# Patient Record
Sex: FEMALE | Race: WHITE | NOT HISPANIC OR LATINO | Employment: OTHER | URBAN - NONMETROPOLITAN AREA
[De-identification: names, ages, dates, MRNs, and addresses within clinical notes are randomized per-mention and may not be internally consistent; named-entity substitution may affect disease eponyms.]

---

## 2017-01-15 ENCOUNTER — TRANSFERRED RECORDS (OUTPATIENT)
Dept: HEALTH INFORMATION MANAGEMENT | Facility: HOSPITAL | Age: 37
End: 2017-01-15

## 2017-01-15 LAB
ALT SERPL-CCNC: 15 IU/L (ref 6–31)
AST SERPL-CCNC: 24 IU/L (ref 10–40)
CREAT SERPL-MCNC: 0.8 MG/DL (ref 0.4–1)
GLUCOSE SERPL-MCNC: 71 MG/DL (ref 70–100)
POTASSIUM SERPL-SCNC: 3 MEQ/L (ref 3.4–5.1)

## 2017-01-18 ENCOUNTER — TRANSFERRED RECORDS (OUTPATIENT)
Dept: HEALTH INFORMATION MANAGEMENT | Facility: HOSPITAL | Age: 37
End: 2017-01-18

## 2017-01-18 LAB
CREAT SERPL-MCNC: 0.81 MG/DL (ref 0.4–1)
GLUCOSE SERPL-MCNC: 82 MG/DL (ref 70–100)
POTASSIUM SERPL-SCNC: 4.8 MEQ/L (ref 3.4–5.1)

## 2017-01-23 ENCOUNTER — TELEPHONE (OUTPATIENT)
Dept: FAMILY MEDICINE | Facility: OTHER | Age: 37
End: 2017-01-23

## 2017-01-23 DIAGNOSIS — K21.9 GASTROESOPHAGEAL REFLUX DISEASE WITHOUT ESOPHAGITIS: Primary | ICD-10-CM

## 2017-01-23 RX ORDER — OMEPRAZOLE 40 MG/1
CAPSULE, DELAYED RELEASE ORAL
Qty: 90 CAPSULE | Refills: 1 | Status: SHIPPED | OUTPATIENT
Start: 2017-01-23 | End: 2017-01-26

## 2017-01-23 NOTE — TELEPHONE ENCOUNTER
Reason for call:  Medication    1. Medication Name? omeprazole (PRILOSEC) 40 MG capsule  2. Is this request for a refill? Yes  3. What Pharmacy do you use? [facility]  4. Have you contacted your pharmacy? No    5. If yes, when?  (Please note that the turn-around-time for prescriptions is 72 business hours; I am sending your request at this time. SEND TO  [facility]  )  Description: Pt states the insurance sent a letter in December stating that they will not cover anymore refills until Provider okay's it.  Pt has a med review appt with Rad Anderson 01/26/2017 because pt is coming in with multiple concerns and HOMER Dailey didn't have any openings this week.  Pt states she is currently out of the medication being requested and is hoping provider will refill it in the meantime.  Nurse, please advise.  Was an appointment offered for this a call? Yes, 01/26/2017 med review and other multiple concerns with Rad Anderson  Preferred method for responding to this messageTelephone Call: [phone]   If we cannot reach you directly, may we leave a detailed response at the number you provided? Yes  Can this message wait until your PCP/Provider returns if not available today? Not applicable, PCP Asim is in today.

## 2017-01-26 ENCOUNTER — OFFICE VISIT (OUTPATIENT)
Dept: FAMILY MEDICINE | Facility: OTHER | Age: 37
End: 2017-01-26
Attending: NURSE PRACTITIONER
Payer: COMMERCIAL

## 2017-01-26 VITALS
HEART RATE: 100 BPM | DIASTOLIC BLOOD PRESSURE: 72 MMHG | BODY MASS INDEX: 25.1 KG/M2 | WEIGHT: 147 LBS | RESPIRATION RATE: 14 BRPM | SYSTOLIC BLOOD PRESSURE: 122 MMHG | HEIGHT: 64 IN | TEMPERATURE: 99.3 F

## 2017-01-26 DIAGNOSIS — R11.0 NAUSEA: ICD-10-CM

## 2017-01-26 DIAGNOSIS — F41.9 ANXIETY: ICD-10-CM

## 2017-01-26 DIAGNOSIS — G43.009 MIGRAINE WITHOUT AURA AND WITHOUT STATUS MIGRAINOSUS, NOT INTRACTABLE: ICD-10-CM

## 2017-01-26 DIAGNOSIS — F51.01 PRIMARY INSOMNIA: ICD-10-CM

## 2017-01-26 DIAGNOSIS — Z71.89 ACP (ADVANCE CARE PLANNING): Primary | ICD-10-CM

## 2017-01-26 DIAGNOSIS — K21.9 GASTROESOPHAGEAL REFLUX DISEASE WITHOUT ESOPHAGITIS: ICD-10-CM

## 2017-01-26 LAB — ERYTHROCYTE [SEDIMENTATION RATE] IN BLOOD BY WESTERGREN METHOD: 9 MM/H (ref 0–20)

## 2017-01-26 PROCEDURE — 80048 BASIC METABOLIC PNL TOTAL CA: CPT | Performed by: NURSE PRACTITIONER

## 2017-01-26 PROCEDURE — 36415 COLL VENOUS BLD VENIPUNCTURE: CPT | Performed by: NURSE PRACTITIONER

## 2017-01-26 PROCEDURE — 99214 OFFICE O/P EST MOD 30 MIN: CPT | Performed by: NURSE PRACTITIONER

## 2017-01-26 PROCEDURE — 84443 ASSAY THYROID STIM HORMONE: CPT | Performed by: NURSE PRACTITIONER

## 2017-01-26 PROCEDURE — 85652 RBC SED RATE AUTOMATED: CPT | Performed by: NURSE PRACTITIONER

## 2017-01-26 RX ORDER — CYCLOBENZAPRINE HCL 10 MG
10 TABLET ORAL 3 TIMES DAILY PRN
Qty: 90 TABLET | Refills: 1 | Status: SHIPPED | OUTPATIENT
Start: 2017-01-26 | End: 2017-03-10

## 2017-01-26 RX ORDER — PROCHLORPERAZINE MALEATE 10 MG
10 TABLET ORAL EVERY 6 HOURS PRN
Qty: 20 TABLET | Refills: 1 | Status: SHIPPED | OUTPATIENT
Start: 2017-01-26 | End: 2017-03-17

## 2017-01-26 RX ORDER — SERTRALINE HYDROCHLORIDE 100 MG/1
100 TABLET, FILM COATED ORAL DAILY
Qty: 180 TABLET | Refills: 1 | Status: SHIPPED | OUTPATIENT
Start: 2017-01-26 | End: 2017-03-08

## 2017-01-26 RX ORDER — TEMAZEPAM 30 MG
30 CAPSULE ORAL
Qty: 30 CAPSULE | Refills: 3 | Status: SHIPPED | OUTPATIENT
Start: 2017-01-26 | End: 2017-06-23

## 2017-01-26 RX ORDER — OMEPRAZOLE 40 MG/1
CAPSULE, DELAYED RELEASE ORAL
Qty: 90 CAPSULE | Refills: 1 | Status: SHIPPED | OUTPATIENT
Start: 2017-01-26 | End: 2017-07-26

## 2017-01-26 RX ORDER — SUMATRIPTAN 100 MG/1
100 TABLET, FILM COATED ORAL
Qty: 12 TABLET | Refills: 5 | Status: SHIPPED | OUTPATIENT
Start: 2017-01-26 | End: 2017-03-17

## 2017-01-26 RX ORDER — IBUPROFEN 800 MG/1
800 TABLET, FILM COATED ORAL EVERY 8 HOURS PRN
Qty: 90 TABLET | Refills: 1 | Status: SHIPPED | OUTPATIENT
Start: 2017-01-26 | End: 2017-08-23

## 2017-01-26 ASSESSMENT — ANXIETY QUESTIONNAIRES
IF YOU CHECKED OFF ANY PROBLEMS ON THIS QUESTIONNAIRE, HOW DIFFICULT HAVE THESE PROBLEMS MADE IT FOR YOU TO DO YOUR WORK, TAKE CARE OF THINGS AT HOME, OR GET ALONG WITH OTHER PEOPLE: VERY DIFFICULT
6. BECOMING EASILY ANNOYED OR IRRITABLE: MORE THAN HALF THE DAYS
1. FEELING NERVOUS, ANXIOUS, OR ON EDGE: NEARLY EVERY DAY
3. WORRYING TOO MUCH ABOUT DIFFERENT THINGS: NOT AT ALL
7. FEELING AFRAID AS IF SOMETHING AWFUL MIGHT HAPPEN: NOT AT ALL
GAD7 TOTAL SCORE: 11
5. BEING SO RESTLESS THAT IT IS HARD TO SIT STILL: MORE THAN HALF THE DAYS
2. NOT BEING ABLE TO STOP OR CONTROL WORRYING: SEVERAL DAYS

## 2017-01-26 ASSESSMENT — PATIENT HEALTH QUESTIONNAIRE - PHQ9: 5. POOR APPETITE OR OVEREATING: NEARLY EVERY DAY

## 2017-01-26 ASSESSMENT — PAIN SCALES - GENERAL: PAINLEVEL: SEVERE PAIN (6)

## 2017-01-26 NOTE — PROGRESS NOTES
SUBJECTIVE:  Nasra Ortiz, 36 year old, female presents with the following Chief Complaint(s) with HPI to follow:  Chief Complaint   Patient presents with     Clinic Care Coordination - Follow-up     er visit at [facility] 1/15 and 18 for headaches     Depression     and anxiety follow up      Sleep Problem     Gastrophageal Reflux     needs refills      *_* Health Care Directive *_*     declined          HPI:  Nasra presents today with the above concern.        Migraine Follow-Up    Headaches symptoms:  Worsened used to be 3 per month now daily.  Cannot sleep, anxiety is increased due to headaches.      Frequency (per week or month): daily    Duration of headaches: hours to all day     Able to do normal daily activities/work with migraines: sometimes, but lately no    Rescue/Relief medication:sumatriptan (Imitrex)              Effectiveness: used to work well, now not providing relief; taking 25mg    Preventative medication: None    Neurologic complications: No new stroke-like symptoms, loss of vision or speech, numbness or weakness    Starts at the base of neck and wraps up around head    She was in the ED last week, labs were normal, normal head CT.  Has not had MRA     GERD:   Has been on omeprazole for the past years.  It has controlled symptoms well.  She has tried to decrease and stop medication but heartburn and reflux returns in 2 days.      Anxiety: is taking zoloft, 20mg daily and needs a refill.      Patient Active Problem List   Diagnosis     Migraine     Hypothyroidism     Anxiety     Primary insomnia     ACP (advance care planning)       Past Medical History   Diagnosis Date     Anxiety disorder 2012     Primary insomnia 2015     Migraines        Past Surgical History   Procedure Laterality Date     Oopherectomy right  2012      section  2012       Family History   Problem Relation Age of Onset     Cardiovascular Father        Social History   Substance Use Topics      Smoking status: Current Some Day Smoker -- 0.40 packs/day for 10 years     Types: Cigarettes     Smokeless tobacco: Never Used      Comment: tobacco cessation discuseed - tried to quit: no - passive smoke exposure quitting on own      Alcohol Use: No       Current Outpatient Prescriptions   Medication Sig Dispense Refill     levonorgestrel (MIRENA) 20 MCG/24HR IUD 1 each by Intrauterine route once       omeprazole (PRILOSEC) 40 MG capsule TAKE ONE CAPSULE BY MOUTH EVERY DAY. TAKE 30 TO 60 MINUTES BEFORE A MEAL 90 capsule 1     SUMAtriptan (IMITREX) 25 MG tablet Take 1-2 tablets (25-50 mg) by mouth at onset of headache for migraine May repeat dose in 2 hours.  Do not exceed 200 mg in 24 hours 9 tablet 1     sertraline (ZOLOFT) 100 MG tablet Take 1 tablet (100 mg) by mouth 2 times daily 180 tablet 1     multivitamin, therapeutic (THERA-VIT) TABS Take 1 tablet by mouth daily         Allergies   Allergen Reactions     Penicillins Rash       BP Readings from Last 3 Encounters:   01/26/17 122/72   12/05/16 132/78   08/01/16 128/96    Wt Readings from Last 3 Encounters:   01/26/17 147 lb (66.679 kg)   07/18/16 156 lb 6.4 oz (70.943 kg)   01/11/16 150 lb (68.04 kg)                    REVIEW OF SYSTEMS  Skin: negative  Eyes: negative  Ears/Nose/Throat: negative  Respiratory: No shortness of breath, dyspnea on exertion, cough, or hemoptysis  Cardiovascular: negative  Gastrointestinal: nausea with  headache  Genitourinary: negative  Musculoskeletal: neck tightness  Neurologic: as above  Psychiatric: as above  Hematologic/Lymphatic/Immunologic: negative  Endocrine: negative    OBJECTIVE:    B/P: 122/72, T: 99.3, P: 100, R: 14, W: 147 lbs 0 oz, BMI: Body mass index is 25.22 kg/(m^2).  Constitutional: healthy, alert and no distress  Neck: neck supple, no lymphadenopathy, trachea midline, thyroid symmetrical with out nodules noted.  Carotid arteries without bruit  Cardiovascular: RRR. No murmurs, clicks gallops or  rub  Respiratory:  Good diaphragmatic excursion. Lungs clear  Psychiatric: mentation appears normal and affect normal/bright    IMAGING:  This document is currently in Final Status    Exam Accession# [id number]    Exam:CT HEAD WO CONTRAST    History: Headache    Comparisons:None.    Technique: CT scan of the brain without contrast    Findings: The ventricular system is normal in size. There are no masses, ventricular shifts, or extracerebral collections. The brainstem and cerebellum appear normal. The cranial vault is intact. The paranasal sinuses are clear.         Impression: Normal brain    Electronically Signed By: Sj Ramey M.D. on 1/15/2017 7:07 PM  Dictated: Tanvri Gustafson       ASSESSMENT / PLAN:  1. ACP (advance care planning)    2. Primary insomnia  chronic  - temazepam (RESTORIL) 30 MG capsule; Take 1 capsule (30 mg) by mouth nightly as needed for sleep  Dispense: 30 capsule; Refill: 3  - SLEEP EVALUATION & MANAGEMENT REFERRAL - ADULT; Future    3. Migraine without aura and without status migrainosus, not intractable  Chronic, symptomatic  Consider tension headache  - TSH with free T4 reflex  - Erythrocyte sedimentation rate auto  - NEUROLOGY ADULT REFERRAL  - Basic metabolic panel  - MRA Angiogram Head w/o Contrast  - SUMAtriptan (IMITREX) 100 MG tablet; Take 1 tablet (100 mg) by mouth at onset of headache for migraine May repeat dose in 2 hours.  Do not exceed 200 mg in 24 hours  Dispense: 12 tablet; Refill: 5  - cyclobenzaprine (FLEXERIL) 10 MG tablet; Take 1 tablet (10 mg) by mouth 3 times daily as needed for muscle spasms  Dispense: 90 tablet; Refill: 1  - ibuprofen (ADVIL/MOTRIN) 800 MG tablet; Take 1 tablet (800 mg) by mouth every 8 hours as needed for moderate pain  Dispense: 90 tablet; Refill: 1    4. Nausea  With headaches  - prochlorperazine (COMPAZINE) 10 MG tablet; Take 1 tablet (10 mg) by mouth every 6 hours as needed for nausea or vomiting  Dispense: 20 tablet; Refill: 1    5.  Anxiety  - sertraline (ZOLOFT) 100 MG tablet; Take 1 tablet (100 mg) by mouth daily  Dispense: 180 tablet; Refill: 1    6. Gastroesophageal reflux disease without esophagitis  - omeprazole (PRILOSEC) 40 MG capsule; TAKE ONE CAPSULE BY MOUTH EVERY DAY. TAKE 30 TO 60 MINUTES BEFORE A MEAL  Dispense: 90 capsule; Refill: 1      Follow-up in 2 weeks or as needed for acute concerns    Manuela Keenan,   Certified Adult Nurse Practitioner  [phone]

## 2017-01-26 NOTE — NURSING NOTE
"Chief Complaint   Patient presents with     Clinic Care Coordination - Follow-up     er visit at [facility] 1/15 and 18 for headaches     Depression     and anxiety follow up      Sleep Problem     Gastrophageal Reflux     needs refills      *_* Health Care Directive *_*     declined       Initial /72 mmHg  Pulse 100  Temp(Src) 99.3  F (37.4  C) (Tympanic)  Resp 14  Ht 5' 4\" (1.626 m)  Wt 147 lb (66.679 kg)  BMI 25.22 kg/m2 Estimated body mass index is 25.22 kg/(m^2) as calculated from the following:    Height as of this encounter: 5' 4\" (1.626 m).    Weight as of this encounter: 147 lb (66.679 kg).  BP completed using cuff size: paxton TOHMPSON      "

## 2017-01-26 NOTE — PATIENT INSTRUCTIONS
ASSESSMENT / PLAN:  1. ACP (advance care planning)    2. Primary insomnia  chronic  - temazepam (RESTORIL) 30 MG capsule; Take 1 capsule (30 mg) by mouth nightly as needed for sleep  Dispense: 30 capsule; Refill: 3  - SLEEP EVALUATION & MANAGEMENT REFERRAL - ADULT; Future    3. Migraine without aura and without status migrainosus, not intractable  Chronic, symptomatic  Consider tension headache  - TSH with free T4 reflex  - Erythrocyte sedimentation rate auto  - NEUROLOGY ADULT REFERRAL  - Basic metabolic panel  - MRA Angiogram Head w/o Contrast  - SUMAtriptan (IMITREX) 100 MG tablet; Take 1 tablet (100 mg) by mouth at onset of headache for migraine May repeat dose in 2 hours.  Do not exceed 200 mg in 24 hours  Dispense: 12 tablet; Refill: 5  - cyclobenzaprine (FLEXERIL) 10 MG tablet; Take 1 tablet (10 mg) by mouth 3 times daily as needed for muscle spasms  Dispense: 90 tablet; Refill: 1  - ibuprofen (ADVIL/MOTRIN) 800 MG tablet; Take 1 tablet (800 mg) by mouth every 8 hours as needed for moderate pain  Dispense: 90 tablet; Refill: 1    4. Nausea  With headaches  - prochlorperazine (COMPAZINE) 10 MG tablet; Take 1 tablet (10 mg) by mouth every 6 hours as needed for nausea or vomiting  Dispense: 20 tablet; Refill: 1    5. Anxiety  - sertraline (ZOLOFT) 100 MG tablet; Take 1 tablet (100 mg) by mouth daily  Dispense: 180 tablet; Refill: 1    6. Gastroesophageal reflux disease without esophagitis  - omeprazole (PRILOSEC) 40 MG capsule; TAKE ONE CAPSULE BY MOUTH EVERY DAY. TAKE 30 TO 60 MINUTES BEFORE A MEAL  Dispense: 90 capsule; Refill: 1      Follow-up in 2 weeks or as needed for acute concerns    Manuela Keenan,   Certified Adult Nurse Practitioner  [phone]

## 2017-01-27 LAB
ANION GAP SERPL CALCULATED.3IONS-SCNC: 9 MMOL/L (ref 3–14)
BUN SERPL-MCNC: 10 MG/DL (ref 7–30)
CALCIUM SERPL-MCNC: 8.7 MG/DL (ref 8.5–10.1)
CHLORIDE SERPL-SCNC: 105 MMOL/L (ref 94–109)
CO2 SERPL-SCNC: 26 MMOL/L (ref 20–32)
CREAT SERPL-MCNC: 0.72 MG/DL (ref 0.52–1.04)
GFR SERPL CREATININE-BSD FRML MDRD: NORMAL ML/MIN/1.7M2
GLUCOSE SERPL-MCNC: 86 MG/DL (ref 70–99)
POTASSIUM SERPL-SCNC: 4.2 MMOL/L (ref 3.4–5.3)
SODIUM SERPL-SCNC: 140 MMOL/L (ref 133–144)
TSH SERPL DL<=0.005 MIU/L-ACNC: 0.92 MU/L (ref 0.4–4)

## 2017-01-27 ASSESSMENT — PATIENT HEALTH QUESTIONNAIRE - PHQ9: SUM OF ALL RESPONSES TO PHQ QUESTIONS 1-9: 8

## 2017-01-27 ASSESSMENT — ANXIETY QUESTIONNAIRES: GAD7 TOTAL SCORE: 11

## 2017-01-31 ENCOUNTER — TELEPHONE (OUTPATIENT)
Dept: FAMILY MEDICINE | Facility: OTHER | Age: 37
End: 2017-01-31

## 2017-01-31 NOTE — TELEPHONE ENCOUNTER
Call from MRI dept. clarifing if you just wants the MRA which is of the vessels or a Brain MRI  Appoint on the 3rd    Them know before then

## 2017-02-01 NOTE — TELEPHONE ENCOUNTER
MRA - she already had a CT that was normal.    This is ordered due to increased frequency and intensity of migraine.  Does radiology have any recommendation?

## 2017-02-08 ENCOUNTER — TELEPHONE (OUTPATIENT)
Dept: MRI IMAGING | Facility: HOSPITAL | Age: 37
End: 2017-02-08

## 2017-03-08 DIAGNOSIS — F41.9 ANXIETY: ICD-10-CM

## 2017-03-08 RX ORDER — SERTRALINE HYDROCHLORIDE 100 MG/1
200 TABLET, FILM COATED ORAL DAILY
Qty: 180 TABLET | Refills: 1 | Status: SHIPPED | OUTPATIENT
Start: 2017-03-08 | End: 2017-08-23

## 2017-03-08 NOTE — TELEPHONE ENCOUNTER
Last refill for Zoloft 3.7.17 #30.  Last office visit 1.26.17.  Request states that pt is taking 200mg daily.

## 2017-03-10 ENCOUNTER — TRANSFERRED RECORDS (OUTPATIENT)
Dept: HEALTH INFORMATION MANAGEMENT | Facility: HOSPITAL | Age: 37
End: 2017-03-10

## 2017-03-17 ENCOUNTER — OFFICE VISIT (OUTPATIENT)
Dept: FAMILY MEDICINE | Facility: OTHER | Age: 37
End: 2017-03-17
Attending: NURSE PRACTITIONER
Payer: MEDICAID

## 2017-03-17 VITALS
BODY MASS INDEX: 26.12 KG/M2 | HEIGHT: 64 IN | WEIGHT: 153 LBS | HEART RATE: 72 BPM | RESPIRATION RATE: 14 BRPM | TEMPERATURE: 99.6 F | DIASTOLIC BLOOD PRESSURE: 76 MMHG | SYSTOLIC BLOOD PRESSURE: 122 MMHG

## 2017-03-17 DIAGNOSIS — G43.009 MIGRAINE WITHOUT AURA AND WITHOUT STATUS MIGRAINOSUS, NOT INTRACTABLE: ICD-10-CM

## 2017-03-17 DIAGNOSIS — F51.01 PRIMARY INSOMNIA: ICD-10-CM

## 2017-03-17 DIAGNOSIS — F41.9 ANXIETY: Primary | ICD-10-CM

## 2017-03-17 PROCEDURE — 99213 OFFICE O/P EST LOW 20 MIN: CPT | Performed by: NURSE PRACTITIONER

## 2017-03-17 PROCEDURE — 99212 OFFICE O/P EST SF 10 MIN: CPT

## 2017-03-17 RX ORDER — GABAPENTIN 300 MG/1
CAPSULE ORAL
Qty: 90 CAPSULE | Refills: 0 | Status: SHIPPED | OUTPATIENT
Start: 2017-03-17 | End: 2017-04-17 | Stop reason: SINTOL

## 2017-03-17 ASSESSMENT — ANXIETY QUESTIONNAIRES
IF YOU CHECKED OFF ANY PROBLEMS ON THIS QUESTIONNAIRE, HOW DIFFICULT HAVE THESE PROBLEMS MADE IT FOR YOU TO DO YOUR WORK, TAKE CARE OF THINGS AT HOME, OR GET ALONG WITH OTHER PEOPLE: VERY DIFFICULT
6. BECOMING EASILY ANNOYED OR IRRITABLE: SEVERAL DAYS
7. FEELING AFRAID AS IF SOMETHING AWFUL MIGHT HAPPEN: NOT AT ALL
2. NOT BEING ABLE TO STOP OR CONTROL WORRYING: SEVERAL DAYS
5. BEING SO RESTLESS THAT IT IS HARD TO SIT STILL: SEVERAL DAYS
3. WORRYING TOO MUCH ABOUT DIFFERENT THINGS: MORE THAN HALF THE DAYS
GAD7 TOTAL SCORE: 11
1. FEELING NERVOUS, ANXIOUS, OR ON EDGE: NEARLY EVERY DAY

## 2017-03-17 ASSESSMENT — PATIENT HEALTH QUESTIONNAIRE - PHQ9: 5. POOR APPETITE OR OVEREATING: NEARLY EVERY DAY

## 2017-03-17 ASSESSMENT — PAIN SCALES - GENERAL: PAINLEVEL: NO PAIN (0)

## 2017-03-17 NOTE — NURSING NOTE
"Chief Complaint   Patient presents with     Depression     Anxiety     Sleep Problem     insurance will only pay for 15/month and will not allow her to pay cash for 15 tabs        Initial /76 (BP Location: Left arm, Patient Position: Chair, Cuff Size: Adult Large)  Pulse 72  Temp 99.6  F (37.6  C) (Tympanic)  Resp 14  Ht 5' 4\" (1.626 m)  Wt 153 lb (69.4 kg)  BMI 26.26 kg/m2 Estimated body mass index is 26.26 kg/(m^2) as calculated from the following:    Height as of this encounter: 5' 4\" (1.626 m).    Weight as of this encounter: 153 lb (69.4 kg).  Medication Reconciliation: dennis THOMPSON      "

## 2017-03-17 NOTE — MR AVS SNAPSHOT
After Visit Summary   3/17/2017    Nasra Ortiz    MRN: [id number]           Patient Information     Date Of Birth          1980        Visit Information        Provider Department      3/17/2017 3:30 PM Manuela Keenan NP [facility]        Today's Diagnoses     Anxiety    -  1    Primary insomnia        Migraine without aura and without status migrainosus, not intractable          Care Instructions          ASSESSMENT / PLAN:  1. Anxiety  Continue zoloft for now  - gabapentin (NEURONTIN) 300 MG capsule; Take 1 tablet (300 mg) every night for 1-3 days, then 1 tablet twice daily for 1-3 days, then 1 tablet three times daily  Dispense: 90 capsule; Refill: 0    2. Primary insomnia  Continue restoril for now  Sleep study is reordered    3. Migraine without aura and without status migrainosus, not intractable  Continue following with neurology        Follow-up in 1 month or as needed for acute concerns    Manuela Keenan,   Certified Adult Nurse Practitioner  [phone]            Follow-ups after your visit        Additional Services     SLEEP EVALUATION & MANAGEMENT REFERRAL - ADULT       Please be aware that coverage of these services is subject to the terms and limitations of your health insurance plan.  Call member services at your health plan with any benefit or coverage questions.      Please bring the following to your appointment:    >>   List of current medications   >>   This referral request   >>   Any documents/labs given to you for this referral    [place]    Father with sleep apnea.  She has insomnia                  Future tests that were ordered for you today     Open Future Orders        Priority Expected Expires Ordered    SLEEP EVALUATION & MANAGEMENT REFERRAL - ADULT Routine  3/17/2018 3/17/2017            Who to contact     If you have questions or need follow up information about today's clinic visit or your schedule please contact [facility] directly at [phone].  Normal or non-critical lab and imaging results will be communicated to you by MyChart, letter or phone within 4 business days after the clinic has received the results. If you do not hear from us within 7 days, please contact the clinic through [employer] or phone. If you have a critical or abnormal lab result, we will notify you by phone as soon as possible.  Submit refill requests through [employer] or call your pharmacy and they will forward the refill request to us. Please allow 3 business days for your refill to be completed.          Additional Information About Your Visit        [employer] [employer] Information     [employer] gives you secure access to your electronic health record. If you see a primary care provider, you can also send messages to your care team and make appointments. If you have questions, please call your primary care clinic.  If you do not have a primary care provider, please call [phone] and they will assist you.        Care EveryWhere ID     This is your Care EveryWhere ID. This could be used by other organizations to access your [facility] medical records  [id number]        Your Vitals Were     Pulse Temperature Respirations Height BMI (Body Mass Index)       72 99.6  F (37.6  C) (Tympanic) 14 5' 4\" (1.626 m) 26.26 kg/m2        Blood Pressure from Last 3 Encounters:   03/17/17 122/76   01/26/17 122/72   12/05/16 132/78    Weight from Last 3 Encounters:   03/17/17 153 lb (69.4 kg)   01/26/17 147 lb (66.7 kg)   07/18/16 156 lb 6.4 oz (70.9 kg)                 Today's Medication Changes          These changes are accurate as of: 3/17/17  3:51 PM.  If you have any questions, ask your nurse or doctor.               Start taking these medicines.        Dose/Directions    gabapentin 300 MG capsule   Commonly known as:  NEURONTIN   Used for:  Anxiety   Started by:  Manuela Keenan NP        Take 1 tablet (300 mg) every night for 1-3 days, then 1 tablet twice " daily for 1-3 days, then 1 tablet three times daily   Quantity:  90 capsule   Refills:  0         Stop taking these medicines if you haven't already. Please contact your care team if you have questions.     cyclobenzaprine 10 MG tablet   Commonly known as:  FLEXERIL   Stopped by:  Manuela Keenan NP           prochlorperazine 10 MG tablet   Commonly known as:  COMPAZINE   Stopped by:  Manuela Keenan NP           SUMAtriptan 100 MG tablet   Commonly known as:  IMITREX   Stopped by:  Manuela Keenan NP                Where to get your medicines      These medications were sent to [facility] [address] [place] [address] - [address]   [address] , [facility] [address]     Phone:  [phone]     gabapentin 300 MG capsule                Primary Care Provider Office Phone # Fax #    Adrienne EnglandLINN wood [phone]       [address] [address]        Thank you!     Thank you for choosing [facility]  for your care. Our goal is always to provide you with excellent care. Hearing back from our patients is one way we can continue to improve our services. Please take a few minutes to complete the written survey that you may receive in the mail after your visit with us. Thank you!             Your Updated Medication List - Protect others around you: Learn how to safely use, store and throw away your medicines at www.disposemymeds.org.          This list is accurate as of: 3/17/17  3:51 PM.  Always use your most recent med list.                   Brand Name Dispense Instructions for use    gabapentin 300 MG capsule    NEURONTIN    90 capsule    Take 1 tablet (300 mg) every night for 1-3 days, then 1 tablet twice daily for 1-3 days, then 1 tablet three times daily       ibuprofen 800 MG tablet    ADVIL/MOTRIN    90 tablet    Take 1 tablet (800 mg) by mouth every 8 hours as needed for moderate pain       levonorgestrel 20  MCG/24HR IUD    MIRENA     1 each by Intrauterine route once       MAXALT-MLT PO      Take by mouth once as needed for migraine Unknown dose by Manuel Lozano PA-C neurology       multivitamin, therapeutic Tabs tablet      Take 1 tablet by mouth daily       omeprazole 40 MG capsule    priLOSEC    90 capsule    TAKE ONE CAPSULE BY MOUTH EVERY DAY. TAKE 30 TO 60 MINUTES BEFORE A MEAL       promethazine 25 MG tablet    PHENERGAN     Take by mouth every 6 hours as needed for nausea (unknown dose by neurolgy /manuel Lozano PA-C)       PROPRANOLOL HCL PO      Take 60 mg by mouth daily ER       sertraline 100 MG tablet    ZOLOFT    180 tablet    Take 2 tablets (200 mg) by mouth daily       temazepam 30 MG capsule    RESTORIL    30 capsule    Take 1 capsule (30 mg) by mouth nightly as needed for sleep

## 2017-03-17 NOTE — PROGRESS NOTES
"SUBJECTIVE:  Nasra Ortiz, 37 year old, female presents with the following Chief Complaint(s) with HPI to follow:  Chief Complaint   Patient presents with     Depression     Anxiety     Sleep Problem     insurance will only pay for 15/month and will not allow her to pay cash for 15 tabs           HPI:  Nasra presents today with the above concern.        Depression Follow-Up    Status since last visit: depression is fine, anxiety is \"awful.\"  Does not feel zoloft is owrking but afraid to stop or change.     See PHQ-9 for current symptoms.    Other associated symptoms:as above    Complicating factors:   Significant life event: No   Current substance abuse: None  Anxiety / Panic symptoms: No  PHQ-9  English PHQ-9   Any Language        Migraine Follow-Up    Headaches symptoms:  Stable saw neurology, was started on propranolol.  Changed to maxalt rescue medication    Frequency (per week or month): 18 per month    Duration of headaches: hours to days     Able to do normal daily activities/work with migraines: No -     Rescue/Relief medication:Tylenol, Excedrin, sumatriptan (Imitrex) and Maxalt              Effectiveness: moderate relief    Preventative medication: propranolol - just started     Neurologic complications: No new stroke-like symptoms, loss of vision or speech, numbness or weakness       She is requesting a referral for sleep study due to insomnia.  She has tried several medications, no relief.  Feels anxiety may be contributing.  States her father had sleep apnea, she denies snoring or periods of apnea, just insomnia.      Patient Active Problem List   Diagnosis     Migraine     Hypothyroidism     Anxiety     Primary insomnia     ACP (advance care planning)       Past Medical History   Diagnosis Date     Anxiety disorder 2012     Migraines      Primary insomnia 2015       Past Surgical History   Procedure Laterality Date     Oopherectomy right  2012      section  2012 "       Family History   Problem Relation Age of Onset     Hypertension Mother      Hyperlipidemia Mother      Cardiovascular Father      Hypertension Father      Hyperlipidemia Father      Heart Failure Father      Sleep Apnea Father        Social History   Substance Use Topics     Smoking status: Current Some Day Smoker     Packs/day: 0.40     Years: 10.00     Types: Cigarettes     Smokeless tobacco: Never Used      Comment: tobacco cessation discuseed - tried to quit: no - passive smoke exposure quitting on own      Alcohol use No       Current Outpatient Prescriptions   Medication Sig Dispense Refill     PROPRANOLOL HCL PO Take 60 mg by mouth daily ER       Rizatriptan Benzoate (MAXALT-MLT PO) Take by mouth once as needed for migraine Unknown dose by Manuel Lozano PA-C neurology       promethazine (PHENERGAN) 25 MG tablet Take by mouth every 6 hours as needed for nausea (unknown dose by neurolgy /manuel Lozano PA-C)       cyclobenzaprine (FLEXERIL) 10 MG tablet Take 1 tablet (10 mg) by mouth 2 times daily as needed for muscle spasms 90 tablet 0     sertraline (ZOLOFT) 100 MG tablet Take 2 tablets (200 mg) by mouth daily 180 tablet 1     levonorgestrel (MIRENA) 20 MCG/24HR IUD 1 each by Intrauterine route once       temazepam (RESTORIL) 30 MG capsule Take 1 capsule (30 mg) by mouth nightly as needed for sleep 30 capsule 3     ibuprofen (ADVIL/MOTRIN) 800 MG tablet Take 1 tablet (800 mg) by mouth every 8 hours as needed for moderate pain 90 tablet 1     omeprazole (PRILOSEC) 40 MG capsule TAKE ONE CAPSULE BY MOUTH EVERY DAY. TAKE 30 TO 60 MINUTES BEFORE A MEAL 90 capsule 1     multivitamin, therapeutic (THERA-VIT) TABS Take 1 tablet by mouth daily         Allergies   Allergen Reactions     Penicillins Rash       Recent Labs   Lab Test  01/26/17   1603 01/18/17 01/15/17  08/01/16   1905  07/25/15   1225   ALT   --    --   15  19  22   CR  0.72  0.81  0.80  0.65  0.71   GFRESTIMATED  >90  Non  GFR  Calc     --    --   >90  Non  GFR Calc    >90  Non  GFR Calc     GFRESTBLACK  >90   GFR Calc     --    --   >90   GFR Calc    >90   GFR Calc     POTASSIUM  4.2  4.8  3.0*  3.6  4.0   TSH  0.92   --    --   1.21   --       BP Readings from Last 3 Encounters:   03/17/17 122/76   01/26/17 122/72   12/05/16 132/78    Wt Readings from Last 3 Encounters:   03/17/17 153 lb (69.4 kg)   01/26/17 147 lb (66.7 kg)   07/18/16 156 lb 6.4 oz (70.9 kg)                    REVIEW OF SYSTEMS  Skin: negative  Eyes: negative  Ears/Nose/Throat: negative  Respiratory: No shortness of breath, dyspnea on exertion, cough, or hemoptysis  Cardiovascular: negative  Gastrointestinal: GERD, well controlled  Genitourinary: negative  Musculoskeletal: negative  Neurologic: migraine headaches  Psychiatric: anxiety  Hematologic/Lymphatic/Immunologic: negative  Endocrine: negative    OBJECTIVE:    B/P: 122/76, T: 99.6, P: 72, R: 14, W: 153 lbs 0 oz, BMI: Body mass index is 26.26 kg/(m^2).  Constitutional: healthy, alert and no distress  Psychiatric: mentation appears normal and affect normal/bright      ASSESSMENT / PLAN:  1. Anxiety  Continue zoloft for now  - gabapentin (NEURONTIN) 300 MG capsule; Take 1 tablet (300 mg) every night for 1-3 days, then 1 tablet twice daily for 1-3 days, then 1 tablet three times daily  Dispense: 90 capsule; Refill: 0    2. Primary insomnia  Continue restoril for now  Sleep consult is reordered    3. Migraine without aura and without status migrainosus, not intractable  Continue following with neurology        Follow-up in 1 month or as needed for acute concerns    Manuela Keenan,   Certified Adult Nurse Practitioner  [phone]

## 2017-03-17 NOTE — PATIENT INSTRUCTIONS
ASSESSMENT / PLAN:  1. Anxiety  Continue zoloft for now  - gabapentin (NEURONTIN) 300 MG capsule; Take 1 tablet (300 mg) every night for 1-3 days, then 1 tablet twice daily for 1-3 days, then 1 tablet three times daily  Dispense: 90 capsule; Refill: 0    2. Primary insomnia  Continue restoril for now  Sleep study is reordered    3. Migraine without aura and without status migrainosus, not intractable  Continue following with neurology        Follow-up in 1 month or as needed for acute concerns    Manuela Keenan,   Certified Adult Nurse Practitioner  [phone]

## 2017-03-18 ASSESSMENT — PATIENT HEALTH QUESTIONNAIRE - PHQ9: SUM OF ALL RESPONSES TO PHQ QUESTIONS 1-9: 4

## 2017-03-18 ASSESSMENT — ANXIETY QUESTIONNAIRES: GAD7 TOTAL SCORE: 11

## 2017-04-04 DIAGNOSIS — G43.009 MIGRAINE WITHOUT AURA AND WITHOUT STATUS MIGRAINOSUS, NOT INTRACTABLE: ICD-10-CM

## 2017-04-04 RX ORDER — CYCLOBENZAPRINE HCL 10 MG
10 TABLET ORAL 2 TIMES DAILY PRN
Qty: 90 TABLET | Refills: 0 | Status: SHIPPED | OUTPATIENT
Start: 2017-04-04 | End: 2017-06-15

## 2017-04-04 NOTE — TELEPHONE ENCOUNTER
flexeril      Last Written Prescription Date: 3/13/17  Last Fill Quantity: 90,  # refills: 0   Last Office Visit with [facility], [facility] or [facility] prescribing provider: 3/17/17                                         Next 5 appointments (look out 90 days)     Apr 17, 2017  4:00 PM CDT   (Arrive by 3:45 PM)   SHORT with Manuela Keenan NP   [facility] ([facility] )    [address]  [facility] [address]   [phone]

## 2017-04-17 ENCOUNTER — OFFICE VISIT (OUTPATIENT)
Dept: FAMILY MEDICINE | Facility: OTHER | Age: 37
End: 2017-04-17
Attending: NURSE PRACTITIONER
Payer: COMMERCIAL

## 2017-04-17 VITALS
TEMPERATURE: 99.2 F | DIASTOLIC BLOOD PRESSURE: 78 MMHG | BODY MASS INDEX: 26.8 KG/M2 | SYSTOLIC BLOOD PRESSURE: 122 MMHG | HEIGHT: 64 IN | WEIGHT: 157 LBS | HEART RATE: 88 BPM

## 2017-04-17 DIAGNOSIS — G43.009 MIGRAINE WITHOUT AURA AND WITHOUT STATUS MIGRAINOSUS, NOT INTRACTABLE: Primary | ICD-10-CM

## 2017-04-17 DIAGNOSIS — K21.9 GASTROESOPHAGEAL REFLUX DISEASE, ESOPHAGITIS PRESENCE NOT SPECIFIED: ICD-10-CM

## 2017-04-17 DIAGNOSIS — F51.01 PRIMARY INSOMNIA: ICD-10-CM

## 2017-04-17 DIAGNOSIS — R11.0 NAUSEA: ICD-10-CM

## 2017-04-17 PROCEDURE — 99214 OFFICE O/P EST MOD 30 MIN: CPT | Performed by: NURSE PRACTITIONER

## 2017-04-17 RX ORDER — PROMETHAZINE HYDROCHLORIDE 25 MG/1
25 TABLET ORAL EVERY 6 HOURS PRN
Qty: 60 TABLET | Refills: 1 | Status: SHIPPED | OUTPATIENT
Start: 2017-04-17 | End: 2017-11-13

## 2017-04-17 RX ORDER — LORAZEPAM 0.5 MG/1
0.5 TABLET ORAL
Qty: 20 TABLET | Refills: 0 | Status: SHIPPED | OUTPATIENT
Start: 2017-04-17 | End: 2017-05-12

## 2017-04-17 ASSESSMENT — ANXIETY QUESTIONNAIRES
6. BECOMING EASILY ANNOYED OR IRRITABLE: NEARLY EVERY DAY
2. NOT BEING ABLE TO STOP OR CONTROL WORRYING: NEARLY EVERY DAY
1. FEELING NERVOUS, ANXIOUS, OR ON EDGE: NEARLY EVERY DAY
5. BEING SO RESTLESS THAT IT IS HARD TO SIT STILL: NEARLY EVERY DAY
GAD7 TOTAL SCORE: 20
7. FEELING AFRAID AS IF SOMETHING AWFUL MIGHT HAPPEN: MORE THAN HALF THE DAYS
3. WORRYING TOO MUCH ABOUT DIFFERENT THINGS: NEARLY EVERY DAY
IF YOU CHECKED OFF ANY PROBLEMS ON THIS QUESTIONNAIRE, HOW DIFFICULT HAVE THESE PROBLEMS MADE IT FOR YOU TO DO YOUR WORK, TAKE CARE OF THINGS AT HOME, OR GET ALONG WITH OTHER PEOPLE: VERY DIFFICULT
4. TROUBLE RELAXING: NEARLY EVERY DAY

## 2017-04-17 ASSESSMENT — PAIN SCALES - GENERAL: PAINLEVEL: MODERATE PAIN (5)

## 2017-04-17 NOTE — MR AVS SNAPSHOT
After Visit Summary   4/17/2017    Nasra Ortiz    MRN: [id number]           Patient Information     Date Of Birth          1980        Visit Information        Provider Department      4/17/2017 4:00 PM Manuela Keenan NP [facility]        Today's Diagnoses     Migraine without aura and without status migrainosus, not intractable    -  1    Primary insomnia        Nausea        Gastroesophageal reflux disease, esophagitis presence not specified          Care Instructions        ASSESSMENT / PLAN:  1. Primary insomnia  chronic  - LORazepam (ATIVAN) 0.5 MG tablet; Take 1 tablet (0.5 mg) by mouth nightly as needed for anxiety  Dispense: 20 tablet; Refill: 0  - alternate with Restoril at bedtime - do not take both on the same night.      2. Migraine without aura and without status migrainosus, not intractable  Continue current plan, follow-up with neurology as scheduled    3. Nausea  With migraine  - promethazine (PHENERGAN) 25 MG tablet; Take 1 tablet (25 mg) by mouth every 6 hours as needed for nausea  Dispense: 60 tablet; Refill: 1    4. Gastroesophageal reflux disease, esophagitis presence not specified  Continue current plan      Follow-up in 6 months or as needed for acute concerns    Manuela Keenan,   Certified Adult Nurse Practitioner  [phone]        Follow-ups after your visit        Follow-up notes from your care team     Return in about 6 months (around 10/17/2017), or if symptoms worsen or fail to improve.      Your next 10 appointments already scheduled     Oct 18, 2017 10:00 AM CDT   (Arrive by 9:45 AM)   SHORT with Manuela Keenan NP   [facility] ([facility] )    [address]  [facility] [address]   [phone]              Who to contact     If you have questions or need follow up information about today's clinic visit or your schedule please contact [facility] directly at  "[phone].  Normal or non-critical lab and imaging results will be communicated to you by Lolayhart, letter or phone within 4 business days after the clinic has received the results. If you do not hear from us within 7 days, please contact the clinic through Muzookat or phone. If you have a critical or abnormal lab result, we will notify you by phone as soon as possible.  Submit refill requests through [employer] or call your pharmacy and they will forward the refill request to us. Please allow 3 business days for your refill to be completed.          Additional Information About Your Visit        Lolayhart Information     [employer] gives you secure access to your electronic health record. If you see a primary care provider, you can also send messages to your care team and make appointments. If you have questions, please call your primary care clinic.  If you do not have a primary care provider, please call [phone] and they will assist you.        Care EveryWhere ID     This is your Care EveryWhere ID. This could be used by other organizations to access your [facility] medical records  [id number]        Your Vitals Were     Pulse Temperature Height BMI (Body Mass Index)          88 99.2  F (37.3  C) (Tympanic) 5' 4\" (1.626 m) 26.95 kg/m2         Blood Pressure from Last 3 Encounters:   04/17/17 122/78   03/17/17 122/76   01/26/17 122/72    Weight from Last 3 Encounters:   04/17/17 157 lb (71.2 kg)   03/17/17 153 lb (69.4 kg)   01/26/17 147 lb (66.7 kg)              Today, you had the following     No orders found for display         Today's Medication Changes          These changes are accurate as of: 4/17/17 11:59 PM.  If you have any questions, ask your nurse or doctor.               Start taking these medicines.        Dose/Directions    LORazepam 0.5 MG tablet   Commonly known as:  ATIVAN   Used for:  Primary insomnia   Started by:  Manuela Keenan NP        Dose:  0.5 mg   Take 1 tablet (0.5 mg) by " mouth nightly as needed for anxiety   Quantity:  20 tablet   Refills:  0         These medicines have changed or have updated prescriptions.        Dose/Directions    promethazine 25 MG tablet   Commonly known as:  PHENERGAN   This may have changed:    - how much to take  - reasons to take this   Used for:  Nausea   Changed by:  Manuela Keenan NP        Dose:  25 mg   Take 1 tablet (25 mg) by mouth every 6 hours as needed for nausea   Quantity:  60 tablet   Refills:  1         Stop taking these medicines if you haven't already. Please contact your care team if you have questions.     gabapentin 300 MG capsule   Commonly known as:  NEURONTIN   Stopped by:  Manuela Keenan NP                Where to get your medicines      These medications were sent to [facility] [address] [place] [address] - [address]   [address] , [facility] [address]     Phone:  [phone]     promethazine 25 MG tablet         Some of these will need a paper prescription and others can be bought over the counter.  Ask your nurse if you have questions.     Bring a paper prescription for each of these medications     LORazepam 0.5 MG tablet                Primary Care Provider Office Phone # Fax #    Adrienne LINN Dailey [phone]       [address] [address]        Thank you!     Thank you for choosing [facility]  for your care. Our goal is always to provide you with excellent care. Hearing back from our patients is one way we can continue to improve our services. Please take a few minutes to complete the written survey that you may receive in the mail after your visit with us. Thank you!             Your Updated Medication List - Protect others around you: Learn how to safely use, store and throw away your medicines at www.disposemymeds.org.          This list is accurate as of: 4/17/17 11:59 PM.  Always use your most recent med list.                    Brand Name Dispense Instructions for use    cyclobenzaprine 10 MG tablet    FLEXERIL    90 tablet    Take 1 tablet (10 mg) by mouth 2 times daily as needed for muscle spasms       ibuprofen 800 MG tablet    ADVIL/MOTRIN    90 tablet    Take 1 tablet (800 mg) by mouth every 8 hours as needed for moderate pain       levonorgestrel 20 MCG/24HR IUD    MIRENA     1 each by Intrauterine route once       LORazepam 0.5 MG tablet    ATIVAN    20 tablet    Take 1 tablet (0.5 mg) by mouth nightly as needed for anxiety       MAXALT-MLT PO      Take by mouth once as needed for migraine Unknown dose by Sheila Lozano PA-C neurology       multivitamin, therapeutic Tabs tablet      Take 1 tablet by mouth daily       omeprazole 40 MG capsule    priLOSEC    90 capsule    TAKE ONE CAPSULE BY MOUTH EVERY DAY. TAKE 30 TO 60 MINUTES BEFORE A MEAL       promethazine 25 MG tablet    PHENERGAN    60 tablet    Take 1 tablet (25 mg) by mouth every 6 hours as needed for nausea       PROPRANOLOL HCL PO      Take 60 mg by mouth daily ER       sertraline 100 MG tablet    ZOLOFT    180 tablet    Take 2 tablets (200 mg) by mouth daily       temazepam 30 MG capsule    RESTORIL    30 capsule    Take 1 capsule (30 mg) by mouth nightly as needed for sleep

## 2017-04-17 NOTE — PATIENT INSTRUCTIONS
ASSESSMENT / PLAN:  1. Primary insomnia  chronic  - LORazepam (ATIVAN) 0.5 MG tablet; Take 1 tablet (0.5 mg) by mouth nightly as needed for anxiety  Dispense: 20 tablet; Refill: 0  - alternate with Restoril at bedtime - do not take both on the same night.      2. Migraine without aura and without status migrainosus, not intractable  Continue current plan, follow-up with neurology as scheduled    3. Nausea  With migraine  - promethazine (PHENERGAN) 25 MG tablet; Take 1 tablet (25 mg) by mouth every 6 hours as needed for nausea  Dispense: 60 tablet; Refill: 1    4. Gastroesophageal reflux disease, esophagitis presence not specified  Continue current plan      Follow-up in 6 months or as needed for acute concerns    Manuela Keeann,   Certified Adult Nurse Practitioner  [phone]

## 2017-04-17 NOTE — NURSING NOTE
"Chief Complaint   Patient presents with     Depression     follow up     Anxiety     follow up       Initial /78 (BP Location: Left arm, Patient Position: Chair, Cuff Size: Adult Regular)  Pulse 88  Temp 99.2  F (37.3  C) (Tympanic)  Ht 5' 4\" (1.626 m)  Wt 157 lb (71.2 kg)  BMI 26.95 kg/m2 Estimated body mass index is 26.95 kg/(m^2) as calculated from the following:    Height as of this encounter: 5' 4\" (1.626 m).    Weight as of this encounter: 157 lb (71.2 kg).  Medication Reconciliation: complete   Latonia Cruz LPN      "

## 2017-04-17 NOTE — PROGRESS NOTES
SUBJECTIVE:  Nasra Ortiz, 37 year old, female presents with the following Chief Complaint(s) with HPI to follow:  Chief Complaint   Patient presents with     Depression     follow up     Anxiety     follow up          HPI:  Nasra presents today with the above concern.        Depression / anxietyFollow-Up    Status since last visit: depression ell controlled, anxiety is getting much better.  Racing thoughts continue.      See PHQ-9 for current symptoms.    Other associated symptoms:restlessness - insomnia that Restoril does help but can only get 15 per month.    She is then unable to sleep for the remaining 15 days of the month.     Complicating factors:   Significant life event: No   Current substance abuse: None  Anxiety / Panic symptoms: No  PHQ-9  English PHQ-9   Any Language        Headaches:  Well controlled with current plan; following with neurology.      Patient Active Problem List   Diagnosis     Migraine     Hypothyroidism     Anxiety     Primary insomnia     ACP (advance care planning)       Past Medical History:   Diagnosis Date     Anxiety disorder 2012     Migraines      Primary insomnia 2015       Past Surgical History:   Procedure Laterality Date      SECTION  2012     oopherectomy right  2012       Family History   Problem Relation Age of Onset     Hypertension Mother      Hyperlipidemia Mother      Cardiovascular Father      Hypertension Father      Hyperlipidemia Father      Heart Failure Father      Sleep Apnea Father        Social History   Substance Use Topics     Smoking status: Current Some Day Smoker     Packs/day: 0.40     Years: 10.00     Types: Cigarettes     Smokeless tobacco: Never Used      Comment: tobacco cessation discuseed - tried to quit: no - passive smoke exposure quitting on own      Alcohol use No       Current Outpatient Prescriptions   Medication Sig Dispense Refill     cyclobenzaprine (FLEXERIL) 10 MG tablet Take 1 tablet (10 mg) by mouth  2 times daily as needed for muscle spasms 90 tablet 0     PROPRANOLOL HCL PO Take 60 mg by mouth daily ER       gabapentin (NEURONTIN) 300 MG capsule Take 1 tablet (300 mg) every night for 1-3 days, then 1 tablet twice daily for 1-3 days, then 1 tablet three times daily 90 capsule 0     Rizatriptan Benzoate (MAXALT-MLT PO) Take by mouth once as needed for migraine Unknown dose by Manuel Lozano PA-C neurology       promethazine (PHENERGAN) 25 MG tablet Take by mouth every 6 hours as needed for nausea (unknown dose by neurolgy /manuel Lozano PA-C)       sertraline (ZOLOFT) 100 MG tablet Take 2 tablets (200 mg) by mouth daily 180 tablet 1     levonorgestrel (MIRENA) 20 MCG/24HR IUD 1 each by Intrauterine route once       temazepam (RESTORIL) 30 MG capsule Take 1 capsule (30 mg) by mouth nightly as needed for sleep 30 capsule 3     ibuprofen (ADVIL/MOTRIN) 800 MG tablet Take 1 tablet (800 mg) by mouth every 8 hours as needed for moderate pain 90 tablet 1     omeprazole (PRILOSEC) 40 MG capsule TAKE ONE CAPSULE BY MOUTH EVERY DAY. TAKE 30 TO 60 MINUTES BEFORE A MEAL 90 capsule 1     multivitamin, therapeutic (THERA-VIT) TABS Take 1 tablet by mouth daily         Allergies   Allergen Reactions     Dust Mite Extract      Penicillins Rash       Recent Labs   Lab Test  01/26/17   1603 01/18/17 01/15/17  08/01/16   1905  07/25/15   1225   ALT   --    --   15  19  22   CR  0.72  0.81  0.80  0.65  0.71   GFRESTIMATED  >90  Non  GFR Calc     --    --   >90  Non  GFR Calc    >90  Non  GFR Calc     GFRESTBLACK  >90   GFR Calc     --    --   >90   GFR Calc    >90   GFR Calc     POTASSIUM  4.2  4.8  3.0*  3.6  4.0   TSH  0.92   --    --   1.21   --       BP Readings from Last 3 Encounters:   04/17/17 122/78   03/17/17 122/76   01/26/17 122/72    Wt Readings from Last 3 Encounters:   04/17/17 157 lb (71.2 kg)   03/17/17 153 lb (69.4 kg)    01/26/17 147 lb (66.7 kg)                    REVIEW OF SYSTEMS  Skin: negative  Eyes: negative  Ears/Nose/Throat: negative  Respiratory: No shortness of breath, dyspnea on exertion, cough, or hemoptysis  Cardiovascular: negative  Gastrointestinal: negative  Genitourinary: negative  Musculoskeletal: negative  Neurologic: migraine headaches  Psychiatric: anxiety  Hematologic/Lymphatic/Immunologic: negative  Endocrine: negative    OBJECTIVE:    B/P: 122/78, T: 99.2, P: 88, R: Data Unavailable, W: 157 lbs 0 oz, BMI: Body mass index is 26.95 kg/(m^2).  Constitutional: healthy, alert and no distress  Neck: neck supple, no lymphadenopathy, trachea midline, thyroid symmetrical with out nodules noted.  Carotid arteries without bruit  Cardiovascular: RRR. No murmurs, clicks gallops or rub  Respiratory:  Good diaphragmatic excursion. Lungs clear  Psychiatric: mentation appears normal and affect normal/bright      ASSESSMENT / PLAN:  1. Primary insomnia  chronic  - LORazepam (ATIVAN) 0.5 MG tablet; Take 1 tablet (0.5 mg) by mouth nightly as needed for anxiety  Dispense: 20 tablet; Refill: 0  - alternate with Restoril at bedtime - do not take both on the same night.      2. Migraine without aura and without status migrainosus, not intractable  Continue current plan, follow-up with neurology as scheduled    3. Nausea  With migraine  - promethazine (PHENERGAN) 25 MG tablet; Take 1 tablet (25 mg) by mouth every 6 hours as needed for nausea  Dispense: 60 tablet; Refill: 1    4. Gastroesophageal reflux disease, esophagitis presence not specified  Continue current plan      Follow-up in 6 months or as needed for acute concerns    Manuela Keenan,   Certified Adult Nurse Practitioner  [phone]

## 2017-04-18 ASSESSMENT — PATIENT HEALTH QUESTIONNAIRE - PHQ9: SUM OF ALL RESPONSES TO PHQ QUESTIONS 1-9: 10

## 2017-04-18 ASSESSMENT — ANXIETY QUESTIONNAIRES: GAD7 TOTAL SCORE: 20

## 2017-05-12 DIAGNOSIS — F51.01 PRIMARY INSOMNIA: ICD-10-CM

## 2017-05-15 RX ORDER — LORAZEPAM 0.5 MG/1
TABLET ORAL
Qty: 20 TABLET | Refills: 0 | Status: SHIPPED | OUTPATIENT
Start: 2017-05-15 | End: 2017-06-09

## 2017-06-09 DIAGNOSIS — F51.01 PRIMARY INSOMNIA: ICD-10-CM

## 2017-06-09 RX ORDER — LORAZEPAM 0.5 MG/1
TABLET ORAL
Qty: 20 TABLET | Refills: 0 | Status: SHIPPED | OUTPATIENT
Start: 2017-06-09 | End: 2017-07-11

## 2017-06-15 ENCOUNTER — OFFICE VISIT (OUTPATIENT)
Dept: FAMILY MEDICINE | Facility: OTHER | Age: 37
End: 2017-06-15
Attending: NURSE PRACTITIONER
Payer: COMMERCIAL

## 2017-06-15 VITALS
RESPIRATION RATE: 16 BRPM | SYSTOLIC BLOOD PRESSURE: 118 MMHG | TEMPERATURE: 98.5 F | DIASTOLIC BLOOD PRESSURE: 90 MMHG | HEIGHT: 64 IN | WEIGHT: 156 LBS | OXYGEN SATURATION: 96 % | BODY MASS INDEX: 26.63 KG/M2 | HEART RATE: 87 BPM

## 2017-06-15 DIAGNOSIS — G43.009 MIGRAINE WITHOUT AURA AND WITHOUT STATUS MIGRAINOSUS, NOT INTRACTABLE: ICD-10-CM

## 2017-06-15 DIAGNOSIS — M54.50 ACUTE RIGHT-SIDED LOW BACK PAIN WITHOUT SCIATICA: Primary | ICD-10-CM

## 2017-06-15 DIAGNOSIS — Z71.6 TOBACCO ABUSE COUNSELING: ICD-10-CM

## 2017-06-15 PROCEDURE — 99214 OFFICE O/P EST MOD 30 MIN: CPT | Performed by: NURSE PRACTITIONER

## 2017-06-15 PROCEDURE — 99212 OFFICE O/P EST SF 10 MIN: CPT

## 2017-06-15 RX ORDER — CYCLOBENZAPRINE HCL 10 MG
10 TABLET ORAL 2 TIMES DAILY PRN
Qty: 90 TABLET | Refills: 0 | Status: SHIPPED | OUTPATIENT
Start: 2017-06-15 | End: 2017-08-23

## 2017-06-15 ASSESSMENT — PAIN SCALES - GENERAL: PAINLEVEL: SEVERE PAIN (7)

## 2017-06-15 NOTE — PROGRESS NOTES
"    SUBJECTIVE:                                                    Nasra Ortiz is a 37 year old female who presents to clinic today for the following health issues:  Chief Complaint   Patient presents with     Musculoskeletal Problem     right lower calf 5-6 days feels pressure no injury achey feeling all the way up to hip     Headache     action plan follow up             Migraine Follow-Up    Headaches symptoms:  Improved since starting propranolol.  She is following with neurology and has an upcoming follow-up appt.      Frequency: 1-2/month     Duration of headaches: hours to day    Able to do normal daily activities/work with migraines: now can    Rescue/Relief medication:ibuprofen (Advil, Motrin)              Effectiveness: moderate relief    Preventative medication: propranolol    Neurologic complications: No new stroke-like symptoms, loss of vision or speech, numbness or weakness    In the past 4 weeks, how often have you gone to Urgent Care or the emergency room because of your headaches?  0       Muscle Pain     Onset: 5-6 days    Description:   Location: right leg  Character: tight - fullness    Intensity: 6-7/10    Progression of Symptoms: worse    Accompanying Signs & Symptoms:  Other symptoms: denies redness, swelling, numbness or tingling, reports leg felt a bit warmer than left leg.  Reports left sided low back pain that radiates around to front.     History:   Previous similar pain: no       Precipitating factors:   Trauma or overuse: denies injury or heavy lifting.     Alleviating factors:  Improved by: ibuprofen, 800mg, no relief.         Therapies Tried and outcome: elevation, laying down.  The longer standing or wlaking the \"tighter\" it feels.        Problem list and histories reviewed & adjusted, as indicated.  Additional history: as documented    Patient Active Problem List   Diagnosis     Migraine     Hypothyroidism     Anxiety     Primary insomnia     ACP (advance care planning)     " Gastroesophageal reflux disease, esophagitis presence not specified     Past Surgical History:   Procedure Laterality Date      SECTION  2012     oopherectomy right  2012       Social History   Substance Use Topics     Smoking status: Current Some Day Smoker     Packs/day: 0.40     Years: 10.00     Types: Cigarettes     Smokeless tobacco: Never Used      Comment: tobacco cessation discuseed - tried to quit: no - passive smoke exposure quitting on own      Alcohol use No     Family History   Problem Relation Age of Onset     Hypertension Mother      Hyperlipidemia Mother      Cardiovascular Father      Hypertension Father      Hyperlipidemia Father      Heart Failure Father      Sleep Apnea Father          Current Outpatient Prescriptions   Medication Sig Dispense Refill     cyclobenzaprine (FLEXERIL) 10 MG tablet Take 1 tablet (10 mg) by mouth 2 times daily as needed for muscle spasms 90 tablet 0     LORazepam (ATIVAN) 0.5 MG tablet TAKE ONE TABLET BY MOUTH NIGHTLY AS NEEDED FOR ANXIETY 20 tablet 0     promethazine (PHENERGAN) 25 MG tablet Take 1 tablet (25 mg) by mouth every 6 hours as needed for nausea 60 tablet 1     PROPRANOLOL HCL PO Take 60 mg by mouth daily ER       Rizatriptan Benzoate (MAXALT-MLT PO) Take by mouth once as needed for migraine Unknown dose by Sheila Lozano PA-C neurology       sertraline (ZOLOFT) 100 MG tablet Take 2 tablets (200 mg) by mouth daily 180 tablet 1     levonorgestrel (MIRENA) 20 MCG/24HR IUD 1 each by Intrauterine route once       temazepam (RESTORIL) 30 MG capsule Take 1 capsule (30 mg) by mouth nightly as needed for sleep 30 capsule 3     ibuprofen (ADVIL/MOTRIN) 800 MG tablet Take 1 tablet (800 mg) by mouth every 8 hours as needed for moderate pain 90 tablet 1     omeprazole (PRILOSEC) 40 MG capsule TAKE ONE CAPSULE BY MOUTH EVERY DAY. TAKE 30 TO 60 MINUTES BEFORE A MEAL 90 capsule 1     multivitamin, therapeutic (THERA-VIT) TABS Take 1 tablet by mouth daily    "    Allergies   Allergen Reactions     Dust Mite Extract      Penicillins Rash     Recent Labs   Lab Test  01/26/17   1603 01/18/17 01/15/17  08/01/16   1905  07/25/15   1225   ALT   --    --   15  19  22   CR  0.72  0.81  0.80  0.65  0.71   GFRESTIMATED  >90  Non  GFR Calc     --    --   >90  Non  GFR Calc    >90  Non  GFR Calc     GFRESTBLACK  >90   GFR Calc     --    --   >90   GFR Calc    >90   GFR Calc     POTASSIUM  4.2  4.8  3.0*  3.6  4.0   TSH  0.92   --    --   1.21   --       BP Readings from Last 3 Encounters:   06/15/17 118/90   04/17/17 122/78   03/17/17 122/76    Wt Readings from Last 3 Encounters:   06/15/17 156 lb (70.8 kg)   04/17/17 157 lb (71.2 kg)   03/17/17 153 lb (69.4 kg)                    ROS:  Constitutional, HEENT, cardiovascular, pulmonary, gi and gu systems are negative, except as otherwise noted.    OBJECTIVE:                                                    /90 (BP Location: Left arm, Patient Position: Chair, Cuff Size: Adult Regular)  Pulse 87  Temp 98.5  F (36.9  C) (Tympanic)  Resp 16  Ht 5' 4\" (1.626 m)  Wt 156 lb (70.8 kg)  SpO2 96%  BMI 26.78 kg/m2  Body mass index is 26.78 kg/(m^2).  GENERAL: healthy, alert and no distress  NECK: no adenopathy, no asymmetry, masses, or scars and thyroid normal to palpation  MS: no gross musculoskeletal defects noted, no edema  Comprehensive back pain exam:  Tenderness of Si joint and right paraspinal muscles, Range of motion not limited by pain, Lower extremity strength functional and equal on both sides, Lower extremity reflexes within normal limits bilaterally, Lower extremity sensation normal and equal on both sides and Straight leg raise negative bilaterally  PSYCH: mentation appears normal, affect normal/bright     ASSESSMENT/PLAN:                                                        1. Tobacco abuse counseling  Cessation " encouraged  - Tobacco Cessation - for Health Maintenance    2. Migraine without aura and without status migrainosus, not intractable  Continue current plan - continue following with neurology  - MIGRAINE ACTION PLAN    3. Acute right-sided low back pain without sciatica  Symptomatic  - ibuprofen 800mg three times a day with food  - cyclobenzaprine (FLEXERIL) 10 MG tablet; Take 1 tablet (10 mg) by mouth 2 times daily as needed for muscle spasms  Dispense: 90 tablet; Refill: 0  - declined Physical Therapy for now    FUTURE APPOINTMENTS:       - Follow-up visit if no improvement or any worsening    Manuela Keenan, NP  [facility]

## 2017-06-15 NOTE — PATIENT INSTRUCTIONS
ASSESSMENT/PLAN:                                                        1. Tobacco abuse counseling  Cessation encouraged  - Tobacco Cessation - for Health Maintenance    2. Migraine without aura and without status migrainosus, not intractable  Continue current plan - continue following with neurology  - MIGRAINE ACTION PLAN    3. Acute right-sided low back pain without sciatica  Symptomatic  - ibuprofen 800mg three times a day with food  - cyclobenzaprine (FLEXERIL) 10 MG tablet; Take 1 tablet (10 mg) by mouth 2 times daily as needed for muscle spasms  Dispense: 90 tablet; Refill: 0  - declined Physical Therapy for now    FUTURE APPOINTMENTS:       - Follow-up visit if no improvement or any worsening    Manuela Keenan, NP  [facility]    HOW TO QUIT SMOKING  Smoking is one of the hardest habits to break. About half of all those who have ever smoked have been able to quit, and most of those (about 70%) who still smoke want to quit. Here are some of the best ways to stop smoking.     KEEP TRYING:  It takes most smokers about 8 tries before they are finally able to fully quit. So, the more often you try and fail, the better your chance of quitting the next time! So, don't give up!    GO COLD TURKEY:  Most ex-smokers quit cold turkey. Trying to cut back gradually doesn't seem to work as well, perhaps because it continues the smoking habit. Also, it is possible to fool yourself by inhaling more while smoking fewer cigarettes. This results in the same amount of nicotine in your body!    GET SUPPORT:  Support programs can make an important difference, especially for the heavy smoker. These groups offer lectures, methods to change your behavior and peer support. Call the free national Quitline for more information. 800-QUIT-NOW (637-246-5553). Low-cost or free programs are offered by many hospitals, local chapters of the American Lung Association (096-185-2785) and the American Cancer Society  (113.574.9803). Support at home is important too. Non-smokers can help by offering praise and encouragement. If the smoker fails to quit, encourage them to try again!    OVER-THE-COUNTER MEDICINES:  For those who can't quit on their own, Nicotine Replacement Therapy (NRT) may make quitting much easier. Certain aids such as the nicotine patch, gum and lozenge are available without a prescription. However, it is best to use these under the guidance of your doctor. The skin patch provides a steady supply of nicotine to the body. Nicotine gum and lozenge gives temporary bursts of low levels of nicotine. Both methods take the edge off the craving for cigarettes. WARNING: If you feel symptoms of nicotine overdose, such as nausea, vomiting, dizziness, weakness, or fast heartbeat, stop using these and see your doctor.    PRESCRIPTION MEDICINES:  After evaluating your smoking patterns and prior attempts at quitting, your doctor may offer a prescription medicine such as bupropion (Zyban, Wellbutrin), varenicline (Chantix, Champix), a niocotine inhaler or nasal spray. Each has its unique advantage and side effects which your doctor can review with you.    HEALTH BENEFITS OF QUITTING:  The benefits of quitting start right away and keep improving the longer you go without smokin minutes: blood pressure and pulse return to normal  8 hours: oxygen levels return to normal  2 days: ability to smell and taste begins to improve as damaged nerves start to regrow  2-3 weeks: circulation and lung function improves  1-9 months: decreased cough, congestion and shortness of breath; less tired  1 year: risk of heart attack decreases by half  5 years: risk of lung cancer decreases by half; risk of stroke becomes the same as a non-smoker  For information about how to quit smoking, visit the following links:  [employer] [place] ,   Clearing the Air, Quit Smoking Today   - an online booklet.  http://www.smokefree.gov/pubs/clearing_the_air.pdf  Smokefree.gov http://smokefree.gov/  QuitNet http://www.quitnet.com/    2660-0840 Kiran Dalton, [address] [place] [address], [place] [address]. All rights reserved. This information is not intended as a substitute for professional medical care. Always follow your healthcare professional's instructions.    The Benefits of Living Smoke Free  What do you want to gain from quitting? Check off some reasons to quit.  Health Benefits  ___ Reduce my risk of lung cancer, heart disease, chronic lung disease  ___ Have fewer wrinkles and softer skin  ___ Improve my sense of taste and smell  ___ For pregnant women--reduce the risk of having a miscarriage, stillbirth, premature birth, or low-birth-weight baby  Personal Benefits  ___ Feel more in control of my life  ___ Have better-smelling hair, breath, clothes, home, and car  ___ Save time by not having to take smoke breaks, buy cigarettes, or hunt for a light  ___ Have whiter teeth  Family Benefits  ___ Reduce my children s respiratory tract infections  ___ Set a good example for my children  ___ Reduce my family s cancer risk  Financial Benefits  ___ Save hundreds of dollars each year that would be spent on cigarettes  ___ Save money on medical bills  ___ Save on life, health, and car insurance premiums    Those Dollars Add Up!  Cigarettes are expensive, and getting more expensive all the time. Do you realize how much money you are spending on cigarettes per year? What is the average amount you spend on a pack of cigarettes? What is the average number of packs that you smoke per day? Using your answers to these questions, fill in this formula to help you find out:  ($ _____ per pack) ×  ( _____ number of packs per day) × (365 days) =  $ _____ yearly cost of smoking  Besides tobacco, there are other costs, including extra cleaning bills and replacement costs for clothing and furniture; medical expenses for smoking-related  illnesses; and higher health, life, and car insurance premiums.    Cigars and Pipes Count Too!  Cigars and pipes are also dangerous. So are smokeless (chewing) tobacco and snuff. All of these products contain nicotine, a highly addictive substance that has harmful effects on your body. Quitting smoking means giving up all tobacco products.      5366-2752 Kiran Dalton, [address] [place] [address], [place] [address]. All rights reserved. This information is not intended as a substitute for professional medical care. Always follow your healthcare professional's instructions.

## 2017-06-15 NOTE — NURSING NOTE
"Chief Complaint   Patient presents with     Musculoskeletal Problem     right lower calf 5-6 days feels pressure no injury achey feeling all the way up to hip       Initial /90 (BP Location: Left arm, Patient Position: Chair, Cuff Size: Adult Regular)  Pulse 87  Temp 98.5  F (36.9  C) (Tympanic)  Resp 16  Ht 5' 4\" (1.626 m)  Wt 156 lb (70.8 kg)  SpO2 96%  BMI 26.78 kg/m2 Estimated body mass index is 26.78 kg/(m^2) as calculated from the following:    Height as of this encounter: 5' 4\" (1.626 m).    Weight as of this encounter: 156 lb (70.8 kg).  Medication Reconciliation: complete   Pamela M Lechevalier LPN      "

## 2017-06-15 NOTE — MR AVS SNAPSHOT
After Visit Summary   6/15/2017    Nasra Ortiz    MRN: [id number]           Patient Information     Date Of Birth          1980        Visit Information        Provider Department      6/15/2017 8:30 AM Manuela Keenan NP [facility]        Today's Diagnoses     Acute right-sided low back pain without sciatica    -  1    Tobacco abuse counseling        Migraine without aura and without status migrainosus, not intractable          Care Instructions      ASSESSMENT/PLAN:                                                        1. Tobacco abuse counseling  Cessation encouraged  - Tobacco Cessation - for Health Maintenance    2. Migraine without aura and without status migrainosus, not intractable  Continue current plan - continue following with neurology  - MIGRAINE ACTION PLAN    3. Acute right-sided low back pain without sciatica  Symptomatic  - ibuprofen 800mg three times a day with food  - cyclobenzaprine (FLEXERIL) 10 MG tablet; Take 1 tablet (10 mg) by mouth 2 times daily as needed for muscle spasms  Dispense: 90 tablet; Refill: 0  - declined Physical Therapy for now    FUTURE APPOINTMENTS:       - Follow-up visit if no improvement or any worsening    Manuela Keenan NP  [facility] [place]    HOW TO QUIT SMOKING  Smoking is one of the hardest habits to break. About half of all those who have ever smoked have been able to quit, and most of those (about 70%) who still smoke want to quit. Here are some of the best ways to stop smoking.     KEEP TRYING:  It takes most smokers about 8 tries before they are finally able to fully quit. So, the more often you try and fail, the better your chance of quitting the next time! So, don't give up!    GO COLD TURKEY:  Most ex-smokers quit cold turkey. Trying to cut back gradually doesn't seem to work as well, perhaps because it continues the smoking habit. Also, it is possible to fool yourself by inhaling more while  smoking fewer cigarettes. This results in the same amount of nicotine in your body!    GET SUPPORT:  Support programs can make an important difference, especially for the heavy smoker. These groups offer lectures, methods to change your behavior and peer support. Call the free national Quitline for more information. 800-QUIT-NOW (473-062-6488). Low-cost or free programs are offered by many hospitals, local chapters of the American Lung Association (043-189-2512) and the American Cancer Society (437-249-7189). Support at home is important too. Non-smokers can help by offering praise and encouragement. If the smoker fails to quit, encourage them to try again!    OVER-THE-COUNTER MEDICINES:  For those who can't quit on their own, Nicotine Replacement Therapy (NRT) may make quitting much easier. Certain aids such as the nicotine patch, gum and lozenge are available without a prescription. However, it is best to use these under the guidance of your doctor. The skin patch provides a steady supply of nicotine to the body. Nicotine gum and lozenge gives temporary bursts of low levels of nicotine. Both methods take the edge off the craving for cigarettes. WARNING: If you feel symptoms of nicotine overdose, such as nausea, vomiting, dizziness, weakness, or fast heartbeat, stop using these and see your doctor.    PRESCRIPTION MEDICINES:  After evaluating your smoking patterns and prior attempts at quitting, your doctor may offer a prescription medicine such as bupropion (Zyban, Wellbutrin), varenicline (Chantix, Champix), a niocotine inhaler or nasal spray. Each has its unique advantage and side effects which your doctor can review with you.    HEALTH BENEFITS OF QUITTING:  The benefits of quitting start right away and keep improving the longer you go without smokin minutes: blood pressure and pulse return to normal  8 hours: oxygen levels return to normal  2 days: ability to smell and taste begins to improve as damaged  nerves start to regrow  2-3 weeks: circulation and lung function improves  1-9 months: decreased cough, congestion and shortness of breath; less tired  1 year: risk of heart attack decreases by half  5 years: risk of lung cancer decreases by half; risk of stroke becomes the same as a non-smoker  For information about how to quit smoking, visit the following links:  National Cancer [place] ,   Clearing the Air, Quit Smoking Today   - an online booklet. http://www.smokefree.gov/pubs/clearing_the_air.pdf  Smokefree.gov http://smokefree.gov/  QuitNet http://www.quitnet.com/    4094-2224 [facility], [address] [place] [address], [place] [address]. All rights reserved. This information is not intended as a substitute for professional medical care. Always follow your healthcare professional's instructions.    The Benefits of Living Smoke Free  What do you want to gain from quitting? Check off some reasons to quit.  Health Benefits  ___ Reduce my risk of lung cancer, heart disease, chronic lung disease  ___ Have fewer wrinkles and softer skin  ___ Improve my sense of taste and smell  ___ For pregnant women--reduce the risk of having a miscarriage, stillbirth, premature birth, or low-birth-weight baby  Personal Benefits  ___ Feel more in control of my life  ___ Have better-smelling hair, breath, clothes, home, and car  ___ Save time by not having to take smoke breaks, buy cigarettes, or hunt for a light  ___ Have whiter teeth  Family Benefits  ___ Reduce my children s respiratory tract infections  ___ Set a good example for my children  ___ Reduce my family s cancer risk  Financial Benefits  ___ Save hundreds of dollars each year that would be spent on cigarettes  ___ Save money on medical bills  ___ Save on life, health, and car insurance premiums    Those Dollars Add Up!  Cigarettes are expensive, and getting more expensive all the time. Do you realize how much money you are spending on cigarettes per year? What is the  average amount you spend on a pack of cigarettes? What is the average number of packs that you smoke per day? Using your answers to these questions, fill in this formula to help you find out:  ($ _____ per pack) ×  ( _____ number of packs per day) × (365 days) =  $ _____ yearly cost of smoking  Besides tobacco, there are other costs, including extra cleaning bills and replacement costs for clothing and furniture; medical expenses for smoking-related illnesses; and higher health, life, and car insurance premiums.    Cigars and Pipes Count Too!  Cigars and pipes are also dangerous. So are smokeless (chewing) tobacco and snuff. All of these products contain nicotine, a highly addictive substance that has harmful effects on your body. Quitting smoking means giving up all tobacco products.      2608-8253 [facility], [address] [place] [address]. All rights reserved. This information is not intended as a substitute for professional medical care. Always follow your healthcare professional's instructions.          Follow-ups after your visit        Your next 10 appointments already scheduled     Oct 18, 2017 10:00 AM CDT   (Arrive by 9:45 AM)   SHORT with Manuela Keenan NP   [facility]    [address] [address]   [phone]              Who to contact     If you have questions or need follow up information about today's clinic visit or your schedule please contact [facility] directly at [phone].  Normal or non-critical lab and imaging results will be communicated to you by MyChart, letter or phone within 4 business days after the clinic has received the results. If you do not hear from us within 7 days, please contact the clinic through MyChart or phone. If you have a critical or abnormal lab result, we will notify you by phone as soon as possible.  Submit refill requests through MyChart or call  "your pharmacy and they will forward the refill request to us. Please allow 3 business days for your refill to be completed.          Additional Information About Your Visit        [employer] Information     [employer] gives you secure access to your electronic health record. If you see a primary care provider, you can also send messages to your care team and make appointments. If you have questions, please call your primary care clinic.  If you do not have a primary care provider, please call [phone] and they will assist you.        Care EveryWhere ID     This is your Care EveryWhere ID. This could be used by other organizations to access your [facility] medical records  [id number]        Your Vitals Were     Pulse Temperature Respirations Height Pulse Oximetry BMI (Body Mass Index)    87 98.5  F (36.9  C) (Tympanic) 16 5' 4\" (1.626 m) 96% 26.78 kg/m2       Blood Pressure from Last 3 Encounters:   06/15/17 118/90   04/17/17 122/78   03/17/17 122/76    Weight from Last 3 Encounters:   06/15/17 156 lb (70.8 kg)   04/17/17 157 lb (71.2 kg)   03/17/17 153 lb (69.4 kg)              We Performed the Following     MIGRAINE ACTION PLAN     Tobacco Cessation - for Health Maintenance          Where to get your medicines      These medications were sent to [facility] [address] [place] [address] [address]   [address] , [facility] [address]     Phone:  [phone]     cyclobenzaprine 10 MG tablet          Primary Care Provider Office Phone # Fax #    Adrienne LINN Dailey [phone]       [address] [address]        Thank you!     Thank you for choosing [facility]  for your care. Our goal is always to provide you with excellent care. Hearing back from our patients is one way we can continue to improve our services. Please take a few minutes to complete the written survey that you may receive in the mail after your visit with us. Thank you!           "   Your Updated Medication List - Protect others around you: Learn how to safely use, store and throw away your medicines at www.disposemymeds.org.          This list is accurate as of: 6/15/17  8:52 AM.  Always use your most recent med list.                   Brand Name Dispense Instructions for use    cyclobenzaprine 10 MG tablet    FLEXERIL    90 tablet    Take 1 tablet (10 mg) by mouth 2 times daily as needed for muscle spasms       ibuprofen 800 MG tablet    ADVIL/MOTRIN    90 tablet    Take 1 tablet (800 mg) by mouth every 8 hours as needed for moderate pain       levonorgestrel 20 MCG/24HR IUD    MIRENA     1 each by Intrauterine route once       LORazepam 0.5 MG tablet    ATIVAN    20 tablet    TAKE ONE TABLET BY MOUTH NIGHTLY AS NEEDED FOR ANXIETY       MAXALT-MLT PO      Take by mouth once as needed for migraine Unknown dose by Sheila Lozano PA-C neurology       multivitamin, therapeutic Tabs tablet      Take 1 tablet by mouth daily       omeprazole 40 MG capsule    priLOSEC    90 capsule    TAKE ONE CAPSULE BY MOUTH EVERY DAY. TAKE 30 TO 60 MINUTES BEFORE A MEAL       promethazine 25 MG tablet    PHENERGAN    60 tablet    Take 1 tablet (25 mg) by mouth every 6 hours as needed for nausea       PROPRANOLOL HCL PO      Take 60 mg by mouth daily ER       sertraline 100 MG tablet    ZOLOFT    180 tablet    Take 2 tablets (200 mg) by mouth daily       temazepam 30 MG capsule    RESTORIL    30 capsule    Take 1 capsule (30 mg) by mouth nightly as needed for sleep

## 2017-06-23 DIAGNOSIS — F51.01 PRIMARY INSOMNIA: ICD-10-CM

## 2017-06-23 RX ORDER — TEMAZEPAM 30 MG
30 CAPSULE ORAL
Qty: 30 CAPSULE | Refills: 3 | Status: SHIPPED | OUTPATIENT
Start: 2017-06-23 | End: 2017-08-23

## 2017-06-23 NOTE — TELEPHONE ENCOUNTER
Restoril  Last office visit: 6/15/17  Last refill: 1/26/17 # 30, 3 R.  Medication pended.  Thank you.

## 2017-07-11 ENCOUNTER — TELEPHONE (OUTPATIENT)
Dept: FAMILY MEDICINE | Facility: OTHER | Age: 37
End: 2017-07-11

## 2017-07-11 DIAGNOSIS — F51.01 PRIMARY INSOMNIA: ICD-10-CM

## 2017-07-11 RX ORDER — LORAZEPAM 0.5 MG/1
TABLET ORAL
Qty: 20 TABLET | Refills: 0 | Status: SHIPPED | OUTPATIENT
Start: 2017-07-11 | End: 2017-08-08

## 2017-07-11 NOTE — TELEPHONE ENCOUNTER
Lorazepam  Last office visit: 6/15/17  Last refill: 6/9/17 #20, 0 R.  Medication pended.  Thank you.

## 2017-07-11 NOTE — TELEPHONE ENCOUNTER
Reason for call:  Medication    1. Medication Name? Lorazepam   2. Is this request for a refill? Yes  3. What Pharmacy do you use? [facility]  4. Have you contacted your pharmacy? Yes    5. If yes, when? 07/08  (Please note that the turn-around-time for prescriptions is 72 business hours; I am sending your request at this time. SEND TO  [facility]  )  Description: Patient is leaving [facility] on 07/12 evening or 07/13 morning and would like her medication refilled prior so she can take with.  Was an appointment offered for this a call? No   Preferred method for responding to this messageTelephone Call  If we cannot reach you directly, may we leave a detailed response at the number you provided? Yes  Can this message wait until your PCP/Provider returns if not available today? Not applicable

## 2017-07-26 DIAGNOSIS — K21.9 GASTROESOPHAGEAL REFLUX DISEASE WITHOUT ESOPHAGITIS: ICD-10-CM

## 2017-07-28 RX ORDER — OMEPRAZOLE 40 MG/1
CAPSULE, DELAYED RELEASE ORAL
Qty: 90 CAPSULE | Refills: 0 | Status: SHIPPED | OUTPATIENT
Start: 2017-07-28 | End: 2017-08-23

## 2017-08-08 DIAGNOSIS — F51.01 PRIMARY INSOMNIA: ICD-10-CM

## 2017-08-08 NOTE — TELEPHONE ENCOUNTER
ativan      Last Written Prescription Date: 7/11/17  Last Fill Quantity: 20,  # refills: 0   Last Office Visit with [facility], [facility] or [facility] prescribing provider: 6/15/17                                         Next 5 appointments (look out 90 days)     Oct 18, 2017 10:00 AM CDT   (Arrive by 9:45 AM)   SHORT with Manuela Keenan NP   [facility] ([facility] )    [address]  [facility] [address]   [phone]

## 2017-08-10 NOTE — TELEPHONE ENCOUNTER
Patient is out of medication and is wondering if this can be done today as she is out. Patient uses [facility] for pharmacy.

## 2017-08-11 RX ORDER — LORAZEPAM 0.5 MG/1
TABLET ORAL
Qty: 20 TABLET | Refills: 0 | Status: SHIPPED | OUTPATIENT
Start: 2017-08-11 | End: 2017-08-23

## 2017-08-23 ENCOUNTER — OFFICE VISIT (OUTPATIENT)
Dept: FAMILY MEDICINE | Facility: OTHER | Age: 37
End: 2017-08-23
Attending: NURSE PRACTITIONER
Payer: COMMERCIAL

## 2017-08-23 VITALS
WEIGHT: 154 LBS | SYSTOLIC BLOOD PRESSURE: 106 MMHG | HEIGHT: 64 IN | HEART RATE: 72 BPM | DIASTOLIC BLOOD PRESSURE: 76 MMHG | BODY MASS INDEX: 26.29 KG/M2 | RESPIRATION RATE: 14 BRPM

## 2017-08-23 DIAGNOSIS — Z71.89 ACP (ADVANCE CARE PLANNING): ICD-10-CM

## 2017-08-23 DIAGNOSIS — E03.9 ACQUIRED HYPOTHYROIDISM: ICD-10-CM

## 2017-08-23 DIAGNOSIS — Z72.0 TOBACCO ABUSE: Primary | ICD-10-CM

## 2017-08-23 DIAGNOSIS — Z12.39 SCREENING FOR BREAST CANCER: ICD-10-CM

## 2017-08-23 DIAGNOSIS — F51.01 PRIMARY INSOMNIA: ICD-10-CM

## 2017-08-23 DIAGNOSIS — F41.9 ANXIETY: ICD-10-CM

## 2017-08-23 DIAGNOSIS — G43.109 MIGRAINE WITH AURA AND WITHOUT STATUS MIGRAINOSUS, NOT INTRACTABLE: ICD-10-CM

## 2017-08-23 DIAGNOSIS — K21.9 GASTROESOPHAGEAL REFLUX DISEASE WITHOUT ESOPHAGITIS: ICD-10-CM

## 2017-08-23 LAB
BASOPHILS # BLD AUTO: 0 10E9/L (ref 0–0.2)
BASOPHILS NFR BLD AUTO: 0.3 %
DIFFERENTIAL METHOD BLD: ABNORMAL
EOSINOPHIL # BLD AUTO: 0.1 10E9/L (ref 0–0.7)
EOSINOPHIL NFR BLD AUTO: 1.3 %
ERYTHROCYTE [DISTWIDTH] IN BLOOD BY AUTOMATED COUNT: 12.7 % (ref 10–15)
HCT VFR BLD AUTO: 40.4 % (ref 35–47)
HGB BLD-MCNC: 13.4 G/DL (ref 11.7–15.7)
LYMPHOCYTES # BLD AUTO: 2.7 10E9/L (ref 0.8–5.3)
LYMPHOCYTES NFR BLD AUTO: 43.8 %
MCH RBC QN AUTO: 33.1 PG (ref 26.5–33)
MCHC RBC AUTO-ENTMCNC: 33.2 G/DL (ref 31.5–36.5)
MCV RBC AUTO: 100 FL (ref 78–100)
MONOCYTES # BLD AUTO: 0.4 10E9/L (ref 0–1.3)
MONOCYTES NFR BLD AUTO: 7.1 %
NEUTROPHILS # BLD AUTO: 3 10E9/L (ref 1.6–8.3)
NEUTROPHILS NFR BLD AUTO: 47.5 %
PLATELET # BLD AUTO: 296 10E9/L (ref 150–450)
RBC # BLD AUTO: 4.05 10E12/L (ref 3.8–5.2)
WBC # BLD AUTO: 6.2 10E9/L (ref 4–11)

## 2017-08-23 PROCEDURE — 84443 ASSAY THYROID STIM HORMONE: CPT | Mod: ZL | Performed by: NURSE PRACTITIONER

## 2017-08-23 PROCEDURE — 99215 OFFICE O/P EST HI 40 MIN: CPT | Performed by: NURSE PRACTITIONER

## 2017-08-23 PROCEDURE — 85025 COMPLETE CBC W/AUTO DIFF WBC: CPT | Mod: ZL | Performed by: NURSE PRACTITIONER

## 2017-08-23 PROCEDURE — 99212 OFFICE O/P EST SF 10 MIN: CPT

## 2017-08-23 PROCEDURE — 36415 COLL VENOUS BLD VENIPUNCTURE: CPT | Mod: ZL | Performed by: NURSE PRACTITIONER

## 2017-08-23 RX ORDER — OMEPRAZOLE 40 MG/1
CAPSULE, DELAYED RELEASE ORAL
Qty: 90 CAPSULE | Refills: 1 | Status: SHIPPED | OUTPATIENT
Start: 2017-08-23 | End: 2018-03-23

## 2017-08-23 RX ORDER — LORAZEPAM 0.5 MG/1
TABLET ORAL
Qty: 20 TABLET | Refills: 0 | Status: SHIPPED | OUTPATIENT
Start: 2017-08-23 | End: 2017-09-21

## 2017-08-23 RX ORDER — RIZATRIPTAN BENZOATE 10 MG/1
10 TABLET, ORALLY DISINTEGRATING ORAL
Qty: 18 TABLET | Refills: 3 | Status: SHIPPED | OUTPATIENT
Start: 2017-08-23 | End: 2017-11-13

## 2017-08-23 RX ORDER — SERTRALINE HYDROCHLORIDE 100 MG/1
200 TABLET, FILM COATED ORAL DAILY
Qty: 180 TABLET | Refills: 1 | Status: SHIPPED | OUTPATIENT
Start: 2017-08-23 | End: 2018-03-09

## 2017-08-23 RX ORDER — RIZATRIPTAN BENZOATE 10 MG/1
TABLET, ORALLY DISINTEGRATING ORAL
Qty: 30 TABLET | Status: CANCELLED | OUTPATIENT
Start: 2017-08-23

## 2017-08-23 RX ORDER — PROPRANOLOL HYDROCHLORIDE 60 MG/1
60 TABLET ORAL DAILY
Qty: 90 TABLET | Refills: 1 | Status: SHIPPED | OUTPATIENT
Start: 2017-08-23 | End: 2018-03-23

## 2017-08-23 RX ORDER — TEMAZEPAM 30 MG
30 CAPSULE ORAL
Qty: 30 CAPSULE | Refills: 3 | Status: SHIPPED | OUTPATIENT
Start: 2017-08-23 | End: 2018-01-03

## 2017-08-23 ASSESSMENT — ANXIETY QUESTIONNAIRES
GAD7 TOTAL SCORE: 13
4. TROUBLE RELAXING: MORE THAN HALF THE DAYS
5. BEING SO RESTLESS THAT IT IS HARD TO SIT STILL: MORE THAN HALF THE DAYS
3. WORRYING TOO MUCH ABOUT DIFFERENT THINGS: MORE THAN HALF THE DAYS
2. NOT BEING ABLE TO STOP OR CONTROL WORRYING: MORE THAN HALF THE DAYS
1. FEELING NERVOUS, ANXIOUS, OR ON EDGE: MORE THAN HALF THE DAYS
7. FEELING AFRAID AS IF SOMETHING AWFUL MIGHT HAPPEN: SEVERAL DAYS
IF YOU CHECKED OFF ANY PROBLEMS ON THIS QUESTIONNAIRE, HOW DIFFICULT HAVE THESE PROBLEMS MADE IT FOR YOU TO DO YOUR WORK, TAKE CARE OF THINGS AT HOME, OR GET ALONG WITH OTHER PEOPLE: VERY DIFFICULT
6. BECOMING EASILY ANNOYED OR IRRITABLE: MORE THAN HALF THE DAYS

## 2017-08-23 ASSESSMENT — PATIENT HEALTH QUESTIONNAIRE - PHQ9: SUM OF ALL RESPONSES TO PHQ QUESTIONS 1-9: 10

## 2017-08-23 NOTE — PATIENT INSTRUCTIONS
1. Primary insomnia  - temazepam (RESTORIL) 30 MG capsule; Take 1 capsule (30 mg) by mouth nightly as needed for sleep  Dispense: 30 capsule; Refill: 3    2. Gastroesophageal reflux disease without esophagitis  - omeprazole (PRILOSEC) 40 MG capsule; TAKE ONE CAPSULE BY MOUTH ONCE DAILY ,TAKE  30  TO  60  MINUTES  BEFORE  A  MEAL.  Dispense: 90 capsule; Refill: 1    3. Anxiety  - sertraline (ZOLOFT) 100 MG tablet; Take 2 tablets (200 mg) by mouth daily  Dispense: 180 tablet; Refill: 1  - LORazepam (ATIVAN) 0.5 MG tablet; TAKE ONE TABLET BY MOUTH NIGHTLY AS NEEDED FOR ANXIETY  Dispense: 20 tablet; Refill: 0  - CBC with platelets differential    4. Tobacco abuse  - QUITPLAN  Referral; Future  - Tobacco Cessation - for Health Maintenance    5. Acquired hypothyroidism  - TSH with free T4 reflex    7. Migraine with aura and without status migrainosus, not intractable  - propranolol HCl 60 MG TABS; Take 1 tablet (60 mg) by mouth daily ER  Dispense: 90 tablet; Refill: 1  - Maxalt PRN - call me with dose  - MIGRAINE ACTION PLAN    8. ACP (advance care planning)  - Addressed    Mammo ordered      FU 6 months    Adrienne Dailey NP  [facility] [place]    HOW TO QUIT SMOKING  Smoking is one of the hardest habits to break. About half of all those who have ever smoked have been able to quit, and most of those (about 70%) who still smoke want to quit. Here are some of the best ways to stop smoking.     KEEP TRYING:  It takes most smokers about 8 tries before they are finally able to fully quit. So, the more often you try and fail, the better your chance of quitting the next time! So, don't give up!    GO COLD TURKEY:  Most ex-smokers quit cold turkey. Trying to cut back gradually doesn't seem to work as well, perhaps because it continues the smoking habit. Also, it is possible to fool yourself by inhaling more while smoking fewer cigarettes. This results in the same amount of nicotine in your body!    GET SUPPORT:  Support  programs can make an important difference, especially for the heavy smoker. These groups offer lectures, methods to change your behavior and peer support. Call the free national Quitline for more information. 800-QUIT-NOW (703-416-2858). Low-cost or free programs are offered by many hospitals, local chapters of the American Lung Association (737-369-9219) and the American Cancer Society (657-466-3311). Support at home is important too. Non-smokers can help by offering praise and encouragement. If the smoker fails to quit, encourage them to try again!    OVER-THE-COUNTER MEDICINES:  For those who can't quit on their own, Nicotine Replacement Therapy (NRT) may make quitting much easier. Certain aids such as the nicotine patch, gum and lozenge are available without a prescription. However, it is best to use these under the guidance of your doctor. The skin patch provides a steady supply of nicotine to the body. Nicotine gum and lozenge gives temporary bursts of low levels of nicotine. Both methods take the edge off the craving for cigarettes. WARNING: If you feel symptoms of nicotine overdose, such as nausea, vomiting, dizziness, weakness, or fast heartbeat, stop using these and see your doctor.    PRESCRIPTION MEDICINES:  After evaluating your smoking patterns and prior attempts at quitting, your doctor may offer a prescription medicine such as bupropion (Zyban, Wellbutrin), varenicline (Chantix, Champix), a niocotine inhaler or nasal spray. Each has its unique advantage and side effects which your doctor can review with you.    HEALTH BENEFITS OF QUITTING:  The benefits of quitting start right away and keep improving the longer you go without smokin minutes: blood pressure and pulse return to normal  8 hours: oxygen levels return to normal  2 days: ability to smell and taste begins to improve as damaged nerves start to regrow  2-3 weeks: circulation and lung function improves  1-9 months: decreased cough,  congestion and shortness of breath; less tired  1 year: risk of heart attack decreases by half  5 years: risk of lung cancer decreases by half; risk of stroke becomes the same as a non-smoker  For information about how to quit smoking, visit the following links:  [employer] ,   Clearing the Air, Quit Smoking Today   - an online booklet. http://www.smokefree.gov/pubs/clearing_the_air.pdf  Smokefree.gov http://smokefree.gov/  QuitNet http://www.quitnet.com/    4857-5131 Kiran Dalton, [address] [place] [address]. All rights reserved. This information is not intended as a substitute for professional medical care. Always follow your healthcare professional's instructions.    The Benefits of Living Smoke Free  What do you want to gain from quitting? Check off some reasons to quit.  Health Benefits  ___ Reduce my risk of lung cancer, heart disease, chronic lung disease  ___ Have fewer wrinkles and softer skin  ___ Improve my sense of taste and smell  ___ For pregnant women reduce the risk of having a miscarriage, stillbirth, premature birth, or low-birth-weight baby  Personal Benefits  ___ Feel more in control of my life  ___ Have better-smelling hair, breath, clothes, home, and car  ___ Save time by not having to take smoke breaks, buy cigarettes, or hunt for a light  ___ Have whiter teeth  Family Benefits  ___ Reduce my children s respiratory tract infections  ___ Set a good example for my children  ___ Reduce my family s cancer risk  Financial Benefits  ___ Save hundreds of dollars each year that would be spent on cigarettes  ___ Save money on medical bills  ___ Save on life, health, and car insurance premiums    Those Dollars Add Up!  Cigarettes are expensive, and getting more expensive all the time. Do you realize how much money you are spending on cigarettes per year? What is the average amount you spend on a pack of cigarettes? What is the average number of packs that you smoke per  day? Using your answers to these questions, fill in this formula to help you find out:  ($ _____ per pack) ×  ( _____ number of packs per day) × (365 days) =  $ _____ yearly cost of smoking  Besides tobacco, there are other costs, including extra cleaning bills and replacement costs for clothing and furniture; medical expenses for smoking-related illnesses; and higher health, life, and car insurance premiums.    Cigars and Pipes Count Too!  Cigars and pipes are also dangerous. So are smokeless (chewing) tobacco and snuff. All of these products contain nicotine, a highly addictive substance that has harmful effects on your body. Quitting smoking means giving up all tobacco products.      1253-2157 Kiran Dalton, [address] [place] [address], [place] [address]. All rights reserved. This information is not intended as a substitute for professional medical care. Always follow your healthcare professional's instructions.

## 2017-08-23 NOTE — LETTER
My Migraine Action Plan      Date: 8/23/2017     My Name: Nasra Ortiz   YOB: 1980  My Pharmacy:    [id number] - [place], [place] - [facility], [address] [facility] - [place], [place] - [address] [facility]        My (Preventative) Control Medicine: propranolol        My Rescue Medicine: maxalt and phenergan   My Doctor: Adrienne Dailey     My Clinic: [facility]  [address]  [facility] [address]  [phone]        GREEN ZONE = Good Control    My headache plan is working.   I can do what I need to do.           I WILL:     ? Keep managing my triggers.  ? Write in my migraine diary each time I have a headache.  ? Keep taking my preventive (controller) medicine daily.  ? Take my relief and rescue medicine as needed.             YELLOW ZONE = Not Enough Control    My headache plan isn t always working.   My headaches keep me from doing   some of the things I need to do.       I WILL:     ? Set goals to control my triggers and act on them.  ? Write in my migraine diary each time I have a headache and review it for                      patterns or new triggers.  ? Keep taking my preventive (controller) medicine daily.  ? Take my relief and rescue medicine as needed.  ? Call my doctor or clinic at if I stay in the Yellow Zone.             RED ZONE = Poor or No Control    My headache plan has  failed. I can t do anything  when I have one. My  medicines aren t working.           I WILL:   ? Set goals to control my triggers and act on them.  ? Write in my migraine diary each time I have a headache and review it for                      patterns or new triggers.  ? Keep taking my preventive (controller) medicine daily.  ? Take my relief and rescue medicine as needed.  ? Call my doctor or clinic or go to urgent care or an ER if I m having the worst                  headache of my life.  ? Call my doctor or clinic or go to urgent care or an ER if my medicine  doesn t work.  ? Let my doctor or clinic know within 2 weeks if I have gone to an urgent care or             emergency department.          Provider specific instructions:  ***

## 2017-08-23 NOTE — LETTER
My Migraine Action Plan      Date: 8/23/2017     My Name: Nasra Ortiz   YOB: 1980  My Pharmacy:    [id number] - [place], [place] - [facility], [address] [facility] - [place], [place] - [address] [facility]        My (Preventative) Control Medicine: propranolol po        My Rescue Medicine: Maxalt   My Doctor: Adrienne Dailey     My Clinic: [facility] [place]  [address]  [facility] [address]  [phone]        GREEN ZONE = Good Control    My headache plan is working.   I can do what I need to do.           I WILL:     ? Keep managing my triggers.  ? Write in my migraine diary each time I have a headache.  ? Keep taking my preventive (controller) medicine daily.  ? Take my relief and rescue medicine as needed.             YELLOW ZONE = Not Enough Control    My headache plan isn t always working.   My headaches keep me from doing   some of the things I need to do.       I WILL:     ? Set goals to control my triggers and act on them.  ? Write in my migraine diary each time I have a headache and review it for                      patterns or new triggers.  ? Keep taking my preventive (controller) medicine daily.  ? Take my relief and rescue medicine as needed.  ? Call my doctor or clinic at if I stay in the Yellow Zone.             RED ZONE = Poor or No Control    My headache plan has  failed. I can t do anything  when I have one. My  medicines aren t working.           I WILL:   ? Set goals to control my triggers and act on them.  ? Write in my migraine diary each time I have a headache and review it for                      patterns or new triggers.  ? Keep taking my preventive (controller) medicine daily.  ? Take my relief and rescue medicine as needed.  ? Call my doctor or clinic or go to urgent care or an ER if I m having the worst                  headache of my life.  ? Call my doctor or clinic or go to urgent care or an ER if my medicine doesn t  work.  ? Let my doctor or clinic know within 2 weeks if I have gone to an urgent care or             emergency department.          Provider specific instructions:  No

## 2017-08-23 NOTE — NURSING NOTE
"No chief complaint on file.      Initial /80 (BP Location: Right arm, Patient Position: Sitting, Cuff Size: Adult Regular)  Pulse 72  Resp 14  Ht 5' 4\" (1.626 m)  Wt 154 lb (69.9 kg)  LMP 08/15/2017  BMI 26.43 kg/m2 Estimated body mass index is 26.43 kg/(m^2) as calculated from the following:    Height as of this encounter: 5' 4\" (1.626 m).    Weight as of this encounter: 154 lb (69.9 kg).  Medication Reconciliation: complete     Bianka Chan      "

## 2017-08-23 NOTE — MR AVS SNAPSHOT
After Visit Summary   8/23/2017    Nasra Ortiz    MRN: [id number]           Patient Information     Date Of Birth          1980        Visit Information        Provider Department      8/23/2017 1:45 PM Adrienne Dailey, NP [facility]        Today's Diagnoses     Tobacco abuse    -  1    Primary insomnia        Gastroesophageal reflux disease without esophagitis        Anxiety        Acquired hypothyroidism        Migraine with aura and without status migrainosus, not intractable        Screening for breast cancer        ACP (advance care planning)          Care Instructions      1. Primary insomnia  - temazepam (RESTORIL) 30 MG capsule; Take 1 capsule (30 mg) by mouth nightly as needed for sleep  Dispense: 30 capsule; Refill: 3    2. Gastroesophageal reflux disease without esophagitis  - omeprazole (PRILOSEC) 40 MG capsule; TAKE ONE CAPSULE BY MOUTH ONCE DAILY ,TAKE  30  TO  60  MINUTES  BEFORE  A  MEAL.  Dispense: 90 capsule; Refill: 1    3. Anxiety  - sertraline (ZOLOFT) 100 MG tablet; Take 2 tablets (200 mg) by mouth daily  Dispense: 180 tablet; Refill: 1  - LORazepam (ATIVAN) 0.5 MG tablet; TAKE ONE TABLET BY MOUTH NIGHTLY AS NEEDED FOR ANXIETY  Dispense: 20 tablet; Refill: 0  - CBC with platelets differential    4. Tobacco abuse  - QUITPLAN  Referral; Future  - Tobacco Cessation - for Health Maintenance    5. Acquired hypothyroidism  - TSH with free T4 reflex    7. Migraine with aura and without status migrainosus, not intractable  - propranolol HCl 60 MG TABS; Take 1 tablet (60 mg) by mouth daily ER  Dispense: 90 tablet; Refill: 1  - Maxalt PRN - call me with dose  - MIGRAINE ACTION PLAN    8. ACP (advance care planning)  - Addressed    Mammo ordered      FU 6 months    Adrienne Dailey NP  [facility]    HOW TO QUIT SMOKING  Smoking is one of the hardest habits to break. About half of all those who have ever smoked have been able to quit, and most of those (about  70%) who still smoke want to quit. Here are some of the best ways to stop smoking.     KEEP TRYING:  It takes most smokers about 8 tries before they are finally able to fully quit. So, the more often you try and fail, the better your chance of quitting the next time! So, don't give up!    GO COLD TURKEY:  Most ex-smokers quit cold turkey. Trying to cut back gradually doesn't seem to work as well, perhaps because it continues the smoking habit. Also, it is possible to fool yourself by inhaling more while smoking fewer cigarettes. This results in the same amount of nicotine in your body!    GET SUPPORT:  Support programs can make an important difference, especially for the heavy smoker. These groups offer lectures, methods to change your behavior and peer support. Call the free national Quitline for more information. 800-QUIT-NOW (367-114-3172). Low-cost or free programs are offered by many hospitals, local chapters of the American Lung Association (760-979-2504) and the American Cancer Society (783-579-9689). Support at home is important too. Non-smokers can help by offering praise and encouragement. If the smoker fails to quit, encourage them to try again!    OVER-THE-COUNTER MEDICINES:  For those who can't quit on their own, Nicotine Replacement Therapy (NRT) may make quitting much easier. Certain aids such as the nicotine patch, gum and lozenge are available without a prescription. However, it is best to use these under the guidance of your doctor. The skin patch provides a steady supply of nicotine to the body. Nicotine gum and lozenge gives temporary bursts of low levels of nicotine. Both methods take the edge off the craving for cigarettes. WARNING: If you feel symptoms of nicotine overdose, such as nausea, vomiting, dizziness, weakness, or fast heartbeat, stop using these and see your doctor.    PRESCRIPTION MEDICINES:  After evaluating your smoking patterns and prior attempts at quitting, your doctor may  offer a prescription medicine such as bupropion (Zyban, Wellbutrin), varenicline (Chantix, Champix), a niocotine inhaler or nasal spray. Each has its unique advantage and side effects which your doctor can review with you.    HEALTH BENEFITS OF QUITTING:  The benefits of quitting start right away and keep improving the longer you go without smokin minutes: blood pressure and pulse return to normal  8 hours: oxygen levels return to normal  2 days: ability to smell and taste begins to improve as damaged nerves start to regrow  2-3 weeks: circulation and lung function improves  1-9 months: decreased cough, congestion and shortness of breath; less tired  1 year: risk of heart attack decreases by half  5 years: risk of lung cancer decreases by half; risk of stroke becomes the same as a non-smoker  For information about how to quit smoking, visit the following links:  National Cancer [place] ,   Clearing the Air, Quit Smoking Today   - an online booklet. http://www.smokefree.gov/pubs/clearing_the_air.pdf  Smokefree.gov http://smokefree.gov/  QuitNet http://www.quitnet.com/    4400-4958 [facility], [address] [place] [address]. All rights reserved. This information is not intended as a substitute for professional medical care. Always follow your healthcare professional's instructions.    The Benefits of Living Smoke Free  What do you want to gain from quitting? Check off some reasons to quit.  Health Benefits  ___ Reduce my risk of lung cancer, heart disease, chronic lung disease  ___ Have fewer wrinkles and softer skin  ___ Improve my sense of taste and smell  ___ For pregnant women--reduce the risk of having a miscarriage, stillbirth, premature birth, or low-birth-weight baby  Personal Benefits  ___ Feel more in control of my life  ___ Have better-smelling hair, breath, clothes, home, and car  ___ Save time by not having to take smoke breaks, buy cigarettes, or hunt for a light  ___ Have whiter  teeth  Family Benefits  ___ Reduce my children s respiratory tract infections  ___ Set a good example for my children  ___ Reduce my family s cancer risk  Financial Benefits  ___ Save hundreds of dollars each year that would be spent on cigarettes  ___ Save money on medical bills  ___ Save on life, health, and car insurance premiums    Those Dollars Add Up!  Cigarettes are expensive, and getting more expensive all the time. Do you realize how much money you are spending on cigarettes per year? What is the average amount you spend on a pack of cigarettes? What is the average number of packs that you smoke per day? Using your answers to these questions, fill in this formula to help you find out:  ($ _____ per pack) ×  ( _____ number of packs per day) × (365 days) =  $ _____ yearly cost of smoking  Besides tobacco, there are other costs, including extra cleaning bills and replacement costs for clothing and furniture; medical expenses for smoking-related illnesses; and higher health, life, and car insurance premiums.    Cigars and Pipes Count Too!  Cigars and pipes are also dangerous. So are smokeless (chewing) tobacco and snuff. All of these products contain nicotine, a highly addictive substance that has harmful effects on your body. Quitting smoking means giving up all tobacco products.      0239-7144 [facility], [address] [place] [address], [place] [address]. All rights reserved. This information is not intended as a substitute for professional medical care. Always follow your healthcare professional's instructions.          Follow-ups after your visit        Additional Services     QUITPLAN  Referral       [place] [employer]AC[employer] QUITLINES FAX FORM  Fax form to: [phone]    The clinic will facilitate the referral to the quitline.    Provider Information:  ===============================================================  Adrienne Dailey NP  ID#: [id number] [facility] [phone] Fax: [phone]   [email or web address]  Payor: YOUNG / Plan: YOUNG [place] / Product Type: HMO /   ===============================================================    The Public Health Service Guideline does not recommend providing over-the-counter nicotine replacement therapy products without physician authorization to patients with the following conditions: pregnancy, uncontrolled high blood pressure, or cardiovascular diseases.     I authorize the [place] Tobacco Quitlines to provide over-the-counter nicotine replacement products for the patient listed below if the patient's health plan benefits cover NRT or if the patient is eligible for QUITPLAN services.    Patient Consented to:  ===============================================================  - YES - I am ready to quit tobacco and request the above information be given to the quitline so they may contact me.  I understand that one of [place]'s Tobacco Quitlines will inform my provider about my participation.  ===============================================================  Please check the BEST 3-hour call window for them to reach you: 5pm - 8pm  May we leave a message?  YES  Language Preference:  English  Phone Number: Home Phone      [phone]  Mobile          [phone]     E-mail Address: [email or web address]    ========================================================================  FOR QUITLINE USE ONLY:  THIS INFORMATION WILL BE PROVIDED BACK TO THE PROVIDER  Contact date: __/ __/__ or ____ Did not reach after three attempts.    Outcome:  __ Enrolled in telephone counseling program  __ Declined  __ Not Reached    Stage of readiness: _______________________  Planned Quit Date: ___/ ___/ ___  Comments:      2011 [facility]   This message funded by [employer] [place], an independent licensee of the Blue Cross and Blue Shield Association. Rev. 11/1/12                  Your  "next 10 appointments already scheduled     Oct 18, 2017 10:00 AM CDT   (Arrive by 9:45 AM)   SHORT with Manuela Keenan NP   [facility] ([facility] )    [address]  [place] [address]   [phone]              Future tests that were ordered for you today     Open Future Orders        Priority Expected Expires Ordered    QUITPLAN  Referral Routine  10/22/2017 8/23/2017            Who to contact     If you have questions or need follow up information about today's clinic visit or your schedule please contact [facility] directly at [phone].  Normal or non-critical lab and imaging results will be communicated to you by MyChart, letter or phone within 4 business days after the clinic has received the results. If you do not hear from us within 7 days, please contact the clinic through [employer] or phone. If you have a critical or abnormal lab result, we will notify you by phone as soon as possible.  Submit refill requests through [employer] or call your pharmacy and they will forward the refill request to us. Please allow 3 business days for your refill to be completed.          Additional Information About Your Visit        [facility] [place] Information     [employer] gives you secure access to your electronic health record. If you see a primary care provider, you can also send messages to your care team and make appointments. If you have questions, please call your primary care clinic.  If you do not have a primary care provider, please call [phone] and they will assist you.        Care EveryWhere ID     This is your Care EveryWhere ID. This could be used by other organizations to access your [facility] medical records  [id number]        Your Vitals Were     Pulse Respirations Height Last Period BMI (Body Mass Index)       72 14 5' 4\" (1.626 m) 08/15/2017 26.43 kg/m2        Blood Pressure from Last 3 Encounters:   08/23/17 106/76   06/15/17 118/90 "   04/17/17 122/78    Weight from Last 3 Encounters:   08/23/17 154 lb (69.9 kg)   06/15/17 156 lb (70.8 kg)   04/17/17 157 lb (71.2 kg)              We Performed the Following     CBC with platelets differential     MA Digital Screening Bilateral     MIGRAINE ACTION PLAN     Tobacco Cessation - for Health Maintenance     TOBACCO CESSATION - FOR HEALTH MAINTENANCE     TSH with free T4 reflex          Today's Medication Changes          These changes are accurate as of: 8/23/17  3:27 PM.  If you have any questions, ask your nurse or doctor.               These medicines have changed or have updated prescriptions.        Dose/Directions    omeprazole 40 MG capsule   Commonly known as:  priLOSEC   This may have changed:  See the new instructions.   Used for:  Gastroesophageal reflux disease without esophagitis   Changed by:  Adrienne Dailey NP        TAKE ONE CAPSULE BY MOUTH ONCE DAILY ,TAKE  30  TO  60  MINUTES  BEFORE  A  MEAL.   Quantity:  90 capsule   Refills:  1       propranolol HCl 60 MG Tabs   This may have changed:  medication strength   Used for:  Migraine with aura and without status migrainosus, not intractable   Changed by:  Adrienne Dailey NP        Dose:  60 mg   Take 1 tablet (60 mg) by mouth daily ER   Quantity:  90 tablet   Refills:  1       rizatriptan 10 MG ODT tab   Commonly known as:  MAXALT-MLT   This may have changed:    - medication strength  - how much to take  - when to take this  - additional instructions   Used for:  Migraine with aura and without status migrainosus, not intractable   Changed by:  Adrienne Dailey NP        Dose:  10 mg   Take 1 tablet (10 mg) by mouth at onset of headache for migraine May repeat in 2 hours. Max 3 tablets/24 hours.   Quantity:  18 tablet   Refills:  3         Stop taking these medicines if you haven't already. Please contact your care team if you have questions.     cyclobenzaprine 10 MG tablet   Commonly known as:  FLEXERIL   Stopped by:  Adrienne Dailey NP            ibuprofen 800 MG tablet   Commonly known as:  ADVIL/MOTRIN   Stopped by:  Adrienne Dailey NP                Where to get your medicines      These medications were sent to [id number] - [place], [place] - [address]   [address] [facility] [address]     Phone:  [phone]     omeprazole 40 MG capsule    propranolol HCl 60 MG Tabs    rizatriptan 10 MG ODT tab    sertraline 100 MG tablet         Some of these will need a paper prescription and others can be bought over the counter.  Ask your nurse if you have questions.     Bring a paper prescription for each of these medications     LORazepam 0.5 MG tablet    temazepam 30 MG capsule                Primary Care Provider Office Phone # Fax #    Adrienne Dailey [phone]       [address] [address]        Equal Access to Services     MARCO MILES : Hadii aad ku hadasho Sosri, waaxda luqadaha, qaybta kaalmada adejelena, lizet east. So [facility] [phone].    ATENCIÓN: Si habla español, tiene a baires disposición servicios gratuitos de asistencia lingüística. Jeannie al [phone].    We comply with applicable federal civil rights laws and [place] laws. We do not discriminate on the basis of race, color, national origin, age, disability sex, sexual orientation or gender identity.            Thank you!     Thank you for choosing [facility]  for your care. Our goal is always to provide you with excellent care. Hearing back from our patients is one way we can continue to improve our services. Please take a few minutes to complete the written survey that you may receive in the mail after your visit with us. Thank you!             Your Updated Medication List - Protect others around you: Learn how to safely use, store and throw away your medicines at www.disposemymeds.org.          This list is accurate as of: 8/23/17  3:27 PM.  Always use your most recent med  list.                   Brand Name Dispense Instructions for use Diagnosis    levonorgestrel 20 MCG/24HR IUD    MIRENA     1 each by Intrauterine route once        LORazepam 0.5 MG tablet    ATIVAN    20 tablet    TAKE ONE TABLET BY MOUTH NIGHTLY AS NEEDED FOR ANXIETY    Primary insomnia       multivitamin, therapeutic Tabs tablet      Take 1 tablet by mouth daily        omeprazole 40 MG capsule    priLOSEC    90 capsule    TAKE ONE CAPSULE BY MOUTH ONCE DAILY ,TAKE  30  TO  60  MINUTES  BEFORE  A  MEAL.    Gastroesophageal reflux disease without esophagitis       promethazine 25 MG tablet    PHENERGAN    60 tablet    Take 1 tablet (25 mg) by mouth every 6 hours as needed for nausea    Nausea       propranolol HCl 60 MG Tabs     90 tablet    Take 1 tablet (60 mg) by mouth daily ER    Migraine with aura and without status migrainosus, not intractable       rizatriptan 10 MG ODT tab    MAXALT-MLT    18 tablet    Take 1 tablet (10 mg) by mouth at onset of headache for migraine May repeat in 2 hours. Max 3 tablets/24 hours.    Migraine with aura and without status migrainosus, not intractable       sertraline 100 MG tablet    ZOLOFT    180 tablet    Take 2 tablets (200 mg) by mouth daily    Anxiety       temazepam 30 MG capsule    RESTORIL    30 capsule    Take 1 capsule (30 mg) by mouth nightly as needed for sleep    Primary insomnia

## 2017-08-23 NOTE — PROGRESS NOTES
SUBJECTIVE:   Nasra Ortiz is a 37 year old female who presents to clinic today for the following health issues:      Anxiety Follow-Up    Status since last visit: Improved     Other associated symptoms:None    Complicating factors:   Significant life event: No   Current substance abuse: None  Depression symptoms: No    MAYKEL-7 SCORE 3/17/2017 2017 2017   Total Score 11 20 13       GAD7        Migraine Follow-Up    Headaches symptoms:  Improved due to new med, propranolol    Frequency: 1-2 monthly     Duration of headaches: couple days    Able to do normal daily activities/work with migraines: No - in a dark room, vomiting    Rescue/Relief medication:Maxalt and phenergan              Effectiveness: moderate relief    Preventative medication: propranolol    Neurologic complications: No new stroke-like symptoms, loss of vision or speech, numbness or weakness and has difficulty focusing, mind races    In the past 4 weeks, how often have you gone to Urgent Care or the emergency room because of your headaches?  0    Hypothyroidism Follow-up    Since last visit, patient describes the following symptoms: Weight stable, no hair loss, no skin changes, no constipation, no loose stools    Tobacco use - referral sent    Insomnia - stable on Restoril - does not take nightly    GERD - stable on meds        Amount of exercise or physical activity: active    Problems taking medications regularly: No    Medication side effects: none      Diet: regular (no restrictions)        Problem list and histories reviewed & adjusted, as indicated.  Additional history: as documented    Patient Active Problem List   Diagnosis     Migraine with aura and without status migrainosus, not intractable     Acquired hypothyroidism     Anxiety     Primary insomnia     ACP (advance care planning)     Gastroesophageal reflux disease, esophagitis presence not specified     Past Surgical History:   Procedure Laterality Date       SECTION  1/31/2012     oopherectomy right  2/2012       Social History   Substance Use Topics     Smoking status: Current Some Day Smoker     Packs/day: 0.40     Years: 10.00     Types: Cigarettes     Smokeless tobacco: Never Used      Comment: tobacco cessation discuseed - tried to quit: no - passive smoke exposure quitting on own      Alcohol use No     Family History   Problem Relation Age of Onset     Hypertension Mother      Hyperlipidemia Mother      Cardiovascular Father      Hypertension Father      Hyperlipidemia Father      Heart Failure Father      Sleep Apnea Father          Current Outpatient Prescriptions   Medication Sig Dispense Refill     LORazepam (ATIVAN) 0.5 MG tablet TAKE ONE TABLET BY MOUTH NIGHTLY AS NEEDED FOR ANXIETY 20 tablet 0     omeprazole (PRILOSEC) 40 MG capsule TAKE ONE CAPSULE BY MOUTH ONCE DAILY ,TAKE  30  TO  60  MINUTES  BEFORE  A  MEAL. 90 capsule 1     sertraline (ZOLOFT) 100 MG tablet Take 2 tablets (200 mg) by mouth daily 180 tablet 1     temazepam (RESTORIL) 30 MG capsule Take 1 capsule (30 mg) by mouth nightly as needed for sleep 30 capsule 3     propranolol HCl 60 MG TABS Take 1 tablet (60 mg) by mouth daily ER 90 tablet 1     promethazine (PHENERGAN) 25 MG tablet Take 1 tablet (25 mg) by mouth every 6 hours as needed for nausea 60 tablet 1     Rizatriptan Benzoate (MAXALT-MLT PO) Take by mouth once as needed for migraine Unknown dose by Sheila Lozano PA-C neurology       levonorgestrel (MIRENA) 20 MCG/24HR IUD 1 each by Intrauterine route once       multivitamin, therapeutic (THERA-VIT) TABS Take 1 tablet by mouth daily       [DISCONTINUED] PROPRANOLOL HCL PO Take 60 mg by mouth daily ER       [DISCONTINUED] sertraline (ZOLOFT) 100 MG tablet Take 2 tablets (200 mg) by mouth daily 180 tablet 1     Allergies   Allergen Reactions     Dust Mite Extract      Penicillins Rash     Recent Labs   Lab Test  01/26/17   1603 01/18/17 01/15/17  08/01/16   1905  07/25/15   1225   ALT    "--    --   15  19  22   CR  0.72  0.81  0.80  0.65  0.71   GFRESTIMATED  >90  Non  GFR Calc     --    --   >90  Non  GFR Calc    >90  Non  GFR Calc     GFRESTBLACK  >90   GFR Calc     --    --   >90   GFR Calc    >90   GFR Calc     POTASSIUM  4.2  4.8  3.0*  3.6  4.0   TSH  0.92   --    --   1.21   --       BP Readings from Last 3 Encounters:   08/23/17 106/80   06/15/17 118/90   04/17/17 122/78    Wt Readings from Last 3 Encounters:   08/23/17 154 lb (69.9 kg)   06/15/17 156 lb (70.8 kg)   04/17/17 157 lb (71.2 kg)                  Labs reviewed in EPIC          Reviewed and updated as needed this visit by clinical staffTobacco  Allergies  Meds  Med Hx  Surg Hx  Fam Hx  Soc Hx      Reviewed and updated as needed this visit by Provider         ROS:  Constitutional, HEENT, cardiovascular, pulmonary, gi and gu systems are negative, except as otherwise noted.      OBJECTIVE:   /80 (BP Location: Right arm, Patient Position: Sitting, Cuff Size: Adult Regular)  Pulse 72  Resp 14  Ht 5' 4\" (1.626 m)  Wt 154 lb (69.9 kg)  LMP 08/15/2017  BMI 26.43 kg/m2  Body mass index is 26.43 kg/(m^2).     GENERAL: healthy, alert and no distress  EYES: Eyes grossly normal to inspection, PERRL and conjunctivae and sclerae normal  HENT: ear canals and TM's normal, nose and mouth without ulcers or lesions  NECK: no adenopathy, no asymmetry, masses, or scars and thyroid normal to palpation  RESP: lungs clear to auscultation - no rales, rhonchi or wheezes  CV: regular rate and rhythm, normal S1 S2, no S3 or S4, no murmur, click or rub, no peripheral edema and peripheral pulses strong  Abdomen - normal, no abnormalities  MS: no gross musculoskeletal defects noted, no edema  SKIN: no suspicious lesions or rashes  PSYCH: mentation appears normal, affect normal/bright    Visit time:   45 Minutes  Time spent with patient, including " examination, face to face patient education - 25 mn  Visit Content: During our face to face time, patient education was provided regarding diagnosis, and treatment pan. Patient counseled regarding disease process  All diagnosis and treatment plan are reviewed with the patient, 50 % of face to face time is directed at patient education  Record review completed        ASSESSMENT/PLAN:     Migraine: controlled   Plan:  No changes in the patient's current treatment plan        Tobacco Cessation:   reports that she has been smoking Cigarettes.  She has a 4.00 pack-year smoking history. She has never used smokeless tobacco.  Tobacco Cessation Action Plan: Phone counseling: Place order for QuitPlan (Tobacco Cessation [facility] Referral [id number])        1. Primary insomnia  - temazepam (RESTORIL) 30 MG capsule; Take 1 capsule (30 mg) by mouth nightly as needed for sleep  Dispense: 30 capsule; Refill: 3    2. Gastroesophageal reflux disease without esophagitis  - omeprazole (PRILOSEC) 40 MG capsule; TAKE ONE CAPSULE BY MOUTH ONCE DAILY ,TAKE  30  TO  60  MINUTES  BEFORE  A  MEAL.  Dispense: 90 capsule; Refill: 1    3. Anxiety  - sertraline (ZOLOFT) 100 MG tablet; Take 2 tablets (200 mg) by mouth daily  Dispense: 180 tablet; Refill: 1  - LORazepam (ATIVAN) 0.5 MG tablet; TAKE ONE TABLET BY MOUTH NIGHTLY AS NEEDED FOR ANXIETY  Dispense: 20 tablet; Refill: 0  - CBC with platelets differential    4. Tobacco abuse  - QUITPLAN  Referral; Future  - Tobacco Cessation - for Health Maintenance    5. Acquired hypothyroidism  - TSH with free T4 reflex    7. Migraine with aura and without status migrainosus, not intractable  - propranolol HCl 60 MG TABS; Take 1 tablet (60 mg) by mouth daily ER  Dispense: 90 tablet; Refill: 1  - Maxalt PRN - call me with dose  - MIGRAINE ACTION PLAN  - Maxalt filled    8. ACP (advance care planning)  - Addressed      Mammo ordered      FU 6 months    Adrienne Dailey NP  [facility]

## 2017-08-24 ENCOUNTER — TELEPHONE (OUTPATIENT)
Dept: FAMILY MEDICINE | Facility: OTHER | Age: 37
End: 2017-08-24

## 2017-08-24 LAB — TSH SERPL DL<=0.005 MIU/L-ACNC: 0.87 MU/L (ref 0.4–4)

## 2017-08-24 ASSESSMENT — ANXIETY QUESTIONNAIRES: GAD7 TOTAL SCORE: 13

## 2017-08-24 NOTE — TELEPHONE ENCOUNTER
Spoke with pharmacist was ordered ER, extended release, apparently they didn't see that.  All is cleared up.

## 2017-08-24 NOTE — TELEPHONE ENCOUNTER
[facility] would like clarification if Propranolol is immediate release. Last filled on 8.23.17 #90 with 1. Last office visit on 8.23.17. Thank you

## 2017-08-24 NOTE — TELEPHONE ENCOUNTER
What clarification do they need? Please find out and I will address it tomorrow when I am in the office  Thank you

## 2017-09-21 DIAGNOSIS — F51.01 PRIMARY INSOMNIA: ICD-10-CM

## 2017-09-21 RX ORDER — LORAZEPAM 0.5 MG/1
TABLET ORAL
Qty: 20 TABLET | Refills: 0 | Status: SHIPPED | OUTPATIENT
Start: 2017-09-21 | End: 2017-10-18

## 2017-09-21 NOTE — TELEPHONE ENCOUNTER
ativan      Last Written Prescription Date: 8/23/17  Last Fill Quantity: 20,  # refills: 0   Last Office Visit with [facility], [facility] or [facility] prescribing provider: 8/23/17                                         Next 5 appointments (look out 90 days)     Oct 18, 2017 10:00 AM CDT   (Arrive by 9:45 AM)   SHORT with Manuela Keenan NP   [facility] ([facility] )    [address]  [facility] [address]   [phone]

## 2017-10-18 DIAGNOSIS — F51.01 PRIMARY INSOMNIA: ICD-10-CM

## 2017-10-18 RX ORDER — LORAZEPAM 0.5 MG/1
TABLET ORAL
Qty: 20 TABLET | Refills: 0 | Status: SHIPPED | OUTPATIENT
Start: 2017-10-18 | End: 2017-11-13

## 2017-10-18 NOTE — TELEPHONE ENCOUNTER
ativan      Last Written Prescription Date: 9/21/17  Last Fill Quantity: 20,  # refills: 0   Last Office Visit with [facility], [facility] or [facility] prescribing provider: 8/23/17                                         Next 5 appointments (look out 90 days)     Oct 18, 2017 10:00 AM CDT   (Arrive by 9:45 AM)   SHORT with Manuela Keenan NP   [facility] ([facility] )    [address]  [facility] [address]   [phone]

## 2017-10-25 ENCOUNTER — TRANSFERRED RECORDS (OUTPATIENT)
Dept: HEALTH INFORMATION MANAGEMENT | Facility: HOSPITAL | Age: 37
End: 2017-10-25

## 2017-10-26 ENCOUNTER — TRANSFERRED RECORDS (OUTPATIENT)
Dept: HEALTH INFORMATION MANAGEMENT | Facility: HOSPITAL | Age: 37
End: 2017-10-26

## 2017-11-13 ENCOUNTER — OFFICE VISIT (OUTPATIENT)
Dept: FAMILY MEDICINE | Facility: OTHER | Age: 37
End: 2017-11-13
Attending: NURSE PRACTITIONER
Payer: COMMERCIAL

## 2017-11-13 ENCOUNTER — RADIANT APPOINTMENT (OUTPATIENT)
Dept: GENERAL RADIOLOGY | Facility: OTHER | Age: 37
End: 2017-11-13
Attending: NURSE PRACTITIONER
Payer: COMMERCIAL

## 2017-11-13 VITALS
RESPIRATION RATE: 14 BRPM | HEART RATE: 81 BPM | TEMPERATURE: 98 F | HEIGHT: 64 IN | SYSTOLIC BLOOD PRESSURE: 110 MMHG | OXYGEN SATURATION: 100 % | DIASTOLIC BLOOD PRESSURE: 80 MMHG | BODY MASS INDEX: 25.61 KG/M2 | WEIGHT: 150 LBS

## 2017-11-13 DIAGNOSIS — R19.8 CHANGE IN BOWEL FUNCTION: Primary | ICD-10-CM

## 2017-11-13 DIAGNOSIS — K52.9 INFLAMMATORY BOWEL SYNDROME: ICD-10-CM

## 2017-11-13 DIAGNOSIS — R19.8 CHANGE IN BOWEL FUNCTION: ICD-10-CM

## 2017-11-13 DIAGNOSIS — R11.0 NAUSEA: ICD-10-CM

## 2017-11-13 DIAGNOSIS — F51.01 PRIMARY INSOMNIA: ICD-10-CM

## 2017-11-13 DIAGNOSIS — G43.109 MIGRAINE WITH AURA AND WITHOUT STATUS MIGRAINOSUS, NOT INTRACTABLE: ICD-10-CM

## 2017-11-13 LAB
BASOPHILS # BLD AUTO: 0 10E9/L (ref 0–0.2)
BASOPHILS NFR BLD AUTO: 0.2 %
CRP SERPL-MCNC: <2.9 MG/L (ref 0–8)
DIFFERENTIAL METHOD BLD: NORMAL
EOSINOPHIL # BLD AUTO: 0.1 10E9/L (ref 0–0.7)
EOSINOPHIL NFR BLD AUTO: 0.9 %
ERYTHROCYTE [DISTWIDTH] IN BLOOD BY AUTOMATED COUNT: 12.1 % (ref 10–15)
ERYTHROCYTE [SEDIMENTATION RATE] IN BLOOD BY WESTERGREN METHOD: 5 MM/H (ref 0–20)
HCT VFR BLD AUTO: 40.5 % (ref 35–47)
HGB BLD-MCNC: 13.3 G/DL (ref 11.7–15.7)
LYMPHOCYTES # BLD AUTO: 2 10E9/L (ref 0.8–5.3)
LYMPHOCYTES NFR BLD AUTO: 22.9 %
MCH RBC QN AUTO: 32 PG (ref 26.5–33)
MCHC RBC AUTO-ENTMCNC: 32.8 G/DL (ref 31.5–36.5)
MCV RBC AUTO: 97 FL (ref 78–100)
MONOCYTES # BLD AUTO: 0.4 10E9/L (ref 0–1.3)
MONOCYTES NFR BLD AUTO: 4.6 %
NEUTROPHILS # BLD AUTO: 6.1 10E9/L (ref 1.6–8.3)
NEUTROPHILS NFR BLD AUTO: 71.4 %
PLATELET # BLD AUTO: 298 10E9/L (ref 150–450)
RBC # BLD AUTO: 4.16 10E12/L (ref 3.8–5.2)
WBC # BLD AUTO: 8.6 10E9/L (ref 4–11)

## 2017-11-13 PROCEDURE — 86140 C-REACTIVE PROTEIN: CPT | Mod: ZL | Performed by: NURSE PRACTITIONER

## 2017-11-13 PROCEDURE — 36415 COLL VENOUS BLD VENIPUNCTURE: CPT | Mod: ZL | Performed by: NURSE PRACTITIONER

## 2017-11-13 PROCEDURE — 85652 RBC SED RATE AUTOMATED: CPT | Mod: ZL | Performed by: NURSE PRACTITIONER

## 2017-11-13 PROCEDURE — 83516 IMMUNOASSAY NONANTIBODY: CPT | Mod: ZL,59 | Performed by: NURSE PRACTITIONER

## 2017-11-13 PROCEDURE — 99214 OFFICE O/P EST MOD 30 MIN: CPT | Performed by: NURSE PRACTITIONER

## 2017-11-13 PROCEDURE — 74020 XR ABDOMEN 2 VW: CPT | Mod: TC

## 2017-11-13 PROCEDURE — 85025 COMPLETE CBC W/AUTO DIFF WBC: CPT | Mod: ZL | Performed by: NURSE PRACTITIONER

## 2017-11-13 PROCEDURE — 83516 IMMUNOASSAY NONANTIBODY: CPT | Mod: ZL | Performed by: NURSE PRACTITIONER

## 2017-11-13 PROCEDURE — 99212 OFFICE O/P EST SF 10 MIN: CPT

## 2017-11-13 PROCEDURE — 99000 SPECIMEN HANDLING OFFICE-LAB: CPT | Performed by: NURSE PRACTITIONER

## 2017-11-13 RX ORDER — RIZATRIPTAN BENZOATE 10 MG/1
10 TABLET, ORALLY DISINTEGRATING ORAL
Qty: 18 TABLET | Refills: 3 | Status: SHIPPED | OUTPATIENT
Start: 2017-11-13 | End: 2018-03-23

## 2017-11-13 RX ORDER — PROMETHAZINE HYDROCHLORIDE 25 MG/1
25 TABLET ORAL EVERY 6 HOURS PRN
Qty: 60 TABLET | Refills: 1 | Status: SHIPPED | OUTPATIENT
Start: 2017-11-13 | End: 2018-03-16

## 2017-11-13 RX ORDER — PREDNISONE 20 MG/1
20 TABLET ORAL 2 TIMES DAILY
Qty: 10 TABLET | Refills: 0 | Status: SHIPPED | OUTPATIENT
Start: 2017-11-13 | End: 2018-03-09

## 2017-11-13 ASSESSMENT — ANXIETY QUESTIONNAIRES
6. BECOMING EASILY ANNOYED OR IRRITABLE: MORE THAN HALF THE DAYS
GAD7 TOTAL SCORE: 9
7. FEELING AFRAID AS IF SOMETHING AWFUL MIGHT HAPPEN: SEVERAL DAYS
2. NOT BEING ABLE TO STOP OR CONTROL WORRYING: SEVERAL DAYS
3. WORRYING TOO MUCH ABOUT DIFFERENT THINGS: SEVERAL DAYS
IF YOU CHECKED OFF ANY PROBLEMS ON THIS QUESTIONNAIRE, HOW DIFFICULT HAVE THESE PROBLEMS MADE IT FOR YOU TO DO YOUR WORK, TAKE CARE OF THINGS AT HOME, OR GET ALONG WITH OTHER PEOPLE: SOMEWHAT DIFFICULT
1. FEELING NERVOUS, ANXIOUS, OR ON EDGE: SEVERAL DAYS
5. BEING SO RESTLESS THAT IT IS HARD TO SIT STILL: SEVERAL DAYS

## 2017-11-13 ASSESSMENT — PATIENT HEALTH QUESTIONNAIRE - PHQ9
SUM OF ALL RESPONSES TO PHQ QUESTIONS 1-9: 9
5. POOR APPETITE OR OVEREATING: MORE THAN HALF THE DAYS

## 2017-11-13 NOTE — PROGRESS NOTES
Normal so far  [facility]  [phone]

## 2017-11-13 NOTE — PROGRESS NOTES
stool within the colon, indicating a possible source of diarrhea, if she is at tall backed up  Follow plan of care as we discussed  No need for prednisone at this time, keep in the event she needs it.  Have her use Milk of Magnesia OTC per bottle instruction for 24 hours while awake  Remainder of labs so far are normal  Please call her for an update tomorrow afternoon    To UC as needed    Adrienne CARCAMO  [phone]

## 2017-11-13 NOTE — PROGRESS NOTES
SUBJECTIVE:   Nasra Ortiz is a 37 year old female who presents to clinic today for the following health issues:      ED/UC Followup:    Facility:  [facility]  Date of visit: 2 weeks ago  Reason for visit: Diarrhea  Current Status: Improved slightly       Patient is here asking for a GI consultation for GI upset, bowel change, diarrhea.  She has had nausea, however is a bit improved.        Migraine Follow-Up    Headaches symptoms:  Stable     Frequency: variable     Duration of headaches: variable    Able to do normal daily activities/work with migraines: No - yes    Rescue/Relief medication:Maxalt              Effectiveness: moderate relief    Preventative medication: None    Neurologic complications: No new stroke-like symptoms, loss of vision or speech, numbness or weakness    In the past 4 weeks, how often have you gone to Urgent Care or the emergency room because of your headaches?  0          Problem list and histories reviewed & adjusted, as indicated.  Additional history: as documented    Patient Active Problem List   Diagnosis     Migraine with aura and without status migrainosus, not intractable     Acquired hypothyroidism     Anxiety     Primary insomnia     ACP (advance care planning)     Gastroesophageal reflux disease, esophagitis presence not specified     Past Surgical History:   Procedure Laterality Date      SECTION  2012     oopherectomy right  2012       Social History   Substance Use Topics     Smoking status: Current Some Day Smoker     Packs/day: 0.40     Years: 10.00     Types: Cigarettes     Smokeless tobacco: Never Used      Comment: tobacco cessation discuseed - tried to quit: no - passive smoke exposure quitting on own      Alcohol use No     Family History   Problem Relation Age of Onset     Hypertension Mother      Hyperlipidemia Mother      Cardiovascular Father      Hypertension Father      Hyperlipidemia Father      Heart Failure Father      Sleep  Apnea Father          Current Outpatient Prescriptions   Medication Sig Dispense Refill     Ondansetron HCl (ZOFRAN PO) Take 4 mg by mouth       rizatriptan (MAXALT-MLT) 10 MG ODT tab Take 1 tablet (10 mg) by mouth at onset of headache for migraine May repeat in 2 hours. Max 3 tablets/24 hours. 18 tablet 3     promethazine (PHENERGAN) 25 MG tablet Take 1 tablet (25 mg) by mouth every 6 hours as needed for nausea 60 tablet 1     LORazepam (ATIVAN) 0.5 MG tablet TAKE ONE TABLET BY MOUTH NIGHTLY AS NEEDED FOR ANXIETY 20 tablet 0     omeprazole (PRILOSEC) 40 MG capsule TAKE ONE CAPSULE BY MOUTH ONCE DAILY ,TAKE  30  TO  60  MINUTES  BEFORE  A  MEAL. 90 capsule 1     sertraline (ZOLOFT) 100 MG tablet Take 2 tablets (200 mg) by mouth daily 180 tablet 1     temazepam (RESTORIL) 30 MG capsule Take 1 capsule (30 mg) by mouth nightly as needed for sleep 30 capsule 3     propranolol HCl 60 MG TABS Take 1 tablet (60 mg) by mouth daily ER 90 tablet 1     levonorgestrel (MIRENA) 20 MCG/24HR IUD 1 each by Intrauterine route once       multivitamin, therapeutic (THERA-VIT) TABS Take 1 tablet by mouth daily       Allergies   Allergen Reactions     Dust Mite Extract      Penicillins Rash     Recent Labs   Lab Test  08/23/17   1530  01/26/17   1603 01/18/17 01/15/17  08/01/16   1905  07/25/15   1225   ALT   --    --    --   15  19  22   CR   --   0.72  0.81  0.80  0.65  0.71   GFRESTIMATED   --   >90  Non  GFR Calc     --    --   >90  Non  GFR Calc    >90  Non  GFR Calc     GFRESTBLACK   --   >90   GFR Calc     --    --   >90   GFR Calc    >90   GFR Calc     POTASSIUM   --   4.2  4.8  3.0*  3.6  4.0   TSH  0.87  0.92   --    --   1.21   --       BP Readings from Last 3 Encounters:   11/13/17 110/80   08/23/17 106/76   06/15/17 118/90    Wt Readings from Last 3 Encounters:   11/13/17 150 lb (68 kg)   08/23/17 154 lb (69.9 kg)   06/15/17 156  "lb (70.8 kg)                  Labs reviewed in EPIC          Reviewed and updated as needed this visit by clinical staffTobacco  Allergies  Meds       Reviewed and updated as needed this visit by Provider         ROS:  Constitutional, HEENT, cardiovascular, pulmonary, gi and gu systems are negative, except as otherwise noted.      OBJECTIVE:   /80 (BP Location: Left arm, Patient Position: Sitting, Cuff Size: Adult Regular)  Pulse 81  Temp 98  F (36.7  C) (Tympanic)  Resp 14  Ht 5' 4\" (1.626 m)  Wt 150 lb (68 kg)  SpO2 100%  BMI 25.75 kg/m2  Body mass index is 25.75 kg/(m^2).     GENERAL: healthy, alert and no distress  EYES: Eyes grossly normal to inspection, PERRL and conjunctivae and sclerae normal  HENT: ear canals and TM's normal, nose and mouth without ulcers or lesions  NECK: no adenopathy, no asymmetry, masses, or scars and thyroid normal to palpation  RESP: lungs clear to auscultation - no rales, rhonchi or wheezes  CV: regular rate and rhythm, normal S1 S2, no S3 or S4, no murmur, click or rub, no peripheral edema and peripheral pulses strong  ABDOMEN: no pain, no tenderness  MS: no gross musculoskeletal defects noted, no edema  SKIN: no suspicious lesions or rashes  PSYCH: mentation appears normal, affect normal/bright          PROCEDURE: XR ABDOMEN 2 VW 11/13/2017 10:41 AM     HISTORY: diarrhea, nausea; Change in bowel function; Nausea     COMPARISONS: None.     TECHNIQUE: Supine and upright views.     FINDINGS: There is no free intraperitoneal air. Small amount of gas  and stool are seen within the colon. There are nondilated gas-filled  small bowel loops with multiple short gas fluid levels. This is  nonspecific. Surgical changes are seen in the right lower quadrant.  There is an IUD. No mass or suspicious calcification is seen.          IMPRESSION: Nonspecific bowel gas pattern with short gas fluid levels  but no significant bowel distention.     LEON WAHL MD      Results for " orders placed or performed in visit on 11/13/17 (from the past 48 hour(s))   Tissue transglutaminase casey IgA and IgG   Result Value Ref Range    Tissue Transglutaminase Antibody IgA <1 <7 U/mL    Tissue Transglutaminase Casey IgG <1 <7 U/mL   CBC with platelets differential   Result Value Ref Range    WBC 8.6 4.0 - 11.0 10e9/L    RBC Count 4.16 3.8 - 5.2 10e12/L    Hemoglobin 13.3 11.7 - 15.7 g/dL    Hematocrit 40.5 35.0 - 47.0 %    MCV 97 78 - 100 fl    MCH 32.0 26.5 - 33.0 pg    MCHC 32.8 31.5 - 36.5 g/dL    RDW 12.1 10.0 - 15.0 %    Platelet Count 298 150 - 450 10e9/L    Diff Method Automated Method     % Neutrophils 71.4 %    % Lymphocytes 22.9 %    % Monocytes 4.6 %    % Eosinophils 0.9 %    % Basophils 0.2 %    Absolute Neutrophil 6.1 1.6 - 8.3 10e9/L    Absolute Lymphocytes 2.0 0.8 - 5.3 10e9/L    Absolute Monocytes 0.4 0.0 - 1.3 10e9/L    Absolute Eosinophils 0.1 0.0 - 0.7 10e9/L    Absolute Basophils 0.0 0.0 - 0.2 10e9/L   ESR: Erythrocyte sedimentation rate   Result Value Ref Range    Sed Rate 5 0 - 20 mm/h   CRP, inflammation   Result Value Ref Range    CRP Inflammation <2.9 0.0 - 8.0 mg/L       ASSESSMENT/PLAN:     Migraine: controlled   Plan:  No changes in the patient's current treatment plan  Migraine Packet given        1. Change in bowel function  - Tissue transglutaminase casey IgA and IgG  - CBC with platelets differential  - ESR: Erythrocyte sedimentation rate  - CRP, inflammation  - Xray abdomen  - FODMAP diet  - Bene fiber daily  - Probiotic daily    2. Nausea  - promethazine (PHENERGAN) 25 MG tablet; Take 1 tablet (25 mg) by mouth every 6 hours as needed for nausea  Dispense: 60 tablet; Refill: 1    3. Inflammatory bowel syndrome  - predniSONE (DELTASONE) 20 MG tablet; Take 1 tablet (20 mg) by mouth 2 times daily  Dispense: 10 tablet; Refill: 0 - please have this on hand in the event that inflammation and pain recur.  If you take this, case call to let my nurse know.    4. Migraine with aura and  without status migrainosus, not intractable  - rizatriptan (MAXALT-MLT) 10 MG ODT tab; Take 1 tablet (10 mg) by mouth at onset of headache for migraine May repeat in 2 hours. Max 3 tablets/24 hours.  Dispense: 18 tablet; Refill: 3        Adrienne Dailey NP  [facility]

## 2017-11-13 NOTE — MR AVS SNAPSHOT
After Visit Summary   11/13/2017    Nasra Ortiz    MRN: [id number]           Patient Information     Date Of Birth          1980        Visit Information        Provider Department      11/13/2017 9:30 AM Adrienne Dailey NP [facility] [place]        Today's Diagnoses     Change in bowel function    -  1    Nausea        Inflammatory bowel syndrome        Migraine with aura and without status migrainosus, not intractable          Care Instructions      1. Change in bowel function  - Tissue transglutaminase casey IgA and IgG  - CBC with platelets differential  - ESR: Erythrocyte sedimentation rate  - CRP, inflammation  - Xray abdomen  - FODMAP diet  - Bene fiber daily  - Probiotic daily    2. Nausea  - promethazine (PHENERGAN) 25 MG tablet; Take 1 tablet (25 mg) by mouth every 6 hours as needed for nausea  Dispense: 60 tablet; Refill: 1    3. Inflammatory bowel syndrome  - predniSONE (DELTASONE) 20 MG tablet; Take 1 tablet (20 mg) by mouth 2 times daily  Dispense: 10 tablet; Refill: 0 - please have this on hand in the event that inflammation and pain recur.  If you take this, case call to let my nurse know.    4. Migraine with aura and without status migrainosus, not intractable  - rizatriptan (MAXALT-MLT) 10 MG ODT tab; Take 1 tablet (10 mg) by mouth at onset of headache for migraine May repeat in 2 hours. Max 3 tablets/24 hours.  Dispense: 18 tablet; Refill: 3        Adrienne Dailey NP  [facility]            Follow-ups after your visit        Future tests that were ordered for you today     Open Future Orders        Priority Expected Expires Ordered    XR ABDOMEN 2 VW (Clinic Performed) Routine 11/13/2017 11/13/2018 11/13/2017            Who to contact     If you have questions or need follow up information about today's clinic visit or your schedule please contact [facility] directly at [phone].  Normal or non-critical lab and imaging results will be  "communicated to you by EnTouch Controlshart, letter or phone within 4 business days after the clinic has received the results. If you do not hear from us within 7 days, please contact the clinic through FantasyHub or phone. If you have a critical or abnormal lab result, we will notify you by phone as soon as possible.  Submit refill requests through FantasyHub or call your pharmacy and they will forward the refill request to us. Please allow 3 business days for your refill to be completed.          Additional Information About Your Visit        FantasyHub Information     FantasyHub gives you secure access to your electronic health record. If you see a primary care provider, you can also send messages to your care team and make appointments. If you have questions, please call your primary care clinic.  If you do not have a primary care provider, please call [phone] and they will assist you.        Care EveryWhere ID     This is your Care EveryWhere ID. This could be used by other organizations to access your [place] medical records  [id number]        Your Vitals Were     Pulse Temperature Respirations Height Pulse Oximetry BMI (Body Mass Index)    81 98  F (36.7  C) (Tympanic) 14 5' 4\" (1.626 m) 100% 25.75 kg/m2       Blood Pressure from Last 3 Encounters:   11/13/17 110/80   08/23/17 106/76   06/15/17 118/90    Weight from Last 3 Encounters:   11/13/17 150 lb (68 kg)   08/23/17 154 lb (69.9 kg)   06/15/17 156 lb (70.8 kg)              We Performed the Following     CBC with platelets differential     CRP, inflammation     ESR: Erythrocyte sedimentation rate     Tissue transglutaminase casey IgA and IgG          Today's Medication Changes          These changes are accurate as of: 11/13/17 10:29 AM.  If you have any questions, ask your nurse or doctor.               Start taking these medicines.        Dose/Directions    predniSONE 20 MG tablet   Commonly known as:  DELTASONE   Used for:  Inflammatory bowel syndrome   Started by:  " Adrienne Dailey NP        Dose:  20 mg   Take 1 tablet (20 mg) by mouth 2 times daily   Quantity:  10 tablet   Refills:  0            Where to get your medicines      These medications were sent to [id number] - [place], [place] - [address]   [address] , [facility] [address]     Phone:  [phone]     predniSONE 20 MG tablet    promethazine 25 MG tablet    rizatriptan 10 MG ODT tab                Primary Care Provider Office Phone # Fax #    Adrienne Dailey [phone]       [facility] [address] [address]        Equal Access to Services     [facility]: Hadii aad ku hadasho Soomaali, waaxda luqadaha, qaybta kaalmada adeegyada, waxay idiin hayaan adealma grant . So [facility] [phone].    ATENCIÓN: Si habla español, tiene a baires disposición servicios gratuitos de asistencia lingüística. LlRegency [facility] [phone].    We comply with applicable federal civil rights laws and [place] laws. We do not discriminate on the basis of race, color, national origin, age, disability, sex, sexual orientation, or gender identity.            Thank you!     Thank you for choosing [facility]  for your care. Our goal is always to provide you with excellent care. Hearing back from our patients is one way we can continue to improve our services. Please take a few minutes to complete the written survey that you may receive in the mail after your visit with us. Thank you!             Your Updated Medication List - Protect others around you: Learn how to safely use, store and throw away your medicines at www.disposemymeds.org.          This list is accurate as of: 11/13/17 10:29 AM.  Always use your most recent med list.                   Brand Name Dispense Instructions for use Diagnosis    levonorgestrel 20 MCG/24HR IUD    MIRENA     1 each by Intrauterine route once        LORazepam 0.5 MG tablet    ATIVAN    20 tablet    TAKE ONE TABLET BY MOUTH NIGHTLY AS  NEEDED FOR ANXIETY    Primary insomnia       multivitamin, therapeutic Tabs tablet      Take 1 tablet by mouth daily        omeprazole 40 MG capsule    priLOSEC    90 capsule    TAKE ONE CAPSULE BY MOUTH ONCE DAILY ,TAKE  30  TO  60  MINUTES  BEFORE  A  MEAL.    Gastroesophageal reflux disease without esophagitis       predniSONE 20 MG tablet    DELTASONE    10 tablet    Take 1 tablet (20 mg) by mouth 2 times daily    Inflammatory bowel syndrome       promethazine 25 MG tablet    PHENERGAN    60 tablet    Take 1 tablet (25 mg) by mouth every 6 hours as needed for nausea    Nausea       propranolol HCl 60 MG Tabs     90 tablet    Take 1 tablet (60 mg) by mouth daily ER    Migraine with aura and without status migrainosus, not intractable       rizatriptan 10 MG ODT tab    MAXALT-MLT    18 tablet    Take 1 tablet (10 mg) by mouth at onset of headache for migraine May repeat in 2 hours. Max 3 tablets/24 hours.    Migraine with aura and without status migrainosus, not intractable       sertraline 100 MG tablet    ZOLOFT    180 tablet    Take 2 tablets (200 mg) by mouth daily    Anxiety       temazepam 30 MG capsule    RESTORIL    30 capsule    Take 1 capsule (30 mg) by mouth nightly as needed for sleep    Primary insomnia       ZOFRAN PO      Take 4 mg by mouth

## 2017-11-13 NOTE — PROGRESS NOTES
Labs are normal so far    Ttg for gluten intolerance pending    [facility]  [phone]

## 2017-11-13 NOTE — NURSING NOTE
"Chief Complaint   Patient presents with     ER F/U     Patient would like a GI referral       Initial /80 (BP Location: Left arm, Patient Position: Sitting, Cuff Size: Adult Regular)  Pulse 81  Temp 98  F (36.7  C) (Tympanic)  Resp 14  Ht 5' 4\" (1.626 m)  Wt 150 lb (68 kg)  SpO2 100%  BMI 25.75 kg/m2 Estimated body mass index is 25.75 kg/(m^2) as calculated from the following:    Height as of this encounter: 5' 4\" (1.626 m).    Weight as of this encounter: 150 lb (68 kg).  Medication Reconciliation: complete   Rianna Bueno    "

## 2017-11-13 NOTE — PATIENT INSTRUCTIONS
1. Change in bowel function  - Tissue transglutaminase casey IgA and IgG  - CBC with platelets differential  - ESR: Erythrocyte sedimentation rate  - CRP, inflammation  - Xray abdomen  - FODMAP diet  - Bene fiber daily  - Probiotic daily    2. Nausea  - promethazine (PHENERGAN) 25 MG tablet; Take 1 tablet (25 mg) by mouth every 6 hours as needed for nausea  Dispense: 60 tablet; Refill: 1    3. Inflammatory bowel syndrome  - predniSONE (DELTASONE) 20 MG tablet; Take 1 tablet (20 mg) by mouth 2 times daily  Dispense: 10 tablet; Refill: 0 - please have this on hand in the event that inflammation and pain recur.  If you take this, case call to let my nurse know.    4. Migraine with aura and without status migrainosus, not intractable  - rizatriptan (MAXALT-MLT) 10 MG ODT tab; Take 1 tablet (10 mg) by mouth at onset of headache for migraine May repeat in 2 hours. Max 3 tablets/24 hours.  Dispense: 18 tablet; Refill: 3        Adrienne Dailey NP  [facility] [place]

## 2017-11-14 LAB
TTG IGA SER-ACNC: <1 U/ML
TTG IGG SER-ACNC: <1 U/ML

## 2017-11-14 ASSESSMENT — ANXIETY QUESTIONNAIRES: GAD7 TOTAL SCORE: 9

## 2017-11-14 NOTE — PROGRESS NOTES
Normal Ttg, negative for gluten intolerance    Continue plan of care, hoping she is feeling better.    Adrienne Dailey [facility]  [phone]

## 2017-11-14 NOTE — TELEPHONE ENCOUNTER
Ativan      Last Written Prescription Date: 10/18/17  Last Fill Quantity: 20,  # refills: 0   Last Office Visit with [facility], [facility] or [facility] prescribing provider: 11/13/17

## 2017-11-15 RX ORDER — LORAZEPAM 0.5 MG/1
TABLET ORAL
Qty: 20 TABLET | Refills: 0 | Status: SHIPPED | OUTPATIENT
Start: 2017-11-15 | End: 2017-12-13

## 2017-11-26 ENCOUNTER — HEALTH MAINTENANCE LETTER (OUTPATIENT)
Age: 37
End: 2017-11-26

## 2017-12-13 ENCOUNTER — TRANSFERRED RECORDS (OUTPATIENT)
Dept: HEALTH INFORMATION MANAGEMENT | Facility: HOSPITAL | Age: 37
End: 2017-12-13

## 2017-12-13 DIAGNOSIS — F51.01 PRIMARY INSOMNIA: ICD-10-CM

## 2017-12-15 RX ORDER — LORAZEPAM 0.5 MG/1
TABLET ORAL
Qty: 20 TABLET | Refills: 0 | Status: SHIPPED | OUTPATIENT
Start: 2017-12-15 | End: 2018-01-12

## 2017-12-15 NOTE — TELEPHONE ENCOUNTER
Ativan  Last office visit: 11/13/17  Last refill: 11/15/17 #20, 0 R.  Medication pended.  PCP Asim  Thank you.

## 2018-01-03 DIAGNOSIS — F51.01 PRIMARY INSOMNIA: ICD-10-CM

## 2018-01-04 RX ORDER — TEMAZEPAM 30 MG
CAPSULE ORAL
Qty: 30 CAPSULE | Refills: 3 | Status: SHIPPED | OUTPATIENT
Start: 2018-01-04 | End: 2018-03-23

## 2018-01-04 NOTE — TELEPHONE ENCOUNTER
restoril      Last Written Prescription Date: 8/23/17  Last Fill Quantity: 30,  # refills: 3   Last Office Visit with [facility], [facility] or [facility] prescribing provider: 11/13/17

## 2018-01-12 DIAGNOSIS — F51.01 PRIMARY INSOMNIA: ICD-10-CM

## 2018-01-15 RX ORDER — LORAZEPAM 0.5 MG/1
TABLET ORAL
Qty: 20 TABLET | Refills: 0 | Status: SHIPPED | OUTPATIENT
Start: 2018-01-15 | End: 2018-01-31

## 2018-01-15 NOTE — TELEPHONE ENCOUNTER
Ativan      Last Written Prescription Date:  12/15/17  Last Fill Quantity: 20,   # refills: 0  Last Office Visit: 11/13/17  Future Office visit:       Routing refill request to provider for review/approval because:  Drug not on the [facility], [facility] or [facility] refill protocol or controlled substance

## 2018-01-31 DIAGNOSIS — F51.01 PRIMARY INSOMNIA: ICD-10-CM

## 2018-01-31 DIAGNOSIS — G43.009 MIGRAINE WITHOUT AURA AND WITHOUT STATUS MIGRAINOSUS, NOT INTRACTABLE: ICD-10-CM

## 2018-01-31 DIAGNOSIS — F41.9 ANXIETY: ICD-10-CM

## 2018-01-31 RX ORDER — SUMATRIPTAN 25 MG/1
TABLET, FILM COATED ORAL
Qty: 9 TABLET | Refills: 1 | Status: SHIPPED | OUTPATIENT
Start: 2018-01-31 | End: 2018-03-09 | Stop reason: ALTCHOICE

## 2018-01-31 RX ORDER — LORAZEPAM 0.5 MG/1
TABLET ORAL
Qty: 20 TABLET | Refills: 0 | Status: SHIPPED | OUTPATIENT
Start: 2018-01-31 | End: 2018-03-04

## 2018-01-31 RX ORDER — SERTRALINE HYDROCHLORIDE 100 MG/1
TABLET, FILM COATED ORAL
Qty: 180 TABLET | Refills: 0 | Status: SHIPPED | OUTPATIENT
Start: 2018-01-31 | End: 2018-04-13

## 2018-01-31 NOTE — TELEPHONE ENCOUNTER
Ativan  Last visit: 11.13.17  Last refill: 1.15.18 #20    Imitrex - Not on med list. Please advise. Thank you

## 2018-02-02 ENCOUNTER — TRANSFERRED RECORDS (OUTPATIENT)
Dept: HEALTH INFORMATION MANAGEMENT | Facility: CLINIC | Age: 38
End: 2018-02-02

## 2018-02-02 LAB
HPV ABSTRACT: NORMAL
PAP-ABSTRACT: NORMAL

## 2018-02-21 ENCOUNTER — TRANSFERRED RECORDS (OUTPATIENT)
Dept: HEALTH INFORMATION MANAGEMENT | Facility: CLINIC | Age: 38
End: 2018-02-21

## 2018-03-04 ENCOUNTER — HEALTH MAINTENANCE LETTER (OUTPATIENT)
Age: 38
End: 2018-03-04

## 2018-03-04 DIAGNOSIS — F51.01 PRIMARY INSOMNIA: ICD-10-CM

## 2018-03-05 RX ORDER — LORAZEPAM 0.5 MG/1
TABLET ORAL
Qty: 20 TABLET | Refills: 0 | Status: SHIPPED | OUTPATIENT
Start: 2018-03-05 | End: 2018-03-16

## 2018-03-05 NOTE — TELEPHONE ENCOUNTER
Ativan       Last Written Prescription Date:  1/31/2018  Last Fill Quantity: 20,   # refills: 0  Last Office Visit: 11/13/2017  Future Office visit:

## 2018-03-09 ENCOUNTER — OFFICE VISIT (OUTPATIENT)
Dept: PSYCHIATRY | Facility: OTHER | Age: 38
End: 2018-03-09
Attending: PSYCHIATRY & NEUROLOGY
Payer: COMMERCIAL

## 2018-03-09 VITALS
DIASTOLIC BLOOD PRESSURE: 80 MMHG | SYSTOLIC BLOOD PRESSURE: 114 MMHG | BODY MASS INDEX: 25.23 KG/M2 | TEMPERATURE: 99.2 F | HEART RATE: 83 BPM | WEIGHT: 147 LBS

## 2018-03-09 DIAGNOSIS — F41.1 GAD (GENERALIZED ANXIETY DISORDER): Primary | ICD-10-CM

## 2018-03-09 PROCEDURE — G0463 HOSPITAL OUTPT CLINIC VISIT: HCPCS

## 2018-03-09 PROCEDURE — 99204 OFFICE O/P NEW MOD 45 MIN: CPT | Performed by: PSYCHIATRY & NEUROLOGY

## 2018-03-09 RX ORDER — DULOXETIN HYDROCHLORIDE 30 MG/1
30 CAPSULE, DELAYED RELEASE ORAL DAILY
Qty: 30 CAPSULE | Refills: 3 | Status: SHIPPED | OUTPATIENT
Start: 2018-03-09 | End: 2018-04-13 | Stop reason: DRUGHIGH

## 2018-03-09 ASSESSMENT — ANXIETY QUESTIONNAIRES
7. FEELING AFRAID AS IF SOMETHING AWFUL MIGHT HAPPEN: NEARLY EVERY DAY
2. NOT BEING ABLE TO STOP OR CONTROL WORRYING: NEARLY EVERY DAY
1. FEELING NERVOUS, ANXIOUS, OR ON EDGE: NEARLY EVERY DAY
5. BEING SO RESTLESS THAT IT IS HARD TO SIT STILL: MORE THAN HALF THE DAYS
3. WORRYING TOO MUCH ABOUT DIFFERENT THINGS: NEARLY EVERY DAY
6. BECOMING EASILY ANNOYED OR IRRITABLE: MORE THAN HALF THE DAYS
IF YOU CHECKED OFF ANY PROBLEMS ON THIS QUESTIONNAIRE, HOW DIFFICULT HAVE THESE PROBLEMS MADE IT FOR YOU TO DO YOUR WORK, TAKE CARE OF THINGS AT HOME, OR GET ALONG WITH OTHER PEOPLE: EXTREMELY DIFFICULT
GAD7 TOTAL SCORE: 19

## 2018-03-09 ASSESSMENT — PATIENT HEALTH QUESTIONNAIRE - PHQ9: 5. POOR APPETITE OR OVEREATING: NEARLY EVERY DAY

## 2018-03-09 ASSESSMENT — PAIN SCALES - GENERAL: PAINLEVEL: MODERATE PAIN (4)

## 2018-03-09 NOTE — MR AVS SNAPSHOT
After Visit Summary   3/9/2018    Nasra Ortiz    MRN: [id number]           Patient Information     Date Of Birth          1980        Visit Information        Provider Department      3/9/2018 9:00 AM Brandy Abebe MD [facility]        Care Instructions    Zoloft: currently 200 mg daily. Go down to 150 mg daily for 5 days then 100 mg daily for 5 days then 50 mg daily for 5 days then d/c. Any issues just let me know and we can slow down the taper. Once you get to 50 mg of Zoloft you can start duloxetine (Cymbalta) 30 mg daily. I would take it in the morning otherwise might mess up your sleep. Discontinue olanzapine it's not helping and high potential for weight gain. Continue lorazepam as prn and the temazepam at night.           Follow-ups after your visit        Who to contact     If you have questions or need follow up information about today's clinic visit or your schedule please contact [facility] directly at [phone].  Normal or non-critical lab and imaging results will be communicated to you by Bioformixhart, letter or phone within 4 business days after the clinic has received the results. If you do not hear from us within 7 days, please contact the clinic through [employer] or phone. If you have a critical or abnormal lab result, we will notify you by phone as soon as possible.  Submit refill requests through Abzena or call your pharmacy and they will forward the refill request to us. Please allow 3 business days for your refill to be completed.          Additional Information About Your Visit        Bioformixhart Information     [employer]zena gives you secure access to your electronic health record. If you see a primary care provider, you can also send messages to your care team and make appointments. If you have questions, please call your primary care clinic.  If you do not have a primary care provider, please call [phone] and they will assist you.        Care  EveryWhere ID     This is your Care EveryWhere ID. This could be used by other organizations to access your [place] medical records  [id number]        Your Vitals Were     Pulse Temperature BMI (Body Mass Index)             83 99.2  F (37.3  C) (Tympanic) 25.23 kg/m2          Blood Pressure from Last 3 Encounters:   03/09/18 114/80   11/13/17 110/80   08/23/17 106/76    Weight from Last 3 Encounters:   03/09/18 147 lb (66.7 kg)   11/13/17 150 lb (68 kg)   08/23/17 154 lb (69.9 kg)              Today, you had the following     No orders found for display         Today's Medication Changes          These changes are accurate as of 3/9/18 10:24 AM.  If you have any questions, ask your nurse or doctor.               Stop taking these medicines if you haven't already. Please contact your care team if you have questions.     levonorgestrel 20 MCG/24HR IUD   Commonly known as:  MIRENA   Stopped by:  Brandy Abebe MD           SUMAtriptan 25 MG tablet   Commonly known as:  IMITREX   Stopped by:  Brandy Abebe MD           ZOFRAN PO   Stopped by:  Brandy Abbee MD                    Primary Care Provider Office Phone # Fax #    Adrienne DaileyLINN [phone] [address]  [place] [facility] [address]        Equal Access to Services     [facility]: Hadii aad ku hadasho Soomaali, waaxda luqadaha, qaybta kaalmada yulia, lizet east. So [facility] [phone].    ATENCIÓN: Si habla español, tiene a baires disposición servicios gratuitos de asistencia lingüística. Jeannie al [phone].    We comply with applicable federal civil rights laws and [place] laws. We do not discriminate on the basis of race, color, national origin, age, disability, sex, sexual orientation, or gender identity.            Thank you!     Thank you for choosing [facility] [place]  for your care. Our goal is always to provide you with excellent care. Hearing back from our patients is one  way we can continue to improve our services. Please take a few minutes to complete the written survey that you may receive in the mail after your visit with us. Thank you!             Your Updated Medication List - Protect others around you: Learn how to safely use, store and throw away your medicines at www.disposemymeds.org.          This list is accurate as of 3/9/18 10:24 AM.  Always use your most recent med list.                   Brand Name Dispense Instructions for use Diagnosis    LISINOPRIL PO      Take 5 mg by mouth        LORazepam 0.5 MG tablet    ATIVAN    20 tablet    TAKE ONE TABLET BY MOUTH AT BEDTIME AS NEEDED FOR ANXIETY    Primary insomnia       MAGNESIUM OXIDE PO      Take 400 mg by mouth daily Take 2 tabs evening        MEDROXYPROGESTERONE ACETATE PO      Depo Provera Dosage not known        multivitamin, therapeutic Tabs tablet      Take 1 tablet by mouth daily        OLANZAPINE PO      Take 5 mg by mouth        omeprazole 40 MG capsule    priLOSEC    90 capsule    TAKE ONE CAPSULE BY MOUTH ONCE DAILY ,TAKE  30  TO  60  MINUTES  BEFORE  A  MEAL.    Gastroesophageal reflux disease without esophagitis       promethazine 25 MG tablet    PHENERGAN    60 tablet    Take 1 tablet (25 mg) by mouth every 6 hours as needed for nausea    Nausea       propranolol HCl 60 MG Tabs     90 tablet    Take 1 tablet (60 mg) by mouth daily ER    Migraine with aura and without status migrainosus, not intractable       rizatriptan 10 MG ODT tab    MAXALT-MLT    18 tablet    Take 1 tablet (10 mg) by mouth at onset of headache for migraine May repeat in 2 hours. Max 3 tablets/24 hours.    Migraine with aura and without status migrainosus, not intractable       sertraline 100 MG tablet    ZOLOFT    180 tablet    Take two tablets by mouth once daily.    Anxiety       temazepam 30 MG capsule    RESTORIL    30 capsule    TAKE ONE CAPSULE BY MOUTH AT BEDTIME AS NEEDED FOR SLEEP    Primary insomnia

## 2018-03-09 NOTE — PATIENT INSTRUCTIONS
Zoloft: currently 200 mg daily. Go down to 150 mg daily for 5 days then 100 mg daily for 5 days then 50 mg daily for 5 days then d/c. Any issues just let me know and we can slow down the taper. Once you get to 50 mg of Zoloft you can start duloxetine (Cymbalta) 30 mg daily. I would take it in the morning otherwise might mess up your sleep. Discontinue olanzapine it's not helping and high potential for weight gain. Continue lorazepam as prn and the temazepam at night.

## 2018-03-09 NOTE — NURSING NOTE
"Chief Complaint   Patient presents with     Consult     Mental health.  Depression, anxiety and insomnia       Initial /80 (BP Location: Right arm, Patient Position: Sitting, Cuff Size: Adult Regular)  Pulse 83  Temp 99.2  F (37.3  C) (Tympanic)  Wt 147 lb (66.7 kg)  BMI 25.23 kg/m2 Estimated body mass index is 25.23 kg/(m^2) as calculated from the following:    Height as of 11/13/17: 5' 4\" (1.626 m).    Weight as of this encounter: 147 lb (66.7 kg).  Medication Reconciliation: complete     JADA CASTRO      "
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
## 2018-03-10 ASSESSMENT — ANXIETY QUESTIONNAIRES: GAD7 TOTAL SCORE: 19

## 2018-03-10 ASSESSMENT — PATIENT HEALTH QUESTIONNAIRE - PHQ9: SUM OF ALL RESPONSES TO PHQ QUESTIONS 1-9: 14

## 2018-03-16 ENCOUNTER — OFFICE VISIT (OUTPATIENT)
Dept: FAMILY MEDICINE | Facility: OTHER | Age: 38
End: 2018-03-16
Attending: NURSE PRACTITIONER
Payer: COMMERCIAL

## 2018-03-16 VITALS
SYSTOLIC BLOOD PRESSURE: 124 MMHG | TEMPERATURE: 97.5 F | HEART RATE: 84 BPM | HEIGHT: 64 IN | WEIGHT: 145 LBS | RESPIRATION RATE: 14 BRPM | DIASTOLIC BLOOD PRESSURE: 76 MMHG | OXYGEN SATURATION: 98 % | BODY MASS INDEX: 24.75 KG/M2

## 2018-03-16 DIAGNOSIS — Z12.31 ENCOUNTER FOR SCREENING MAMMOGRAM FOR BREAST CANCER: ICD-10-CM

## 2018-03-16 DIAGNOSIS — F41.9 ANXIETY: Primary | ICD-10-CM

## 2018-03-16 DIAGNOSIS — E56.9 VITAMIN DEFICIENCY: ICD-10-CM

## 2018-03-16 PROCEDURE — G0463 HOSPITAL OUTPT CLINIC VISIT: HCPCS

## 2018-03-16 PROCEDURE — 82306 VITAMIN D 25 HYDROXY: CPT | Mod: ZL | Performed by: NURSE PRACTITIONER

## 2018-03-16 PROCEDURE — 36415 COLL VENOUS BLD VENIPUNCTURE: CPT | Mod: ZL | Performed by: NURSE PRACTITIONER

## 2018-03-16 PROCEDURE — 99214 OFFICE O/P EST MOD 30 MIN: CPT | Performed by: NURSE PRACTITIONER

## 2018-03-16 PROCEDURE — 99000 SPECIMEN HANDLING OFFICE-LAB: CPT | Performed by: NURSE PRACTITIONER

## 2018-03-16 RX ORDER — LORAZEPAM 0.5 MG/1
TABLET ORAL
Qty: 20 TABLET | Refills: 0 | Status: SHIPPED | OUTPATIENT
Start: 2018-03-16 | End: 2018-04-13

## 2018-03-16 ASSESSMENT — PATIENT HEALTH QUESTIONNAIRE - PHQ9: 5. POOR APPETITE OR OVEREATING: NEARLY EVERY DAY

## 2018-03-16 ASSESSMENT — ANXIETY QUESTIONNAIRES
IF YOU CHECKED OFF ANY PROBLEMS ON THIS QUESTIONNAIRE, HOW DIFFICULT HAVE THESE PROBLEMS MADE IT FOR YOU TO DO YOUR WORK, TAKE CARE OF THINGS AT HOME, OR GET ALONG WITH OTHER PEOPLE: EXTREMELY DIFFICULT
5. BEING SO RESTLESS THAT IT IS HARD TO SIT STILL: NEARLY EVERY DAY
2. NOT BEING ABLE TO STOP OR CONTROL WORRYING: NEARLY EVERY DAY
6. BECOMING EASILY ANNOYED OR IRRITABLE: MORE THAN HALF THE DAYS
1. FEELING NERVOUS, ANXIOUS, OR ON EDGE: NEARLY EVERY DAY
3. WORRYING TOO MUCH ABOUT DIFFERENT THINGS: NEARLY EVERY DAY
7. FEELING AFRAID AS IF SOMETHING AWFUL MIGHT HAPPEN: NEARLY EVERY DAY
GAD7 TOTAL SCORE: 20

## 2018-03-16 NOTE — PATIENT INSTRUCTIONS
ASSESSMENT/PLAN:     1. Anxiety  - Continue to wean off Zoloft  - Start your Cymbalta  - Use temazepam only PRN  - Ativan less than daily, separate from temazepam by 8 hours    2. Vitamin D deficiency   - D3 5000 U daily  - D level pending    3.  Insomnia  - Continue plan of care      4.  Due for screening for breast cancer  - Mammo ordered        Continue follow up with Dr. Abebe.  All mental health med's need to be filled by her please.    Adrienne Dailey NP  [facility]

## 2018-03-16 NOTE — NURSING NOTE
"Chief Complaint   Patient presents with     Anxiety       Initial /76 (BP Location: Left arm, Patient Position: Sitting, Cuff Size: Adult Regular)  Pulse 84  Temp 97.5  F (36.4  C) (Tympanic)  Resp 14  Ht 5' 4\" (1.626 m)  Wt 145 lb (65.8 kg)  SpO2 98%  BMI 24.89 kg/m2 Estimated body mass index is 24.89 kg/(m^2) as calculated from the following:    Height as of this encounter: 5' 4\" (1.626 m).    Weight as of this encounter: 145 lb (65.8 kg).  Medication Reconciliation: complete   Rianna Bueno      "

## 2018-03-16 NOTE — PROGRESS NOTES
SUBJECTIVE:   Nasra Ortiz is a 38 year old female who presents to clinic today for the following health issues:      Anxiety Follow-Up    Status since last visit: Patient was recently at the [facility]. Had an appointment with Brandy last Friday    Other associated symptoms:Insomnia    Complicating factors:     Significant life event: No     Current substance abuse: None    PHQ-9 2017 3/9/2018 3/16/2018   Total Score 9 14 12   Q9: Suicide Ideation Not at all Not at all Not at all     MAYKEL-7 SCORE 2017 3/9/2018 3/16/2018   Total Score 9 19 20         In the past two weeks have you had thoughts of suicide or self-harm?  No.    Do you have concerns about your personal safety or the safety of others?   No         PHQ-9  English  PHQ-9   Any Language  MAYKEL-7  Suicide Assessment Five-step Evaluation and Treatment (SAFE-T)        Amount of exercise or physical activity: None    Problems taking medications regularly: No    Medication side effects: none    Diet: regular (no restrictions)      Problem list and histories reviewed & adjusted, as indicated.  Additional history: as documented    Patient Active Problem List   Diagnosis     Migraine with aura and without status migrainosus, not intractable     Acquired hypothyroidism     Anxiety     Primary insomnia     ACP (advance care planning)     Gastroesophageal reflux disease, esophagitis presence not specified     Past Surgical History:   Procedure Laterality Date      SECTION  2012     oopherectomy right  2012       Social History   Substance Use Topics     Smoking status: Current Every Day Smoker     Packs/day: 0.40     Years: 10.00     Types: Cigarettes     Smokeless tobacco: Never Used      Comment: tobacco cessation discuseed - tried to quit: no - passive smoke exposure quitting on own      Alcohol use No     Family History   Problem Relation Age of Onset     Hypertension Mother      Hyperlipidemia Mother      Cardiovascular Father       Hypertension Father      Hyperlipidemia Father      Heart Failure Father      Sleep Apnea Father          Current Outpatient Prescriptions   Medication Sig Dispense Refill     MAGNESIUM OXIDE PO Take 400 mg by mouth daily Take 2 tabs evening       MEDROXYPROGESTERONE ACETATE PO Depo Provera  Dosage not known       LORazepam (ATIVAN) 0.5 MG tablet TAKE ONE TABLET BY MOUTH AT BEDTIME AS NEEDED FOR ANXIETY 20 tablet 0     sertraline (ZOLOFT) 100 MG tablet Take two tablets by mouth once daily. (Patient taking differently: Take two tablets by mouth once daily.) 180 tablet 0     temazepam (RESTORIL) 30 MG capsule TAKE ONE CAPSULE BY MOUTH AT BEDTIME AS NEEDED FOR SLEEP 30 capsule 3     rizatriptan (MAXALT-MLT) 10 MG ODT tab Take 1 tablet (10 mg) by mouth at onset of headache for migraine May repeat in 2 hours. Max 3 tablets/24 hours. 18 tablet 3     omeprazole (PRILOSEC) 40 MG capsule TAKE ONE CAPSULE BY MOUTH ONCE DAILY ,TAKE  30  TO  60  MINUTES  BEFORE  A  MEAL. 90 capsule 1     propranolol HCl 60 MG TABS Take 1 tablet (60 mg) by mouth daily ER 90 tablet 1     multivitamin, therapeutic (THERA-VIT) TABS Take 1 tablet by mouth daily       DULoxetine (CYMBALTA) 30 MG EC capsule Take 1 capsule (30 mg) by mouth daily (Patient not taking: Reported on 3/16/2018) 30 capsule 3     Allergies   Allergen Reactions     Dust Mite Extract      Penicillins Rash     Recent Labs   Lab Test  08/23/17   1530  01/26/17   1603 01/18/17 01/15/17  08/01/16   1905  07/25/15   1225   ALT   --    --    --   15  19  22   CR   --   0.72  0.81  0.80  0.65  0.71   GFRESTIMATED   --   >90  Non  GFR Calc     --    --   >90  Non  GFR Calc    >90  Non  GFR Calc     GFRESTBLACK   --   >90   GFR Calc     --    --   >90   GFR Calc    >90   GFR Calc     POTASSIUM   --   4.2  4.8  3.0*  3.6  4.0   TSH  0.87  0.92   --    --   1.21   --       BP Readings from  "Last 3 Encounters:   03/16/18 124/76   03/09/18 114/80   11/13/17 110/80    Wt Readings from Last 3 Encounters:   03/16/18 145 lb (65.8 kg)   03/09/18 147 lb (66.7 kg)   11/13/17 150 lb (68 kg)                  Labs reviewed in EPIC    Reviewed and updated as needed this visit by clinical staff  Tobacco  Meds       Reviewed and updated as needed this visit by Provider         ROS:  Constitutional, HEENT, cardiovascular, pulmonary, gi and gu systems are negative, except as otherwise noted.    OBJECTIVE:     /76 (BP Location: Left arm, Patient Position: Sitting, Cuff Size: Adult Regular)  Pulse 84  Temp 97.5  F (36.4  C) (Tympanic)  Resp 14  Ht 5' 4\" (1.626 m)  Wt 145 lb (65.8 kg)  SpO2 98%  BMI 24.89 kg/m2  Body mass index is 24.89 kg/(m^2).       GENERAL: healthy, alert and no distress  EYES: Eyes grossly normal to inspection, PERRL and conjunctivae and sclerae normal  HENT: ear canals and TM's normal, nose and mouth without ulcers or lesions  NECK: no adenopathy, no asymmetry, masses, or scars and thyroid normal to palpation  RESP: lungs clear to auscultation - no rales, rhonchi or wheezes  CV: regular rate and rhythm, normal S1 S2, no S3 or S4, no murmur, click or rub, no peripheral edema and peripheral pulses strong  MS: no gross musculoskeletal defects noted, no edema  PSYCH: anxious      Visit time:   Over 25  minutes are spent with the patient, over 50% of which was in education and counseling regarding current conditions and treatment/therapy.  Time spent with patient, including examination, face to face patient education, review of disease process, and plan of care  15  - mn  Visit Content: During our face to face time, patient education was provided regarding diagnosis, and treatment pan.  Relevant laboratory and diagnostic testing is reviewed prior to visit, and updated at this appointment as indicated  Health Maintenance - updated as appropriate.  Record review " completed      ASSESSMENT/PLAN:     1. Anxiety  - Continue to wean off Zoloft  - Start your Cymbalta  - Use temazepam only PRN  - Ativan less than daily, separate from temazepam by 8 hours    2. Vitamin D deficiency   - D3 5000 U daily  - D level pending    3.  Insomnia  - Continue plan of care      4.  Due for screening for breast cancer  - Mammo ordered        Continue follow up with Dr. Abebe.  All mental health med's need to be filled by her please.    Adrienne Dailey NP  [facility]

## 2018-03-16 NOTE — MR AVS SNAPSHOT
After Visit Summary   3/16/2018    Nasra Ortiz    MRN: [id number]           Patient Information     Date Of Birth          1980        Visit Information        Provider Department      3/16/2018 1:45 PM Adrienne Dailey NP [facility]        Today's Diagnoses     Anxiety    -  1    Encounter for screening mammogram for breast cancer        Vitamin deficiency          Care Instructions        ASSESSMENT/PLAN:     1. Anxiety  - Continue to wean off Zoloft  - Start your Cymbalta  - Use temazepam only PRN  - Ativan less than daily, separate from temazepam by 8 hours    2. Vitamin D deficiency   - D3 5000 U daily  - D level pending    3.  Insomnia  - Continue plan of care      4.  Due for screening for breast cancer  - Mammo ordered        Continue follow up with Dr. Abebe.  All mental health med's need to be filled by her please.    Adrienne Dailey NP  [facility]            Follow-ups after your visit        Your next 10 appointments already scheduled     Apr 13, 2018  9:20 AM CDT   (Arrive by 9:05 AM)   [facility]   [facility] ([facility] [place] )    [facility] [address] [place] [address]  [place] [id number] [phone]              Future tests that were ordered for you today     Open Future Orders        Priority Expected Expires Ordered    MA Screening Digital Bilateral ([facility]) Routine  3/16/2019 3/16/2018            Who to contact     If you have questions or need follow up information about today's clinic visit or your schedule please contact [facility] directly at [phone].  Normal or non-critical lab and imaging results will be communicated to you by MyChart, letter or phone within 4 business days after the clinic has received the results. If you do not hear from us within 7 days, please contact the clinic through MyChart or phone. If you have a critical or abnormal lab result, we will notify you by  "phone as soon as possible.  Submit refill requests through Floxx or call your pharmacy and they will forward the refill request to us. Please allow 3 business days for your refill to be completed.          Additional Information About Your Visit        KidaroharMePlease Information     Floxx gives you secure access to your electronic health record. If you see a primary care provider, you can also send messages to your care team and make appointments. If you have questions, please call your primary care clinic.  If you do not have a primary care provider, please call [phone] and they will assist you.        Care EveryWhere ID     This is your Care EveryWhere ID. This could be used by other organizations to access your [place] medical records  [id number]        Your Vitals Were     Pulse Temperature Respirations Height Pulse Oximetry BMI (Body Mass Index)    84 97.5  F (36.4  C) (Tympanic) 14 5' 4\" (1.626 m) 98% 24.89 kg/m2       Blood Pressure from Last 3 Encounters:   03/16/18 124/76   03/09/18 114/80   11/13/17 110/80    Weight from Last 3 Encounters:   03/16/18 145 lb (65.8 kg)   03/09/18 147 lb (66.7 kg)   11/13/17 150 lb (68 kg)                 Today's Medication Changes          These changes are accurate as of 3/16/18  2:31 PM.  If you have any questions, ask your nurse or doctor.               Start taking these medicines.        Dose/Directions    cholecalciferol 5000 UNITS Tabs tablet   Commonly known as:  vitamin D3   Used for:  Vitamin deficiency   Started by:  Adrienne Dailey NP        Dose:  5000 Units   Take 1 tablet (5,000 Units) by mouth daily   Quantity:  90 tablet   Refills:  11         These medicines have changed or have updated prescriptions.        Dose/Directions    LORazepam 0.5 MG tablet   Commonly known as:  ATIVAN   This may have changed:  See the new instructions.   Changed by:  Adrienne Dailey NP        TAKE ONE TABLET BY MOUTH AT BEDTIME AS NEEDED FOR ANXIETY   Quantity:  20 tablet "   Refills:  0       sertraline 100 MG tablet   Commonly known as:  ZOLOFT   This may have changed:  additional instructions   Used for:  Anxiety        Take two tablets by mouth once daily.   Quantity:  180 tablet   Refills:  0         Stop taking these medicines if you haven't already. Please contact your care team if you have questions.     OLANZAPINE PO   Stopped by:  Adrienne Dailey NP           promethazine 25 MG tablet   Commonly known as:  PHENERGAN   Stopped by:  Adrienne Dailey NP                Where to get your medicines      Some of these will need a paper prescription and others can be bought over the counter.  Ask your nurse if you have questions.     Bring a paper prescription for each of these medications     LORazepam 0.5 MG tablet       You don't need a prescription for these medications     cholecalciferol 5000 UNITS Tabs tablet                Primary Care Provider Office Phone # Fax #    Adrienne Dailey [phone] [address]  [place] [address]        Equal Access to Services     [facility]: Hadii gretchen bates hadasho Sonerissaali, waaxda luqadaha, qaybta kaalmada adeegyada, lizet grant . So [facility] [phone].    ATENCIÓN: Si habla español, tiene a baires disposición servicios gratuitos de asistencia lingüística. [facility] [phone].    We comply with applicable federal civil rights laws and [place] laws. We do not discriminate on the basis of race, color, national origin, age, disability, sex, sexual orientation, or gender identity.            Thank you!     Thank you for choosing [facility]  for your care. Our goal is always to provide you with excellent care. Hearing back from our patients is one way we can continue to improve our services. Please take a few minutes to complete the written survey that you may receive in the mail after your visit with us. Thank you!             Your Updated Medication List - Protect others around  you: Learn how to safely use, store and throw away your medicines at www.disposemymeds.org.          This list is accurate as of 3/16/18  2:31 PM.  Always use your most recent med list.                   Brand Name Dispense Instructions for use Diagnosis    cholecalciferol 5000 UNITS Tabs tablet    vitamin D3    90 tablet    Take 1 tablet (5,000 Units) by mouth daily    Vitamin deficiency       DULoxetine 30 MG EC capsule    CYMBALTA    30 capsule    Take 1 capsule (30 mg) by mouth daily    MAYKEL (generalized anxiety disorder)       LORazepam 0.5 MG tablet    ATIVAN    20 tablet    TAKE ONE TABLET BY MOUTH AT BEDTIME AS NEEDED FOR ANXIETY        MAGNESIUM OXIDE PO      Take 400 mg by mouth daily Take 2 tabs evening        MEDROXYPROGESTERONE ACETATE PO      Depo Provera Dosage not known        multivitamin, therapeutic Tabs tablet      Take 1 tablet by mouth daily        omeprazole 40 MG capsule    priLOSEC    90 capsule    TAKE ONE CAPSULE BY MOUTH ONCE DAILY ,TAKE  30  TO  60  MINUTES  BEFORE  A  MEAL.    Gastroesophageal reflux disease without esophagitis       propranolol HCl 60 MG Tabs     90 tablet    Take 1 tablet (60 mg) by mouth daily ER    Migraine with aura and without status migrainosus, not intractable       rizatriptan 10 MG ODT tab    MAXALT-MLT    18 tablet    Take 1 tablet (10 mg) by mouth at onset of headache for migraine May repeat in 2 hours. Max 3 tablets/24 hours.    Migraine with aura and without status migrainosus, not intractable       sertraline 100 MG tablet    ZOLOFT    180 tablet    Take two tablets by mouth once daily.    Anxiety       temazepam 30 MG capsule    RESTORIL    30 capsule    TAKE ONE CAPSULE BY MOUTH AT BEDTIME AS NEEDED FOR SLEEP    Primary insomnia

## 2018-03-17 ASSESSMENT — PATIENT HEALTH QUESTIONNAIRE - PHQ9: SUM OF ALL RESPONSES TO PHQ QUESTIONS 1-9: 12

## 2018-03-17 ASSESSMENT — ANXIETY QUESTIONNAIRES: GAD7 TOTAL SCORE: 20

## 2018-03-20 LAB — DEPRECATED CALCIDIOL+CALCIFEROL SERPL-MC: 51 UG/L (ref 20–75)

## 2018-03-20 NOTE — PROGRESS NOTES
No need, she must maintain enough dietary D    Adrienne [facility]  [phone]

## 2018-03-23 DIAGNOSIS — K21.9 GASTROESOPHAGEAL REFLUX DISEASE WITHOUT ESOPHAGITIS: ICD-10-CM

## 2018-03-23 DIAGNOSIS — F51.01 PRIMARY INSOMNIA: ICD-10-CM

## 2018-03-23 DIAGNOSIS — G43.109 MIGRAINE WITH AURA AND WITHOUT STATUS MIGRAINOSUS, NOT INTRACTABLE: ICD-10-CM

## 2018-03-23 RX ORDER — PROMETHAZINE HYDROCHLORIDE 25 MG/1
25 TABLET ORAL EVERY 6 HOURS PRN
Qty: 20 TABLET | Refills: 1 | Status: SHIPPED | OUTPATIENT
Start: 2018-03-23 | End: 2018-10-11

## 2018-03-23 RX ORDER — PROPRANOLOL HCL 60 MG
CAPSULE, EXTENDED RELEASE 24HR ORAL
Qty: 90 CAPSULE | Refills: 1 | OUTPATIENT
Start: 2018-03-23

## 2018-03-23 RX ORDER — TEMAZEPAM 30 MG
CAPSULE ORAL
Qty: 30 CAPSULE | Refills: 3 | Status: SHIPPED | OUTPATIENT
Start: 2018-03-23 | End: 2018-04-13

## 2018-03-23 RX ORDER — RIZATRIPTAN BENZOATE 10 MG/1
10 TABLET, ORALLY DISINTEGRATING ORAL
Qty: 18 TABLET | Refills: 3 | Status: SHIPPED | OUTPATIENT
Start: 2018-03-23 | End: 2019-01-16

## 2018-03-23 RX ORDER — PROPRANOLOL HYDROCHLORIDE 60 MG/1
60 TABLET ORAL DAILY
Qty: 90 TABLET | Refills: 1 | Status: SHIPPED | OUTPATIENT
Start: 2018-03-23 | End: 2018-09-18

## 2018-03-23 RX ORDER — OMEPRAZOLE 40 MG/1
CAPSULE, DELAYED RELEASE ORAL
Qty: 90 CAPSULE | Refills: 0 | OUTPATIENT
Start: 2018-03-23

## 2018-03-23 RX ORDER — OMEPRAZOLE 40 MG/1
CAPSULE, DELAYED RELEASE ORAL
Qty: 90 CAPSULE | Refills: 1 | Status: SHIPPED | OUTPATIENT
Start: 2018-03-23 | End: 2018-09-18

## 2018-03-23 NOTE — TELEPHONE ENCOUNTER
propranolol (INDERAL LA) 60 MG 24 hr capsule     Last Written Prescription Date:  3/23/18  Last Fill Quantity: 90,   # refills: 1  Last Office Visit: 3/16/18  Future Office visit:    Next 5 appointments (look out 90 days)     Apr 13, 2018  9:20 AM CDT   (Arrive by 9:05 AM)   Return Visit with Brandy Abebe MD   [facility] ([facility] [place] )    [facility] [address] [place] [id number] [phone]

## 2018-04-12 NOTE — PROGRESS NOTES
"  PSYCHIATRY CLINIC PROGRESS NOTE   20 minute medication management, more than 50% of time spent counseling patient on medications, medication side effects, symptom history and management   SUBJECTIVE / INTERIM HISTORY                                                                        Social- works [employer]-  3 kids  Last visit: Discontinue olanzapine 5 mg evening. Continue temazepam 30 mg HS. Lorazepam 0.5 mg prn severe anxiety as discussed above do not take along with temazepam and use sparingly. Zoloft: currently 200 mg daily. Go down to 150 mg daily for 5 days then 100 mg daily for 5 days then 50 mg daily for 5 days then d/c. Any issues just let me know and we can slow down the taper. Once you get to 50 mg of Zoloft you can start duloxetine (Cymbalta) 30 mg daily  - \"my biggest goal right now is sleep\". Has   - cross - taper was bit rough  - feels like Cymbalta is going okay no major SEs that she is aware of. Having time with thinking clear, concentration   - going to work ar [employer]. Worried concentration will interfere  - depression is better, anxiety , antsy, same maybe worse    SUBSTANCE USE- etoh (likely abuse rather than dependence)    SYMPTOMS-    Depression:  these are better:  depressed mood, low energy, insomnia, weight changes, feeling hopeless, feeling trapped, excessive crying and overwhelmed  Alise/Hypomania:  none  Anxiety:  STILL: excessive worry, feeling fearful, social anxiety and nervous/overwhelmed    Trauma-related:  negative beliefs / emotions    MEDICAL ROS- migraines, weight gain over past several years, heartburn  MEDICAL / SURGICAL HISTORY                pregnant [if applicable]--no     Patient Active Problem List   Diagnosis     Migraine with aura and without status migrainosus, not intractable     Anxiety     Primary insomnia     ACP (advance care planning)     Gastroesophageal reflux disease, esophagitis presence not specified     Vitamin deficiency "     ALLERGY   Dust mite extract and Penicillins  MEDICATIONS                                                                                             Current Outpatient Prescriptions   Medication Sig     vitamin B complex with vitamin C (VITAMIN  B COMPLEX) TABS tablet Take 1 tablet by mouth daily     UNABLE TO FIND MEDICATION NAME: Supplement of Garcinia   Weight loss supplement (green tea)     LORazepam (ATIVAN) 0.5 MG tablet TAKE ONE TABLET BY MOUTH AT BEDTIME AS NEEDED FOR ANXIETY     omeprazole (PRILOSEC) 40 MG capsule TAKE ONE CAPSULE BY MOUTH ONCE DAILY ,TAKE  30  TO  60  MINUTES  BEFORE  A  MEAL.     rizatriptan (MAXALT-MLT) 10 MG ODT tab Take 1 tablet (10 mg) by mouth at onset of headache for migraine May repeat in 2 hours. Max 3 tablets/24 hours.     propranolol HCl 60 MG TABS Take 1 tablet (60 mg) by mouth daily ER     temazepam (RESTORIL) 30 MG capsule TAKE ONE CAPSULE BY MOUTH AT BEDTIME AS NEEDED FOR SLEEP     promethazine (PHENERGAN) 25 MG tablet Take 1 tablet (25 mg) by mouth every 6 hours as needed for nausea (for migraines)     MAGNESIUM OXIDE PO Take 400 mg by mouth daily Take 2 tabs evening     MEDROXYPROGESTERONE ACETATE PO Depo Provera  Dosage not known     DULoxetine (CYMBALTA) 30 MG EC capsule Take 1 capsule (30 mg) by mouth daily     multivitamin, therapeutic (THERA-VIT) TABS Take 1 tablet by mouth daily     cholecalciferol (VITAMIN D3) 5000 UNITS TABS tablet Take 1 tablet (5,000 Units) by mouth daily (Patient not taking: Reported on 4/13/2018)     No current facility-administered medications for this visit.        VITALS   /70 (BP Location: Left arm, Patient Position: Sitting, Cuff Size: Adult Regular)  Pulse 77  Temp 99.1  F (37.3  C) (Tympanic)  Wt 145 lb (65.8 kg)  BMI 24.89 kg/m2     PHQ9                     [unfilled]  LABS                                                                                                                         Last Basic Metabolic  Panel:  Lab Results   Component Value Date     01/26/2017      Lab Results   Component Value Date    POTASSIUM 4.2 01/26/2017     Lab Results   Component Value Date    CHLORIDE 105 01/26/2017     Lab Results   Component Value Date    RAÚL 8.7 01/26/2017     Lab Results   Component Value Date    CO2 26 01/26/2017     Lab Results   Component Value Date    BUN 10 01/26/2017     Lab Results   Component Value Date    CR 0.72 01/26/2017     Lab Results   Component Value Date    GLC 86 01/26/2017     Liver Function Studies -   Recent Labs   Lab Test 01/15/17  08/01/16   1905   PROTTOTAL   --   7.5   ALBUMIN   --   4.1   BILITOTAL   --   0.2   ALKPHOS   --   57   AST  24  24   ALT  15  19     CBC RESULTS:   Recent Labs   Lab Test  11/13/17   1044   WBC  8.6   RBC  4.16   HGB  13.3   HCT  40.5   MCV  97   MCH  32.0   MCHC  32.8   RDW  12.1   PLT  298       MENTAL STATUS EXAM                                                                                        Alert. Oriented to person, place, and date / time. Well groomed, calm, cooperative with good eye contact. No problems with speech or psychomotor behavior. Mood was described as depression better, still having anxiety and affect was congruent to speech content and full range. Thought process, including associations, was unremarkable and thought content was devoid of suicidal and homicidal ideation and psychotic thought. No hallucinations. Insight was good. Judgment was intact and adequate for safety. Fund of knowledge was intact. Pt demonstrates no obvious problems with attention, concentration, language, recent or remote memory although these were not formally tested.     ASSESSMENT                                                                                                      HISTORICAL:  Initial psych note 3/9/18          NOTES:    Ayesha Ortiz is a 39 yo with hx MDD, MAYKEL, hx couple psychiatric hospital stays. Ayesha had a recent stay in [facility] prior to us  first meeting and was continued on temazepam for insomnia, lorazepam as prn and zoloft 200 mg daily. Started on olanzapine 5 mg HS. Discussed I'm not keen on both temazepam and lorazepam as they are both benzodiazepines / same class meds however given she sparingly takes lorazepam I feel comfortable leaving for now. She tends take lorazepam before events which tend increase her anxiety such as hockey games. She had been working with Kirstie at [facility] for therapy.  Insomnia is a major issues and we reviewed some sleep hygiene tactics - happy to hear she has been using them and sleep has been a little mbetter    cymbalta is helping with depression, anxiety is still high though -> we agreed on trying an increase of dose. ? ADHD: we will continue to revisit this. Hard to say yet as we discussed how anxiety can look like ADHD.           TREATMENT RISK STATEMENT:  The risks, benefits, alternatives and potential adverse effects have been explained and are understood by the pt.  The pt agrees to the treatment plan with the ability to do so.   The pt knows to call the clinic for any problems or access emergency care if needed.          DIAGNOSES                 (Use of Axes system will continue, even though absent from DSM 5)          MDD, recurrent, moderate  MAYKEL     PLAN                                                                                                                     1)  MEDICATIONS:         --Continue temazepam 30 mg HS as prn . Lorazepam 0.5 mg prn severe anxiety as discussed above do not take along with temazepam and use sparingly. Increase Cymbalta from 30 mg daily to 60 mg daily.      2)  THERAPY:  No Change     3)  LABS:  None     4)  PT MONITOR [call for probs]:  Worsening symptoms, SI/HI, SEs from meds     5)  REFERRALS [CD, medical, other]:  None     6)  RTC:  1 month

## 2018-04-13 ENCOUNTER — OFFICE VISIT (OUTPATIENT)
Dept: PSYCHIATRY | Facility: OTHER | Age: 38
End: 2018-04-13
Attending: PSYCHIATRY & NEUROLOGY
Payer: COMMERCIAL

## 2018-04-13 ENCOUNTER — APPOINTMENT (OUTPATIENT)
Dept: OCCUPATIONAL MEDICINE | Facility: OTHER | Age: 38
End: 2018-04-13

## 2018-04-13 VITALS
SYSTOLIC BLOOD PRESSURE: 106 MMHG | WEIGHT: 145 LBS | DIASTOLIC BLOOD PRESSURE: 70 MMHG | TEMPERATURE: 99.1 F | HEART RATE: 77 BPM | BODY MASS INDEX: 24.89 KG/M2

## 2018-04-13 DIAGNOSIS — F51.01 PRIMARY INSOMNIA: ICD-10-CM

## 2018-04-13 DIAGNOSIS — F41.1 GAD (GENERALIZED ANXIETY DISORDER): Primary | ICD-10-CM

## 2018-04-13 PROCEDURE — G0463 HOSPITAL OUTPT CLINIC VISIT: HCPCS

## 2018-04-13 PROCEDURE — 99000 SPECIMEN HANDLING OFFICE-LAB: CPT

## 2018-04-13 PROCEDURE — 99213 OFFICE O/P EST LOW 20 MIN: CPT | Performed by: PSYCHIATRY & NEUROLOGY

## 2018-04-13 RX ORDER — LORAZEPAM 0.5 MG/1
TABLET ORAL
Qty: 20 TABLET | Refills: 1 | Status: SHIPPED | OUTPATIENT
Start: 2018-04-13 | End: 2018-06-12

## 2018-04-13 RX ORDER — DULOXETIN HYDROCHLORIDE 60 MG/1
60 CAPSULE, DELAYED RELEASE ORAL DAILY
Qty: 30 CAPSULE | Refills: 6 | Status: SHIPPED | OUTPATIENT
Start: 2018-04-13 | End: 2018-11-26

## 2018-04-13 RX ORDER — TEMAZEPAM 30 MG
CAPSULE ORAL
Qty: 30 CAPSULE | Refills: 3 | Status: SHIPPED | OUTPATIENT
Start: 2018-04-13 | End: 2018-07-16

## 2018-04-13 ASSESSMENT — PATIENT HEALTH QUESTIONNAIRE - PHQ9: 5. POOR APPETITE OR OVEREATING: MORE THAN HALF THE DAYS

## 2018-04-13 ASSESSMENT — ANXIETY QUESTIONNAIRES
1. FEELING NERVOUS, ANXIOUS, OR ON EDGE: NEARLY EVERY DAY
6. BECOMING EASILY ANNOYED OR IRRITABLE: SEVERAL DAYS
5. BEING SO RESTLESS THAT IT IS HARD TO SIT STILL: MORE THAN HALF THE DAYS
2. NOT BEING ABLE TO STOP OR CONTROL WORRYING: MORE THAN HALF THE DAYS
7. FEELING AFRAID AS IF SOMETHING AWFUL MIGHT HAPPEN: SEVERAL DAYS
3. WORRYING TOO MUCH ABOUT DIFFERENT THINGS: NEARLY EVERY DAY
GAD7 TOTAL SCORE: 14
IF YOU CHECKED OFF ANY PROBLEMS ON THIS QUESTIONNAIRE, HOW DIFFICULT HAVE THESE PROBLEMS MADE IT FOR YOU TO DO YOUR WORK, TAKE CARE OF THINGS AT HOME, OR GET ALONG WITH OTHER PEOPLE: VERY DIFFICULT

## 2018-04-13 ASSESSMENT — PAIN SCALES - GENERAL: PAINLEVEL: NO PAIN (0)

## 2018-04-13 NOTE — MR AVS SNAPSHOT
After Visit Summary   4/13/2018    Nasra Ortiz    MRN: [id number]           Patient Information     Date Of Birth          1980        Visit Information        Provider Department      4/13/2018 9:20 AM Brandy Abebe MD [facility]        Today's Diagnoses     MAYKEL (generalized anxiety disorder)    -  1    Primary insomnia           Follow-ups after your visit        Your next 10 appointments already scheduled     Apr 13, 2018 10:30 AM CDT   LAB with Joselin Smith LPN   [facility] ([facility])    [address]  [facility] [id number] [phone]           Please do not eat 10-12 hours before your appointment if you are coming in fasting for labs on lipids, cholesterol, or glucose (sugar). This does not apply to pregnant women. Water, hot tea and black coffee (with nothing added) are okay. Do not drink other fluids, diet soda or chew gum.            Apr 25, 2018  3:00 PM CDT   (Arrive by 2:45 PM)   MA SCREENING DIGITAL BILATERAL with MTMA1   [facility] ([facility] [place] )    [address] [facility] [address]   [phone]           Do not use any powder, lotion or deodorant under your arms or on your breast. If you do, we will ask you to remove it before your exam.  Wear comfortable, two-piece clothing.  If you have any allergies, tell your care team.  Bring any previous mammograms from other facilities or have them mailed to the breast center.            May 11, 2018  9:20 AM CDT   (Arrive by 9:05 AM)   Return Visit with Branyd Abebe MD   [facility] ([facility] [place] )    [address]  [facility] [id number] [phone]              Who to contact     If you have questions or need follow up information about today's clinic visit or your schedule please contact [facility] directly at [phone].  Normal or non-critical lab and imaging  results will be communicated to you by Medalliahart, letter or phone within 4 business days after the clinic has received the results. If you do not hear from us within 7 days, please contact the clinic through Emotient or phone. If you have a critical or abnormal lab result, we will notify you by phone as soon as possible.  Submit refill requests through Emotient or call your pharmacy and they will forward the refill request to us. Please allow 3 business days for your refill to be completed.          Additional Information About Your Visit        Emotient Information     Emotient gives you secure access to your electronic health record. If you see a primary care provider, you can also send messages to your care team and make appointments. If you have questions, please call your primary care clinic.  If you do not have a primary care provider, please call [phone] and they will assist you.        Care EveryWhere ID     This is your Care EveryWhere ID. This could be used by other organizations to access your [place] medical records  [id number]        Your Vitals Were     Pulse Temperature BMI (Body Mass Index)             77 99.1  F (37.3  C) (Tympanic) 24.89 kg/m2          Blood Pressure from Last 3 Encounters:   04/13/18 106/70   03/16/18 124/76   03/09/18 114/80    Weight from Last 3 Encounters:   04/13/18 145 lb (65.8 kg)   03/16/18 145 lb (65.8 kg)   03/09/18 147 lb (66.7 kg)              Today, you had the following     No orders found for display         Today's Medication Changes          These changes are accurate as of 4/13/18 10:02 AM.  If you have any questions, ask your nurse or doctor.               These medicines have changed or have updated prescriptions.        Dose/Directions    DULoxetine 60 MG EC capsule   Commonly known as:  CYMBALTA   This may have changed:    - medication strength  - how much to take   Used for:  MAYKEL (generalized anxiety disorder)   Changed by:  Brandy Abebe MD         Dose:  60 mg   Take 1 capsule (60 mg) by mouth daily   Quantity:  30 capsule   Refills:  6            Where to get your medicines      These medications were sent to [id number] - [place], [place] - [address]   [address] [facility] [address]     Phone:  [phone]     DULoxetine 60 MG EC capsule         Some of these will need a paper prescription and others can be bought over the counter.  Ask your nurse if you have questions.     Bring a paper prescription for each of these medications     LORazepam 0.5 MG tablet    temazepam 30 MG capsule                Primary Care Provider Office Phone # Fax #    Adrienne DaileyLINN [phone] [address] [place]  [place] [address]        Equal Access to Services     [facility] : Hadii gretchen bates hadasho Soomaali, waaxda luqadaha, qaybta kaalmada adeegyada, lizet east. So [facility] [phone].    ATENCIÓN: Si habla español, tiene a baires disposición servicios gratuitos de asistencia lingüística. Ll[facility] [phone].    We comply with applicable federal civil rights laws and [place] laws. We do not discriminate on the basis of race, color, national origin, age, disability, sex, sexual orientation, or gender identity.            Thank you!     Thank you for choosing [facility]  [facility] your [facility]. Our goal is always to provide you with excellent care. Hearing back from our patients is one way we can continue to improve our services. Please take a few minutes to complete the written survey that you may receive in the mail after your visit with us. Thank you!             Your Updated Medication List - Protect others around you: Learn how to safely use, store and throw away your medicines at www.disposemymeds.org.          This list is accurate as of 4/13/18 10:02 AM.  Always use your most recent med list.                   Brand Name Dispense Instructions for use Diagnosis     cholecalciferol 5000 units Tabs tablet    vitamin D3    90 tablet    Take 1 tablet (5,000 Units) by mouth daily    Vitamin deficiency       DULoxetine 60 MG EC capsule    CYMBALTA    30 capsule    Take 1 capsule (60 mg) by mouth daily    MAYKEL (generalized anxiety disorder)       LORazepam 0.5 MG tablet    ATIVAN    20 tablet    TAKE ONE TABLET BY MOUTH AT BEDTIME AS NEEDED FOR ANXIETY    MAYKEL (generalized anxiety disorder)       MAGNESIUM OXIDE PO      Take 400 mg by mouth daily Take 2 tabs evening        MEDROXYPROGESTERONE ACETATE PO      Depo Provera Dosage not known        multivitamin, therapeutic Tabs tablet      Take 1 tablet by mouth daily        omeprazole 40 MG capsule    priLOSEC    90 capsule    TAKE ONE CAPSULE BY MOUTH ONCE DAILY ,TAKE  30  TO  60  MINUTES  BEFORE  A  MEAL.    Gastroesophageal reflux disease without esophagitis       promethazine 25 MG tablet    PHENERGAN    20 tablet    Take 1 tablet (25 mg) by mouth every 6 hours as needed for nausea (for migraines)    Gastroesophageal reflux disease without esophagitis       propranolol HCl 60 MG Tabs     90 tablet    Take 1 tablet (60 mg) by mouth daily ER    Migraine with aura and without status migrainosus, not intractable       rizatriptan 10 MG ODT tab    MAXALT-MLT    18 tablet    Take 1 tablet (10 mg) by mouth at onset of headache for migraine May repeat in 2 hours. Max 3 tablets/24 hours.    Migraine with aura and without status migrainosus, not intractable       temazepam 30 MG capsule    RESTORIL    30 capsule    TAKE ONE CAPSULE BY MOUTH AT BEDTIME AS NEEDED FOR SLEEP    Primary insomnia       UNABLE TO FIND      MEDICATION NAME: Supplement of Garcinia   Weight loss supplement (green tea)        vitamin B complex with vitamin C Tabs tablet      Take 1 tablet by mouth daily

## 2018-04-13 NOTE — NURSING NOTE
"Chief Complaint   Patient presents with     RECHECK     Mental health.  Patient c/o focus issues.  Also discuss Ativan as a prn.       Initial /70 (BP Location: Left arm, Patient Position: Sitting, Cuff Size: Adult Regular)  Pulse 77  Temp 99.1  F (37.3  C) (Tympanic)  Wt 145 lb (65.8 kg)  BMI 24.89 kg/m2 Estimated body mass index is 24.89 kg/(m^2) as calculated from the following:    Height as of 3/16/18: 5' 4\" (1.626 m).    Weight as of this encounter: 145 lb (65.8 kg).  Medication Reconciliation: complete     JADA CASTRO      "

## 2018-04-14 ASSESSMENT — ANXIETY QUESTIONNAIRES: GAD7 TOTAL SCORE: 14

## 2018-04-14 ASSESSMENT — PATIENT HEALTH QUESTIONNAIRE - PHQ9: SUM OF ALL RESPONSES TO PHQ QUESTIONS 1-9: 10

## 2018-04-25 ENCOUNTER — RADIANT APPOINTMENT (OUTPATIENT)
Dept: MAMMOGRAPHY | Facility: OTHER | Age: 38
End: 2018-04-25
Attending: NURSE PRACTITIONER
Payer: COMMERCIAL

## 2018-04-25 DIAGNOSIS — Z12.31 ENCOUNTER FOR SCREENING MAMMOGRAM FOR BREAST CANCER: ICD-10-CM

## 2018-04-25 PROCEDURE — 77067 SCR MAMMO BI INCL CAD: CPT | Mod: TC

## 2018-06-12 DIAGNOSIS — F41.1 GAD (GENERALIZED ANXIETY DISORDER): ICD-10-CM

## 2018-06-13 RX ORDER — LORAZEPAM 0.5 MG/1
TABLET ORAL
Qty: 20 TABLET | Refills: 1 | Status: SHIPPED | OUTPATIENT
Start: 2018-06-13 | End: 2018-08-13

## 2018-07-06 ENCOUNTER — TRANSFERRED RECORDS (OUTPATIENT)
Dept: HEALTH INFORMATION MANAGEMENT | Facility: CLINIC | Age: 38
End: 2018-07-06

## 2018-07-06 LAB
CREAT SERPL-MCNC: 0.9 MG/DL (ref 0.4–1)
GLUCOSE SERPL-MCNC: 148 MG/DL (ref 70–100)
INR PPP: 1 (ref 0.9–1.1)
POTASSIUM SERPL-SCNC: 2.9 MEQ/L (ref 3.4–5.1)

## 2018-07-16 DIAGNOSIS — F51.01 PRIMARY INSOMNIA: ICD-10-CM

## 2018-07-17 NOTE — TELEPHONE ENCOUNTER
restoril      Last Written Prescription Date:  4/13/`8  Last Fill Quantity: 30,   # refills: 3  Last Office Visit: 3/16/18  Future Office visit:       Routing refill request to provider for review/approval because:  Drug not on the [facility], P or [facility] refill protocol or controlled substance

## 2018-07-18 ENCOUNTER — TELEPHONE (OUTPATIENT)
Dept: PSYCHIATRY | Facility: OTHER | Age: 38
End: 2018-07-18

## 2018-07-18 RX ORDER — TEMAZEPAM 30 MG
CAPSULE ORAL
Qty: 30 CAPSULE | Refills: 2 | Status: SHIPPED | OUTPATIENT
Start: 2018-07-18 | End: 2018-10-17

## 2018-07-20 DIAGNOSIS — F41.1 GAD (GENERALIZED ANXIETY DISORDER): ICD-10-CM

## 2018-07-23 NOTE — TELEPHONE ENCOUNTER
Ativan      Last Written Prescription Date:  6/13/18  Last Fill Quantity: 20,   # refills: 1  Last Office Visit: 4/13/18  Future Office visit:       Routing refill request to provider for review/approval because:  Drug not on the [facility], P or [facility] refill protocol or controlled substance

## 2018-07-27 ENCOUNTER — TELEPHONE (OUTPATIENT)
Dept: FAMILY MEDICINE | Facility: OTHER | Age: 38
End: 2018-07-27

## 2018-07-27 RX ORDER — LORAZEPAM 0.5 MG/1
TABLET ORAL
Qty: 20 TABLET | Refills: 1 | OUTPATIENT
Start: 2018-07-27

## 2018-07-27 NOTE — TELEPHONE ENCOUNTER
Not going to fill lorazepam. Reviewed mn , been taking opioid as well recently AND she takes temazepam also a benzodiazepine. Guidelines are to avoid long-term use benzos, avoid opioid and benzos together...

## 2018-07-27 NOTE — TELEPHONE ENCOUNTER
Hysterscopy d/t heavy bleeding before menstrual cycle.May scrape. Will not perform ablation at that time per patient. on 8.1.18

## 2018-07-27 NOTE — TELEPHONE ENCOUNTER
3:40 PM    Reason for Call: OVERBOOK    Patient is having the following symptoms: needs preop if possible before 08/01/2018 for 1 days.    The patient is requesting an appointment for next week before 08/01/2018 with anyone.    Was an appointment offered for this call? No  If yes : Appointment type              Date    Preferred method for responding to this message: Telephone Call  What is your phone number ? [phone]    If we cannot reach you directly, may we leave a detailed response at the number you provided? Yes    Can this message wait until your PCP/provider returns, if unavailable today? Not applicable, pcp is in today.     Thank you,     Alta Ahumada

## 2018-07-30 ENCOUNTER — TELEPHONE (OUTPATIENT)
Dept: FAMILY MEDICINE | Facility: OTHER | Age: 38
End: 2018-07-30

## 2018-07-30 ENCOUNTER — OFFICE VISIT (OUTPATIENT)
Dept: FAMILY MEDICINE | Facility: OTHER | Age: 38
End: 2018-07-30
Attending: NURSE PRACTITIONER
Payer: COMMERCIAL

## 2018-07-30 VITALS
HEART RATE: 78 BPM | SYSTOLIC BLOOD PRESSURE: 101 MMHG | WEIGHT: 129 LBS | DIASTOLIC BLOOD PRESSURE: 78 MMHG | HEIGHT: 64 IN | BODY MASS INDEX: 22.02 KG/M2 | TEMPERATURE: 97.9 F | OXYGEN SATURATION: 98 %

## 2018-07-30 DIAGNOSIS — Z72.0 TOBACCO ABUSE: ICD-10-CM

## 2018-07-30 DIAGNOSIS — F33.0 MILD EPISODE OF RECURRENT MAJOR DEPRESSIVE DISORDER (H): ICD-10-CM

## 2018-07-30 DIAGNOSIS — N94.6 DYSMENORRHEA: ICD-10-CM

## 2018-07-30 DIAGNOSIS — Z01.818 PREOP GENERAL PHYSICAL EXAM: Primary | ICD-10-CM

## 2018-07-30 DIAGNOSIS — N92.0 MENORRHAGIA WITH REGULAR CYCLE: ICD-10-CM

## 2018-07-30 DIAGNOSIS — Z71.6 TOBACCO ABUSE COUNSELING: ICD-10-CM

## 2018-07-30 PROBLEM — F33.9 RECURRENT MAJOR DEPRESSIVE DISORDER (H): Status: ACTIVE | Noted: 2018-07-30

## 2018-07-30 LAB
ANION GAP SERPL CALCULATED.3IONS-SCNC: 9 MMOL/L (ref 3–14)
BASOPHILS # BLD AUTO: 0 10E9/L (ref 0–0.2)
BASOPHILS NFR BLD AUTO: 0.7 %
BUN SERPL-MCNC: 7 MG/DL (ref 7–30)
CALCIUM SERPL-MCNC: 8.7 MG/DL (ref 8.5–10.1)
CHLORIDE SERPL-SCNC: 105 MMOL/L (ref 94–109)
CO2 SERPL-SCNC: 27 MMOL/L (ref 20–32)
CREAT SERPL-MCNC: 0.65 MG/DL (ref 0.52–1.04)
DIFFERENTIAL METHOD BLD: NORMAL
EOSINOPHIL # BLD AUTO: 0.1 10E9/L (ref 0–0.7)
EOSINOPHIL NFR BLD AUTO: 2 %
ERYTHROCYTE [DISTWIDTH] IN BLOOD BY AUTOMATED COUNT: 13.1 % (ref 10–15)
GFR SERPL CREATININE-BSD FRML MDRD: >90 ML/MIN/1.7M2
GLUCOSE SERPL-MCNC: 93 MG/DL (ref 70–99)
HCG UR QL: NEGATIVE
HCT VFR BLD AUTO: 41 % (ref 35–47)
HGB BLD-MCNC: 13.5 G/DL (ref 11.7–15.7)
LYMPHOCYTES # BLD AUTO: 2.7 10E9/L (ref 0.8–5.3)
LYMPHOCYTES NFR BLD AUTO: 44.7 %
MCH RBC QN AUTO: 30.5 PG (ref 26.5–33)
MCHC RBC AUTO-ENTMCNC: 32.9 G/DL (ref 31.5–36.5)
MCV RBC AUTO: 93 FL (ref 78–100)
MONOCYTES # BLD AUTO: 0.6 10E9/L (ref 0–1.3)
MONOCYTES NFR BLD AUTO: 9.8 %
NEUTROPHILS # BLD AUTO: 2.6 10E9/L (ref 1.6–8.3)
NEUTROPHILS NFR BLD AUTO: 42.8 %
PLATELET # BLD AUTO: 286 10E9/L (ref 150–450)
POTASSIUM SERPL-SCNC: 4 MMOL/L (ref 3.4–5.3)
RBC # BLD AUTO: 4.42 10E12/L (ref 3.8–5.2)
SODIUM SERPL-SCNC: 141 MMOL/L (ref 133–144)
TSH SERPL DL<=0.005 MIU/L-ACNC: 0.53 MU/L (ref 0.4–4)
WBC # BLD AUTO: 6 10E9/L (ref 4–11)

## 2018-07-30 PROCEDURE — 36415 COLL VENOUS BLD VENIPUNCTURE: CPT | Mod: ZL | Performed by: NURSE PRACTITIONER

## 2018-07-30 PROCEDURE — 99215 OFFICE O/P EST HI 40 MIN: CPT | Mod: 25 | Performed by: NURSE PRACTITIONER

## 2018-07-30 PROCEDURE — 84443 ASSAY THYROID STIM HORMONE: CPT | Mod: ZL | Performed by: NURSE PRACTITIONER

## 2018-07-30 PROCEDURE — 80048 BASIC METABOLIC PNL TOTAL CA: CPT | Mod: ZL | Performed by: NURSE PRACTITIONER

## 2018-07-30 PROCEDURE — G0463 HOSPITAL OUTPT CLINIC VISIT: HCPCS

## 2018-07-30 PROCEDURE — 93005 ELECTROCARDIOGRAM TRACING: CPT

## 2018-07-30 PROCEDURE — 93010 ELECTROCARDIOGRAM REPORT: CPT | Performed by: INTERNAL MEDICINE

## 2018-07-30 PROCEDURE — 85025 COMPLETE CBC W/AUTO DIFF WBC: CPT | Mod: ZL | Performed by: NURSE PRACTITIONER

## 2018-07-30 PROCEDURE — 81025 URINE PREGNANCY TEST: CPT | Mod: ZL | Performed by: NURSE PRACTITIONER

## 2018-07-30 RX ORDER — HYDROCODONE BITARTRATE AND ACETAMINOPHEN 5; 325 MG/1; MG/1
1 TABLET ORAL EVERY 6 HOURS PRN
COMMUNITY
End: 2019-03-18

## 2018-07-30 ASSESSMENT — ANXIETY QUESTIONNAIRES
2. NOT BEING ABLE TO STOP OR CONTROL WORRYING: SEVERAL DAYS
1. FEELING NERVOUS, ANXIOUS, OR ON EDGE: SEVERAL DAYS
3. WORRYING TOO MUCH ABOUT DIFFERENT THINGS: SEVERAL DAYS
4. TROUBLE RELAXING: SEVERAL DAYS
6. BECOMING EASILY ANNOYED OR IRRITABLE: SEVERAL DAYS
5. BEING SO RESTLESS THAT IT IS HARD TO SIT STILL: SEVERAL DAYS
7. FEELING AFRAID AS IF SOMETHING AWFUL MIGHT HAPPEN: SEVERAL DAYS
GAD7 TOTAL SCORE: 7

## 2018-07-30 ASSESSMENT — PAIN SCALES - GENERAL: PAINLEVEL: MODERATE PAIN (5)

## 2018-07-30 NOTE — MR AVS SNAPSHOT
After Visit Summary   7/30/2018    Nasra Ortiz    MRN: [id number]           Patient Information     Date Of Birth          1980        Visit Information        Provider Department      7/30/2018 8:45 AM Adrienne Dailey NP [facility]        Today's Diagnoses     Preop general physical exam    -  1    Menorrhagia with regular cycle        Dysmenorrhea        Mild episode of recurrent major depressive disorder (H)        Tobacco abuse        Tobacco abuse counseling          Care Instructions      Before Your Surgery      Call your surgeon if there is any change in your health. This includes signs of a cold or flu (such as a sore throat, runny nose, cough, rash or fever).    Do not smoke, drink alcohol or take over the counter medicine (unless your surgeon or primary care doctor tells you to) for the 24 hours before and after surgery.    If you take prescribed drugs: Follow your doctor s orders about which medicines to take and which to stop until after surgery.    Eating and drinking prior to surgery: follow the instructions from your surgeon    Take a shower or bath the night before surgery. Use the soap your surgeon gave you to gently clean your skin. If you do not have soap from your surgeon, use your regular soap. Do not shave or scrub the surgery site.  Wear clean pajamas and have clean sheets on your bed.     HOW TO QUIT SMOKING  Smoking is one of the hardest habits to break. About half of all those who have ever smoked have been able to quit, and most of those (about 70%) who still smoke want to quit. Here are some of the best ways to stop smoking.     KEEP TRYING:  It takes most smokers about 8 tries before they are finally able to fully quit. So, the more often you try and fail, the better your chance of quitting the next time! So, don't give up!    GO COLD TURKEY:  Most ex-smokers quit cold turkey. Trying to cut back gradually doesn't seem to work as well, perhaps because  it continues the smoking habit. Also, it is possible to fool yourself by inhaling more while smoking fewer cigarettes. This results in the same amount of nicotine in your body!    GET SUPPORT:  Support programs can make an important difference, especially for the heavy smoker. These groups offer lectures, methods to change your behavior and peer support. Call the free national Quitline for more information. 800-QUIT-NOW (472-042-8801). Low-cost or free programs are offered by many hospitals, local chapters of the American Lung Association (720-170-9590) and the American Cancer Society (520-776-1457). Support at home is important too. Non-smokers can help by offering praise and encouragement. If the smoker fails to quit, encourage them to try again!    OVER-THE-COUNTER MEDICINES:  For those who can't quit on their own, Nicotine Replacement Therapy (NRT) may make quitting much easier. Certain aids such as the nicotine patch, gum and lozenge are available without a prescription. However, it is best to use these under the guidance of your doctor. The skin patch provides a steady supply of nicotine to the body. Nicotine gum and lozenge gives temporary bursts of low levels of nicotine. Both methods take the edge off the craving for cigarettes. WARNING: If you feel symptoms of nicotine overdose, such as nausea, vomiting, dizziness, weakness, or fast heartbeat, stop using these and see your doctor.    PRESCRIPTION MEDICINES:  After evaluating your smoking patterns and prior attempts at quitting, your doctor may offer a prescription medicine such as bupropion (Zyban, Wellbutrin), varenicline (Chantix, Champix), a niocotine inhaler or nasal spray. Each has its unique advantage and side effects which your doctor can review with you.    HEALTH BENEFITS OF QUITTING:  The benefits of quitting start right away and keep improving the longer you go without smokin minutes: blood pressure and pulse return to normal  8 hours:  oxygen levels return to normal  2 days: ability to smell and taste begins to improve as damaged nerves start to regrow  2-3 weeks: circulation and lung function improves  1-9 months: decreased cough, congestion and shortness of breath; less tired  1 year: risk of heart attack decreases by half  5 years: risk of lung cancer decreases by half; risk of stroke becomes the same as a non-smoker  For information about how to quit smoking, visit the following links:  National Cancer [place] ,   Clearing the Air, Quit Smoking Today   - an online booklet. http://www.smokefree.gov/pubs/clearing_the_air.pdf  Smokefree.gov http://smokefree.gov/  QuitNet http://www.quitnet.com/    2649-5897 Kiran Dalton, [address] [place] [address], [place] [address]. All rights reserved. This information is not intended as a substitute for professional medical care. Always follow your healthcare professional's instructions.    The Benefits of Living Smoke Free  What do you want to gain from quitting? Check off some reasons to quit.  Health Benefits  ___ Reduce my risk of lung cancer, heart disease, chronic lung disease  ___ Have fewer wrinkles and softer skin  ___ Improve my sense of taste and smell  ___ For pregnant women--reduce the risk of having a miscarriage, stillbirth, premature birth, or low-birth-weight baby  Personal Benefits  ___ Feel more in control of my life  ___ Have better-smelling hair, breath, clothes, home, and car  ___ Save time by not having to take smoke breaks, buy cigarettes, or hunt for a light  ___ Have whiter teeth  Family Benefits  ___ Reduce my children s respiratory tract infections  ___ Set a good example for my children  ___ Reduce my family s cancer risk  Financial Benefits  ___ Save hundreds of dollars each year that would be spent on cigarettes  ___ Save money on medical bills  ___ Save on life, health, and car insurance premiums    Those Dollars Add Up!  Cigarettes are expensive, and getting more expensive all  the time. Do you realize how much money you are spending on cigarettes per year? What is the average amount you spend on a pack of cigarettes? What is the average number of packs that you smoke per day? Using your answers to these questions, fill in this formula to help you find out:  ($ _____ per pack) ×  ( _____ number of packs per day) × (365 days) =  $ _____ yearly cost of smoking  Besides tobacco, there are other costs, including extra cleaning bills and replacement costs for clothing and furniture; medical expenses for smoking-related illnesses; and higher health, life, and car insurance premiums.    Cigars and Pipes Count Too!  Cigars and pipes are also dangerous. So are smokeless (chewing) tobacco and snuff. All of these products contain nicotine, a highly addictive substance that has harmful effects on your body. Quitting smoking means giving up all tobacco products.      2337-5059 [place] [address]. All rights reserved. This information is not intended as a substitute for professional medical care. Always follow your healthcare professional's instructions.          Follow-ups after your visit        Who to contact     If you have questions or need follow up information about today's clinic visit or your schedule please contact [facility] directly at [phone].  Normal or non-critical lab and imaging results will be communicated to you by MyChart, letter or phone within 4 business days after the clinic has received the results. If you do not hear from us within 7 days, please contact the clinic through MyChart or phone. If you have a critical or abnormal lab result, we will notify you by phone as soon as possible.  Submit refill requests through [employer] or call your pharmacy and they will forward the refill request to us. Please allow 3 business days for your refill to be completed.          Additional Information About Your Visit        [facility]  "Information     [employer] gives you secure access to your electronic health record. If you see a primary care provider, you can also send messages to your care team and make appointments. If you have questions, please call your primary care clinic.  If you do not have a primary care provider, please call [phone] and they will assist you.        Care EveryWhere ID     This is your Care EveryWhere ID. This could be used by other organizations to access your [facility] medical records  [id number]        Your Vitals Were     Pulse Temperature Height Pulse Oximetry BMI (Body Mass Index)       78 97.9  F (36.6  C) (Tympanic) 5' 4\" (1.626 m) 98% 22.14 kg/m2        Blood Pressure from Last 3 Encounters:   07/30/18 101/78   04/13/18 106/70   03/16/18 124/76    Weight from Last 3 Encounters:   07/30/18 129 lb (58.5 kg)   04/13/18 145 lb (65.8 kg)   03/16/18 145 lb (65.8 kg)              We Performed the Following     Basic metabolic panel     CBC with platelets differential     DEPRESSION EDUCATION     EKG 12-lead complete w/read - (Clinic Performed)     HCG qualitative urine     Tobacco Cessation - Order to Satisfy Health Maintenance     TSH with free T4 reflex          Today's Medication Changes          These changes are accurate as of 7/30/18  9:45 AM.  If you have any questions, ask your nurse or doctor.               Stop taking these medicines if you haven't already. Please contact your care team if you have questions.     UNABLE TO FIND   Stopped by:  Adrienne Dailey NP                   Information about OPIOIDS     PRESCRIPTION OPIOIDS: WHAT YOU NEED TO KNOW   We gave you an opioid (narcotic) pain medicine. It is important to manage your pain, but opioids are not always the best choice. You should first try all the other options your care team gave you. Take this medicine for as short a time (and as few doses) as possible.     These medicines have risks:    DO NOT drive when on new or higher doses of pain medicine. " These medicines can affect your alertness and reaction times, and you could be arrested for driving under the influence (DUI). If you need to use opioids long-term, talk to your care team about driving.    DO NOT operate heave machinery    DO NOT do any other dangerous activities while taking these medicines.     DO NOT drink any alcohol while taking these medicines.      If the opioid prescribed includes acetaminophen, DO NOT take with any other medicines that contain acetaminophen. Read all labels carefully. Look for the word  acetaminophen  or  Tylenol.  Ask your pharmacist if you have questions or are unsure.    You can get addicted to pain medicines, especially if you have a history of addiction (chemical, alcohol or substance dependence). Talk to your care team about ways to reduce this risk.    Store your pills in a secure place, locked if possible. We will not replace any lost or stolen medicine. If you don t finish your medicine, please throw away (dispose) as directed by your pharmacist. The [place] Pollution Control Agency has more information about safe disposal: https://www.pca.[facility].mn.us/living-green/managing-unwanted-medications.     All opioids tend to cause constipation. Drink plenty of water and eat foods that have a lot of fiber, such as fruits, vegetables, prune juice, apple juice and high-fiber cereal. Take a laxative (Miralax, milk of magnesia, Colace, Senna) if you don t move your bowels at least every other day.          Primary Care Provider Office Phone # Fax #    Adrienne Dailey [phone] [address]  [facility] [address]        Equal Access to Services     [facility] HospitalCRISTINE : Hadii aad ku hadasho Soomaali, waaxda luqadaha, qaybta kaalmada adeegyada, lizet grant . So [facility] [phone].    ATENCIÓN: Si habla español, tiene a baires disposición servicios gratuitos de asistencia lingüística. Llame al [phone].    We comply with  applicable federal civil rights laws and [place] laws. We do not discriminate on the basis of race, color, national origin, age, disability, sex, sexual orientation, or gender identity.            Thank you!     Thank you for choosing [facility]  for your care. Our goal is always to provide you with excellent care. Hearing back from our patients is one way we can continue to improve our services. Please take a few minutes to complete the written survey that you may receive in the mail after your visit with us. Thank you!             Your Updated Medication List - Protect others around you: Learn how to safely use, store and throw away your medicines at www.disposemymeds.org.          This list is accurate as of 7/30/18  9:45 AM.  Always use your most recent med list.                   Brand Name Dispense Instructions for use Diagnosis    cholecalciferol 5000 units Tabs tablet    vitamin D3    90 tablet    Take 1 tablet (5,000 Units) by mouth daily    Vitamin deficiency       DULoxetine 60 MG EC capsule    CYMBALTA    30 capsule    Take 1 capsule (60 mg) by mouth daily    MAYKEL (generalized anxiety disorder)       HYDROcodone-acetaminophen 5-325 MG per tablet    NORCO     Take 1 tablet by mouth every 6 hours as needed for severe pain        LORazepam 0.5 MG tablet    ATIVAN    20 tablet    TAKE 1 TABLET BY MOUTH AT BEDTIME AS NEEDED FOR ANXIETY    MAYKEL (generalized anxiety disorder)       MAGNESIUM OXIDE PO      Take 400 mg by mouth daily Take 2 tabs evening        MEDROXYPROGESTERONE ACETATE PO      Depo Provera Dosage not known        multivitamin, therapeutic Tabs tablet      Take 1 tablet by mouth daily        omeprazole 40 MG capsule    priLOSEC    90 capsule    TAKE ONE CAPSULE BY MOUTH ONCE DAILY ,TAKE  30  TO  60  MINUTES  BEFORE  A  MEAL.    Gastroesophageal reflux disease without esophagitis       promethazine 25 MG tablet    PHENERGAN    20 tablet    Take 1 tablet (25 mg) by mouth every 6  hours as needed for nausea (for migraines)    Gastroesophageal reflux disease without esophagitis       propranolol HCl 60 MG Tabs     90 tablet    Take 1 tablet (60 mg) by mouth daily ER    Migraine with aura and without status migrainosus, not intractable       rizatriptan 10 MG ODT tab    MAXALT-MLT    18 tablet    Take 1 tablet (10 mg) by mouth at onset of headache for migraine May repeat in 2 hours. Max 3 tablets/24 hours.    Migraine with aura and without status migrainosus, not intractable       temazepam 30 MG capsule    RESTORIL    30 capsule    TAKE 1 CAPSULE BY MOUTH AT BEDTIME AS NEEDED FOR SLEEP    Primary insomnia       vitamin B complex with vitamin C Tabs tablet      Take 1 tablet by mouth daily

## 2018-07-30 NOTE — PROGRESS NOTES
[facility]  [address]  [facility] [address]  [phone]  Dept: [phone]    PRE-OP EVALUATION:  Today's date: 2018    Nasra Ortiz (: 1980) presents for pre-operative evaluation assessment as requested by Dr. Rashid.  She requires evaluation and anesthesia risk assessment prior to undergoing surgery/procedure for treatment of heavy menses.      Proposed Surgery/ Procedure: Hysteroscopy / D&C  Date of Surgery/ Procedure: 18  Time of Surgery/ Procedure: to be determined  Hospital/Surgical Facility: [facility]  Primary Physician: Adrienne Dailey  Type of Anesthesia Anticipated: to be determined  Patient has a Health Care Directive or Living Will:  NO        1. NO - Do you have a history of heart attack, stroke, stent, bypass or surgery on an artery in the head, neck, heart or legs?  2. NO - Do you ever have any pain or discomfort in your chest?  3. NO - Do you have a history of  Heart Failure?  4. NO - Are you troubled by shortness of breath when: walking on the level, up a slight hill or at night?  5. NO - Do you currently have a cold, bronchitis or other respiratory infection?  6.YES (allergies) - Do you have a cough, shortness of breath or wheezing? Sneezing, since camping - believes this is allergy related.  No cough, no chest congestion  7. NO - Do you sometimes get pains in the calves of your legs when you walk?  8. NO - Do you or anyone in your family have previous history of blood clots?  9. NO - Do you or does anyone in your family have a serious bleeding problem such as prolonged bleeding following surgeries or cuts?  10. YES - Have you ever had problems with anemia or been told to take iron pills? - history of low iron in the past  11. YES - Have you had any abnormal blood loss such as black, tarry or bloody stools, or abnormal vaginal bleeding? - Abnormal vaginal bleeding  12. YES - Have you ever had a blood transfusion? - Following  ovarian aneurism, and  - 6 years ago.  13. YES- Have you or any of your relatives ever had problems with anesthesia? - Vomiting and difficulty with BP regulation, slow to come out of anesthesia.   14. NO - Do you have sleep apnea, excessive snoring or daytime drowsiness?  15. NO - Do you have any prosthetic heart valves?  16. NO - Do you have prosthetic joints?  17. NO - Is there any chance that you may be pregnant?      HPI:     HPI related to upcoming procedure:   Menorrhagia with irregular cycle, excessive and frequent menstruation, Dysmenorrhea    Currently on Provera.       PELVIS NON OB COMPLETE2018  [facility] [employer]  Result Narrative   This document is currently in Final Status    Exam Accession# [id number]     [facility] COMPLETE   [facility]    REFERRING PROVIDER: CRISTINE CLAY    REPORT DATE:  2018 10:40 AM    PATIENT : 1980, 38 years    HISTORY: Menorrhagia.       TECHNIQUE: Transvaginal study was performed.    FINDINGS: Uterus is 8.6 x 5.1 x 5.4 cm. The myometrium is slightly heterogeneous without a definite defined dominant mass. This could represent multiple fibroids or less likely adenomyosis.    Endometrium is about 7 mm, but again difficult to determine, because of the heterogeneity.    Left ovary is 3.5 x 2.7 x 2.0.    There is a small amount nonechogenic fluid.    Good flow in the left ovary.    IMPRESSION: Heterogeneity in the uterine wall, nonspecific. The endometrium is poorly demonstrated. This could represent fibroids or less likely adenomyosis.    Dictated By: Dr. Dominic Correa MD 2018 10:40 AM  Edited By: DAPHNE 2018 10:49 AM    Electronically Signed: Dr. Dominic Correa MD 2018 4:02 PM           US PELVIS NON OB COMPLETE2018  [facility]  Result Narrative   This document is currently in Final Status    Exam Accession# [id number]     PELVIS NON OB COMPLETE   [facility]    REFERRING PROVIDER: CRISTINE CLAY    REPORT DATE:  2018 10:40 AM    PATIENT  ": 1980, 38 years    HISTORY: Menorrhagia.       TECHNIQUE: Transvaginal study was performed.    FINDINGS: Uterus is 8.6 x 5.1 x 5.4 cm. The myometrium is slightly heterogeneous without a definite defined dominant mass. This could represent multiple fibroids or less likely adenomyosis.    Endometrium is about 7 mm, but again difficult to determine, because of the heterogeneity.    Left ovary is 3.5 x 2.7 x 2.0.    There is a small amount nonechogenic fluid.    Good flow in the left ovary.    IMPRESSION: Heterogeneity in the uterine wall, nonspecific. The endometrium is poorly demonstrated. This could represent fibroids or less likely adenomyosis.    Dictated By: Dr. Dominic Correa MD 2018 10:40 AM  Edited By: DAPHNE 2018 10:49 AM    Electronically Signed: Dr. Dominic Correa MD 2018 4:02 PM         See problem list for active medical problems.  Problems all longstanding and stable, except as noted/documented.  See ROS for pertinent symptoms related to these conditions.                            Weight down 15# over 3 months - is more active, feels well. Eating well.      7/30/18 4/13/18 3/16/18 3/9/18 11/13/17   /78 106/70 124/76 114/80 110/80   Pulse 78 77 84 83 81   Resp   14  14   SpO2 98 %  98 %  100 %   Height 5' 4\" (1.626 m)  5' 4\" (1.626 m)  5' 4\" (1.626 m)   Weight 129 lb (58.5 kg) 145 lb (65.8 kg) 145 lb (65.8 kg) 147 lb (66.7 kg) 150 lb (68 kg)   Pain Score Mod Pain5 NO PAIN (0)  Mod Pain4    Pain Loc    -- [migraine headache]    BMI (Calculated) 22.19  24.94  25.8        PHQ-9 SCORE 3/16/2018 2018 2018   Total Score - - -   Total Score 12 10 10        MAYEKL-7 SCORE 3/16/2018 2018 2018   Total Score 20 14 7       Sees Dr. Abebe for mental health management                                                                                                                          .    MEDICAL HISTORY:     Patient Active Problem List    Diagnosis Date Noted     Vitamin " deficiency 2018     Priority: Medium     Gastroesophageal reflux disease, esophagitis presence not specified 2017     Priority: Medium     ACP (advance care planning) 2017     Priority: Medium     Advance Care Planning 2017: ACP Review of Chart / Resources Provided:  Reviewed chart for advance care plan.  Nasra Ortiz has been provided information and resources to begin or update their advance care plan.  Added by EMERALD THOMPSON             Primary insomnia 2015     Priority: Medium     Migraine with aura and without status migrainosus, not intractable 2014     Priority: Medium     Anxiety 2014     Priority: Medium      Past Medical History:   Diagnosis Date     Acquired hypothyroidism 2014     Anxiety disorder 2012     Migraine with aura and without status migrainosus, not intractable 2014     Migraines      Primary insomnia 2015     Vitamin deficiency 3/16/2018     Past Surgical History:   Procedure Laterality Date      SECTION  2012     oopherectomy right  2012     Current Outpatient Prescriptions   Medication Sig Dispense Refill     DULoxetine (CYMBALTA) 60 MG EC capsule Take 1 capsule (60 mg) by mouth daily 30 capsule 6     HYDROcodone-acetaminophen (NORCO) 5-325 MG per tablet Take 1 tablet by mouth every 6 hours as needed for severe pain       LORazepam (ATIVAN) 0.5 MG tablet TAKE 1 TABLET BY MOUTH AT BEDTIME AS NEEDED FOR ANXIETY 20 tablet 1     MAGNESIUM OXIDE PO Take 400 mg by mouth daily Take 2 tabs evening       MEDROXYPROGESTERONE ACETATE PO Depo Provera  Dosage not known       multivitamin, therapeutic (THERA-VIT) TABS Take 1 tablet by mouth daily       omeprazole (PRILOSEC) 40 MG capsule TAKE ONE CAPSULE BY MOUTH ONCE DAILY ,TAKE  30  TO  60  MINUTES  BEFORE  A  MEAL. 90 capsule 1     promethazine (PHENERGAN) 25 MG tablet Take 1 tablet (25 mg) by mouth every 6 hours as needed for nausea (for migraines) 20 tablet 1      propranolol HCl 60 MG TABS Take 1 tablet (60 mg) by mouth daily ER 90 tablet 1     rizatriptan (MAXALT-MLT) 10 MG ODT tab Take 1 tablet (10 mg) by mouth at onset of headache for migraine May repeat in 2 hours. Max 3 tablets/24 hours. 18 tablet 3     temazepam (RESTORIL) 30 MG capsule TAKE 1 CAPSULE BY MOUTH AT BEDTIME AS NEEDED FOR SLEEP 30 capsule 2     vitamin B complex with vitamin C (VITAMIN  B COMPLEX) TABS tablet Take 1 tablet by mouth daily       cholecalciferol (VITAMIN D3) 5000 UNITS TABS tablet Take 1 tablet (5,000 Units) by mouth daily (Patient not taking: Reported on 4/13/2018) 90 tablet 11     OTC products: None, holding aspirin, Ibuprofen, and other supplements    Allergies   Allergen Reactions     Dust Mite Extract      Penicillins Rash      Latex Allergy: NO  Adhesive allergy noted    Social History   Substance Use Topics     Smoking status: Current Every Day Smoker     Packs/day: 0.40     Years: 10.00     Types: Cigarettes     Smokeless tobacco: Never Used      Comment: tobacco cessation discuseed - tried to quit: no - passive smoke exposure quitting on own      Alcohol use No     History   Drug Use No       REVIEW OF SYSTEMS:   CONSTITUTIONAL: NEGATIVE for fever, chills. 16# weight loss since 4/13/18  INTEGUMENTARY/SKIN: NEGATIVE for worrisome rashes, moles or lesions  EYES: NEGATIVE for vision changes or irritation  ENT/MOUTH: sneezing since a recent camping trip.  No fever or other URI symptoms  RESP: NEGATIVE for significant cough or SOB  BREAST: NEGATIVE for masses, tenderness or discharge  CV: NEGATIVE for chest pain, palpitations or peripheral edema  GI: NEGATIVE for nausea, abdominal pain, heartburn, or change in bowel habits  : NEGATIVE for frequency, dysuria, or hematuria  MUSCULOSKELETAL: NEGATIVE for significant arthralgias or myalgia  NEURO: NEGATIVE for weakness, dizziness or paresthesias  ENDOCRINE: NEGATIVE for temperature intolerance, skin/hair changes  HEME: NEGATIVE for bleeding  "problems  PSYCHIATRIC: NEGATIVE for changes in mood or affect      EXAM:   /78 (BP Location: Left arm, Patient Position: Chair, Cuff Size: Adult Regular)  Pulse 78  Temp 97.9  F (36.6  C) (Tympanic)  Ht 5' 4\" (1.626 m)  Wt 129 lb (58.5 kg)  SpO2 98%  BMI 22.14 kg/m2       GENERAL APPEARANCE: healthy, alert and no distress     EYES: EOMI, PERRL     HENT: ear canals and TM's normal and nose and mouth without ulcers or lesions     NECK: no adenopathy, no asymmetry, masses, or scars and thyroid normal to palpation     RESP: lungs clear to auscultation - no rales, rhonchi or wheezes     CV: regular rates and rhythm, normal S1 S2, no S3 or S4 and no murmur, click or rub     ABDOMEN:  soft, nontender, no HSM or masses and bowel sounds normal      - female: Pelvic fullness, tenderness     MS: extremities normal- no gross deformities noted, no evidence of inflammation in joints, FROM in all extremities.     SKIN: no suspicious lesions or rashes     NEURO: Normal strength and tone, sensory exam grossly normal, mentation intact and speech normal     PSYCH: mentation appears normal. and affect normal/bright     LYMPHATICS: No cervical adenopathy        DIAGNOSTICS:     EKG attached, NSR    Results for orders placed or performed in visit on 07/30/18 (from the past 48 hour(s))   CBC with platelets differential   Result Value Ref Range    WBC 6.0 4.0 - 11.0 10e9/L    RBC Count 4.42 3.8 - 5.2 10e12/L    Hemoglobin 13.5 11.7 - 15.7 g/dL    Hematocrit 41.0 35.0 - 47.0 %    MCV 93 78 - 100 fl    MCH 30.5 26.5 - 33.0 pg    MCHC 32.9 31.5 - 36.5 g/dL    RDW 13.1 10.0 - 15.0 %    Platelet Count 286 150 - 450 10e9/L    Diff Method Automated Method     % Neutrophils 42.8 %    % Lymphocytes 44.7 %    % Monocytes 9.8 %    % Eosinophils 2.0 %    % Basophils 0.7 %    Absolute Neutrophil 2.6 1.6 - 8.3 10e9/L    Absolute Lymphocytes 2.7 0.8 - 5.3 10e9/L    Absolute Monocytes 0.6 0.0 - 1.3 10e9/L    Absolute Eosinophils 0.1 0.0 - " 0.7 10e9/L    Absolute Basophils 0.0 0.0 - 0.2 10e9/L   Basic metabolic panel   Result Value Ref Range    Sodium 141 133 - 144 mmol/L    Potassium 4.0 3.4 - 5.3 mmol/L    Chloride 105 94 - 109 mmol/L    Carbon Dioxide 27 20 - 32 mmol/L    Anion Gap 9 3 - 14 mmol/L    Glucose 93 70 - 99 mg/dL    Urea Nitrogen 7 7 - 30 mg/dL    Creatinine 0.65 0.52 - 1.04 mg/dL    GFR Estimate >90 >60 mL/min/1.7m2    GFR Estimate If Black >90 >60 mL/min/1.7m2    Calcium 8.7 8.5 - 10.1 mg/dL   TSH with free T4 reflex   Result Value Ref Range    TSH 0.53 0.40 - 4.00 mU/L   HCG qualitative urine   Result Value Ref Range    HCG Qual Urine Negative NEG^Negative       Recent Labs   Lab Test 07/06/18 11/13/17   1044  08/23/17   1530  01/26/17   1603   08/01/16   1905   HGB   --   13.3  13.4   --    --   13.5   PLT   --   298  296   --    --   268   INR  1.0   --    --    --    --    --    NA   --    --    --   140   --   140   POTASSIUM  2.9*   --    --   4.2   < >  3.6   CR  0.90   --    --   0.72   < >  0.65    < > = values in this interval not displayed.        IMPRESSION:   Reason for surgery/procedure:   Menorrhagia with irregular cycle, excessive and frequent menstruation, Dysmenorrhea      The proposed surgical procedure is considered LOW risk.    REVISED CARDIAC RISK INDEX  The patient has the following serious cardiovascular risks for perioperative complications such as (MI, PE, VFib and 3  AV Block):  No serious cardiac risks  INTERPRETATION: 0 risks: Class I (very low risk - 0.4% complication rate)    The patient has the following additional risks for perioperative complications:  No identified additional risks      ICD-10-CM    1. Preop general physical exam Z01.818          Visit time:   Over 40 minutes are spent with the patient.   Greater than 50 % of our visit time was spent face to face and included education and counseling regarding tobacco cessation,  Depression and anxiety management, pre-op instruction, medication  management, and plan of care.  We discussed the importance of medication compliance.  TSH updated, labs ordered.  Visit Content:   Lab testing discussed and reviewed  Any medication adjustment has been reviewed  Health Maintenance - updated as appropriate.  Record review completed        RECOMMENDATIONS:       APPROVAL GIVEN to proceed with proposed procedure, without further diagnostic evaluation       Signed Electronically by: Adrienne Dailey NP    Copy of this evaluation report is provided to requesting physician.    [facility] Preop Guidelines    Revised Cardiac Risk Index

## 2018-07-30 NOTE — PATIENT INSTRUCTIONS
Before Your Surgery      Call your surgeon if there is any change in your health. This includes signs of a cold or flu (such as a sore throat, runny nose, cough, rash or fever).    Do not smoke, drink alcohol or take over the counter medicine (unless your surgeon or primary care doctor tells you to) for the 24 hours before and after surgery.    If you take prescribed drugs: Follow your doctor s orders about which medicines to take and which to stop until after surgery.    Eating and drinking prior to surgery: follow the instructions from your surgeon    Take a shower or bath the night before surgery. Use the soap your surgeon gave you to gently clean your skin. If you do not have soap from your surgeon, use your regular soap. Do not shave or scrub the surgery site.  Wear clean pajamas and have clean sheets on your bed.     HOW TO QUIT SMOKING  Smoking is one of the hardest habits to break. About half of all those who have ever smoked have been able to quit, and most of those (about 70%) who still smoke want to quit. Here are some of the best ways to stop smoking.     KEEP TRYING:  It takes most smokers about 8 tries before they are finally able to fully quit. So, the more often you try and fail, the better your chance of quitting the next time! So, don't give up!    GO COLD TURKEY:  Most ex-smokers quit cold turkey. Trying to cut back gradually doesn't seem to work as well, perhaps because it continues the smoking habit. Also, it is possible to fool yourself by inhaling more while smoking fewer cigarettes. This results in the same amount of nicotine in your body!    GET SUPPORT:  Support programs can make an important difference, especially for the heavy smoker. These groups offer lectures, methods to change your behavior and peer support. Call the free national Quitline for more information. 800-QUIT-NOW (641-900-7015). Low-cost or free programs are offered by many hospitals, local chapters of the American Lung  Association (979-766-7814) and the American Cancer Society (076-287-2147). Support at home is important too. Non-smokers can help by offering praise and encouragement. If the smoker fails to quit, encourage them to try again!    OVER-THE-COUNTER MEDICINES:  For those who can't quit on their own, Nicotine Replacement Therapy (NRT) may make quitting much easier. Certain aids such as the nicotine patch, gum and lozenge are available without a prescription. However, it is best to use these under the guidance of your doctor. The skin patch provides a steady supply of nicotine to the body. Nicotine gum and lozenge gives temporary bursts of low levels of nicotine. Both methods take the edge off the craving for cigarettes. WARNING: If you feel symptoms of nicotine overdose, such as nausea, vomiting, dizziness, weakness, or fast heartbeat, stop using these and see your doctor.    PRESCRIPTION MEDICINES:  After evaluating your smoking patterns and prior attempts at quitting, your doctor may offer a prescription medicine such as bupropion (Zyban, Wellbutrin), varenicline (Chantix, Champix), a niocotine inhaler or nasal spray. Each has its unique advantage and side effects which your doctor can review with you.    HEALTH BENEFITS OF QUITTING:  The benefits of quitting start right away and keep improving the longer you go without smokin minutes: blood pressure and pulse return to normal  8 hours: oxygen levels return to normal  2 days: ability to smell and taste begins to improve as damaged nerves start to regrow  2-3 weeks: circulation and lung function improves  1-9 months: decreased cough, congestion and shortness of breath; less tired  1 year: risk of heart attack decreases by half  5 years: risk of lung cancer decreases by half; risk of stroke becomes the same as a non-smoker  For information about how to quit smoking, visit the following links:  National [employer] [place] ,   Clearing the Air, Quit Smoking Today   -  an online booklet. http://www.smokefree.gov/pubs/clearing_the_air.pdf  Smokefree.gov http://smokefree.gov/  QuitNet http://www.quitnet.com/    3680-6600 Kiran Dalton, [address] [place] [address], [place] [address]. All rights reserved. This information is not intended as a substitute for professional medical care. Always follow your healthcare professional's instructions.    The Benefits of Living Smoke Free  What do you want to gain from quitting? Check off some reasons to quit.  Health Benefits  ___ Reduce my risk of lung cancer, heart disease, chronic lung disease  ___ Have fewer wrinkles and softer skin  ___ Improve my sense of taste and smell  ___ For pregnant women reduce the risk of having a miscarriage, stillbirth, premature birth, or low-birth-weight baby  Personal Benefits  ___ Feel more in control of my life  ___ Have better-smelling hair, breath, clothes, home, and car  ___ Save time by not having to take smoke breaks, buy cigarettes, or hunt for a light  ___ Have whiter teeth  Family Benefits  ___ Reduce my children s respiratory tract infections  ___ Set a good example for my children  ___ Reduce my family s cancer risk  Financial Benefits  ___ Save hundreds of dollars each year that would be spent on cigarettes  ___ Save money on medical bills  ___ Save on life, health, and car insurance premiums    Those Dollars Add Up!  Cigarettes are expensive, and getting more expensive all the time. Do you realize how much money you are spending on cigarettes per year? What is the average amount you spend on a pack of cigarettes? What is the average number of packs that you smoke per day? Using your answers to these questions, fill in this formula to help you find out:  ($ _____ per pack) ×  ( _____ number of packs per day) × (365 days) =  $ _____ yearly cost of smoking  Besides tobacco, there are other costs, including extra cleaning bills and replacement costs for clothing and furniture; medical expenses for  smoking-related illnesses; and higher health, life, and car insurance premiums.    Cigars and Pipes Count Too!  Cigars and pipes are also dangerous. So are smokeless (chewing) tobacco and snuff. All of these products contain nicotine, a highly addictive substance that has harmful effects on your body. Quitting smoking means giving up all tobacco products.      5664-9186 [facility], [address] [place] [address], [place] [address]. All rights reserved. This information is not intended as a substitute for professional medical care. Always follow your healthcare professional's instructions.

## 2018-07-30 NOTE — NURSING NOTE
"Chief Complaint   Patient presents with     Pre-Op Exam       Initial /78 (BP Location: Left arm, Patient Position: Chair, Cuff Size: Adult Regular)  Pulse 78  Temp 97.9  F (36.6  C) (Tympanic)  Ht 5' 4\" (1.626 m)  Wt 129 lb (58.5 kg)  SpO2 98%  BMI 22.14 kg/m2 Estimated body mass index is 22.14 kg/(m^2) as calculated from the following:    Height as of this encounter: 5' 4\" (1.626 m).    Weight as of this encounter: 129 lb (58.5 kg).  Medication Reconciliation: complete    ELIANA JHA LPN  "

## 2018-07-30 NOTE — LETTER
My Depression Action Plan  Name: Nasra Ortiz   Date of Birth 1980  Date: 7/30/2018    My doctor: Adrienne Dailey   My clinic: [facility]  [address] [place] [address] [address]  [place] [address]  [phone]          GREEN    ZONE   Good Control    What it looks like:     Things are going generally well. You have normal up s and down s. You may even feel depressed from time to time, but bad moods usually last less than a day.   What you need to do:  1. Continue to care for yourself (see self care plan)  2. Check your depression survival kit and update it as needed  3. Follow your physician s recommendations including any medication.  4. Do not stop taking medication unless you consult with your physician first.           YELLOW         ZONE Getting Worse    What it looks like:     Depression is starting to interfere with your life.     It may be hard to get out of bed; you may be starting to isolate yourself from others.    Symptoms of depression are starting to last most all day and this has happened for several days.     You may have suicidal thoughts but they are not constant.   What you need to do:     1. Call your care team, your response to treatment will improve if you keep your care team informed of your progress. Yellow periods are signs an adjustment may need to be made.     2. Continue your self-care, even if you have to fake it!    3. Talk to someone in your support network    4. Open up your depression survival kit           RED    ZONE Medical Alert - Get Help    What it looks like:     Depression is seriously interfering with your life.     You may experience these or other symptoms: You can t get out of bed most days, can t work or engage in other necessary activities, you have trouble taking care of basic hygiene, or basic responsibilities, thoughts of suicide or death that will not go away, self-injurious behavior.     What you need to do:  1. Call your care team and  request a same-day appointment. If they are not available (weekends or after hours) call your local crisis line, emergency room or 911.            Depression Self Care Plan / Survival Kit    Self-Care for Depression  Here s the deal. Your body and mind are really not as separate as most people think.  What you do and think affects how you feel and how you feel influences what you do and think. This means if you do things that people who feel good do, it will help you feel better.  Sometimes this is all it takes.  There is also a place for medication and therapy depending on how severe your depression is, so be sure to consult with your medical provider and/ or Behavioral Health Consultant if your symptoms are worsening or not improving.     In order to better manage my stress, I will:    Exercise  Get some form of exercise, every day. This will help reduce pain and release endorphins, the  feel good  chemicals in your brain. This is almost as good as taking antidepressants!  This is not the same as joining a gym and then never going! (they count on that by the way ) It can be as simple as just going for a walk or doing some gardening, anything that will get you moving.      Hygiene   Maintain good hygiene (Get out of bed in the morning, Make your bed, Brush your teeth, Take a shower, and Get dressed like you were going to work, even if you are unemployed).  If your clothes don't fit try to get ones that do.    Diet  I will strive to eat foods that are good for me, drink plenty of water, and avoid excessive sugar, caffeine, alcohol, and other mood-altering substances.  Some foods that are helpful in depression are: complex carbohydrates, B vitamins, flaxseed, fish or fish oil, fresh fruits and vegetables.    Psychotherapy  I agree to participate in Individual Therapy (if recommended).    Medication  If prescribed medications, I agree to take them.  Missing doses can result in serious side effects.  I understand that  drinking alcohol, or other illicit drug use, may cause potential side effects.  I will not stop my medication abruptly without first discussing it with my provider.    Staying Connected With Others  I will stay in touch with my friends, family members, and my primary care provider/team.    Use your imagination  Be creative.  We all have a creative side; it doesn t matter if it s oil painting, sand castles, or mud pies! This will also kick up the endorphins.    Witness Beauty  (AKA stop and smell the roses) Take a look outside, even in mid-winter. Notice colors, textures. Watch the squirrels and birds.     Service to others  Be of service to others.  There is always someone else in need.  By helping others we can  get out of ourselves  and remember the really important things.  This also provides opportunities for practicing all the other parts of the program.    Humor  Laugh and be silly!  Adjust your TV habits for less news and crime-drama and more comedy.    Control your stress  Try breathing deep, massage therapy, biofeedback, and meditation. Find time to relax each day.     My support system    Clinic Contact:  Phone number:    Contact 1:  Phone number:    Contact 2:  Phone number:    Judaism/:  Phone number:    Therapist:  Phone number:    Local crisis center:    Phone number:    Other community support:  Phone number:

## 2018-07-31 ASSESSMENT — ANXIETY QUESTIONNAIRES: GAD7 TOTAL SCORE: 7

## 2018-07-31 ASSESSMENT — PATIENT HEALTH QUESTIONNAIRE - PHQ9: SUM OF ALL RESPONSES TO PHQ QUESTIONS 1-9: 10

## 2018-08-01 ENCOUNTER — TRANSFERRED RECORDS (OUTPATIENT)
Dept: HEALTH INFORMATION MANAGEMENT | Facility: CLINIC | Age: 38
End: 2018-08-01

## 2018-08-13 DIAGNOSIS — F41.1 GAD (GENERALIZED ANXIETY DISORDER): ICD-10-CM

## 2018-08-14 RX ORDER — LORAZEPAM 0.5 MG/1
TABLET ORAL
Qty: 20 TABLET | Refills: 1 | Status: SHIPPED | OUTPATIENT
Start: 2018-08-14 | End: 2018-10-11

## 2018-09-10 ENCOUNTER — OFFICE VISIT (OUTPATIENT)
Dept: FAMILY MEDICINE | Facility: OTHER | Age: 38
End: 2018-09-10
Attending: NURSE PRACTITIONER
Payer: COMMERCIAL

## 2018-09-10 VITALS
WEIGHT: 135 LBS | SYSTOLIC BLOOD PRESSURE: 96 MMHG | OXYGEN SATURATION: 98 % | HEIGHT: 64 IN | TEMPERATURE: 97.4 F | DIASTOLIC BLOOD PRESSURE: 70 MMHG | BODY MASS INDEX: 23.05 KG/M2 | HEART RATE: 72 BPM | RESPIRATION RATE: 14 BRPM

## 2018-09-10 DIAGNOSIS — Z01.818 PREOP GENERAL PHYSICAL EXAM: ICD-10-CM

## 2018-09-10 DIAGNOSIS — Z01.818 PRE-OP EXAM: Primary | ICD-10-CM

## 2018-09-10 DIAGNOSIS — N80.9 ENDOMETRIOSIS: ICD-10-CM

## 2018-09-10 LAB
ALBUMIN UR-MCNC: NEGATIVE MG/DL
ANION GAP SERPL CALCULATED.3IONS-SCNC: 8 MMOL/L (ref 3–14)
APPEARANCE UR: CLEAR
BASOPHILS # BLD AUTO: 0.1 10E9/L (ref 0–0.2)
BASOPHILS NFR BLD AUTO: 1 %
BILIRUB UR QL STRIP: NEGATIVE
BUN SERPL-MCNC: 12 MG/DL (ref 7–30)
CALCIUM SERPL-MCNC: 8.8 MG/DL (ref 8.5–10.1)
CHLORIDE SERPL-SCNC: 104 MMOL/L (ref 94–109)
CO2 SERPL-SCNC: 27 MMOL/L (ref 20–32)
COLOR UR AUTO: YELLOW
CREAT SERPL-MCNC: 0.78 MG/DL (ref 0.52–1.04)
DIFFERENTIAL METHOD BLD: NORMAL
EOSINOPHIL # BLD AUTO: 0.1 10E9/L (ref 0–0.7)
EOSINOPHIL NFR BLD AUTO: 1 %
ERYTHROCYTE [DISTWIDTH] IN BLOOD BY AUTOMATED COUNT: 12.7 % (ref 10–15)
GFR SERPL CREATININE-BSD FRML MDRD: 82 ML/MIN/1.7M2
GLUCOSE SERPL-MCNC: 93 MG/DL (ref 70–99)
GLUCOSE UR STRIP-MCNC: NEGATIVE MG/DL
HCG UR QL: NEGATIVE
HCT VFR BLD AUTO: 39.8 % (ref 35–47)
HGB BLD-MCNC: 13.1 G/DL (ref 11.7–15.7)
HGB UR QL STRIP: NEGATIVE
KETONES UR STRIP-MCNC: ABNORMAL MG/DL
LEUKOCYTE ESTERASE UR QL STRIP: NEGATIVE
LYMPHOCYTES # BLD AUTO: 2.9 10E9/L (ref 0.8–5.3)
LYMPHOCYTES NFR BLD AUTO: 43 %
MCH RBC QN AUTO: 30.6 PG (ref 26.5–33)
MCHC RBC AUTO-ENTMCNC: 32.9 G/DL (ref 31.5–36.5)
MCV RBC AUTO: 93 FL (ref 78–100)
MONOCYTES # BLD AUTO: 0.4 10E9/L (ref 0–1.3)
MONOCYTES NFR BLD AUTO: 6 %
NEUTROPHILS # BLD AUTO: 3.2 10E9/L (ref 1.6–8.3)
NEUTROPHILS NFR BLD AUTO: 49 %
NITRATE UR QL: NEGATIVE
PH UR STRIP: 6 PH (ref 5–7)
PLATELET # BLD AUTO: 248 10E9/L (ref 150–450)
POTASSIUM SERPL-SCNC: 4 MMOL/L (ref 3.4–5.3)
RBC # BLD AUTO: 4.28 10E12/L (ref 3.8–5.2)
SODIUM SERPL-SCNC: 139 MMOL/L (ref 133–144)
SOURCE: ABNORMAL
SP GR UR STRIP: 1.02 (ref 1–1.03)
UROBILINOGEN UR STRIP-ACNC: 0.2 EU/DL (ref 0.2–1)
WBC # BLD AUTO: 6.7 10E9/L (ref 4–11)

## 2018-09-10 PROCEDURE — 99214 OFFICE O/P EST MOD 30 MIN: CPT | Performed by: NURSE PRACTITIONER

## 2018-09-10 PROCEDURE — 36415 COLL VENOUS BLD VENIPUNCTURE: CPT | Mod: ZL | Performed by: NURSE PRACTITIONER

## 2018-09-10 PROCEDURE — 85025 COMPLETE CBC W/AUTO DIFF WBC: CPT | Mod: ZL | Performed by: NURSE PRACTITIONER

## 2018-09-10 PROCEDURE — 80048 BASIC METABOLIC PNL TOTAL CA: CPT | Mod: ZL | Performed by: NURSE PRACTITIONER

## 2018-09-10 PROCEDURE — G0463 HOSPITAL OUTPT CLINIC VISIT: HCPCS

## 2018-09-10 PROCEDURE — 81003 URINALYSIS AUTO W/O SCOPE: CPT | Mod: ZL | Performed by: NURSE PRACTITIONER

## 2018-09-10 PROCEDURE — 81025 URINE PREGNANCY TEST: CPT | Mod: ZL | Performed by: NURSE PRACTITIONER

## 2018-09-10 RX ORDER — PROPRANOLOL HCL 60 MG
CAPSULE, EXTENDED RELEASE 24HR ORAL
COMMUNITY
Start: 2018-08-17 | End: 2018-09-10

## 2018-09-10 RX ORDER — MEDROXYPROGESTERONE ACETATE 10 MG
20 TABLET ORAL
COMMUNITY
Start: 2018-08-17 | End: 2019-03-18

## 2018-09-10 RX ORDER — PROCHLORPERAZINE MALEATE 10 MG
10 TABLET ORAL
COMMUNITY
End: 2018-09-10

## 2018-09-10 ASSESSMENT — PAIN SCALES - GENERAL: PAINLEVEL: SEVERE PAIN (7)

## 2018-09-10 NOTE — MR AVS SNAPSHOT
After Visit Summary   9/10/2018    Nasra Ortiz    MRN: [id number]           Patient Information     Date Of Birth          1980        Visit Information        Provider Department      9/10/2018 11:00 AM Adrienne Dailey NP [facility]        Today's Diagnoses     Pre-op exam    -  1    Preop general physical exam        Endometriosis        Anxiety state          Care Instructions      Before Your Surgery      Call your surgeon if there is any change in your health. This includes signs of a cold or flu (such as a sore throat, runny nose, cough, rash or fever).    Do not smoke, drink alcohol or take over the counter medicine (unless your surgeon or primary care doctor tells you to) for the 24 hours before and after surgery.    If you take prescribed drugs: Follow your doctor s orders about which medicines to take and which to stop until after surgery.    Eating and drinking prior to surgery: follow the instructions from your surgeon    Take a shower or bath the night before surgery. Use the soap your surgeon gave you to gently clean your skin. If you do not have soap from your surgeon, use your regular soap. Do not shave or scrub the surgery site.  Wear clean pajamas and have clean sheets on your bed.           Follow-ups after your visit        Who to contact     If you have questions or need follow up information about today's clinic visit or your schedule please contact [facility] directly at [phone].  Normal or non-critical lab and imaging results will be communicated to you by MyChart, letter or phone within 4 business days after the clinic has received the results. If you do not hear from us within 7 days, please contact the clinic through MyChart or phone. If you have a critical or abnormal lab result, we will notify you by phone as soon as possible.  Submit refill requests through [employer] or call your pharmacy and they will forward the refill request to  "us. Please allow 3 business days for your refill to be completed.          Additional Information About Your Visit        MyChart Information     [employer] gives you secure access to your electronic health record. If you see a primary care provider, you can also send messages to your care team and make appointments. If you have questions, please call your primary care clinic.  If you do not have a primary care provider, please call [phone] and they will assist you.        Care EveryWhere ID     This is your Care EveryWhere ID. This could be used by other organizations to access your [facility] medical records  [id number]        Your Vitals Were     Pulse Temperature Respirations Height Pulse Oximetry BMI (Body Mass Index)    72 97.4  F (36.3  C) (Tympanic) 14 5' 4\" (1.626 m) 98% 23.17 kg/m2       Blood Pressure from Last 3 Encounters:   09/10/18 96/70   07/30/18 101/78   04/13/18 106/70    Weight from Last 3 Encounters:   09/10/18 135 lb (61.2 kg)   07/30/18 129 lb (58.5 kg)   04/13/18 145 lb (65.8 kg)              We Performed the Following     *UA reflex to Microscopic and Culture - [facility]     Basic metabolic panel     CBC with platelets differential     HCG qualitative urine        Primary Care Provider Office Phone # Fax #    Adrienne Dailey [phone] [address]  [place] [address]        Equal Access to Services     MARCO MILES AH: Hadii aad ku hadasho Soomaali, waaxda luqadaha, qaybta kaalmada adeegyada, lizet moralesin hayoctavion leonard grant . So [facility] [phone].    ATENCIÓN: Si habla español, tiene a baires disposición servicios gratuitos de asistencia lingüística. Llame al [phone].    We comply with applicable federal civil rights laws and [place] laws. We do not discriminate on the basis of race, color, national origin, age, disability, sex, sexual orientation, or gender identity.            Thank you!     Thank you for choosing [facility] [place]  for your care. Our goal is always to provide you with excellent care. Hearing back from our patients is one way we can continue to improve our services. Please take a few minutes to complete the written survey that you may receive in the mail after your visit with us. Thank you!             Your Updated Medication List - Protect others around you: Learn how to safely use, store and throw away your medicines at www.disposemymeds.org.          This list is accurate as of 9/10/18 11:42 AM.  Always use your most recent med list.                   Brand Name Dispense Instructions for use Diagnosis    cholecalciferol 5000 units Tabs tablet    vitamin D3    90 tablet    Take 1 tablet (5,000 Units) by mouth daily    Vitamin deficiency       DULoxetine 60 MG EC capsule    CYMBALTA    30 capsule    Take 1 capsule (60 mg) by mouth daily    MAYKEL (generalized anxiety disorder)       HYDROcodone-acetaminophen 5-325 MG per tablet    NORCO     Take 1 tablet by mouth every 6 hours as needed for severe pain        LORazepam 0.5 MG tablet    ATIVAN    20 tablet    TAKE 1 TABLET BY MOUTH AT BEDTIME AS NEEDED FOR ANXIETY    MAYKEL (generalized anxiety disorder)       MAGNESIUM OXIDE PO      Take 400 mg by mouth daily Take 2 tabs evening        medroxyPROGESTERone 10 MG tablet    PROVERA     Take 20 mg by mouth        multivitamin, therapeutic Tabs tablet      Take 1 tablet by mouth daily        omeprazole 40 MG capsule    priLOSEC    90 capsule    TAKE ONE CAPSULE BY MOUTH ONCE DAILY ,TAKE  30  TO  60  MINUTES  BEFORE  A  MEAL.    Gastroesophageal reflux disease without esophagitis       promethazine 25 MG tablet    PHENERGAN    20 tablet    Take 1 tablet (25 mg) by mouth every 6 hours as needed for nausea (for migraines)    Gastroesophageal reflux disease without esophagitis       propranolol HCl 60 MG Tabs     90 tablet    Take 1 tablet (60 mg) by mouth daily ER    Migraine with aura and without status migrainosus, not intractable        rizatriptan 10 MG ODT tab    MAXALT-MLT    18 tablet    Take 1 tablet (10 mg) by mouth at onset of headache for migraine May repeat in 2 hours. Max 3 tablets/24 hours.    Migraine with aura and without status migrainosus, not intractable       temazepam 30 MG capsule    RESTORIL    30 capsule    TAKE 1 CAPSULE BY MOUTH AT BEDTIME AS NEEDED FOR SLEEP    Primary insomnia       vitamin B complex with vitamin C Tabs tablet      Take 1 tablet by mouth daily

## 2018-09-10 NOTE — PROGRESS NOTES
[facility]  [address]  [facility] [address]  [phone]  Dept: [phone]    PRE-OP EVALUATION:  Today's date: 9/10/2018      Nasra Ortiz (: 1980) presents for pre-operative evaluation assessment as requested by Dr. Sweeney.  She requires evaluation and anesthesia risk assessment prior to undergoing surgery/procedure for treatment of Menorrhagia .    Proposed Surgery/ Procedure: Uterine Ablation  Date of Surgery/ Procedure: 18  Time of Surgery/ Procedure: to be determined  Hospital/Surgical Facility[facility]  Primary Physician: Adrienne Dailey  Type of Anesthesia Anticipated: to be determined      Patient has a Health Care Directive or Living Will:  NO    1. NO - Do you have a history of heart attack, stroke, stent, bypass or surgery on an artery in the head, neck, heart or legs?  2. NO - Do you ever have any pain or discomfort in your chest?  3. NO - Do you have a history of  Heart Failure?  4. NO - Are you troubled by shortness of breath when: walking on the level, up a slight hill or at night?  5. NO - Do you currently have a cold, bronchitis or other respiratory infection?  6. NO - Do you have a cough, shortness of breath or wheezing?  7. NO - Do you sometimes get pains in the calves of your legs when you walk?  8. NO - Do you or anyone in your family have previous history of blood clots?   9. NO - Do you or does anyone in your family have a serious bleeding problem such as prolonged bleeding following surgeries or cuts?  10. NO - Have you ever had problems with anemia or been told to take iron pills?  11. YES - Have you had any abnormal blood loss such as black, tarry or bloody stools, or abnormal vaginal bleeding?  Vaginal bleeding  12. YES - Have you ever had a blood transfusion? 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 Received multiple units of blood.   13.  YES - Have you or any of your relatives ever had problems with anesthesia? Problems with sedation - tends to be difficult to sedate, per patient  14. NO - Do you have sleep apnea, excessive snoring or daytime drowsiness?  15. NO - Do you have any prosthetic heart valves?  16. NO - Do you have prosthetic joints?  17. NO - Is there any chance that you may be pregnant?      HPI:     HPI related to upcoming procedure:   Menorrhagia      S/P Hysteroscopy, D&C - for Menorrhagia 18.    Continued with heavy periods and cramping, as well as menstrual migraines -  hence she has decided to pursue endometrial ablation.         REPORT DATE:  2018 10:40 AM    PATIENT : 1980, 38 years    HISTORY: Menorrhagia.       TECHNIQUE: Transvaginal study was performed.    FINDINGS: Uterus is 8.6 x 5.1 x 5.4 cm. The myometrium is slightly heterogeneous without a definite defined dominant mass. This could represent multiple fibroids or less likely adenomyosis.    Endometrium is about 7 mm, but again difficult to determine, because of the heterogeneity.    Left ovary is 3.5 x 2.7 x 2.0.    There is a small amount nonechogenic fluid.    Good flow in the left ovary.    IMPRESSION: Heterogeneity in the uterine wall, nonspecific. The endometrium is poorly demonstrated. This could represent fibroids or less likely adenomyosis.    Dictated By: Dr. Dominic Correa MD 2018 10:40 AM  Edited By: DAPHNE 2018 10:49 AM    Electronically Signed: Dr. Dominic Correa MD 2018 4:02 PM        See problem list for active medical problems.  Problems all longstanding and stable, except as noted/documented.  See ROS for pertinent symptoms related to these conditions.                                                                                                                                                          .         9/10/18 7/30/18 4/13/18 3/16/18 3/9/18   BP 96/70 101/78 106/70 124/76 114/80   Pulse 72 78 77 84 83   Resp 14   14   "  SpO2 98 % 98 %  98 %    Height 5' 4\" (1.626 m) 5' 4\" (1.626 m)  5' 4\" (1.626 m)    Weight 135 lb (61.2 kg) 129 lb (58.5 kg) 145 lb (65.8 kg) 145 lb (65.8 kg) 147 lb (66.7 kg)   Pain Score Sev Pain7 Mod Pain5 NO PAIN (0)  Mod Pain4   Pain Loc Abdomen    -- [migraine headache]   BMI (Calculated) 23.22 22.19  24.94          MEDICAL HISTORY:     Patient Active Problem List    Diagnosis Date Noted     Recurrent major depressive disorder (H) 2018     Priority: Medium     Tobacco abuse 2018     Priority: Medium     Vitamin deficiency 2018     Priority: Medium     Gastroesophageal reflux disease, esophagitis presence not specified 2017     Priority: Medium     ACP (advance care planning) 2017     Priority: Medium     Advance Care Planning 2017: ACP Review of Chart / Resources Provided:  Reviewed chart for advance care plan.  Nasra Ortiz has been provided information and resources to begin or update their advance care plan.  Added by EMERALD THOMPSON             Primary insomnia 2015     Priority: Medium     Migraine with aura and without status migrainosus, not intractable 2014     Priority: Medium     Anxiety state 2014     Priority: Medium     Hydronephrosis 2011     Priority: Medium     Panic disorder without agoraphobia 2006     Priority: Medium      Past Medical History:   Diagnosis Date     Acquired hypothyroidism 2014     Anxiety disorder 2012     Migraine with aura and without status migrainosus, not intractable 2014     Migraines      Primary insomnia 2015     Recurrent major depressive disorder (H) 2018     Tobacco abuse 2018     Vitamin deficiency 3/16/2018     Past Surgical History:   Procedure Laterality Date      SECTION  2012     HYSTEROSCOPY  2018     oopherectomy right  2012     Current Outpatient Prescriptions   Medication Sig Dispense Refill     cholecalciferol (VITAMIN D3) 5000 UNITS " TABS tablet Take 1 tablet (5,000 Units) by mouth daily 90 tablet 11     DULoxetine (CYMBALTA) 60 MG EC capsule Take 1 capsule (60 mg) by mouth daily 30 capsule 6     LORazepam (ATIVAN) 0.5 MG tablet TAKE 1 TABLET BY MOUTH AT BEDTIME AS NEEDED FOR ANXIETY 20 tablet 1     MAGNESIUM OXIDE PO Take 400 mg by mouth daily Take 2 tabs evening       medroxyPROGESTERone (PROVERA) 10 MG tablet Take 20 mg by mouth        multivitamin, therapeutic (THERA-VIT) TABS Take 1 tablet by mouth daily       omeprazole (PRILOSEC) 40 MG capsule TAKE ONE CAPSULE BY MOUTH ONCE DAILY ,TAKE  30  TO  60  MINUTES  BEFORE  A  MEAL. 90 capsule 1     promethazine (PHENERGAN) 25 MG tablet Take 1 tablet (25 mg) by mouth every 6 hours as needed for nausea (for migraines) 20 tablet 1     propranolol HCl 60 MG TABS Take 1 tablet (60 mg) by mouth daily ER 90 tablet 1     rizatriptan (MAXALT-MLT) 10 MG ODT tab Take 1 tablet (10 mg) by mouth at onset of headache for migraine May repeat in 2 hours. Max 3 tablets/24 hours. 18 tablet 3     temazepam (RESTORIL) 30 MG capsule TAKE 1 CAPSULE BY MOUTH AT BEDTIME AS NEEDED FOR SLEEP 30 capsule 2     vitamin B complex with vitamin C (VITAMIN  B COMPLEX) TABS tablet Take 1 tablet by mouth daily       HYDROcodone-acetaminophen (NORCO) 5-325 MG per tablet Take 1 tablet by mouth every 6 hours as needed for severe pain       [DISCONTINUED] propranolol (INDERAL LA) 60 MG 24 hr capsule        OTC products: None, except as noted above    Allergies   Allergen Reactions     Dust Mite Extract      Penicillins Rash      Latex Allergy: NO    Social History   Substance Use Topics     Smoking status: Current Every Day Smoker     Packs/day: 0.40     Years: 10.00     Types: Cigarettes     Smokeless tobacco: Never Used      Comment: tobacco cessation discuseed - tried to quit: no - passive smoke exposure quitting on own      Alcohol use No     History   Drug Use No       REVIEW OF SYSTEMS:   CONSTITUTIONAL: NEGATIVE for fever,  "chills, change in weight  INTEGUMENTARY/SKIN: NEGATIVE for worrisome rashes, moles or lesions  EYES: NEGATIVE for vision changes or irritation  ENT/MOUTH: NEGATIVE for ear, mouth and throat problems  RESP: NEGATIVE for significant cough or SOB  CV: NEGATIVE for chest pain, palpitations or peripheral edema  GI: NEGATIVE for nausea, abdominal pain, heartburn, or change in bowel habits  : NEGATIVE for frequency, dysuria, or hematuria  MUSCULOSKELETAL: NEGATIVE for significant arthralgias or myalgia  NEURO: NEGATIVE for weakness, dizziness or paresthesias  ENDOCRINE: NEGATIVE for temperature intolerance, skin/hair changes  PSYCHIATRIC: NEGATIVE for changes in mood or affect    EXAM:   BP 96/70  Pulse 72  Temp 97.4  F (36.3  C) (Tympanic)  Resp 14  Ht 5' 4\" (1.626 m)  Wt 135 lb (61.2 kg)  SpO2 98%  BMI 23.17 kg/m2         GENERAL APPEARANCE: healthy, alert and no distress     EYES: EOMI, PERRL     HENT: ear canals and TM's normal and nose and mouth without ulcers or lesions     NECK: no adenopathy, no asymmetry, masses, or scars and thyroid normal to palpation     RESP: lungs clear to auscultation - no rales, rhonchi or wheezes     CV: regular rates and rhythm, normal S1 S2, no S3 or S4 and no murmur, click or rub     SKIN: no suspicious lesions or rashes     NEURO: Normal strength and tone, sensory exam grossly normal, mentation intact and speech normal     PSYCH: mentation appears normal. and affect normal/bright     LYMPHATICS: No cervical adenopathy    DIAGNOSTICS:     Last EKG attached      Recent Labs   Lab Test  07/30/18   0948 07/06/18 11/13/17   1044   01/26/17   1603   HGB  13.5   --   13.3   < >   --    PLT  286   --   298   < >   --    INR   --   1.0   --    --    --    NA  141   --    --    --   140   POTASSIUM  4.0  2.9*   --    --   4.2   CR  0.65  0.90   --    --   0.72    < > = values in this interval not displayed.        IMPRESSION:   Reason for surgery/procedure: Persistent " Menorrhagia    Results for orders placed or performed in visit on 09/10/18 (from the past 24 hour(s))   CBC with platelets differential   Result Value Ref Range    WBC 6.7 4.0 - 11.0 10e9/L    RBC Count 4.28 3.8 - 5.2 10e12/L    Hemoglobin 13.1 11.7 - 15.7 g/dL    Hematocrit 39.8 35.0 - 47.0 %    MCV 93 78 - 100 fl    MCH 30.6 26.5 - 33.0 pg    MCHC 32.9 31.5 - 36.5 g/dL    RDW 12.7 10.0 - 15.0 %    Platelet Count 248 150 - 450 10e9/L    % Neutrophils 49.0 %    % Lymphocytes 43.0 %    % Monocytes 6.0 %    % Eosinophils 1.0 %    % Basophils 1.0 %    Absolute Neutrophil 3.2 1.6 - 8.3 10e9/L    Absolute Lymphocytes 2.9 0.8 - 5.3 10e9/L    Absolute Monocytes 0.4 0.0 - 1.3 10e9/L    Absolute Eosinophils 0.1 0.0 - 0.7 10e9/L    Absolute Basophils 0.1 0.0 - 0.2 10e9/L    Diff Method Manual Differential    Basic metabolic panel   Result Value Ref Range    Sodium 139 133 - 144 mmol/L    Potassium 4.0 3.4 - 5.3 mmol/L    Chloride 104 94 - 109 mmol/L    Carbon Dioxide 27 20 - 32 mmol/L    Anion Gap 8 3 - 14 mmol/L    Glucose 93 70 - 99 mg/dL    Urea Nitrogen 12 7 - 30 mg/dL    Creatinine 0.78 0.52 - 1.04 mg/dL    GFR Estimate 82 >60 mL/min/1.7m2    GFR Estimate If Black >90 >60 mL/min/1.7m2    Calcium 8.8 8.5 - 10.1 mg/dL   *UA reflex to Microscopic and Culture - [facility]   Result Value Ref Range    Color Urine Yellow     Appearance Urine Clear     Glucose Urine Negative NEG^Negative mg/dL    Bilirubin Urine Negative NEG^Negative    Ketones Urine Trace (A) NEG^Negative mg/dL    Specific Gravity Urine 1.025 1.003 - 1.035    Blood Urine Negative NEG^Negative    pH Urine 6.0 5.0 - 7.0 pH    Protein Albumin Urine Negative NEG^Negative mg/dL    Urobilinogen Urine 0.2 0.2 - 1.0 EU/dL    Nitrite Urine Negative NEG^Negative    Leukocyte Esterase Urine Negative NEG^Negative    Source Midstream Urine    HCG qualitative urine   Result Value Ref Range    HCG Qual Urine Negative NEG^Negative       The proposed surgical procedure is  considered LOW risk.    REVISED CARDIAC RISK INDEX  The patient has the following serious cardiovascular risks for perioperative complications such as (MI, PE, VFib and 3  AV Block):  No serious cardiac risks  INTERPRETATION: 0 risks: Class I (very low risk - 0.4% complication rate)    The patient has the following additional risks for perioperative complications:  No identified additional risks      ICD-10-CM    1. Pre-op exam Z01.818 CBC with platelets differential     Basic metabolic panel     *UA reflex to Microscopic and Culture - [facility]     HCG qualitative urine     CANCELED: EKG 12-lead complete w/read - (Clinic Performed)   2. Preop general physical exam Z01.818    3. Endometriosis N80.9    4. Anxiety state F41.1        RECOMMENDATIONS:     Patient will hold aspirin, antiinflammatories, vitamins, minerals, supplements for 1 week prior to surgery      APPROVAL GIVEN to proceed with proposed procedure, without further diagnostic evaluation       Signed Electronically by: Adrienne Dailey NP    Copy of this evaluation report is provided to requesting physician.    [facility] Preop Guidelines    Revised Cardiac Risk Index

## 2018-09-10 NOTE — NURSING NOTE
"Chief Complaint   Patient presents with     Pre-Op Exam     Other     Patient is reporting worsening pain and would like something for this       Initial BP 96/70  Pulse 72  Temp 97.4  F (36.3  C) (Tympanic)  Resp 14  Ht 5' 4\" (1.626 m)  Wt 135 lb (61.2 kg)  SpO2 98%  BMI 23.17 kg/m2 Estimated body mass index is 23.17 kg/(m^2) as calculated from the following:    Height as of this encounter: 5' 4\" (1.626 m).    Weight as of this encounter: 135 lb (61.2 kg).  Medication Reconciliation: complete    Rianna Bueno LPN    "

## 2018-09-11 ENCOUNTER — TELEPHONE (OUTPATIENT)
Dept: FAMILY MEDICINE | Facility: OTHER | Age: 38
End: 2018-09-11

## 2018-09-18 DIAGNOSIS — K21.9 GASTROESOPHAGEAL REFLUX DISEASE WITHOUT ESOPHAGITIS: ICD-10-CM

## 2018-09-18 DIAGNOSIS — G43.109 MIGRAINE WITH AURA AND WITHOUT STATUS MIGRAINOSUS, NOT INTRACTABLE: ICD-10-CM

## 2018-09-18 NOTE — TELEPHONE ENCOUNTER
Omepprazole  Last visit date with prescribing provider: 11-  Last refill date: 3-  Quantity: 90, Refills: 1    propranolol  Last visit date with prescribing provider: 11-  Last refill date: 3-  Quantity: 90, Refills: 1

## 2018-09-19 ENCOUNTER — TRANSFERRED RECORDS (OUTPATIENT)
Dept: HEALTH INFORMATION MANAGEMENT | Facility: CLINIC | Age: 38
End: 2018-09-19

## 2018-09-19 RX ORDER — OMEPRAZOLE 40 MG/1
CAPSULE, DELAYED RELEASE ORAL
Qty: 90 CAPSULE | Refills: 1 | Status: SHIPPED | OUTPATIENT
Start: 2018-09-19 | End: 2019-03-29

## 2018-09-19 RX ORDER — PROPRANOLOL HCL 60 MG
CAPSULE, EXTENDED RELEASE 24HR ORAL
Qty: 90 CAPSULE | Refills: 1 | Status: SHIPPED | OUTPATIENT
Start: 2018-09-19 | End: 2019-02-08

## 2018-10-11 DIAGNOSIS — F41.1 GAD (GENERALIZED ANXIETY DISORDER): ICD-10-CM

## 2018-10-11 DIAGNOSIS — K21.9 GASTROESOPHAGEAL REFLUX DISEASE WITHOUT ESOPHAGITIS: ICD-10-CM

## 2018-10-15 RX ORDER — PROMETHAZINE HYDROCHLORIDE 25 MG/1
TABLET ORAL
Qty: 20 TABLET | Refills: 1 | Status: SHIPPED | OUTPATIENT
Start: 2018-10-15 | End: 2019-01-16

## 2018-10-15 RX ORDER — LORAZEPAM 0.5 MG/1
TABLET ORAL
Qty: 20 TABLET | Refills: 1 | Status: SHIPPED | OUTPATIENT
Start: 2018-10-15 | End: 2018-10-19

## 2018-10-15 NOTE — TELEPHONE ENCOUNTER
Ativan  Last Written Prescription Date:  8/14/18  Last Fill Qty:  20, # Refills:  1  Last Office Visit:  9/10/18

## 2018-10-15 NOTE — TELEPHONE ENCOUNTER
LORAZEPAM      Last Written Prescription Date:  8/14/18  Last Fill Quantity: 20,   # refills: 1    PHENERGAN      Last Written Prescription Date:  3/23/18  Last Fill Quantity: 20,   # refills: 1    Last Office Visit: 9/10/18  Future Office visit:

## 2018-10-17 DIAGNOSIS — F51.01 PRIMARY INSOMNIA: ICD-10-CM

## 2018-10-18 ENCOUNTER — TELEPHONE (OUTPATIENT)
Dept: FAMILY MEDICINE | Facility: OTHER | Age: 38
End: 2018-10-18

## 2018-10-18 RX ORDER — TEMAZEPAM 30 MG
CAPSULE ORAL
Qty: 30 CAPSULE | Refills: 3 | Status: SHIPPED | OUTPATIENT
Start: 2018-10-18 | End: 2018-10-19

## 2018-10-18 NOTE — TELEPHONE ENCOUNTER
Car pharmacist from [facility] would like to know if pt should use Ativan PRN at bedtime and Restoril PRN at bedtime.Please advise.

## 2018-10-18 NOTE — TELEPHONE ENCOUNTER
Looks like she's been getting both for quite awhile, just noted it now. Has refills for both, which do you want to discontinue, or let pt decide?? Both are ordered at bedtime as needed.

## 2018-10-19 ENCOUNTER — TELEPHONE (OUTPATIENT)
Dept: FAMILY MEDICINE | Facility: OTHER | Age: 38
End: 2018-10-19

## 2018-10-19 DIAGNOSIS — F51.01 PRIMARY INSOMNIA: ICD-10-CM

## 2018-10-19 RX ORDER — TEMAZEPAM 30 MG
CAPSULE ORAL
Qty: 30 CAPSULE | Refills: 0 | Status: SHIPPED | OUTPATIENT
Start: 2018-10-19 | End: 2019-01-16

## 2018-10-19 NOTE — TELEPHONE ENCOUNTER
"I \"inherited\" Ayesha on both temazepam and lorazepam. I have gotten a lot of patients on this combo and I have worked on \"cleaning up\" their regimens = one OR the other. NOT both. Benzos now not indicated for long-term therapy so really we should be eventually getting rid both of them. For now: we will let Ayesha decide which she wants to keep and which goes. I typically let patients decide that. Can you relay this to her Bianka? Thanks!  "

## 2018-10-19 NOTE — TELEPHONE ENCOUNTER
Concomnant use of benzodiazepines is contraindicated as duplicate therapy is dangerous.  I am not comfortable addressing these medications in combination, or alone for sleep for long term use.  Has seen Dr Abebe - should defer this decision to her.    [facility]  [phone]

## 2018-11-26 ENCOUNTER — TELEPHONE (OUTPATIENT)
Dept: FAMILY MEDICINE | Facility: OTHER | Age: 38
End: 2018-11-26

## 2018-11-26 DIAGNOSIS — F41.1 GAD (GENERALIZED ANXIETY DISORDER): ICD-10-CM

## 2018-11-26 RX ORDER — DULOXETIN HYDROCHLORIDE 60 MG/1
60 CAPSULE, DELAYED RELEASE ORAL DAILY
Qty: 30 CAPSULE | Refills: 0 | Status: SHIPPED | OUTPATIENT
Start: 2018-11-26 | End: 2019-01-04

## 2018-11-26 NOTE — TELEPHONE ENCOUNTER
Patient called and needs Cymbalta refilled. She has not had medication since last Tuesday.Van is out today. She is having shock like feelings and increased anxiety.Advised her to go to UC.She states she will if s/s increase.She is requesting refill. Any recommendations?

## 2018-11-26 NOTE — TELEPHONE ENCOUNTER
Med filled.  Symptoms should subside once she resumes Cymbalta.  Follow-up with psychiatry for further refills  UC as needed    [facility]  [phone]

## 2019-01-03 ENCOUNTER — TELEPHONE (OUTPATIENT)
Dept: PSYCHIATRY | Facility: OTHER | Age: 39
End: 2019-01-03

## 2019-01-03 DIAGNOSIS — F41.1 GAD (GENERALIZED ANXIETY DISORDER): ICD-10-CM

## 2019-01-03 NOTE — TELEPHONE ENCOUNTER
Patient is requesting a refill of Duloxetine (Cymbalta) 60 mg.  She has no refills.  She has also switched to [facility] in [place].  She has been out x 2 days.  Has f/u appt on 1-16-18.  Noted last fill of Duloxetine was on 11-26-18 from Adrienne Dailey.  Thank you, please advise.

## 2019-01-04 RX ORDER — DULOXETIN HYDROCHLORIDE 60 MG/1
60 CAPSULE, DELAYED RELEASE ORAL DAILY
Qty: 30 CAPSULE | Refills: 11 | Status: SHIPPED | OUTPATIENT
Start: 2019-01-04 | End: 2019-11-30

## 2019-01-16 ENCOUNTER — OFFICE VISIT (OUTPATIENT)
Dept: PSYCHIATRY | Facility: OTHER | Age: 39
End: 2019-01-16
Attending: PSYCHIATRY & NEUROLOGY
Payer: COMMERCIAL

## 2019-01-16 VITALS
HEART RATE: 77 BPM | WEIGHT: 130 LBS | TEMPERATURE: 98.9 F | BODY MASS INDEX: 22.31 KG/M2 | DIASTOLIC BLOOD PRESSURE: 74 MMHG | SYSTOLIC BLOOD PRESSURE: 114 MMHG | OXYGEN SATURATION: 97 %

## 2019-01-16 DIAGNOSIS — Z71.6 TOBACCO ABUSE COUNSELING: ICD-10-CM

## 2019-01-16 DIAGNOSIS — F51.01 PRIMARY INSOMNIA: ICD-10-CM

## 2019-01-16 DIAGNOSIS — K21.9 GASTROESOPHAGEAL REFLUX DISEASE WITHOUT ESOPHAGITIS: ICD-10-CM

## 2019-01-16 DIAGNOSIS — F33.1 MAJOR DEPRESSIVE DISORDER, RECURRENT EPISODE, MODERATE (H): Primary | ICD-10-CM

## 2019-01-16 DIAGNOSIS — Z72.0 TOBACCO ABUSE: ICD-10-CM

## 2019-01-16 DIAGNOSIS — G43.109 MIGRAINE WITH AURA AND WITHOUT STATUS MIGRAINOSUS, NOT INTRACTABLE: ICD-10-CM

## 2019-01-16 PROCEDURE — 99213 OFFICE O/P EST LOW 20 MIN: CPT | Performed by: PSYCHIATRY & NEUROLOGY

## 2019-01-16 PROCEDURE — G0463 HOSPITAL OUTPT CLINIC VISIT: HCPCS

## 2019-01-16 RX ORDER — TEMAZEPAM 30 MG
CAPSULE ORAL
Qty: 30 CAPSULE | Refills: 3 | Status: SHIPPED | OUTPATIENT
Start: 2019-02-12 | End: 2019-06-18

## 2019-01-16 RX ORDER — DULOXETIN HYDROCHLORIDE 30 MG/1
CAPSULE, DELAYED RELEASE ORAL
Qty: 30 CAPSULE | Refills: 6 | Status: SHIPPED | OUTPATIENT
Start: 2019-01-16 | End: 2019-03-18

## 2019-01-16 RX ORDER — RIZATRIPTAN BENZOATE 10 MG/1
10 TABLET, ORALLY DISINTEGRATING ORAL
Qty: 18 TABLET | Refills: 3 | Status: SHIPPED | OUTPATIENT
Start: 2019-01-16 | End: 2019-10-28

## 2019-01-16 RX ORDER — PROMETHAZINE HYDROCHLORIDE 25 MG/1
TABLET ORAL
Qty: 20 TABLET | Refills: 1 | Status: SHIPPED | OUTPATIENT
Start: 2019-01-16 | End: 2019-03-20

## 2019-01-16 ASSESSMENT — ANXIETY QUESTIONNAIRES
1. FEELING NERVOUS, ANXIOUS, OR ON EDGE: SEVERAL DAYS
GAD7 TOTAL SCORE: 18
3. WORRYING TOO MUCH ABOUT DIFFERENT THINGS: NEARLY EVERY DAY
7. FEELING AFRAID AS IF SOMETHING AWFUL MIGHT HAPPEN: NEARLY EVERY DAY
2. NOT BEING ABLE TO STOP OR CONTROL WORRYING: NEARLY EVERY DAY
IF YOU CHECKED OFF ANY PROBLEMS ON THIS QUESTIONNAIRE, HOW DIFFICULT HAVE THESE PROBLEMS MADE IT FOR YOU TO DO YOUR WORK, TAKE CARE OF THINGS AT HOME, OR GET ALONG WITH OTHER PEOPLE: EXTREMELY DIFFICULT
6. BECOMING EASILY ANNOYED OR IRRITABLE: MORE THAN HALF THE DAYS
5. BEING SO RESTLESS THAT IT IS HARD TO SIT STILL: NEARLY EVERY DAY

## 2019-01-16 ASSESSMENT — PATIENT HEALTH QUESTIONNAIRE - PHQ9
5. POOR APPETITE OR OVEREATING: NEARLY EVERY DAY
SUM OF ALL RESPONSES TO PHQ QUESTIONS 1-9: 12

## 2019-01-16 ASSESSMENT — PAIN SCALES - GENERAL: PAINLEVEL: NO PAIN (0)

## 2019-01-16 NOTE — PATIENT INSTRUCTIONS

## 2019-01-16 NOTE — PROGRESS NOTES
"  PSYCHIATRY CLINIC PROGRESS NOTE   20 minute medication management, more than 50% of time spent counseling patient on medications, medication side effects, symptom history and management   SUBJECTIVE / INTERIM HISTORY                                                                        Social- works [employer]-  3 kids  Last visit: Continue temazepam 30 mg HS as prn . Lorazepam 0.5 mg prn severe anxiety as discussed above do not take along with temazepam and use sparingly. Increase Cymbalta from 30 mg daily to 60 mg daily.     - been a rough time, dad sick had a 50/50 chance, [place] lost his job  - sleep is awful. Depression, anxiety  - \"I am in a whirlwind\"  - \"my biggest goal right now is sleep\". Has   - feels Cymbalta increase / Cymbalta overall has helped  - cleaning [employer]    SUBSTANCE USE- etoh (likely abuse rather than dependence)    SYMPTOMS-    Depression: depressed mood, low energy, insomnia, weight changes, feeling hopeless, feeling trapped, excessive crying and overwhelmed  Alise/Hypomania:  none  Anxiety:  STILL: excessive worry, feeling fearful, social anxiety and nervous/overwhelmed    Trauma-related:  negative beliefs / emotions    MEDICAL ROS- migraines, weight loss along with anxiety and depression, heartburn  MEDICAL / SURGICAL HISTORY                pregnant [if applicable]--no     Patient Active Problem List   Diagnosis     Migraine with aura and without status migrainosus, not intractable     Anxiety state     Primary insomnia     ACP (advance care planning)     Gastroesophageal reflux disease, esophagitis presence not specified     Vitamin deficiency     Recurrent major depressive disorder (H)     Tobacco abuse     Hydronephrosis     Panic disorder without agoraphobia     ALLERGY   Dust mite extract and Penicillins  MEDICATIONS                                                                                             Current Outpatient Medications   Medication Sig     cholecalciferol " (VITAMIN D3) 5000 UNITS TABS tablet Take 1 tablet (5,000 Units) by mouth daily     DULoxetine (CYMBALTA) 60 MG capsule Take 1 capsule (60 mg) by mouth daily     HYDROcodone-acetaminophen (NORCO) 5-325 MG per tablet Take 1 tablet by mouth every 6 hours as needed for severe pain     MAGNESIUM OXIDE PO Take 400 mg by mouth daily Take 2 tabs evening     medroxyPROGESTERone (PROVERA) 10 MG tablet Take 20 mg by mouth      multivitamin, therapeutic (THERA-VIT) TABS Take 1 tablet by mouth daily     omeprazole (PRILOSEC) 40 MG capsule TAKE 1 CAPSULE BY MOUTH ONCE DAILY 30-60  MINUTES  BEFORE  A  MEAL     promethazine (PHENERGAN) 25 MG tablet TAKE 1 TABLET BY MOUTH EVERY 6 HOURS AS NEEDED FOR NAUSEA FOR  MIGRAINES     propranolol (INDERAL LA) 60 MG 24 hr capsule TAKE 1 CAPSULE BY MOUTH ONCE DAILY     rizatriptan (MAXALT-MLT) 10 MG ODT tab Take 1 tablet (10 mg) by mouth at onset of headache for migraine May repeat in 2 hours. Max 3 tablets/24 hours.     temazepam (RESTORIL) 30 MG capsule TAKE 1 CAPSULE BY MOUTH AT BEDTIME AS NEEDED FOR SLEEP     vitamin B complex with vitamin C (VITAMIN  B COMPLEX) TABS tablet Take 1 tablet by mouth daily     No current facility-administered medications for this visit.        VITALS   /74 (BP Location: Left arm, Patient Position: Sitting, Cuff Size: Adult Regular)   Pulse 77   Temp 98.9  F (37.2  C) (Tympanic)   Wt 59 kg (130 lb)   SpO2 97%   BMI 22.31 kg/m       PHQ9                     [unfilled]  LABS                                                                                                                         Last Basic Metabolic Panel:  Lab Results   Component Value Date     01/26/2017      Lab Results   Component Value Date    POTASSIUM 4.2 01/26/2017     Lab Results   Component Value Date    CHLORIDE 105 01/26/2017     Lab Results   Component Value Date    RAÚL 8.7 01/26/2017     Lab Results   Component Value Date    CO2 26 01/26/2017     Lab Results    Component Value Date    BUN 10 01/26/2017     Lab Results   Component Value Date    CR 0.72 01/26/2017     Lab Results   Component Value Date    GLC 86 01/26/2017     Liver Function Studies -   Recent Labs   Lab Test 01/15/17  08/01/16   1905   PROTTOTAL   --   7.5   ALBUMIN   --   4.1   BILITOTAL   --   0.2   ALKPHOS   --   57   AST  24  24   ALT  15  19     CBC RESULTS:   Recent Labs   Lab Test  11/13/17   1044   WBC  8.6   RBC  4.16   HGB  13.3   HCT  40.5   MCV  97   MCH  32.0   MCHC  32.8   RDW  12.1   PLT  298       MENTAL STATUS EXAM                                                                                        Alert. Oriented to person, place, and date / time. Well groomed, calm, cooperative with good eye contact. No problems with speech or psychomotor behavior. Mood was described as depression better, still having anxiety and affect was congruent to speech content and full range. Thought process, including associations, was unremarkable and thought content was devoid of suicidal and homicidal ideation and psychotic thought. No hallucinations. Insight was good. Judgment was intact and adequate for safety. Fund of knowledge was intact. Pt demonstrates no obvious problems with attention, concentration, language, recent or remote memory although these were not formally tested.     ASSESSMENT                                                                                                      HISTORICAL:  Initial psych note 3/9/18          NOTES:    Ayesha Ortiz is a 37 yo with MDD, MAYKEL, hx couple psychiatric hospital stays. Ayesha had a recent stay in [facility] prior to us first meeting and was continued on temazepam for insomnia, lorazepam as prn and zoloft 200 mg daily. Started on olanzapine 5 mg HS. Discussed I'm not keen on both temazepam and lorazepam as they are both benzodiazepines / same class meds so we discontinued the lorazepam.    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 Last saw Ayesha ~9  months ago. Today she notes has not been doing well in context of multiple stressors. Anxiety, depression high and initial insomnia. We agreed on trying an increase in Cymbalta.    cymbalta is helping with depression, anxiety is still high though -> we agreed on trying an increase of dose. ? ADHD: we will continue to revisit this. Hard to say yet as we discussed how anxiety can look like ADHD. ADHd: she does note easily misplaces things, hard time trying to read even a novel she seemingly enjoys, blurts into conversations, very difficult time with attention / concentration and focus with detailed projects.           TREATMENT RISK STATEMENT:  The risks, benefits, alternatives and potential adverse effects have been explained and are understood by the pt.  The pt agrees to the treatment plan with the ability to do so.   The pt knows to call the clinic for any problems or access emergency care if needed.          DIAGNOSES                    MDD, recurrent, moderate  MAYKEL     PLAN                                                                                                                     1)  MEDICATIONS:         --Continue temazepam 30 mg HS and last script was 1/14/19 so gave scripts for 2/12/19.  Increase Cymbalta from 60 mg daily to 90 mg daily.      2)  THERAPY:  No Change     3)  LABS:  None     4)  PT MONITOR [call for probs]:  Worsening symptoms, SI/HI, SEs from meds     5)  REFERRALS [CD, medical, other]:  None     6)  RTC:  1 month

## 2019-01-16 NOTE — NURSING NOTE
"Chief Complaint   Patient presents with     RECHECK     Depression, anxiety.  C/o focus issues, stress level is unmanageable, increase in migraines.     Smoking Cessation       Initial /74 (BP Location: Left arm, Patient Position: Sitting, Cuff Size: Adult Regular)   Pulse 77   Temp 98.9  F (37.2  C) (Tympanic)   Wt 59 kg (130 lb)   SpO2 97%   BMI 22.31 kg/m   Estimated body mass index is 22.31 kg/m  as calculated from the following:    Height as of 9/10/18: 1.626 m (5' 4\").    Weight as of this encounter: 59 kg (130 lb).  Medication Reconciliation: complete    JADA CASTRO LPN    "

## 2019-01-17 ASSESSMENT — ANXIETY QUESTIONNAIRES: GAD7 TOTAL SCORE: 18

## 2019-02-08 DIAGNOSIS — G43.109 MIGRAINE WITH AURA AND WITHOUT STATUS MIGRAINOSUS, NOT INTRACTABLE: ICD-10-CM

## 2019-02-08 RX ORDER — PROPRANOLOL HCL 60 MG
CAPSULE, EXTENDED RELEASE 24HR ORAL
Qty: 30 CAPSULE | Refills: 0 | Status: SHIPPED | OUTPATIENT
Start: 2019-02-08 | End: 2019-03-29

## 2019-03-18 ENCOUNTER — OFFICE VISIT (OUTPATIENT)
Dept: PSYCHIATRY | Facility: OTHER | Age: 39
End: 2019-03-18
Attending: PSYCHIATRY & NEUROLOGY
Payer: COMMERCIAL

## 2019-03-18 VITALS
SYSTOLIC BLOOD PRESSURE: 122 MMHG | BODY MASS INDEX: 22.2 KG/M2 | WEIGHT: 130 LBS | HEIGHT: 64 IN | DIASTOLIC BLOOD PRESSURE: 70 MMHG | HEART RATE: 88 BPM

## 2019-03-18 DIAGNOSIS — F90.2 ADHD (ATTENTION DEFICIT HYPERACTIVITY DISORDER), COMBINED TYPE: Primary | ICD-10-CM

## 2019-03-18 PROCEDURE — G0463 HOSPITAL OUTPT CLINIC VISIT: HCPCS

## 2019-03-18 PROCEDURE — 99214 OFFICE O/P EST MOD 30 MIN: CPT | Performed by: PSYCHIATRY & NEUROLOGY

## 2019-03-18 RX ORDER — METHYLPHENIDATE HYDROCHLORIDE 10 MG/1
10 TABLET ORAL 2 TIMES DAILY
Qty: 60 TABLET | Refills: 0 | Status: SHIPPED | OUTPATIENT
Start: 2019-03-18 | End: 2019-04-08

## 2019-03-18 ASSESSMENT — PATIENT HEALTH QUESTIONNAIRE - PHQ9
5. POOR APPETITE OR OVEREATING: NEARLY EVERY DAY
SUM OF ALL RESPONSES TO PHQ QUESTIONS 1-9: 18

## 2019-03-18 ASSESSMENT — MIFFLIN-ST. JEOR: SCORE: 1249.68

## 2019-03-18 ASSESSMENT — ANXIETY QUESTIONNAIRES
6. BECOMING EASILY ANNOYED OR IRRITABLE: NEARLY EVERY DAY
5. BEING SO RESTLESS THAT IT IS HARD TO SIT STILL: NEARLY EVERY DAY
7. FEELING AFRAID AS IF SOMETHING AWFUL MIGHT HAPPEN: NEARLY EVERY DAY
2. NOT BEING ABLE TO STOP OR CONTROL WORRYING: NEARLY EVERY DAY
GAD7 TOTAL SCORE: 20
1. FEELING NERVOUS, ANXIOUS, OR ON EDGE: MORE THAN HALF THE DAYS
3. WORRYING TOO MUCH ABOUT DIFFERENT THINGS: NEARLY EVERY DAY
IF YOU CHECKED OFF ANY PROBLEMS ON THIS QUESTIONNAIRE, HOW DIFFICULT HAVE THESE PROBLEMS MADE IT FOR YOU TO DO YOUR WORK, TAKE CARE OF THINGS AT HOME, OR GET ALONG WITH OTHER PEOPLE: EXTREMELY DIFFICULT

## 2019-03-18 ASSESSMENT — PAIN SCALES - GENERAL: PAINLEVEL: MODERATE PAIN (4)

## 2019-03-18 NOTE — NURSING NOTE
"Chief Complaint   Patient presents with     Mental Health Problem       Initial /70 (BP Location: Right arm, Patient Position: Sitting, Cuff Size: Adult Regular)   Pulse 88   Ht 1.626 m (5' 4\")   Wt 59 kg (130 lb)   BMI 22.31 kg/m   Estimated body mass index is 22.31 kg/m  as calculated from the following:    Height as of this encounter: 1.626 m (5' 4\").    Weight as of this encounter: 59 kg (130 lb).  Medication Reconciliation: complete    DAE ROCHA LPN  "

## 2019-03-18 NOTE — PROGRESS NOTES
PSYCHIATRY CLINIC PROGRESS NOTE   20 minute medication management, more than 50% of time spent counseling patient on medications, medication side effects, symptom history and management   SUBJECTIVE / INTERIM HISTORY                                                                        Social- works [employer]-  3 kids  Last visit: Continue temazepam 30 mg HS and last script was 1/14/19 so gave scripts for 2/12/19.  Increase Cymbalta from 60 mg daily to 90 mg daily.     - first couple days of Cymbalta increase she was tired. Didn't help anything.   - been a rough time - jay jay Richards is ill and in [place], dad home but still with his medical issues, Lawrence still hasn't found a job  - sleep is disrupted - wakes up several times per night.  - ADHD : difficulty attention / focus, easily misplaces, blurts in conversations, difficult to do focused task. 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 Panic attacks: feels buzzing and sweating  Trauma-related:  negative beliefs / emotions    MEDICAL ROS- migraines, weight loss along with anxiety and depression, heartburn  MEDICAL / SURGICAL HISTORY                pregnant [if applicable]--no     Patient Active Problem List   Diagnosis     Migraine with aura and without status migrainosus, not intractable     Anxiety state     Primary insomnia     ACP (advance care planning)     Gastroesophageal reflux disease, esophagitis presence not specified     Vitamin deficiency     Recurrent major depressive disorder (H)     Tobacco abuse     Hydronephrosis     Panic disorder without  "agoraphobia     ALLERGY   Dust mite extract and Penicillins  MEDICATIONS                                                                                             Current Outpatient Medications   Medication Sig     cholecalciferol (VITAMIN D3) 5000 UNITS TABS tablet Take 1 tablet (5,000 Units) by mouth daily     DULoxetine (CYMBALTA) 30 MG capsule Take capsule along with 60 mg capsule daily     DULoxetine (CYMBALTA) 60 MG capsule Take 1 capsule (60 mg) by mouth daily     MAGNESIUM OXIDE PO Take 400 mg by mouth daily Take 2 tabs evening     multivitamin, therapeutic (THERA-VIT) TABS Take 1 tablet by mouth daily     omeprazole (PRILOSEC) 40 MG capsule TAKE 1 CAPSULE BY MOUTH ONCE DAILY 30-60  MINUTES  BEFORE  A  MEAL     promethazine (PHENERGAN) 25 MG tablet TAKE 1 TABLET BY MOUTH EVERY 6 HOURS AS NEEDED FOR NAUSEA FOR  MIGRAINES     propranolol ER (INDERAL LA) 60 MG 24 hr capsule TAKE 1 CAPSULE DAILY     rizatriptan (MAXALT-MLT) 10 MG ODT Take 1 tablet (10 mg) by mouth at onset of headache for migraine May repeat in 2 hours. Max 3 tablets/24 hours.     temazepam (RESTORIL) 30 MG capsule TAKE 1 CAPSULE BY MOUTH AT BEDTIME AS NEEDED FOR SLEEP     vitamin B complex with vitamin C (VITAMIN  B COMPLEX) TABS tablet Take 1 tablet by mouth daily     No current facility-administered medications for this visit.        VITALS   /70 (BP Location: Right arm, Patient Position: Sitting, Cuff Size: Adult Regular)   Pulse 88   Ht 1.626 m (5' 4\")   Wt 59 kg (130 lb)   BMI 22.31 kg/m       PHQ9                     [unfilled]  LABS                                                                                                                         Last Basic Metabolic Panel:  Lab Results   Component Value Date     01/26/2017      Lab Results   Component Value Date    POTASSIUM 4.2 01/26/2017     Lab Results   Component Value Date    CHLORIDE 105 01/26/2017     Lab Results   Component Value Date    RAÚL 8.7 " 01/26/2017     Lab Results   Component Value Date    CO2 26 01/26/2017     Lab Results   Component Value Date    BUN 10 01/26/2017     Lab Results   Component Value Date    CR 0.72 01/26/2017     Lab Results   Component Value Date    GLC 86 01/26/2017     Liver Function Studies -   Recent Labs   Lab Test 01/15/17  08/01/16   1905   PROTTOTAL   --   7.5   ALBUMIN   --   4.1   BILITOTAL   --   0.2   ALKPHOS   --   57   AST  24  24   ALT  15  19     CBC RESULTS:   Recent Labs   Lab Test  11/13/17   1044   WBC  8.6   RBC  4.16   HGB  13.3   HCT  40.5   MCV  97   MCH  32.0   MCHC  32.8   RDW  12.1   PLT  298       MENTAL STATUS EXAM                                                                                        Alert. Oriented to person, place, and date / time. Well groomed, calm, cooperative with good eye contact. No problems with speech or psychomotor behavior. Mood was described as depressed and anxiety and affect was congruent to speech content and full range. Thought process, including associations, was unremarkable and thought content was devoid of suicidal and homicidal ideation and psychotic thought. No hallucinations. Insight was good. Judgment was intact and adequate for safety. Fund of knowledge was intact. Pt demonstrates no obvious problems with attention, concentration, language, recent or remote memory although these were not formally tested.     ASSESSMENT                                                                                                      HISTORICAL:  Initial psych note 3/9/18          NOTES:    Ayesha Ortiz is a 38 yo with MDD, MAYKEL, hx couple psychiatric hospital stays. Ayesha had a recent stay in [facility] prior to us first meeting and was continued on temazepam for insomnia, lorazepam as prn and zoloft 200 mg daily. Started on olanzapine 5 mg HS. Discussed I'm not keen on both temazepam and lorazepam as they are both benzodiazepines / same class meds so we discontinued the  lorazepam.    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 We increased Cymbalta last visit and hasn't helped any so we agreed makes sense to go back to prior dose of 60 mg. . The more Ayesha and I explore her mental health history we both believe she has ADHD: many sx and examples. Panic attacks. Cymbalta is working better than Zoloft did. I do think is worth having her try med: I was thinking Strattera or Wellbutrin but she already has Cymbalta with [facility]. We agreed on Ritalin.           TREATMENT RISK STATEMENT:  The risks, benefits, alternatives and potential adverse effects have been explained and are understood by the pt.  The pt agrees to the treatment plan with the ability to do so.   The pt knows to call the clinic for any problems or access emergency care if needed.          DIAGNOSES                    MDD, recurrent, moderate  MAYKEL     PLAN                                                                                                                     1)  MEDICATIONS:         --Continue temazepam 30 mg HS and last script was was 2/12/19. Decrease Cymbalta 90 mg daily back to 60 mg daily. Ritalin IR 10 mg twice daily and gave script for 3/18/19     2)  THERAPY:  No Change     3)  LABS:  None     4)  PT MONITOR [call for probs]:  Worsening symptoms, SI/HI, SEs from meds     5)  REFERRALS [CD, medical, other]:  None     6)  RTC:  3 weeks

## 2019-03-19 ENCOUNTER — TRANSFERRED RECORDS (OUTPATIENT)
Dept: HEALTH INFORMATION MANAGEMENT | Facility: CLINIC | Age: 39
End: 2019-03-19

## 2019-03-19 LAB
ALT SERPL-CCNC: 15 IU/L (ref 6–31)
AST SERPL-CCNC: 23 IU/L (ref 10–40)
CREAT SERPL-MCNC: 0.86 MG/DL (ref 0.4–1)
GFR SERPL CREATININE-BSD FRML MDRD: >60 ML/MIN/1.73M2
GLUCOSE SERPL-MCNC: 120 MG/DL (ref 70–99)
POTASSIUM SERPL-SCNC: 3.4 MEQ/L (ref 3.4–5.1)

## 2019-03-19 ASSESSMENT — ANXIETY QUESTIONNAIRES: GAD7 TOTAL SCORE: 20

## 2019-03-20 DIAGNOSIS — K21.9 GASTROESOPHAGEAL REFLUX DISEASE WITHOUT ESOPHAGITIS: ICD-10-CM

## 2019-03-21 RX ORDER — PROMETHAZINE HYDROCHLORIDE 25 MG/1
TABLET ORAL
Qty: 20 TABLET | Refills: 0 | Status: SHIPPED | OUTPATIENT
Start: 2019-03-21 | End: 2019-10-28

## 2019-03-21 NOTE — TELEPHONE ENCOUNTER
promethazine (PHENERGAN) 25 MG tablet   Last Written Prescription Date:  1/16/19  Last Fill Quantity: 20,   # refills: 1  Last Office Visit: 3/18/19  Future Office visit:    Next 5 appointments (look out 90 days)    Apr 08, 2019  1:00 PM CDT  (Arrive by 12:45 PM)  [facility] [address] [place] [id number]  [phone]           Routing refill request to provider for review/approval because:  Drug not on the [facility], [facility] or  Health refill protocol or controlled substance

## 2019-03-29 DIAGNOSIS — K21.9 GASTROESOPHAGEAL REFLUX DISEASE WITHOUT ESOPHAGITIS: ICD-10-CM

## 2019-03-29 DIAGNOSIS — G43.109 MIGRAINE WITH AURA AND WITHOUT STATUS MIGRAINOSUS, NOT INTRACTABLE: ICD-10-CM

## 2019-03-29 RX ORDER — PROPRANOLOL HCL 60 MG
CAPSULE, EXTENDED RELEASE 24HR ORAL
Qty: 30 CAPSULE | Refills: 5 | Status: SHIPPED | OUTPATIENT
Start: 2019-03-29 | End: 2019-05-08 | Stop reason: DRUGHIGH

## 2019-03-29 RX ORDER — OMEPRAZOLE 40 MG/1
CAPSULE, DELAYED RELEASE ORAL
Qty: 90 CAPSULE | Refills: 1 | Status: SHIPPED | OUTPATIENT
Start: 2019-03-29 | End: 2019-10-17

## 2019-04-08 ENCOUNTER — OFFICE VISIT (OUTPATIENT)
Dept: PSYCHIATRY | Facility: OTHER | Age: 39
End: 2019-04-08
Attending: PSYCHIATRY & NEUROLOGY
Payer: COMMERCIAL

## 2019-04-08 VITALS
HEART RATE: 82 BPM | SYSTOLIC BLOOD PRESSURE: 110 MMHG | OXYGEN SATURATION: 98 % | DIASTOLIC BLOOD PRESSURE: 64 MMHG | WEIGHT: 115 LBS | BODY MASS INDEX: 19.74 KG/M2 | TEMPERATURE: 99.1 F

## 2019-04-08 DIAGNOSIS — G43.009 MIGRAINE WITHOUT AURA AND WITHOUT STATUS MIGRAINOSUS, NOT INTRACTABLE: Primary | ICD-10-CM

## 2019-04-08 DIAGNOSIS — F90.2 ADHD (ATTENTION DEFICIT HYPERACTIVITY DISORDER), COMBINED TYPE: ICD-10-CM

## 2019-04-08 PROCEDURE — 99213 OFFICE O/P EST LOW 20 MIN: CPT | Performed by: PSYCHIATRY & NEUROLOGY

## 2019-04-08 PROCEDURE — G0463 HOSPITAL OUTPT CLINIC VISIT: HCPCS

## 2019-04-08 RX ORDER — METHYLPHENIDATE HYDROCHLORIDE 10 MG/1
10 TABLET ORAL 2 TIMES DAILY
Qty: 60 TABLET | Refills: 0 | Status: SHIPPED | OUTPATIENT
Start: 2019-04-16 | End: 2019-05-08

## 2019-04-08 RX ORDER — PROPRANOLOL HYDROCHLORIDE 80 MG/1
80 CAPSULE, EXTENDED RELEASE ORAL DAILY
Qty: 30 CAPSULE | Refills: 3 | Status: SHIPPED | OUTPATIENT
Start: 2019-04-08 | End: 2019-10-23

## 2019-04-08 ASSESSMENT — ANXIETY QUESTIONNAIRES
7. FEELING AFRAID AS IF SOMETHING AWFUL MIGHT HAPPEN: NEARLY EVERY DAY
2. NOT BEING ABLE TO STOP OR CONTROL WORRYING: MORE THAN HALF THE DAYS
IF YOU CHECKED OFF ANY PROBLEMS ON THIS QUESTIONNAIRE, HOW DIFFICULT HAVE THESE PROBLEMS MADE IT FOR YOU TO DO YOUR WORK, TAKE CARE OF THINGS AT HOME, OR GET ALONG WITH OTHER PEOPLE: SOMEWHAT DIFFICULT
3. WORRYING TOO MUCH ABOUT DIFFERENT THINGS: MORE THAN HALF THE DAYS
GAD7 TOTAL SCORE: 15
1. FEELING NERVOUS, ANXIOUS, OR ON EDGE: MORE THAN HALF THE DAYS
6. BECOMING EASILY ANNOYED OR IRRITABLE: MORE THAN HALF THE DAYS
5. BEING SO RESTLESS THAT IT IS HARD TO SIT STILL: MORE THAN HALF THE DAYS

## 2019-04-08 ASSESSMENT — PATIENT HEALTH QUESTIONNAIRE - PHQ9
5. POOR APPETITE OR OVEREATING: MORE THAN HALF THE DAYS
SUM OF ALL RESPONSES TO PHQ QUESTIONS 1-9: 12

## 2019-04-08 ASSESSMENT — PAIN SCALES - GENERAL: PAINLEVEL: MODERATE PAIN (4)

## 2019-04-08 NOTE — PROGRESS NOTES
"  PSYCHIATRY CLINIC PROGRESS NOTE   20 minute medication management, more than 50% of time spent counseling patient on medications, medication side effects, symptom history and management   SUBJECTIVE / INTERIM HISTORY                                                                        Social- works [employer]-  3 kids  Last visit: Continue temazepam 30 mg HS and last script was was 2/12/19. Decrease Cymbalta 90 mg daily back to 60 mg daily. Ritalin IR 10 mg twice daily and gave script for 3/18/19  - new / different primary at [facility]  - relationship issues. Has close friends who are supportive. FAmily too  - ritalin helping but did have a \"different\" headache initially. IS helping with thoughts are more clear, is able to read   - feels IS worth keeping the Ritalin is helping significantly  - sleep is disrupted - wakes up several times per night.  - ADHD : difficulty attention / focus, easily misplaces, blurts in conversations, difficult to do focused task. Gives example of hasn't been able to read a book or watch a movie  - [employer] and at [employer]    SUBSTANCE USE- etoh (likely abuse rather than dependence) and no longer dirnks     SYMPTOMS-    Depression: depressed mood, low energy, insomnia, weight changes, feeling hopeless, feeling trapped, excessive crying and overwhelmed  Alise/Hypomania:  none  Anxiety:  STILL: excessive worry, feeling fearful, social anxiety and nervous/overwhelmed. Panic attacks: feels buzzing and sweating  Trauma-related:  negative beliefs / emotions    MEDICAL ROS- migraines, weight loss along with anxiety and depression, heartburn  MEDICAL / SURGICAL HISTORY                pregnant [if applicable]--no     Patient Active Problem List   Diagnosis     Migraine with aura and without status migrainosus, not intractable     Anxiety state     Primary insomnia     ACP (advance care planning)     Gastroesophageal reflux disease, esophagitis presence not specified "     Vitamin deficiency     Recurrent major depressive disorder (H)     Tobacco abuse     Hydronephrosis     Panic disorder without agoraphobia     ALLERGY   Dust mite extract and Penicillins  MEDICATIONS                                                                                             Current Outpatient Medications   Medication Sig     cholecalciferol (VITAMIN D3) 5000 UNITS TABS tablet Take 1 tablet (5,000 Units) by mouth daily     DULoxetine (CYMBALTA) 60 MG capsule Take 1 capsule (60 mg) by mouth daily     MAGNESIUM OXIDE PO Take 400 mg by mouth daily Take 2 tabs evening     methylphenidate (RITALIN) 10 MG tablet Take 1 tablet (10 mg) by mouth 2 times daily     multivitamin, therapeutic (THERA-VIT) TABS Take 1 tablet by mouth daily     omeprazole (PRILOSEC) 40 MG DR capsule TAKE 1 CAPSULE BY MOUTH ONCE DAILY 30-60 MINUTES BEFORE A MEAL.     promethazine (PHENERGAN) 25 MG tablet TAKE 1 TABLET BY MOUTH EVERY 6 HOURS AS NEEDED FOR NAUSEA FOR MIGRAINES     propranolol ER (INDERAL LA) 60 MG 24 hr capsule TAKE 1 CAPSULE DAILY     rizatriptan (MAXALT-MLT) 10 MG ODT Take 1 tablet (10 mg) by mouth at onset of headache for migraine May repeat in 2 hours. Max 3 tablets/24 hours.     temazepam (RESTORIL) 30 MG capsule TAKE 1 CAPSULE BY MOUTH AT BEDTIME AS NEEDED FOR SLEEP     vitamin B complex with vitamin C (VITAMIN  B COMPLEX) TABS tablet Take 1 tablet by mouth daily     No current facility-administered medications for this visit.        VITALS   /64 (BP Location: Right arm, Patient Position: Sitting, Cuff Size: Adult Regular)   Pulse 82   Temp 99.1  F (37.3  C) (Tympanic)   Wt 52.2 kg (115 lb)   SpO2 98%   BMI 19.74 kg/m       PHQ9                     [unfilled]  LABS                                                                                                                         Last Basic Metabolic Panel:  Lab Results   Component Value Date     01/26/2017      Lab Results    Component Value Date    POTASSIUM 4.2 01/26/2017     Lab Results   Component Value Date    CHLORIDE 105 01/26/2017     Lab Results   Component Value Date    RAÚL 8.7 01/26/2017     Lab Results   Component Value Date    CO2 26 01/26/2017     Lab Results   Component Value Date    BUN 10 01/26/2017     Lab Results   Component Value Date    CR 0.72 01/26/2017     Lab Results   Component Value Date    GLC 86 01/26/2017     Liver Function Studies -   Recent Labs   Lab Test 01/15/17  08/01/16   1905   PROTTOTAL   --   7.5   ALBUMIN   --   4.1   BILITOTAL   --   0.2   ALKPHOS   --   57   AST  24  24   ALT  15  19     CBC RESULTS:   Recent Labs   Lab Test  11/13/17   1044   WBC  8.6   RBC  4.16   HGB  13.3   HCT  40.5   MCV  97   MCH  32.0   MCHC  32.8   RDW  12.1   PLT  298       MENTAL STATUS EXAM                                                                                        Alert. Oriented to person, place, and date / time. Well groomed, calm, cooperative with good eye contact. No problems with speech or psychomotor behavior. Mood was described as depressed and anxious and affect was congruent to speech content and full range. Thought process, including associations, was unremarkable and thought content was devoid of suicidal and homicidal ideation and psychotic thought. No hallucinations. Insight was good. Judgment was intact and adequate for safety. Fund of knowledge was intact. Pt demonstrates no obvious problems with attention, concentration, language, recent or remote memory although these were not formally tested.     ASSESSMENT                                                                                                      HISTORICAL:  Initial psych note 3/9/18          NOTES:    Ayesha Ortiz is a 38 yo with MDD, MAYKEL, hx couple psychiatric hospital stays. Ayesha had a recent stay in [facility] prior to us first meeting and was continued on temazepam for insomnia, lorazepam as prn and zoloft 200 mg daily.  Started on olanzapine 5 mg HS. Discussed I'm not keen on both temazepam and lorazepam as they are both benzodiazepines / same class meds so we discontinued the lorazepam.    We decreased Cymbalta and okay. Ritalin is helping, lots going on right now so we will stick with the 10 mg up to twice daily. We are going to increase propranolol for her headaches.           TREATMENT RISK STATEMENT:  The risks, benefits, alternatives and potential adverse effects have been explained and are understood by the pt.  The pt agrees to the treatment plan with the ability to do so.   The pt knows to call the clinic for any problems or access emergency care if needed.          DIAGNOSES                    MDD, recurrent, moderate  MAYKEL     PLAN                                                                                                                     1)  MEDICATIONS:         --Continue temazepam 30 mg HS.  Continue Cymbalta 60 mg daily. Ritalin IR 10 mg twice daily and last script was  for 3/18/19 and gave another script for 4/16/19     2)  THERAPY:  No Change     3)  LABS:  None     4)  PT MONITOR [call for probs]:  Worsening symptoms, SI/HI, SEs from meds     5)  REFERRALS [CD, medical, other]:  None     6)  RTC:  ~2 months

## 2019-04-08 NOTE — NURSING NOTE
"Chief Complaint   Patient presents with     RECHECK     ADHD, depression, anxiety.  Discuss medications.  Patient reports that her focus is better with Ritalin.  C/o headaches being worse.       Initial /64 (BP Location: Right arm, Patient Position: Sitting, Cuff Size: Adult Regular)   Pulse 82   Temp 99.1  F (37.3  C) (Tympanic)   Wt 52.2 kg (115 lb)   SpO2 98%   BMI 19.74 kg/m   Estimated body mass index is 19.74 kg/m  as calculated from the following:    Height as of 3/18/19: 1.626 m (5' 4\").    Weight as of this encounter: 52.2 kg (115 lb).  Medication Reconciliation: complete    AJDA CASTRO LPN    "

## 2019-04-09 ASSESSMENT — ANXIETY QUESTIONNAIRES: GAD7 TOTAL SCORE: 15

## 2019-05-08 ENCOUNTER — OFFICE VISIT (OUTPATIENT)
Dept: PSYCHIATRY | Facility: OTHER | Age: 39
End: 2019-05-08
Attending: PSYCHIATRY & NEUROLOGY
Payer: COMMERCIAL

## 2019-05-08 VITALS
DIASTOLIC BLOOD PRESSURE: 68 MMHG | BODY MASS INDEX: 19.74 KG/M2 | SYSTOLIC BLOOD PRESSURE: 126 MMHG | TEMPERATURE: 99.5 F | WEIGHT: 115 LBS | OXYGEN SATURATION: 98 % | HEART RATE: 93 BPM

## 2019-05-08 DIAGNOSIS — F41.1 GAD (GENERALIZED ANXIETY DISORDER): Primary | ICD-10-CM

## 2019-05-08 PROCEDURE — 99214 OFFICE O/P EST MOD 30 MIN: CPT | Performed by: PSYCHIATRY & NEUROLOGY

## 2019-05-08 PROCEDURE — G0463 HOSPITAL OUTPT CLINIC VISIT: HCPCS

## 2019-05-08 RX ORDER — LORAZEPAM 0.5 MG/1
0.5 TABLET ORAL DAILY PRN
Qty: 30 TABLET | Refills: 1 | Status: SHIPPED | OUTPATIENT
Start: 2019-05-08 | End: 2019-08-27

## 2019-05-08 ASSESSMENT — ANXIETY QUESTIONNAIRES
3. WORRYING TOO MUCH ABOUT DIFFERENT THINGS: NEARLY EVERY DAY
IF YOU CHECKED OFF ANY PROBLEMS ON THIS QUESTIONNAIRE, HOW DIFFICULT HAVE THESE PROBLEMS MADE IT FOR YOU TO DO YOUR WORK, TAKE CARE OF THINGS AT HOME, OR GET ALONG WITH OTHER PEOPLE: EXTREMELY DIFFICULT
7. FEELING AFRAID AS IF SOMETHING AWFUL MIGHT HAPPEN: NEARLY EVERY DAY
2. NOT BEING ABLE TO STOP OR CONTROL WORRYING: NEARLY EVERY DAY
GAD7 TOTAL SCORE: 21
1. FEELING NERVOUS, ANXIOUS, OR ON EDGE: NEARLY EVERY DAY
5. BEING SO RESTLESS THAT IT IS HARD TO SIT STILL: NEARLY EVERY DAY
6. BECOMING EASILY ANNOYED OR IRRITABLE: NEARLY EVERY DAY

## 2019-05-08 ASSESSMENT — PATIENT HEALTH QUESTIONNAIRE - PHQ9
5. POOR APPETITE OR OVEREATING: NEARLY EVERY DAY
SUM OF ALL RESPONSES TO PHQ QUESTIONS 1-9: 24

## 2019-05-08 ASSESSMENT — PAIN SCALES - GENERAL: PAINLEVEL: NO PAIN (0)

## 2019-05-08 NOTE — PROGRESS NOTES
PSYCHIATRY CLINIC PROGRESS NOTE   40 minute medication management, more than 50% of time spent counseling patient on medications, medication side effects, symptom history and management   SUBJECTIVE / INTERIM HISTORY                                                                        Social- works [employer]-  3 kidsDavid Metzger 8 yo  Last visit: Continue temazepam 30 mg HS and last script was was 2/12/19. Decrease Cymbalta 90 mg daily back to 60 mg daily. Ritalin IR 10 mg twice daily and gave script for 3/18/19    - is very anxious to point NOT able to work lately  - new / different primary at [facility]   - anxiety is to point not working. Lawrence's out in [place]. He's coming back Friday.  - she is living in the house  - sleep is disrupted - wakes up several times per night.  - ADHD : difficulty attention / focus, easily misplaces, blurts in conversations, difficult to do focused task. Gives example of hasn't been able to read a book or watch a movie. 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 MJ    SYMPTOMS-    Depression: depressed mood, low energy, insomnia, weight changes, feeling hopeless, feeling trapped, excessive crying and overwhelmed  Alise/Hypomania:  none  Anxiety:  STILL: excessive worry, feeling fearful, social anxiety and nervous/overwhelmed. Panic attacks: feels buzzing and sweating  Trauma-related:  negative beliefs / emotions    MEDICAL ROS- migraines, weight loss along with anxiety and depression, heartburn  MEDICAL / SURGICAL HISTORY                pregnant [if applicable]--no     Patient Active Problem List   Diagnosis     Migraine with aura and without status migrainosus, not intractable     Anxiety state     Primary insomnia     ACP (advance care planning)     Gastroesophageal reflux disease,  esophagitis presence not specified     Vitamin deficiency     Recurrent major depressive disorder (H)     Tobacco abuse     Hydronephrosis     Panic disorder without agoraphobia     ALLERGY   Dust mite extract and Penicillins  MEDICATIONS                                                                                             Current Outpatient Medications   Medication Sig     DULoxetine (CYMBALTA) 60 MG capsule Take 1 capsule (60 mg) by mouth daily     MAGNESIUM OXIDE PO Take 400 mg by mouth daily Take 2 tabs evening     multivitamin, therapeutic (THERA-VIT) TABS Take 1 tablet by mouth daily     omeprazole (PRILOSEC) 40 MG DR capsule TAKE 1 CAPSULE BY MOUTH ONCE DAILY 30-60 MINUTES BEFORE A MEAL.     promethazine (PHENERGAN) 25 MG tablet TAKE 1 TABLET BY MOUTH EVERY 6 HOURS AS NEEDED FOR NAUSEA FOR MIGRAINES     propranolol ER (INDERAL LA) 80 MG 24 hr capsule Take 1 capsule (80 mg) by mouth daily     rizatriptan (MAXALT-MLT) 10 MG ODT Take 1 tablet (10 mg) by mouth at onset of headache for migraine May repeat in 2 hours. Max 3 tablets/24 hours.     temazepam (RESTORIL) 30 MG capsule TAKE 1 CAPSULE BY MOUTH AT BEDTIME AS NEEDED FOR SLEEP     methylphenidate (RITALIN) 10 MG tablet Take 1 tablet (10 mg) by mouth 2 times daily (Patient not taking: Reported on 5/8/2019)     No current facility-administered medications for this visit.        VITALS   /68 (BP Location: Left arm, Patient Position: Sitting, Cuff Size: Adult Regular)   Pulse 93   Temp 99.5  F (37.5  C) (Tympanic)   Wt 52.2 kg (115 lb)   SpO2 98%   BMI 19.74 kg/m       PHQ9                     [unfilled]  LABS                                                                                                                         Last Basic Metabolic Panel:  Lab Results   Component Value Date     01/26/2017      Lab Results   Component Value Date    POTASSIUM 4.2 01/26/2017     Lab Results   Component Value Date    CHLORIDE 105  01/26/2017     Lab Results   Component Value Date    RAÚL 8.7 01/26/2017     Lab Results   Component Value Date    CO2 26 01/26/2017     Lab Results   Component Value Date    BUN 10 01/26/2017     Lab Results   Component Value Date    CR 0.72 01/26/2017     Lab Results   Component Value Date    GLC 86 01/26/2017     Liver Function Studies -   Recent Labs   Lab Test 01/15/17  08/01/16   1905   PROTTOTAL   --   7.5   ALBUMIN   --   4.1   BILITOTAL   --   0.2   ALKPHOS   --   57   AST  24  24   ALT  15  19     CBC RESULTS:   Recent Labs   Lab Test  11/13/17   1044   WBC  8.6   RBC  4.16   HGB  13.3   HCT  40.5   MCV  97   MCH  32.0   MCHC  32.8   RDW  12.1   PLT  298       MENTAL STATUS EXAM                                                                                        Alert. Oriented to person, place, and date / time. Well groomed, calm, cooperative with good eye contact. No problems with speech or psychomotor behavior. Mood was described as depressed and anxious and affect was congruent to speech content and full range. Thought process, including associations, was unremarkable and thought content was devoid of suicidal and homicidal ideation and psychotic thought. No hallucinations. Insight was good. Judgment was intact and adequate for safety. Fund of knowledge was intact. Pt demonstrates no obvious problems with attention, concentration, language, recent or remote memory although these were not formally tested.     ASSESSMENT                                                                                                      HISTORICAL:  Initial psych note 3/9/18          NOTES:    Ayesha Ortiz is a 40 yo with MDD, MAYKEL, hx couple psychiatric hospital stays. Ayesha had a recent stay in [facility] prior to us first meeting and was continued on temazepam for insomnia, lorazepam as prn and zoloft 200 mg daily. Started on olanzapine 5 mg HS. Discussed I'm not keen on both temazepam and lorazepam as they are both  benzodiazepines / same class meds however given what is going on currently and to the point she has been unable to work agreed to lorazepam 0.5 mg daily prn anxiety for now. Discussed ideally temporary and we can eventually discontinue it as we did in past after anxiety improved.       TREATMENT RISK STATEMENT:  The risks, benefits, alternatives and potential adverse effects have been explained and are understood by the pt.  The pt agrees to the treatment plan with the ability to do so.   The pt knows to call the clinic for any problems or access emergency care if needed.          DIAGNOSES                    MDD, recurrent, moderate  MAYKEL     PLAN                                                                                                                     1)  MEDICATIONS:         --Continue temazepam 30 mg HS.  Start lorazepam 0.5 mg daily prn Continue Cymbalta 60 mg daily.     2)  THERAPY:  No Change     3)  LABS:  None     4)  PT MONITOR [call for probs]:  Worsening symptoms, SI/HI, SEs from meds     5)  REFERRALS [CD, medical, other]:  None     6)  RTC:  ~1 month

## 2019-05-09 ASSESSMENT — ANXIETY QUESTIONNAIRES: GAD7 TOTAL SCORE: 21

## 2019-06-18 DIAGNOSIS — F51.01 PRIMARY INSOMNIA: ICD-10-CM

## 2019-06-18 RX ORDER — TEMAZEPAM 30 MG
CAPSULE ORAL
Qty: 30 CAPSULE | Refills: 0 | Status: SHIPPED | OUTPATIENT
Start: 2019-06-21 | End: 2019-07-16

## 2019-06-18 NOTE — TELEPHONE ENCOUNTER
Restoril      Last Written Prescription Date:  1-  Last Fill Quantity: 30,   # refills: 0  Last Office Visit: 5-8-2019  Future Office visit:       Routing refill request to provider for review/approval because:  Drug not on the [facility], [facility] or [facility] refill protocol or controlled substance

## 2019-07-16 DIAGNOSIS — F51.01 PRIMARY INSOMNIA: ICD-10-CM

## 2019-07-16 RX ORDER — TEMAZEPAM 30 MG
CAPSULE ORAL
Qty: 30 CAPSULE | Refills: 0 | Status: SHIPPED | OUTPATIENT
Start: 2019-07-24 | End: 2019-08-19

## 2019-07-16 NOTE — TELEPHONE ENCOUNTER
temazepam (RESTORIL) 30 MG capsule  Last Written Prescription Date:  6/21/2019  Last Fill Quantity: 30,   # refills: 0  Last Office Visit: 5/8/2019  Future Office visit:       Routing refill request to provider for review/approval because:  Drug not on the [facility], [facility] or [facility] refill protocol or controlled substance

## 2019-08-19 DIAGNOSIS — F51.01 PRIMARY INSOMNIA: ICD-10-CM

## 2019-08-19 RX ORDER — TEMAZEPAM 30 MG
CAPSULE ORAL
Qty: 30 CAPSULE | Refills: 0 | Status: SHIPPED | OUTPATIENT
Start: 2019-08-19 | End: 2019-09-18

## 2019-08-19 NOTE — TELEPHONE ENCOUNTER
Temazepam -  reviewed  Last visit: 5.8.19  Last refill: 7.25.19 #30    Future appointments:   Next 5 appointments (look out 90 days)    Aug 27, 2019  1:40 PM CDT  (Arrive by 1:25 PM)  Return Visit with Brandy Abebe MD  [facility] ([facility] ) [address] [place] [id number] [phone]

## 2019-08-27 ENCOUNTER — OFFICE VISIT (OUTPATIENT)
Dept: PSYCHIATRY | Facility: OTHER | Age: 39
End: 2019-08-27
Attending: PSYCHIATRY & NEUROLOGY
Payer: COMMERCIAL

## 2019-08-27 VITALS
OXYGEN SATURATION: 97 % | TEMPERATURE: 98.2 F | WEIGHT: 110 LBS | DIASTOLIC BLOOD PRESSURE: 76 MMHG | HEIGHT: 64 IN | BODY MASS INDEX: 18.78 KG/M2 | HEART RATE: 78 BPM | SYSTOLIC BLOOD PRESSURE: 112 MMHG

## 2019-08-27 DIAGNOSIS — Z79.899 ENCOUNTER FOR LONG-TERM (CURRENT) USE OF MEDICATIONS: Primary | ICD-10-CM

## 2019-08-27 DIAGNOSIS — Z72.0 TOBACCO ABUSE: ICD-10-CM

## 2019-08-27 DIAGNOSIS — F90.2 ADHD (ATTENTION DEFICIT HYPERACTIVITY DISORDER), COMBINED TYPE: ICD-10-CM

## 2019-08-27 DIAGNOSIS — F41.1 GAD (GENERALIZED ANXIETY DISORDER): ICD-10-CM

## 2019-08-27 DIAGNOSIS — F51.01 PRIMARY INSOMNIA: ICD-10-CM

## 2019-08-27 DIAGNOSIS — Z71.6 TOBACCO ABUSE COUNSELING: ICD-10-CM

## 2019-08-27 PROCEDURE — 99214 OFFICE O/P EST MOD 30 MIN: CPT | Performed by: PSYCHIATRY & NEUROLOGY

## 2019-08-27 PROCEDURE — G0463 HOSPITAL OUTPT CLINIC VISIT: HCPCS

## 2019-08-27 RX ORDER — NICOTINE 21 MG/24HR
1 PATCH, TRANSDERMAL 24 HOURS TRANSDERMAL EVERY 24 HOURS
Qty: 30 PATCH | Refills: 3 | Status: SHIPPED | OUTPATIENT
Start: 2019-08-27 | End: 2020-05-22

## 2019-08-27 RX ORDER — METHYLPHENIDATE HYDROCHLORIDE 10 MG/1
10 TABLET ORAL 2 TIMES DAILY
Qty: 60 TABLET | Refills: 0 | Status: SHIPPED | OUTPATIENT
Start: 2019-09-25 | End: 2019-11-19

## 2019-08-27 RX ORDER — LORAZEPAM 0.5 MG/1
0.5 TABLET ORAL DAILY PRN
Qty: 30 TABLET | Refills: 1 | Status: SHIPPED | OUTPATIENT
Start: 2019-08-27 | End: 2019-09-30 | Stop reason: ALTCHOICE

## 2019-08-27 RX ORDER — METHYLPHENIDATE HYDROCHLORIDE 10 MG/1
10 TABLET ORAL 2 TIMES DAILY
Qty: 60 TABLET | Refills: 0 | Status: SHIPPED | OUTPATIENT
Start: 2019-08-27 | End: 2019-10-28

## 2019-08-27 ASSESSMENT — ANXIETY QUESTIONNAIRES
5. BEING SO RESTLESS THAT IT IS HARD TO SIT STILL: NEARLY EVERY DAY
7. FEELING AFRAID AS IF SOMETHING AWFUL MIGHT HAPPEN: NEARLY EVERY DAY
6. BECOMING EASILY ANNOYED OR IRRITABLE: MORE THAN HALF THE DAYS
1. FEELING NERVOUS, ANXIOUS, OR ON EDGE: MORE THAN HALF THE DAYS
3. WORRYING TOO MUCH ABOUT DIFFERENT THINGS: NEARLY EVERY DAY
GAD7 TOTAL SCORE: 19
IF YOU CHECKED OFF ANY PROBLEMS ON THIS QUESTIONNAIRE, HOW DIFFICULT HAVE THESE PROBLEMS MADE IT FOR YOU TO DO YOUR WORK, TAKE CARE OF THINGS AT HOME, OR GET ALONG WITH OTHER PEOPLE: EXTREMELY DIFFICULT
2. NOT BEING ABLE TO STOP OR CONTROL WORRYING: NEARLY EVERY DAY

## 2019-08-27 ASSESSMENT — PAIN SCALES - GENERAL: PAINLEVEL: NO PAIN (0)

## 2019-08-27 ASSESSMENT — MIFFLIN-ST. JEOR: SCORE: 1158.96

## 2019-08-27 ASSESSMENT — PATIENT HEALTH QUESTIONNAIRE - PHQ9
SUM OF ALL RESPONSES TO PHQ QUESTIONS 1-9: 13
5. POOR APPETITE OR OVEREATING: NEARLY EVERY DAY
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
## 2019-08-27 NOTE — PATIENT INSTRUCTIONS

## 2019-08-27 NOTE — LETTER
[facility]  08/27/19    Patient: Nasra Ortiz  YOB: 1980  Medical Record Number: [id number]  CSN: [id number]                                                                              Non-opioid Controlled Substance Agreement    I understand that my care provider has prescribed a controlled substance to help manage my condition(s). I am taking this medicine to help me function or work. I know this is strong medicine, and that it can cause serious side effects. Controlled substances can be sedating, addicting and may cause a dependency on the drug. They can affect my ability to drive or think, and cause depression. They need to be taken exactly as prescribed. Combining controlled substances with certain medicines or chemicals (such as cocaine, sedatives and tranquilizers, sleeping pills, meth) can be dangerous or even fatal. Also, if I stop controlled substances suddenly, I may have severe withdrawal symptoms.  If not helpful, I may be asked to stop them.    The risks, benefits, and side effects of these medicine(s) were explained to me. I agree that:    1. I will take part in other treatments as advised by my care team. This may be psychiatry or counseling, physical therapy, behavioral therapy, group treatment or a referral to a pain clinic. I will reduce or stop my medicine when my care team tells me to do so.  2. I will take my medicines as prescribed. I will not change the dose or schedule unless my care team tells me to. There will be no refills if I  run out early.   I may be contactedwithout warning and asked to complete a urine drug test or pill count at any time.   3. I will keep all my appointments, and understand this is part of the monitoring of controlled substances. My care team may require an office visit for EVERY controlled substance refill. If I miss appointments or don t follow instructions, my care team may stop my medicine.  4. I will not ask other providers to  prescribe controlled substances, and I will not accept controlled substances from other people. If I need another prescribed controlled substance for a new reason, I will tell my care team within 1 business day.  5. I will use one pharmacy to fill all of my controlled substance prescriptions, and it is up to me to make sure that I do not run out of my medicines on weekends or holidays. If my care team is willing to refill my controlled substance prescription without a visit, I must request refills only during office hours, refills may take up to 3 days to process, and it may take up to 5 to 7 days for my medicine to be mailed and ready at my pharmacy. Prescriptions will not be mailed anywhere except my pharmacy.    6. I am responsible for my prescriptions. If the medicine/prescription is lost or stolen, it will not be replaced. I also agree not to share controlled substance medicines with anyone.              [facility]  08/27/19  Patient:  Nasra Ortiz  YOB: 1980  Medical Record Number: [id number]  CSN: [id number]    7. I agree to not use ANY illegal or recreational drugs. This includes marijuana, cocaine, bath salts or other drugs. I agree not to use alcohol unless my care team says I may. I agree to give urine samples whenever asked. If I don t give a urine sample, the care team may stop my medicine.    8. If I enroll in the [place] Medical Marijuana program, I will tell my care team. I will also sign an agreement to share my medical records with my care team.    9. I will bring in my list of medicines (or my medicine bottles) each time I come to the clinic.   10. I will tell my care team right away if I become pregnant or have a new medical problem treated outside of my regular clinic.  11. I understand that this medicine can affect my thinking and judgment. It may be unsafe for me to drive, use machinery and do dangerous tasks. I will not do any of these things until I know how the  medicine affects me. If my dose changes, I will wait to see how it affects me. I will contact my care team if I have concerns about medicine side effects.    I understand that if I do not follow any of the conditions above, my prescriptions or treatment may be stopped.      I agree that my provider, clinic care team, and pharmacy may work with any city, state or federal law enforcement agency that investigates the misuse, sale, or other diversion of my controlled medicine. I will allow my provider to discuss my care with or share a copy of this agreement with any other treating provider, pharmacy or emergency room where I receive care. I agree to give up (waive) any right of privacy or confidentiality with respect to these consents.   I have read this agreement and have asked questions about anything I did not understand.    ____________________________________________________    ____________  ________  Patient signature                                                         Date      Time    ____________________________________________________     ____________  ________  Witness                                                          Date      Time    ____________________________________________________  Provider signature

## 2019-08-27 NOTE — NURSING NOTE
"Chief Complaint   Patient presents with     RECHECK     Anxiety, depression.  Patient feels anxiety is better.  Would like a refill on Lorazepam.  Has not taken in 2 months.  Discuss ADHD medications     Smoking Cessation     patient is interested in quitting smoking.       Initial /76 (BP Location: Left arm, Patient Position: Sitting, Cuff Size: Adult Regular)   Pulse 78   Temp 98.2  F (36.8  C) (Tympanic)   Ht 1.626 m (5' 4\")   Wt 49.9 kg (110 lb)   SpO2 97%   BMI 18.88 kg/m   Estimated body mass index is 18.88 kg/m  as calculated from the following:    Height as of this encounter: 1.626 m (5' 4\").    Weight as of this encounter: 49.9 kg (110 lb).  Medication Reconciliation: complete    "

## 2019-08-28 ASSESSMENT — ANXIETY QUESTIONNAIRES: GAD7 TOTAL SCORE: 19

## 2019-08-29 PROBLEM — F41.1 GAD (GENERALIZED ANXIETY DISORDER): Status: ACTIVE | Noted: 2019-08-29

## 2019-08-29 PROBLEM — F90.2 ATTENTION DEFICIT HYPERACTIVITY DISORDER (ADHD), COMBINED TYPE: Status: ACTIVE | Noted: 2019-08-29

## 2019-09-14 ENCOUNTER — TRANSFERRED RECORDS (OUTPATIENT)
Dept: HEALTH INFORMATION MANAGEMENT | Facility: CLINIC | Age: 39
End: 2019-09-14

## 2019-09-18 DIAGNOSIS — F51.01 PRIMARY INSOMNIA: ICD-10-CM

## 2019-09-18 NOTE — TELEPHONE ENCOUNTER
Temazepam 30mg      Last Written Prescription Date:  8/19/2019  Last Fill Quantity: 30,   # refills: 0  Last Office Visit: 8/27/2019  Future Office visit:    Next 5 appointments (look out 90 days)    Oct 28, 2019  1:00 PM CDT  (Arrive by 12:45 PM)  Return Visit with Brandy Abebe MD  [facility] ([facility] ) [address] [place] [id number] [phone]           Routing refill request to provider for review/approval because:  Drug not on the [facility], [facility] or [facility] refill protocol or controlled substance

## 2019-09-20 RX ORDER — TEMAZEPAM 30 MG
CAPSULE ORAL
Qty: 30 CAPSULE | Refills: 0 | Status: SHIPPED | OUTPATIENT
Start: 2019-09-19 | End: 2019-10-17

## 2019-09-20 RX ORDER — TEMAZEPAM 30 MG
CAPSULE ORAL
Qty: 30 CAPSULE | Refills: 0 | Status: SHIPPED | OUTPATIENT
Start: 2019-09-19 | End: 2019-09-20

## 2019-09-27 DIAGNOSIS — F41.1 GAD (GENERALIZED ANXIETY DISORDER): ICD-10-CM

## 2019-09-30 RX ORDER — LORAZEPAM 0.5 MG/1
TABLET ORAL
Qty: 30 TABLET | Refills: 1 | OUTPATIENT
Start: 2019-10-25

## 2019-09-30 NOTE — TELEPHONE ENCOUNTER
Ativan -  reviewed.  Start date adjusted.   Last visit: 8.27.19  Last refill: 9.27.19    Future appointments:   Next 5 appointments (look out 90 days)    Oct 28, 2019  1:00 PM CDT  (Arrive by 12:45 PM)  Return Visit with Brandy Abebe MD  [facility] ([facility] ) [address] [place] [phone]

## 2019-09-30 NOTE — TELEPHONE ENCOUNTER
11:37 AM    Contacted patient and advised that  is not comfortable continuing to prescribe both Ativan and Temazepam.  Patient was fine with this and she would prefer to continue the Temazepam and discontinue the Ativan.  Patient has not taken the Ativan in about 2 weeks and usually doesn't take daily.    Patient states everything is going well with surgery being done and daughter back in school.     Patient doesn't believe she needs anything else or anxiety at this time and feels as if current medications are working fine.    Gave patient CC contact information and encouraged her to call with any changes, questions, or concerns.     Gina García, RN-BSN  Care Coordination, Behavioral Health

## 2019-09-30 NOTE — TELEPHONE ENCOUNTER
1:07 PM    Notified Dr. Abebe of patient's decision.  Due to patient not taking for 2 weeks, she does not need to taper.  Discontinued Ativan.      Gina García, RN-BSN  Care Coordination, Behavioral Health

## 2019-09-30 NOTE — TELEPHONE ENCOUNTER
We need to have Ayesha choose one or the either. I'm not comfortable having her continue on two benzodiazepines. When you have time please call and check in with her as to which she wants to keep..

## 2019-10-17 DIAGNOSIS — F51.01 PRIMARY INSOMNIA: ICD-10-CM

## 2019-10-17 DIAGNOSIS — F90.2 ADHD (ATTENTION DEFICIT HYPERACTIVITY DISORDER), COMBINED TYPE: ICD-10-CM

## 2019-10-18 DIAGNOSIS — G43.109 MIGRAINE WITH AURA AND WITHOUT STATUS MIGRAINOSUS, NOT INTRACTABLE: ICD-10-CM

## 2019-10-18 RX ORDER — METHYLPHENIDATE HYDROCHLORIDE 10 MG/1
10 TABLET ORAL 2 TIMES DAILY
Qty: 60 TABLET | Refills: 0 | Status: CANCELLED | OUTPATIENT
Start: 2019-10-18

## 2019-10-18 RX ORDER — METHYLPHENIDATE HYDROCHLORIDE 10 MG/1
TABLET ORAL
Qty: 60 TABLET | Refills: 0 | OUTPATIENT
Start: 2019-10-18

## 2019-10-18 RX ORDER — TEMAZEPAM 30 MG
CAPSULE ORAL
Qty: 30 CAPSULE | Refills: 0 | Status: SHIPPED | OUTPATIENT
Start: 2019-10-18 | End: 2019-11-19

## 2019-10-18 NOTE — TELEPHONE ENCOUNTER
Temazepam -  reviewed   Last visit: 8.27.19  Last refill: 9.20.19    Future appointments:   Next 5 appointments (look out 90 days)    Oct 28, 2019  1:00 PM CDT  (Arrive by 12:45 PM)  Return Visit with Brandy Abebe MD  [facility] ([facility] ) [address] [place] [id number]  [phone]        Methylphenidate refused.  Last office visit 8.27.19 -  gave patient Rx for September and October.  Per  patient only filled 1 of the 2.

## 2019-10-21 ENCOUNTER — TELEPHONE (OUTPATIENT)
Dept: PSYCHIATRY | Facility: OTHER | Age: 39
End: 2019-10-21

## 2019-10-21 NOTE — TELEPHONE ENCOUNTER
Propranolol      Last Written Prescription Date:  4/8/19  Last Fill Quantity: 30,   # refills: 3  Last Office Visit: 8/27/19  Future Office visit:    Next 5 appointments (look out 90 days)    Oct 28, 2019  1:00 PM CDT  (Arrive by 12:45 PM)  Return Visit with Brandy Abebe MD  [facility] ([facility] ) [address] [place] [id number] [phone]           Routing refill request to provider for review/approval because:

## 2019-10-21 NOTE — TELEPHONE ENCOUNTER
Patient is requesting a refill of Ritalin 10 mg tablet.  She reports the medication has been helping and she did not start out taking it twice daily.  Last refill was on 9-25-19 for 60 tablets.  She is not sure about this date because she has been out of medication for 2 days.  Patient uses [facility] and feels she will wait until her f/u appt on 10-28-19 to  hard copy script.  Thank you, please advise.

## 2019-10-22 NOTE — TELEPHONE ENCOUNTER
had filled at one point as patient must have need emergent refill.  Patient has Propranolol 80 mg on med list but pharmacy is requesting 60 mg.  Please advise.

## 2019-10-22 NOTE — TELEPHONE ENCOUNTER
Okay, sounds like I'll hold off on fill it then until her appointment next Monday given she'd rather wait to  the script.

## 2019-10-22 NOTE — TELEPHONE ENCOUNTER
Please clarify dose with patient and let me know which to fill    Adrienne Dailey [facility]  [phone]

## 2019-10-23 DIAGNOSIS — G43.009 MIGRAINE WITHOUT AURA AND WITHOUT STATUS MIGRAINOSUS, NOT INTRACTABLE: ICD-10-CM

## 2019-10-23 RX ORDER — PROPRANOLOL HYDROCHLORIDE 80 MG/1
80 CAPSULE, EXTENDED RELEASE ORAL DAILY
Qty: 30 CAPSULE | Refills: 3 | Status: SHIPPED | OUTPATIENT
Start: 2019-10-23 | End: 2020-01-14

## 2019-10-23 RX ORDER — PROPRANOLOL HCL 60 MG
CAPSULE, EXTENDED RELEASE 24HR ORAL
Qty: 30 CAPSULE | Refills: 3 | OUTPATIENT
Start: 2019-10-23

## 2019-10-28 ENCOUNTER — OFFICE VISIT (OUTPATIENT)
Dept: PSYCHIATRY | Facility: OTHER | Age: 39
End: 2019-10-28
Attending: PSYCHIATRY & NEUROLOGY
Payer: COMMERCIAL

## 2019-10-28 VITALS
WEIGHT: 113 LBS | TEMPERATURE: 99.2 F | DIASTOLIC BLOOD PRESSURE: 58 MMHG | OXYGEN SATURATION: 96 % | BODY MASS INDEX: 19.29 KG/M2 | HEIGHT: 64 IN | HEART RATE: 92 BPM | SYSTOLIC BLOOD PRESSURE: 104 MMHG

## 2019-10-28 DIAGNOSIS — F90.2 ADHD (ATTENTION DEFICIT HYPERACTIVITY DISORDER), COMBINED TYPE: ICD-10-CM

## 2019-10-28 PROCEDURE — 99214 OFFICE O/P EST MOD 30 MIN: CPT | Performed by: PSYCHIATRY & NEUROLOGY

## 2019-10-28 PROCEDURE — G0463 HOSPITAL OUTPT CLINIC VISIT: HCPCS

## 2019-10-28 RX ORDER — METHYLPHENIDATE HYDROCHLORIDE 10 MG/1
10 TABLET ORAL 2 TIMES DAILY
Qty: 60 TABLET | Refills: 0 | Status: CANCELLED | OUTPATIENT
Start: 2019-10-28

## 2019-10-28 RX ORDER — METHYLPHENIDATE HYDROCHLORIDE 10 MG/1
10 TABLET ORAL DAILY
Qty: 30 TABLET | Refills: 0 | Status: SHIPPED | OUTPATIENT
Start: 2019-10-28 | End: 2019-10-28

## 2019-10-28 RX ORDER — METHYLPHENIDATE HYDROCHLORIDE 10 MG/1
10 CAPSULE, EXTENDED RELEASE ORAL DAILY
Qty: 30 CAPSULE | Refills: 0 | Status: SHIPPED | OUTPATIENT
Start: 2019-11-25 | End: 2019-11-19

## 2019-10-28 RX ORDER — METHYLPHENIDATE HYDROCHLORIDE 10 MG/1
10 TABLET ORAL DAILY
Qty: 30 TABLET | Refills: 0 | Status: SHIPPED | OUTPATIENT
Start: 2019-11-25 | End: 2019-11-19

## 2019-10-28 RX ORDER — METHYLPHENIDATE HYDROCHLORIDE 10 MG/1
10 CAPSULE, EXTENDED RELEASE ORAL DAILY
Qty: 30 CAPSULE | Refills: 0 | Status: SHIPPED | OUTPATIENT
Start: 2019-10-28 | End: 2019-10-28

## 2019-10-28 ASSESSMENT — PATIENT HEALTH QUESTIONNAIRE - PHQ9
5. POOR APPETITE OR OVEREATING: NEARLY EVERY DAY
SUM OF ALL RESPONSES TO PHQ QUESTIONS 1-9: 11

## 2019-10-28 ASSESSMENT — ANXIETY QUESTIONNAIRES
6. BECOMING EASILY ANNOYED OR IRRITABLE: NEARLY EVERY DAY
3. WORRYING TOO MUCH ABOUT DIFFERENT THINGS: NEARLY EVERY DAY
7. FEELING AFRAID AS IF SOMETHING AWFUL MIGHT HAPPEN: NEARLY EVERY DAY
GAD7 TOTAL SCORE: 21
1. FEELING NERVOUS, ANXIOUS, OR ON EDGE: NEARLY EVERY DAY
5. BEING SO RESTLESS THAT IT IS HARD TO SIT STILL: NEARLY EVERY DAY
2. NOT BEING ABLE TO STOP OR CONTROL WORRYING: NEARLY EVERY DAY

## 2019-10-28 ASSESSMENT — PAIN SCALES - GENERAL: PAINLEVEL: NO PAIN (0)

## 2019-10-28 ASSESSMENT — MIFFLIN-ST. JEOR: SCORE: 1172.56

## 2019-10-28 NOTE — NURSING NOTE
"Chief Complaint   Patient presents with     RECHECK     Mental health.  Discuss Ritalin.       Initial /58 (BP Location: Right arm, Patient Position: Sitting, Cuff Size: Adult Large)   Pulse 92   Temp 99.2  F (37.3  C) (Tympanic)   Ht 1.626 m (5' 4\")   Wt 51.3 kg (113 lb)   SpO2 96%   BMI 19.40 kg/m   Estimated body mass index is 19.4 kg/m  as calculated from the following:    Height as of this encounter: 1.626 m (5' 4\").    Weight as of this encounter: 51.3 kg (113 lb).  Medication Reconciliation: complete  JADA CASTRO LPN    "
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
## 2019-10-29 ASSESSMENT — ANXIETY QUESTIONNAIRES: GAD7 TOTAL SCORE: 21

## 2019-11-19 DIAGNOSIS — F51.01 PRIMARY INSOMNIA: ICD-10-CM

## 2019-11-19 DIAGNOSIS — F90.2 ADHD (ATTENTION DEFICIT HYPERACTIVITY DISORDER), COMBINED TYPE: Primary | ICD-10-CM

## 2019-11-19 RX ORDER — METHYLPHENIDATE HYDROCHLORIDE 18 MG/1
18 TABLET, EXTENDED RELEASE ORAL EVERY MORNING
Qty: 30 TABLET | Refills: 0 | Status: SHIPPED | OUTPATIENT
Start: 2019-11-19 | End: 2020-02-18

## 2019-11-21 NOTE — TELEPHONE ENCOUNTER
temazepam (RESTORIL) 30 MG capsule      Last Written Prescription Date:  10/18/19  Last Fill Quantity: 30,   # refills: 0  Last Office Visit: 10/28/19  Future Office visit:    Next 5 appointments (look out 90 days)    Dec 17, 2019  1:20 PM CST  (Arrive by 1:05 PM)  [facility] [address] [place] [id number]  [phone]           Routing refill request to provider for review/approval because:  Drug not on the [facility], [facility] or [facility] refill protocol or controlled substance

## 2019-11-22 RX ORDER — TEMAZEPAM 30 MG
CAPSULE ORAL
Qty: 30 CAPSULE | Refills: 0 | Status: SHIPPED | OUTPATIENT
Start: 2019-11-22 | End: 2019-12-18

## 2019-11-30 DIAGNOSIS — F41.1 GAD (GENERALIZED ANXIETY DISORDER): ICD-10-CM

## 2019-12-02 RX ORDER — DULOXETIN HYDROCHLORIDE 60 MG/1
60 CAPSULE, DELAYED RELEASE ORAL DAILY
Qty: 30 CAPSULE | Refills: 11 | Status: SHIPPED | OUTPATIENT
Start: 2019-12-02 | End: 2020-12-28

## 2019-12-17 DIAGNOSIS — F90.2 ADHD (ATTENTION DEFICIT HYPERACTIVITY DISORDER), COMBINED TYPE: ICD-10-CM

## 2019-12-17 DIAGNOSIS — F51.01 PRIMARY INSOMNIA: ICD-10-CM

## 2019-12-18 ENCOUNTER — OFFICE VISIT (OUTPATIENT)
Dept: PSYCHIATRY | Facility: OTHER | Age: 39
End: 2019-12-18
Attending: PSYCHIATRY & NEUROLOGY
Payer: MEDICAID

## 2019-12-18 VITALS
OXYGEN SATURATION: 98 % | WEIGHT: 110 LBS | DIASTOLIC BLOOD PRESSURE: 60 MMHG | SYSTOLIC BLOOD PRESSURE: 108 MMHG | HEART RATE: 94 BPM | BODY MASS INDEX: 18.78 KG/M2 | TEMPERATURE: 99.4 F | HEIGHT: 64 IN

## 2019-12-18 DIAGNOSIS — F51.01 PRIMARY INSOMNIA: ICD-10-CM

## 2019-12-18 DIAGNOSIS — F90.2 ADHD (ATTENTION DEFICIT HYPERACTIVITY DISORDER), COMBINED TYPE: ICD-10-CM

## 2019-12-18 PROCEDURE — 99213 OFFICE O/P EST LOW 20 MIN: CPT | Performed by: PSYCHIATRY & NEUROLOGY

## 2019-12-18 PROCEDURE — G0463 HOSPITAL OUTPT CLINIC VISIT: HCPCS

## 2019-12-18 RX ORDER — METHYLPHENIDATE HYDROCHLORIDE 18 MG/1
TABLET ORAL
Qty: 30 TABLET | Refills: 0 | OUTPATIENT
Start: 2019-12-18

## 2019-12-18 RX ORDER — TEMAZEPAM 30 MG
30 CAPSULE ORAL
Qty: 30 CAPSULE | Refills: 0 | Status: SHIPPED | OUTPATIENT
Start: 2019-12-18 | End: 2020-01-14

## 2019-12-18 RX ORDER — METHYLPHENIDATE HYDROCHLORIDE 18 MG/1
18 TABLET, EXTENDED RELEASE ORAL EVERY MORNING
Qty: 30 TABLET | Refills: 0 | Status: CANCELLED | OUTPATIENT
Start: 2019-12-18 | End: 2020-01-17

## 2019-12-18 RX ORDER — METHYLPHENIDATE HYDROCHLORIDE 18 MG/1
18 TABLET, EXTENDED RELEASE ORAL EVERY MORNING
Qty: 30 TABLET | Refills: 0 | Status: CANCELLED | OUTPATIENT
Start: 2020-01-17

## 2019-12-18 RX ORDER — TEMAZEPAM 30 MG
CAPSULE ORAL
Qty: 30 CAPSULE | Refills: 0 | OUTPATIENT
Start: 2019-12-18

## 2019-12-18 RX ORDER — METHYLPHENIDATE HYDROCHLORIDE 20 MG/1
20 CAPSULE, EXTENDED RELEASE ORAL DAILY
Qty: 30 CAPSULE | Refills: 0 | Status: SHIPPED | OUTPATIENT
Start: 2019-12-18 | End: 2020-02-18

## 2019-12-18 RX ORDER — METHYLPHENIDATE HYDROCHLORIDE 20 MG/1
20 CAPSULE, EXTENDED RELEASE ORAL DAILY
Qty: 30 CAPSULE | Refills: 0 | Status: SHIPPED | OUTPATIENT
Start: 2020-01-15 | End: 2020-02-18

## 2019-12-18 ASSESSMENT — PATIENT HEALTH QUESTIONNAIRE - PHQ9
SUM OF ALL RESPONSES TO PHQ QUESTIONS 1-9: 7
5. POOR APPETITE OR OVEREATING: MORE THAN HALF THE DAYS

## 2019-12-18 ASSESSMENT — ANXIETY QUESTIONNAIRES
2. NOT BEING ABLE TO STOP OR CONTROL WORRYING: SEVERAL DAYS
3. WORRYING TOO MUCH ABOUT DIFFERENT THINGS: NEARLY EVERY DAY
7. FEELING AFRAID AS IF SOMETHING AWFUL MIGHT HAPPEN: SEVERAL DAYS
6. BECOMING EASILY ANNOYED OR IRRITABLE: SEVERAL DAYS
1. FEELING NERVOUS, ANXIOUS, OR ON EDGE: SEVERAL DAYS
GAD7 TOTAL SCORE: 10
IF YOU CHECKED OFF ANY PROBLEMS ON THIS QUESTIONNAIRE, HOW DIFFICULT HAVE THESE PROBLEMS MADE IT FOR YOU TO DO YOUR WORK, TAKE CARE OF THINGS AT HOME, OR GET ALONG WITH OTHER PEOPLE: SOMEWHAT DIFFICULT
5. BEING SO RESTLESS THAT IT IS HARD TO SIT STILL: SEVERAL DAYS

## 2019-12-18 ASSESSMENT — PAIN SCALES - GENERAL: PAINLEVEL: NO PAIN (0)

## 2019-12-18 ASSESSMENT — MIFFLIN-ST. JEOR: SCORE: 1158.96

## 2019-12-18 NOTE — PROGRESS NOTES
PSYCHIATRY CLINIC PROGRESS NOTE   40 minute medication management, more than 50% of time spent counseling patient on medications, medication side effects, symptom history and management   SUBJECTIVE / INTERIM HISTORY                                                                        Social- works [employer]-  3 kids. Yassine 6 yo  Last visit October '19: Ritalin LA 10 mg am and Ritalin 10 mg afternoon gave scripts for today 10/28/19 and for 11/25/19.  Continue temazepam 30 mg HS. We discontinued  lorazepam 0.5 mg daily prn  Continue Cymbalta 60 mg daily.  -  Ritalin LA is helping. Doesn't even have take the immediate release in afternoon. She is thinking bit room for improvement hence try an increase   - 18 yo Terra is having a hard time with depression and etoh. He has a two year degree in electrical. Likely he is going to move to [employer]. He has been doing a little nikko  - Dameon been doing better. Ever since he had the stents in he is doing better.  - is helping take care of her dad nearly every day now  - friends / family, even her boss has commented on noticing improvement in certain things since she has been taking Ritalin  - interested in smoking cessation  - she is living in the house with Dameon out in [employer] and at [employer]    SUBSTANCE USE- etoh (likely abuse rather than dependence) and no longer drinks. MJ    SYMPTOMS-    - ADHD : difficulty attention / focus, easily misplaces, blurts in conversations, difficult to do focused task.   Depression: these are improving but still has sx: depressed mood, low energy, insomnia, weight changes, feeling hopeless, feeling trapped, excessive crying and overwhelmed  Alise/Hypomania:  none  Anxiety:  STILL: excessive worry, feeling fearful, social anxiety and nervous/overwhelmed. Panic attacks: feels buzzing and sweating  Trauma-related:  negative beliefs / emotions    MEDICAL ROS- migraines but have gotten better, weight loss along with  "anxiety and depression, heartburn  MEDICAL / SURGICAL HISTORY                pregnant [if applicable]--no     Patient Active Problem List   Diagnosis     Migraine with aura and without status migrainosus, not intractable     Anxiety state     Primary insomnia     ACP (advance care planning)     Gastroesophageal reflux disease, esophagitis presence not specified     Vitamin deficiency     Recurrent major depressive disorder (H)     Tobacco abuse     Hydronephrosis     Panic disorder without agoraphobia     MAYKEL (generalized anxiety disorder)     Attention deficit hyperactivity disorder (ADHD), combined type     ALLERGY   Dust mite extract; Hydrocodone; and Penicillins  MEDICATIONS                                                                                             Current Outpatient Medications   Medication Sig     DULoxetine (CYMBALTA) 60 MG capsule TAKE 1 CAPSULE (60 MG) BY MOUTH DAILY     MAGNESIUM OXIDE PO Take 400 mg by mouth daily Take 2 tabs evening     Methylphenidate HCl ER 18 MG 24H tablet Take 1 tablet (18 mg) by mouth every morning     multivitamin, therapeutic (THERA-VIT) TABS Take 1 tablet by mouth daily     nicotine (NICODERM CQ) 14 MG/24HR 24 hr patch Place 1 patch onto the skin every 24 hours     omeprazole (PRILOSEC) 40 MG DR capsule TAKE 1 CAPSULE BY MOUTH ONCE DAILY 30-60 MINUTES BEFORE A MEAL.     propranolol ER (INDERAL LA) 80 MG 24 hr capsule Take 1 capsule (80 mg) by mouth daily     temazepam (RESTORIL) 30 MG capsule TAKE 1 CAPSULE (30MG) BY MOUTH AT BEDTIME AS NEEDED FOR SLEEP.     No current facility-administered medications for this visit.        VITALS   /60 (BP Location: Right arm, Patient Position: Sitting, Cuff Size: Adult Regular)   Pulse 94   Temp 99.4  F (37.4  C) (Tympanic)   Ht 1.626 m (5' 4\")   Wt 49.9 kg (110 lb)   SpO2 98%   BMI 18.88 kg/m       PHQ9                     [unfilled]  LABS                                                                       "                                                   Last Basic Metabolic Panel:  Lab Results   Component Value Date     01/26/2017      Lab Results   Component Value Date    POTASSIUM 4.2 01/26/2017     Lab Results   Component Value Date    CHLORIDE 105 01/26/2017     Lab Results   Component Value Date    RAÚL 8.7 01/26/2017     Lab Results   Component Value Date    CO2 26 01/26/2017     Lab Results   Component Value Date    BUN 10 01/26/2017     Lab Results   Component Value Date    CR 0.72 01/26/2017     Lab Results   Component Value Date    GLC 86 01/26/2017     Liver Function Studies -   Recent Labs   Lab Test 01/15/17  08/01/16   1905   PROTTOTAL   --   7.5   ALBUMIN   --   4.1   BILITOTAL   --   0.2   ALKPHOS   --   57   AST  24  24   ALT  15  19     CBC RESULTS:   Recent Labs   Lab Test  11/13/17   1044   WBC  8.6   RBC  4.16   HGB  13.3   HCT  40.5   MCV  97   MCH  32.0   MCHC  32.8   RDW  12.1   PLT  298       MENTAL STATUS EXAM                                                                                        Alert. Oriented to person, place, and date / time. Well groomed, calm, cooperative with good eye contact. No problems with speech or psychomotor behavior. Mood was described as  anxious and affect was congruent to speech content and full range. Thought process, including associations, was unremarkable and thought content was devoid of suicidal and homicidal ideation and psychotic thought. No hallucinations. Insight was good. Judgment was intact and adequate for safety. Fund of knowledge was intact. Pt demonstrates no obvious problems with attention, concentration, language, recent or remote memory although these were not formally tested.     ASSESSMENT                                                                                                      HISTORICAL:  Initial psych note 3/9/18          NOTES:    Ayesha Ortiz is a 38 yo with MDD, MAYKEL, hx couple psychiatric hospital stays. Ayesha had a  recent stay in [facility] prior to us first meeting and was continued on temazepam for insomnia, lorazepam as prn and zoloft 200 mg daily. Started on olanzapine 5 mg HS. We discontinued lorazepam (is on temazepam also) as was on two benzos.     Given anxiety slowly improving she felt ready to revisit ADHD - we both agreed start again with methylphenidate IR. IS helping, just not lasting long enough. We agreed on trying an LA of RItalin . Ayesha hasn't needed to take the afternoon IR and prefers stick with once daily of the LA. Room for improvement with sx so we agreed on tryiing an increase of dose from 10 mg to 20 mg.       TREATMENT RISK STATEMENT:  The risks, benefits, alternatives and potential adverse effects have been explained and are understood by the pt.  The pt agrees to the treatment plan with the ability to do so.   The pt knows to call the clinic for any problems or access emergency care if needed.          DIAGNOSES                    MDD, recurrent, mild  ADHD  MAYKEL     PLAN                                                                                                                     1)  MEDICATIONS:         --Ritalin LA 20 mg daily and filled for 12/18/19 and for 1/15/19.  Continue temazepam 30 mg HS.   Continue Cymbalta 60 mg daily.     2)  THERAPY:  No Change     3)  LABS:  None     4)  PT MONITOR [call for probs]:  Worsening symptoms, SI/HI, SEs from meds     5)  REFERRALS [CD, medical, other]:  None     6)  RTC:  ~2 months

## 2019-12-18 NOTE — NURSING NOTE
"Chief Complaint   Patient presents with     RECHECK     ADHD, depression, anxiety.  Patient c/o \"terrible night sweats\" and circulation issues.       Initial /60 (BP Location: Right arm, Patient Position: Sitting, Cuff Size: Adult Regular)   Pulse 94   Temp 99.4  F (37.4  C) (Tympanic)   Ht 1.626 m (5' 4\")   Wt 49.9 kg (110 lb)   SpO2 98%   BMI 18.88 kg/m   Estimated body mass index is 18.88 kg/m  as calculated from the following:    Height as of this encounter: 1.626 m (5' 4\").    Weight as of this encounter: 49.9 kg (110 lb).  Medication Reconciliation: complete  JADA CASRTO LPN    "

## 2019-12-19 ASSESSMENT — ANXIETY QUESTIONNAIRES: GAD7 TOTAL SCORE: 10

## 2020-01-14 DIAGNOSIS — F51.01 PRIMARY INSOMNIA: ICD-10-CM

## 2020-01-14 DIAGNOSIS — G43.009 MIGRAINE WITHOUT AURA AND WITHOUT STATUS MIGRAINOSUS, NOT INTRACTABLE: ICD-10-CM

## 2020-01-14 RX ORDER — PROPRANOLOL HYDROCHLORIDE 80 MG/1
80 CAPSULE, EXTENDED RELEASE ORAL DAILY
Qty: 30 CAPSULE | Refills: 5 | Status: SHIPPED | OUTPATIENT
Start: 2020-01-14 | End: 2020-05-22

## 2020-01-14 RX ORDER — TEMAZEPAM 30 MG
30 CAPSULE ORAL
Qty: 30 CAPSULE | Refills: 0 | Status: SHIPPED | OUTPATIENT
Start: 2020-01-16 | End: 2020-02-06

## 2020-01-14 NOTE — TELEPHONE ENCOUNTER
Restoril -  reviewed.  Rx post dated   Last visit: 12.18.19  Last refill: 12.18.19    Future appointments:   Next 5 appointments (look out 90 days)    Feb 18, 2020  1:00 PM CST  (Arrive by 12:45 PM)  Return Visit with Brandy Abebe MD  [facility] ) [address] [place] [id number] [phone]

## 2020-01-23 DIAGNOSIS — K21.9 GASTROESOPHAGEAL REFLUX DISEASE WITHOUT ESOPHAGITIS: ICD-10-CM

## 2020-01-24 RX ORDER — OMEPRAZOLE 40 MG/1
CAPSULE, DELAYED RELEASE ORAL
Qty: 30 CAPSULE | Refills: 0 | Status: SHIPPED | OUTPATIENT
Start: 2020-01-24 | End: 2020-02-26

## 2020-01-24 NOTE — TELEPHONE ENCOUNTER
Patient has not been seen by provider in over a year   LOV 9/2018  Prilosec 12/20/2019  Qty 30 capsule 0 refills

## 2020-02-06 ENCOUNTER — TELEPHONE (OUTPATIENT)
Dept: FAMILY MEDICINE | Facility: OTHER | Age: 40
End: 2020-02-06

## 2020-02-06 DIAGNOSIS — F51.01 PRIMARY INSOMNIA: ICD-10-CM

## 2020-02-06 RX ORDER — TEMAZEPAM 15 MG/1
15 CAPSULE ORAL
Qty: 30 CAPSULE | Refills: 0 | Status: SHIPPED | OUTPATIENT
Start: 2020-02-06 | End: 2020-02-18 | Stop reason: DRUGHIGH

## 2020-02-06 NOTE — TELEPHONE ENCOUNTER
Pt requesting to cut down on Restoril, currently taking 30mg for sleep  Would like to decrease to 15mg and slowly wean off    Leaving town before Brandy is back    Requesting someone to fill the 15mg med     Please call pt.    Primary is Adrienne, if Adrienne won't/can't decrease medication please send to  psychiatry.  Thanks. Bianka Chan LPN

## 2020-02-07 NOTE — TELEPHONE ENCOUNTER
15 mg dose sent to [facility]  Next taper dose is 7.5 mg    [facility]  [phone]

## 2020-02-18 ENCOUNTER — OFFICE VISIT (OUTPATIENT)
Dept: PSYCHIATRY | Facility: OTHER | Age: 40
End: 2020-02-18
Attending: PSYCHIATRY & NEUROLOGY
Payer: COMMERCIAL

## 2020-02-18 VITALS
SYSTOLIC BLOOD PRESSURE: 112 MMHG | BODY MASS INDEX: 18.78 KG/M2 | WEIGHT: 110 LBS | OXYGEN SATURATION: 99 % | HEART RATE: 84 BPM | HEIGHT: 64 IN | DIASTOLIC BLOOD PRESSURE: 68 MMHG | TEMPERATURE: 99.2 F

## 2020-02-18 DIAGNOSIS — Z71.6 ENCOUNTER FOR SMOKING CESSATION COUNSELING: ICD-10-CM

## 2020-02-18 DIAGNOSIS — F90.2 ADHD (ATTENTION DEFICIT HYPERACTIVITY DISORDER), COMBINED TYPE: Primary | ICD-10-CM

## 2020-02-18 DIAGNOSIS — G47.00 PERSISTENT INSOMNIA: ICD-10-CM

## 2020-02-18 PROCEDURE — G0463 HOSPITAL OUTPT CLINIC VISIT: HCPCS

## 2020-02-18 PROCEDURE — 99214 OFFICE O/P EST MOD 30 MIN: CPT | Performed by: PSYCHIATRY & NEUROLOGY

## 2020-02-18 RX ORDER — METHYLPHENIDATE HYDROCHLORIDE 20 MG/1
20 CAPSULE, EXTENDED RELEASE ORAL DAILY
Qty: 30 CAPSULE | Refills: 0 | Status: SHIPPED | OUTPATIENT
Start: 2020-02-18 | End: 2020-04-20

## 2020-02-18 RX ORDER — METHYLPHENIDATE HYDROCHLORIDE 20 MG/1
20 CAPSULE, EXTENDED RELEASE ORAL DAILY
Qty: 30 CAPSULE | Refills: 0 | Status: SHIPPED | OUTPATIENT
Start: 2020-03-18 | End: 2020-04-20

## 2020-02-18 RX ORDER — NICOTINE 21 MG/24HR
1 PATCH, TRANSDERMAL 24 HOURS TRANSDERMAL EVERY 24 HOURS
Qty: 28 PATCH | Refills: 3 | Status: SHIPPED | OUTPATIENT
Start: 2020-02-18 | End: 2020-05-20

## 2020-02-18 RX ORDER — TEMAZEPAM 15 MG/1
CAPSULE ORAL
Qty: 60 CAPSULE | Refills: 1 | Status: SHIPPED | OUTPATIENT
Start: 2020-02-18 | End: 2020-03-17

## 2020-02-18 ASSESSMENT — PATIENT HEALTH QUESTIONNAIRE - PHQ9
5. POOR APPETITE OR OVEREATING: NEARLY EVERY DAY
SUM OF ALL RESPONSES TO PHQ QUESTIONS 1-9: 18

## 2020-02-18 ASSESSMENT — PAIN SCALES - GENERAL: PAINLEVEL: NO PAIN (0)

## 2020-02-18 ASSESSMENT — ANXIETY QUESTIONNAIRES
1. FEELING NERVOUS, ANXIOUS, OR ON EDGE: NEARLY EVERY DAY
IF YOU CHECKED OFF ANY PROBLEMS ON THIS QUESTIONNAIRE, HOW DIFFICULT HAVE THESE PROBLEMS MADE IT FOR YOU TO DO YOUR WORK, TAKE CARE OF THINGS AT HOME, OR GET ALONG WITH OTHER PEOPLE: EXTREMELY DIFFICULT
7. FEELING AFRAID AS IF SOMETHING AWFUL MIGHT HAPPEN: NEARLY EVERY DAY
GAD7 TOTAL SCORE: 21
6. BECOMING EASILY ANNOYED OR IRRITABLE: NEARLY EVERY DAY
2. NOT BEING ABLE TO STOP OR CONTROL WORRYING: NEARLY EVERY DAY
3. WORRYING TOO MUCH ABOUT DIFFERENT THINGS: NEARLY EVERY DAY
5. BEING SO RESTLESS THAT IT IS HARD TO SIT STILL: NEARLY EVERY DAY

## 2020-02-18 ASSESSMENT — MIFFLIN-ST. JEOR: SCORE: 1158.96

## 2020-02-18 NOTE — PROGRESS NOTES
PSYCHIATRY CLINIC PROGRESS NOTE   40 minute medication management, more than 50% of time spent counseling patient on medications, medication side effects, symptom history and management   SUBJECTIVE / INTERIM HISTORY                                                                        Social- works [employer]-  3 kids. Yassine 6 yo  Last visit December '19: --Ritalin LA 20 mg daily and filled for 12/18/19 and for 1/15/19.  Continue temazepam 30 mg HS.   Continue Cymbalta 60 mg daily.     - having a lot of anxiety. Lots of stuff going on. Lawrence tormenting her. For one, he won't sign the papers. Also, Yassine comes back from with him and emotionally not doing well. Also, not getting her homework done, not remembering her glasses, etc. Yassine   - hasn't talked to Daniel 23 yo since Ayesha and Lawrence broke up. She texts him, tries to call, etc.   - not working right now as doesn't feel she can. still taking care of her dad a ton. Taking care of Yassine.  - temazepam helped more at 30 mg. Would prefer to go back to 15 mg but have option to take second 15 mg second time (after she is done with everything including reading to Yassine)  -  Ritalin LA is helping. Doesn't even have take the immediate release in afternoon. She is thinking bit room for improvement hence try an increase   - 18 yo Terra is having a hard time with depression and etoh. He has a two year degree in electrical. Likely he is going to move to [employer]. He has been doing a little nikko  - friends / family, even her boss has commented on noticing improvement in certain things since she has been taking Ritalin  - interested in smoking cessation  - she is living in the house with Dameon out in [employer] and at [employer]    SUBSTANCE USE- etoh (likely abuse rather than dependence) and no longer drinks. MJ    SYMPTOMS-    - ADHD : difficulty attention / focus, easily misplaces, blurts in conversations, difficult to do focused task.   Anxiety:   "STILL: excessive worry, feeling fearful, social anxiety and nervous/overwhelmed. Panic attacks: feels buzzing and sweating    MEDICAL ROS- migraines. Heartburn. Weight gain since quit smoking.  MEDICAL / SURGICAL HISTORY                pregnant [if applicable]--no     Patient Active Problem List   Diagnosis     Migraine with aura and without status migrainosus, not intractable     Anxiety state     Primary insomnia     ACP (advance care planning)     Gastroesophageal reflux disease, esophagitis presence not specified     Vitamin deficiency     Recurrent major depressive disorder (H)     Tobacco abuse     Hydronephrosis     Panic disorder without agoraphobia     MAYKEL (generalized anxiety disorder)     Attention deficit hyperactivity disorder (ADHD), combined type     ALLERGY   Dust mite extract; Hydrocodone; and Penicillins  MEDICATIONS                                                                                             Current Outpatient Medications   Medication Sig     DULoxetine (CYMBALTA) 60 MG capsule TAKE 1 CAPSULE (60 MG) BY MOUTH DAILY     MAGNESIUM OXIDE PO Take 400 mg by mouth daily Take 2 tabs evening     multivitamin, therapeutic (THERA-VIT) TABS Take 1 tablet by mouth daily     nicotine (NICODERM CQ) 14 MG/24HR 24 hr patch Place 1 patch onto the skin every 24 hours     omeprazole (PRILOSEC) 40 MG DR capsule TAKE 1 CAPSULE BY MOUTH ONCE DAILY 30-60 MINUTES BEFORE A MEAL.     propranolol ER (INDERAL LA) 80 MG 24 hr capsule TAKE 1 CAPSULE (80 MG) BY MOUTH DAILY     temazepam (RESTORIL) 15 MG capsule Take 1 capsule (15 mg) by mouth nightly as needed for sleep     No current facility-administered medications for this visit.        VITALS   /68 (BP Location: Right arm, Patient Position: Sitting, Cuff Size: Adult Regular)   Pulse 84   Temp 99.2  F (37.3  C) (Tympanic)   Ht 1.626 m (5' 4\")   Wt 49.9 kg (110 lb)   SpO2 99%   BMI 18.88 kg/m       PHQ9                     [unfilled]  LABS    "                                                                                                                      Last Basic Metabolic Panel:  Lab Results   Component Value Date     01/26/2017      Lab Results   Component Value Date    POTASSIUM 4.2 01/26/2017     Lab Results   Component Value Date    CHLORIDE 105 01/26/2017     Lab Results   Component Value Date    RAÚL 8.7 01/26/2017     Lab Results   Component Value Date    CO2 26 01/26/2017     Lab Results   Component Value Date    BUN 10 01/26/2017     Lab Results   Component Value Date    CR 0.72 01/26/2017     Lab Results   Component Value Date    GLC 86 01/26/2017     Liver Function Studies -   Recent Labs   Lab Test 01/15/17  08/01/16   1905   PROTTOTAL   --   7.5   ALBUMIN   --   4.1   BILITOTAL   --   0.2   ALKPHOS   --   57   AST  24  24   ALT  15  19     CBC RESULTS:   Recent Labs   Lab Test  11/13/17   1044   WBC  8.6   RBC  4.16   HGB  13.3   HCT  40.5   MCV  97   MCH  32.0   MCHC  32.8   RDW  12.1   PLT  298       MENTAL STATUS EXAM                                                                                        Alert. Oriented to person, place, and date / time. Well groomed, calm, cooperative with good eye contact. No problems with speech or psychomotor behavior. Mood was described as  anxious and affect was congruent to speech content and full range. Thought process, including associations, was unremarkable and thought content was devoid of suicidal and homicidal ideation and psychotic thought. No hallucinations. Insight was good. Judgment was intact and adequate for safety. Fund of knowledge was intact. Pt demonstrates no obvious problems with attention, concentration, language, recent or remote memory although these were not formally tested.     ASSESSMENT                                                                                                      HISTORICAL:  Initial psych note 3/9/18          NOTES:    Ayesha Ortiz is a 39  yo with MDD, MAYKEL, hx couple psychiatric hospital stays. Ayesha had a recent stay in [facility] prior to us first meeting and was continued on temazepam for insomnia, lorazepam as prn and zoloft 200 mg daily. Started on olanzapine 5 mg HS. We discontinued lorazepam (is on temazepam also) as was on two benzos.     We are going to  switch her temazepam to 15 mg up to 2 times daily given she notices helps at night to ease anxiety at 15 mg but then not helping with insomnia. Likes how the 15 mg doesn't cause the sedation earlier in evening so especially can still do Yassine's reading with her. We are ordering nicotine patches and nicotine gum given smoking cessation.       TREATMENT RISK STATEMENT:  The risks, benefits, alternatives and potential adverse effects have been explained and are understood by the pt.  The pt agrees to the treatment plan with the ability to do so.   The pt knows to call the clinic for any problems or access emergency care if needed.          DIAGNOSES                 MAYKEL  MDD, recurrent, mod  ADHD       PLAN                                                                                                                     1)  MEDICATIONS:         --Ritalin LA 20 mg daily and refilled.   Continue temazepam  However we are switching from 30 mg : 15 mg up to 2 times daily.  Continue Cymbalta 60 mg daily. NIcorette gum and patches     2)  THERAPY:  No Change     3)  LABS:  None     4)  PT MONITOR [call for probs]:  Worsening symptoms, SI/HI, SEs from meds     5)  REFERRALS [CD, medical, other]:  None     6)  RTC:  ~2 months

## 2020-02-18 NOTE — NURSING NOTE
"Chief Complaint   Patient presents with     RECHECK     ADHD, anxiety, depression.  Patient c/o increased anxiety and situational stuff going on.       Initial /68 (BP Location: Right arm, Patient Position: Sitting, Cuff Size: Adult Regular)   Pulse 84   Temp 99.2  F (37.3  C) (Tympanic)   Ht 1.626 m (5' 4\")   Wt 49.9 kg (110 lb)   SpO2 99%   BMI 18.88 kg/m   Estimated body mass index is 18.88 kg/m  as calculated from the following:    Height as of this encounter: 1.626 m (5' 4\").    Weight as of this encounter: 49.9 kg (110 lb).  Medication Reconciliation: complete  JADA CASTRO LPN    "

## 2020-02-19 ASSESSMENT — ANXIETY QUESTIONNAIRES: GAD7 TOTAL SCORE: 21

## 2020-02-24 DIAGNOSIS — K21.9 GASTROESOPHAGEAL REFLUX DISEASE WITHOUT ESOPHAGITIS: ICD-10-CM

## 2020-02-25 NOTE — TELEPHONE ENCOUNTER
Omeprazole  Last Written Prescription Date: 1/24/20  Last Fill Quantity: 30 # of Refills: 0  Last Office Visit: 9/10/18

## 2020-02-26 RX ORDER — OMEPRAZOLE 40 MG/1
CAPSULE, DELAYED RELEASE ORAL
Qty: 30 CAPSULE | Refills: 0 | Status: SHIPPED | OUTPATIENT
Start: 2020-02-26 | End: 2020-03-24

## 2020-03-02 ENCOUNTER — HEALTH MAINTENANCE LETTER (OUTPATIENT)
Age: 40
End: 2020-03-02

## 2020-03-17 DIAGNOSIS — G47.00 PERSISTENT INSOMNIA: ICD-10-CM

## 2020-03-17 RX ORDER — TEMAZEPAM 15 MG/1
CAPSULE ORAL
Qty: 60 CAPSULE | Refills: 1 | Status: SHIPPED | OUTPATIENT
Start: 2020-03-17 | End: 2020-04-20

## 2020-03-17 NOTE — TELEPHONE ENCOUNTER
temazepam      Last Written Prescription Date:  2/18/20  Last Fill Quantity: 60,   # refills: 1  Last Office Visit: 2/28/20  Future Office visit:    Next 5 appointments (look out 90 days)    Apr 20, 2020  1:40 PM CDT  (Arrive by 1:25 PM)  [facility] [address] [place] [id number]  [phone]           Routing refill request to provider for review/approval because:  Drug not on the [facility], [facility] or [facility] refill protocol or controlled substance

## 2020-03-24 DIAGNOSIS — K21.9 GASTROESOPHAGEAL REFLUX DISEASE WITHOUT ESOPHAGITIS: ICD-10-CM

## 2020-03-24 RX ORDER — OMEPRAZOLE 40 MG/1
CAPSULE, DELAYED RELEASE ORAL
Qty: 30 CAPSULE | Refills: 1 | Status: SHIPPED | OUTPATIENT
Start: 2020-03-24 | End: 2020-04-28

## 2020-03-24 NOTE — TELEPHONE ENCOUNTER
Please see note below for Omeprazole.  Protocol fails for reason:    PPI Protocol [id number]/2020 02:37 PM   Recent (12 mo) or future (30 days) visit within the authorizing provider's specialty     Moriah Mcmillan RN

## 2020-03-24 NOTE — TELEPHONE ENCOUNTER
Omeprazole    Last Written Prescription Date:  2/26/20  Last Fill Quantity: 30,   # refills: 0  Last Office Visit: 9/10/18  Future Office visit:    Next 5 appointments (look out 90 days)    Apr 20, 2020  1:40 PM CDT  (Arrive by 1:25 PM)  Return Visit with Brandy Abebe MD  [facility] ([facility] ) [address] [place] [id number] [phone]           Routing refill request to provider for review/approval because:  Medication is reported/historical

## 2020-04-05 ENCOUNTER — VIRTUAL VISIT (OUTPATIENT)
Dept: FAMILY MEDICINE | Facility: OTHER | Age: 40
End: 2020-04-05

## 2020-04-05 NOTE — PROGRESS NOTES
"Date: 2020 12:12:11  Clinician: CARLITO Romeo  Clinician NPI: [id number]  Patient: Nasra Ortiz  Patient : 1980  Patient Address: Formerly named [facility] [address], [place] [address]  Patient Phone: [phone]  Visit Protocol: URI  Patient Summary:  Nasra is a 40 year old ( : 1980 ) female who initiated a Visit for COVID-19 (Coronavirus) evaluation and screening. When asked the question \"Please sign me up to receive news, health information and promotions from [employer].\", Nasra responded \"Yes\".    Nasra states her symptoms started today.   Her symptoms consist of facial pain or pressure, myalgia, malaise, enlarged lymph nodes, chills, diarrhea, nausea, a headache, and wheezing. She is experiencing mild difficulty breathing with activities but can speak normally in full sentences. Nasra also feels feverish.   Symptom details     Temperature: Her current temperature is 101.4 degrees Fahrenheit. Nasra has had a temperature over 100 degrees Fahrenheit for 1-2 days.     Wheezing: Nasra has not ever been diagnosed with asthma. The wheezing interferes with her normal daily activities.    Facial pain or pressure: The facial pain or pressure feels worse when bending over or leaning forward.     Headache: She states the headache is moderate (4-6 on a 10 point pain scale).      Nasra denies having rhinitis, sore throat, cough, nasal congestion, ear pain, vomiting, and teeth pain. She also denies taking antibiotic medication for the symptoms, having recent facial or sinus surgery in the past 60 days, and having a sinus infection within the past year.   Precipitating events  She has not recently been exposed to someone with influenza. Nasra has been in close contact with the following high risk individuals: people with asthma, heart disease or diabetes, adults 65 or older, and immunocompromised people.   Pertinent COVID-19 (Coronavirus) information  Nasra has not traveled " internationally or to the areas where COVID-19 (Coronavirus) is widespread, including cruise ship travel in the last 14 days before the start of her symptoms.   Nasra has not had a close contact with a laboratory-confirmed COVID-19 patient within 14 days of symptom onset. She also has not had a close contact with a suspected COVID-19 patient within 14 days of symptom onset.   Nasra does not work or volunteer as healthcare worker or a  and does not work or volunteer in a healthcare facility. She does not live with a healthcare worker.   Triage Point(s) temporarily suspended for COVID-19 (Coronavirus) screening  Nasra reported the following symptoms which were previously protocol referral points. These protocol referral points have temporarily been removed for purposes of COVID-19 (Coronavirus) screening.     Wheezing that keeps Nasra from doing daily activities    Meets at least 3/5 centor score criteria     Swollen lymph nodes    Temp over 100    Absence of cough           Pertinent medical history  Nasra does not get yeast infections when she takes antibiotics.   Nasra needs a return to work/school note.   Weight: 115 lbs   Nasra smokes or uses smokeless tobacco.   She denies pregnancy and denies breastfeeding. She has menstruated in the past month.   Weight: 115 lbs    MEDICATIONS: temazepam oral, duloxetine HCl (bulk), Ritalin LA oral, omeprazole oral, magnesium oxide-magnesium amino acid chelate oral, propranolol oral, ALLERGIES: Penicillins  Clinician Response:  Dear Nasra,      Based on the information you have provided, you do have symptoms that are consistent with Coronavirus (COVID-19).  The coronavirus causes mild to severe respiratory illness with the most common symptoms including fever, cough and difficulty breathing. Unfortunately, many viruses cause similar symptoms and it can be difficult to distinguish between viruses, especially in mild cases, so we  are presuming that anyone with cough or fever has coronavirus at this time.  Coronavirus/COVID-19 has reached the point of community spread in [place], meaning that we are finding the virus in people with no known exposure risk for anish the virus. Given the increasing commonness of coronavirus in the community we are no longer testing patients who are not critically ill.  If you are a health care worker, you should refer to your employee health office for instructions about testing and returning to work.  For everyone else who has cough or fever, you should assume you are infected with coronavirus. Since you will not be tested but have symptoms that may be consistent with coronavirus, the CDC recommends you stay in self-isolation until these three things have happened:    You have had no fever for at least 72 hours (that is three full days of no fever without the use of medicine that reduces fevers)    AND   Other symptoms have improved (for example, when your cough or shortness of breath have improved)   AND   At least 7 days have passed since your symptoms first appeared.   How to Isolate:   Isolate yourself at home.  Do Not allow any visitors  Do Not go to work or school  Do Not go to Muslim,  centers, shopping, or other public places.  Do Not shake hands.  Avoid close contact with others (hugging, kissing).   Protect Others:   Cover Your Mouth and Nose with a mask, disposable tissue or wash cloth to avoid spreading germs to others.  Wash your hands and face frequently with soap and water.   We know it can be scary to hear that you might have COVID-19. Our team can help track your symptoms and make sure you are doing ok over the next two weeks using a program called [employer] to keep in touch. When you receive an email from [employer], please consider enrolling in our monitoring program. There is no cost to you for monitoring. Here is a URL where you can learn more:  [email or web address]  Managing Symptoms:   At this time, we primarily recommend Tylenol (Acetaminophen) for fever or pain. If you have liver or kidney problems, contact your primary care provider for instructions on use of tylenol. Adults can take 650 mg (two 325 mg pills) by mouth every 4-6 hours as needed OR 1,000 mg (two 500 mg pills) every 8 hours as needed. MAXIMUM DAILY DOSE: 3,000mg. For children, refer to dosing on bottle based on age or weight.   If you develop significant shortness of breath that prevents you from doing normal activities, please call 911 or proceed to the nearest emergency room and alert them immediately that you have been in self-isolation for possible coronavirus.  If you have a higher risk medical condition such as cancer, heart failure, end stage renal disease on dialysis or have a transplant, please reach out to your specialist's clinic to advise them of your [facility] visit should you not improve within the next two days.   -------------  Medication information  Unless you are allergic to or are taking medications contraindicated to use, I recommend using the over-the-counter medication(s) below:   An antihistamine such as&nbsp;Benadryl, Claritin, or store brand.    A decongestant such as&nbsp;Sudafed PE or store brand.  Dextromethorphan (Robitussin DM or store brand). This medication is a cough suppressant that works by decreasing the feeling of needing to cough. Please follow the instructions on the package.  Guaifenesin + dextromethorphan (Robitussin DM, Mucinex DM, or store brand).  Saline nasal spray ([place] or store brand). Use 1-2 sprays in each nostril 3 times a day as needed for congestion.  A sinus irrigation kit such as&nbsp;Sinus Rinse, Neti Pot, SinuCleanse, or store brand. Be sure to use sterile or previously boiled water to prevent unwanted infections.  Oxymetazoline (Afrin or store brand) nasal spray. Use 1 spray in each nostril 2 times a day for a maximum of  3 days. Using this medication more frequently or longer than recommended may cause nasal congestion to reoccur or worsen. Sinus pressure occurs when the tissues lining your sinuses become swollen and inflamed. Afrin nasal spray decreases the swelling to provide the quickest and most effective relief from sinus pressure.   Over-the-counter medications do not require a prescription. Ask the pharmacist if you have any questions.  Self care  The following tips will keep you as comfortable as possible while you recover:   Rest  Drink plenty of fluids  Take a hot shower to loosen congestion  Use throat lozenges  Gargle with warm salt water (1/4 teaspoon of salt per 8 ounce glass of water)  Suck on frozen items such as popsicles or ice cubes  Drink hot tea with lemon and honey  Take a spoonful of honey to reduce your cough   Peppermint essential oils or candies; ginger ale/ginger tea, can help reduce GI ups          For more information about COVID19 and options for caring for yourself at home, please visit the CDC website at https://www.cdc.gov/coronavirus/2019-ncov/about/steps-when-sick.htmlFor more options for care at [facility], please visit our website at [email or web address]    COVID-19 (Coronavirus) General Information  With the increase in the number of COVID-19 (Coronavirus) cases, we understand you may have some questions. Below is some helpful information on COVID-19 (Coronavirus).  How can I protect myself and others from the COVID-19 (Coronavirus)?  Because there is currently no vaccine to prevent infection, the best way to protect yourself is to avoid being exposed to this virus. Put distance between yourself and other people if COVID-19 (Coronavirus) is spreading in your community. The virus is thought to spread mainly from person-to-person.     Between people who are in close contact with one another (within about 6 about) for a prolonged period (10 minutes or longer).     Through respiratory droplets produced when an infected person coughs or sneezes.     The CDC recommends the following additional steps to protect yourself and others:     Wash your hands often with soap and water for at least 20 seconds, especially after blowing your nose, coughing, or sneezing; going to the bathroom; and before eating or preparing food.  Use an alcohol-based hand  that contains at least 60 percent alcohol if soap and water are not available.        Avoid touching your eyes, nose and mouth with unwashed hands.    Avoid close contact with people who are sick.    Stay home when you are sick.    Cover your cough or sneeze with a tissue, then throw the tissue in the trash.    Clean and disinfect frequently touched objects and surfaces.     You can help stop COVID-19 (Coronavirus) by knowing the signs and symptoms:     Fever    Cough    Shortness of breath     Contact your healthcare provider if   Develop symptoms   AND   Have been in close contact with a person known to have COVID-19 (Coronavirus) or live in or have recently traveled from an area with ongoing spread of COVID-19 (Coronavirus). Call ahead before you go to a doctor's office or emergency room. Tell them about your recent travel and your symptoms.   For the most up to date information, visit the CDC's website.  Self-monitoring  Self-monitoring means people should monitor themselves for fever by taking their temperatures twice a day and remain alert for a cough or difficulty breathing.  It is important to check your health two times each day for 14 days after a potential exposure to a person with COVID-19 (Coronavirus) or after travel from a location where COVID-19 (Coronavirus) is widespread. If you have been exposed to a person with COVID-19 (Coronavirus), it may take up to 14 days to know if you will get sick. Follow the steps below to check and record your health.     Take your temperature with a thermometer twice a day, once in  the morning and once in the evening, and watch for a cough or difficulty breathing for 14 days.    Write down your temperature and any COVID-19 symptoms you may have: feeling feverish, coughing, or difficulty breathing.    Stay home from work or school.    Do not take public transportation, taxis, or ride-shares.    Avoid crowded places (such as shopping centers and movie theaters) and limit your activities in public.    Keep your distance from others (about 6 feet or 2 meters).    If you get sick with fever, cough, or trouble breathing, contact your healthcare provider and tell them about your recent travel and/or your symptoms.    If you need to seek medical care for other reasons, such as dialysis, call ahead to your doctor and tell them about your recent travel.     Steps to help prevent the spread of COVID-19 (Coronavirus) if you are sick  If you are sick with COVID-19 (Coronavirus) or suspect you are infected with the virus that causes COVID-19 (Coronavirus), follow the steps below to help prevent the disease from spreading&nbsp;to people in your home and community.     Stay home except to get medical care. Home isolation may be started in consultation with your healthcare clinician.    Separate yourself from other people and animals in your home.    Call ahead before visiting your doctor if you have a medical appointment.    Wear a facemask when you are around other people.    Cover your cough and sneezes.    Clean your hands often.    Avoid sharing personal household items.    Clean and disinfect frequently touched objects and surfaces everyday.    You will need to have someone drop off medications or household supplies (if needed) at your house without coming inside or in contact with you or others living in your house.    Monitor your symptoms and seek prompt medical care if your illness is worsening (e.g. Difficulty breathing).    Discontinue home isolation only in consultation with your healthcare  "provider.     For more detailed and up to date information on what to do if you are sick, visit this link: What to Do If You Are Sick With COVID-19.  Do I need to be tested for COVID-19 (Coronavirus)?     Not everyone needs to be tested for COVID-19 (Coronavirus). Decisions on which patients receive testing will be based on the local spread of COVID-19 (Coronavirus) as well as the symptoms. Your healthcare provider will make the final decision on whether you should be tested.    In the meantime, if you have concerns that you may have been exposed, it is reasonable to practice \"social distancing.\"&nbsp; If you are ill with a cold or flu-like illness, please monitor your symptoms and call your healthcare provider if your symptoms worsen.    For more up to date information, visit this link: COVID-19 (Coronavirus) Frequently Asked Questions and Answers.      Diagnosis: Coronavirus infection, unspecified  Diagnosis ICD: B34.2  "

## 2020-04-20 ENCOUNTER — VIRTUAL VISIT (OUTPATIENT)
Dept: PSYCHIATRY | Facility: OTHER | Age: 40
End: 2020-04-20
Attending: PSYCHIATRY & NEUROLOGY
Payer: COMMERCIAL

## 2020-04-20 DIAGNOSIS — F90.2 ADHD (ATTENTION DEFICIT HYPERACTIVITY DISORDER), COMBINED TYPE: ICD-10-CM

## 2020-04-20 DIAGNOSIS — F41.1 GAD (GENERALIZED ANXIETY DISORDER): Primary | ICD-10-CM

## 2020-04-20 DIAGNOSIS — G47.00 PERSISTENT INSOMNIA: ICD-10-CM

## 2020-04-20 PROCEDURE — 99214 OFFICE O/P EST MOD 30 MIN: CPT | Mod: 95 | Performed by: PSYCHIATRY & NEUROLOGY

## 2020-04-20 RX ORDER — TEMAZEPAM 15 MG/1
CAPSULE ORAL
Qty: 60 CAPSULE | Refills: 3 | Status: SHIPPED | OUTPATIENT
Start: 2020-04-20 | End: 2020-06-02

## 2020-04-20 RX ORDER — BUSPIRONE HYDROCHLORIDE 10 MG/1
TABLET ORAL
Qty: 90 TABLET | Refills: 4 | Status: SHIPPED | OUTPATIENT
Start: 2020-04-20 | End: 2020-05-20

## 2020-04-20 RX ORDER — METHYLPHENIDATE HYDROCHLORIDE 20 MG/1
20 CAPSULE, EXTENDED RELEASE ORAL DAILY
Qty: 30 CAPSULE | Refills: 0 | Status: SHIPPED | OUTPATIENT
Start: 2020-04-20 | End: 2020-05-20

## 2020-04-20 ASSESSMENT — PATIENT HEALTH QUESTIONNAIRE - PHQ9
5. POOR APPETITE OR OVEREATING: NEARLY EVERY DAY
SUM OF ALL RESPONSES TO PHQ QUESTIONS 1-9: 15

## 2020-04-20 ASSESSMENT — ANXIETY QUESTIONNAIRES
7. FEELING AFRAID AS IF SOMETHING AWFUL MIGHT HAPPEN: NEARLY EVERY DAY
3. WORRYING TOO MUCH ABOUT DIFFERENT THINGS: NEARLY EVERY DAY
2. NOT BEING ABLE TO STOP OR CONTROL WORRYING: NEARLY EVERY DAY
6. BECOMING EASILY ANNOYED OR IRRITABLE: NEARLY EVERY DAY
1. FEELING NERVOUS, ANXIOUS, OR ON EDGE: NEARLY EVERY DAY
5. BEING SO RESTLESS THAT IT IS HARD TO SIT STILL: NEARLY EVERY DAY
GAD7 TOTAL SCORE: 21

## 2020-04-20 NOTE — NURSING NOTE
"Chief Complaint   Patient presents with     RECHECK     Telephone visit.       Initial There were no vitals taken for this visit. Estimated body mass index is 18.88 kg/m  as calculated from the following:    Height as of 2/18/20: 1.626 m (5' 4\").    Weight as of 2/18/20: 49.9 kg (110 lb).  Medication Reconciliation: complete  JADA CASTRO LPN    "
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
## 2020-04-21 ASSESSMENT — ANXIETY QUESTIONNAIRES: GAD7 TOTAL SCORE: 21

## 2020-04-28 DIAGNOSIS — K21.9 GASTROESOPHAGEAL REFLUX DISEASE WITHOUT ESOPHAGITIS: ICD-10-CM

## 2020-04-28 NOTE — TELEPHONE ENCOUNTER
Prilosec      Last Written Prescription Date:  3.24.2020  Last Fill Quantity: 30,   # refills: 1  Last Office Visit:   Future Office visit:   9.10.18    Routing refill request to provider for review/approval because:  Last office visit 9.10.18. Telephone visit?       Joann Pollard RN

## 2020-04-28 NOTE — TELEPHONE ENCOUNTER
I have not seen her since 2018  Needs a telephone or Virtual visit prior to refill    Can use OTC Prilosec if unwilling to do phone or virtual visit.    Adrienne CARCAMO  [phone]

## 2020-05-11 RX ORDER — OMEPRAZOLE 40 MG/1
CAPSULE, DELAYED RELEASE ORAL
Qty: 30 CAPSULE | Refills: 0 | Status: SHIPPED | OUTPATIENT
Start: 2020-05-11 | End: 2020-05-22

## 2020-05-20 ENCOUNTER — VIRTUAL VISIT (OUTPATIENT)
Dept: PSYCHIATRY | Facility: OTHER | Age: 40
End: 2020-05-20
Attending: PSYCHIATRY & NEUROLOGY
Payer: COMMERCIAL

## 2020-05-20 DIAGNOSIS — F41.1 GAD (GENERALIZED ANXIETY DISORDER): Primary | ICD-10-CM

## 2020-05-20 DIAGNOSIS — F90.2 ADHD (ATTENTION DEFICIT HYPERACTIVITY DISORDER), COMBINED TYPE: ICD-10-CM

## 2020-05-20 PROCEDURE — 99213 OFFICE O/P EST LOW 20 MIN: CPT | Mod: 95 | Performed by: PSYCHIATRY & NEUROLOGY

## 2020-05-20 RX ORDER — METHYLPHENIDATE HYDROCHLORIDE 20 MG/1
20 CAPSULE, EXTENDED RELEASE ORAL DAILY
Qty: 30 CAPSULE | Refills: 0 | Status: SHIPPED | OUTPATIENT
Start: 2020-05-20 | End: 2020-06-29

## 2020-05-20 RX ORDER — MIRTAZAPINE 15 MG/1
TABLET, FILM COATED ORAL
Qty: 30 TABLET | Refills: 3 | Status: SHIPPED | OUTPATIENT
Start: 2020-05-20 | End: 2020-05-22

## 2020-05-20 ASSESSMENT — PATIENT HEALTH QUESTIONNAIRE - PHQ9: SUM OF ALL RESPONSES TO PHQ QUESTIONS 1-9: 19
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
## 2020-05-22 ENCOUNTER — VIRTUAL VISIT (OUTPATIENT)
Dept: FAMILY MEDICINE | Facility: OTHER | Age: 40
End: 2020-05-22
Attending: NURSE PRACTITIONER
Payer: COMMERCIAL

## 2020-05-22 VITALS — BODY MASS INDEX: 20.6 KG/M2 | WEIGHT: 120 LBS

## 2020-05-22 DIAGNOSIS — Z12.31 ENCOUNTER FOR SCREENING MAMMOGRAM FOR BREAST CANCER: Primary | ICD-10-CM

## 2020-05-22 DIAGNOSIS — F33.0 MILD EPISODE OF RECURRENT MAJOR DEPRESSIVE DISORDER (H): ICD-10-CM

## 2020-05-22 DIAGNOSIS — K21.9 GASTROESOPHAGEAL REFLUX DISEASE WITHOUT ESOPHAGITIS: ICD-10-CM

## 2020-05-22 DIAGNOSIS — G43.009 MIGRAINE WITHOUT AURA AND WITHOUT STATUS MIGRAINOSUS, NOT INTRACTABLE: ICD-10-CM

## 2020-05-22 PROCEDURE — 99213 OFFICE O/P EST LOW 20 MIN: CPT | Mod: 95 | Performed by: NURSE PRACTITIONER

## 2020-05-22 RX ORDER — OMEPRAZOLE 40 MG/1
CAPSULE, DELAYED RELEASE ORAL
Qty: 90 CAPSULE | Refills: 3 | Status: SHIPPED | OUTPATIENT
Start: 2020-05-22 | End: 2021-03-29

## 2020-05-22 RX ORDER — PROPRANOLOL HYDROCHLORIDE 80 MG/1
80 CAPSULE, EXTENDED RELEASE ORAL DAILY
Qty: 90 CAPSULE | Refills: 1 | Status: SHIPPED | OUTPATIENT
Start: 2020-05-22 | End: 2020-12-28

## 2020-05-22 ASSESSMENT — ANXIETY QUESTIONNAIRES
7. FEELING AFRAID AS IF SOMETHING AWFUL MIGHT HAPPEN: NEARLY EVERY DAY
6. BECOMING EASILY ANNOYED OR IRRITABLE: NEARLY EVERY DAY
5. BEING SO RESTLESS THAT IT IS HARD TO SIT STILL: NEARLY EVERY DAY
1. FEELING NERVOUS, ANXIOUS, OR ON EDGE: NEARLY EVERY DAY
GAD7 TOTAL SCORE: 21
3. WORRYING TOO MUCH ABOUT DIFFERENT THINGS: NEARLY EVERY DAY
2. NOT BEING ABLE TO STOP OR CONTROL WORRYING: NEARLY EVERY DAY

## 2020-05-22 ASSESSMENT — PATIENT HEALTH QUESTIONNAIRE - PHQ9: 5. POOR APPETITE OR OVEREATING: NEARLY EVERY DAY

## 2020-05-22 ASSESSMENT — PAIN SCALES - GENERAL: PAINLEVEL: NO PAIN (0)

## 2020-05-22 NOTE — PATIENT INSTRUCTIONS
Assessment/Plan:    1. Gastroesophageal reflux disease without esophagitis  - omeprazole (PRILOSEC) 40 MG DR capsule; TAKE 1 CAPSULE BY MOUTH ONCE DAILY 30-60 MINUTES BEFORE A MEAL.  Dispense: 90 capsule; Refill: 3    2. Encounter for screening mammogram for breast cancer  - MA SCREENING DIGITAL BILATERAL ([place]); Future    3. Mild episode of recurrent major depressive disorder (H)  - Continue Follow-up with Dr. Abebe    4. Migraine without aura and without status migrainosus, not intractable  - propranolol ER (INDERAL LA) 80 MG 24 hr capsule; Take 1 capsule (80 mg) by mouth daily  Dispense: 90 capsule; Refill: 1 July physical will be scheduled        [facility]ne [facility]  [phone]

## 2020-05-22 NOTE — PROGRESS NOTES
"Nasra Ortiz is a 40 year old female who is being evaluated via a billable telephone visit.      The patient has been notified of following:     \"This telephone visit will be conducted via a call between you and your physician/provider. We have found that certain health care needs can be provided without the need for a physical exam.  This service lets us provide the care you need with a short phone conversation.  If a prescription is necessary we can send it directly to your pharmacy.  If lab work is needed we can place an order for that and you can then stop by our lab to have the test done at a later time.    Telephone visits are billed at different rates depending on your insurance coverage. During this emergency period, for some insurers they may be billed the same as an in-person visit.  Please reach out to your insurance provider with any questions.    If during the course of the call the physician/provider feels a telephone visit is not appropriate, you will not be charged for this service.\"    Patient has given verbal consent for Telephone visit?  Yes    What phone number would you like to be contacted at? [phone]    How would you like to obtain your AVS? Evelyn      Nasra Ortiz is a 40 year old female who presents via phone visit today for the following health issues:          Medication Followup of omeprazole     Taking Medication as prescribed: yes    Side Effects:  None    Medication Helping Symptoms:  yes          GERD/Heartburn    Duration: symptoms are controlled on medication    Description (location/character/radiation): no symptoms while on meds    Intensity:  none    Accompanying signs and symptoms:  food getting stuck: no   nausea/vomiting/blood: no   abdominal pain: no   black/tarry or bloody stools: no :    History (similar episodes/previous evaluation): controlled    Precipitating or alleviating factors:  worse with no particular food or drink.  current NSAID/Aspirin use: no "     Therapies tried and outcome: Omeprazole (Prilosec)          Depression / Anxiety  Followup    How are you doing with your depression since your last visit? Worsened     Are you having other symptoms that might be associated with depression? No    Have you had a significant life event?  No     Are you feeling anxious or having panic attacks?   yes    Do you have any concerns with your use of alcohol or other drugs? No      Is working with Dr. Abebe regarding mental health concerns        Social History     Tobacco Use     Smoking status: Former Smoker     Packs/day: 0.40     Years: 10.00     Pack years: 4.00     Types: Cigarettes     Last attempt to quit: 2020     Years since quittin.2     Smokeless tobacco: Never Used     Tobacco comment: tobacco cessation discuseed - tried to quit: no - passive smoke exposure quitting on own    Substance Use Topics     Alcohol use: No     Drug use: No         PHQ 2020   PHQ-9 Total Score 18 15 19   Q9: Thoughts of better off dead/self-harm past 2 weeks Not at all Not at all Not at all   F/U: Thoughts of suicide or self-harm - - -   F/U: Safety concerns - - -     MAYKEL-7 SCORE 2020   Total Score 21 21 21         Suicide Assessment Five-step Evaluation and Treatment (SAFE-T)        Patient Active Problem List   Diagnosis     Migraine with aura and without status migrainosus, not intractable     Anxiety state     Primary insomnia     ACP (advance care planning)     Gastroesophageal reflux disease, esophagitis presence not specified     Vitamin deficiency     Recurrent major depressive disorder (H)     Tobacco abuse     Hydronephrosis     Panic disorder without agoraphobia     MAYKEL (generalized anxiety disorder)     Attention deficit hyperactivity disorder (ADHD), combined type     Past Surgical History:   Procedure Laterality Date      SECTION  2012     COLONOSCOPY N/A 2019    Procedure: COLONOSCOPY  DIAGNOSTIC WITH RANDOM COLON BIOPSIES ANOSCOPY; Surgeon: Igor Burgos MD; Location: [facility] OR       ESOPHAGOSCOPY, GASTROSCOPY, DUODENOSCOPY (EGD), COMBINED  2019    Procedure: ESOPHAGOGASTRODUODENOSCOPY DIAGNOSTIC with biopsies; Surgeon: Igor Burgos MD; Location: [facility] OR        HYSTEROSCOPY, SURGICAL; W/ ENDOMETRIAL ABLATION, ANY METHOD N/A 2018     HYSTEROSCOPY  2018     LAPAROSCOPIC APPENDECTOMY N/A 2012     OOPHORECTOMY Right 2012    post operative hemorrhage with ruptured ovarian vein,      SALPINGECTOMY Left 2019    Procedure: LAPAROSCOPY LEFT SALPINGECTOMY; Surgeon: Regina Sweeney MD; Location: [facility] OR         Social History     Tobacco Use     Smoking status: Former Smoker     Packs/day: 0.40     Years: 10.00     Pack years: 4.00     Types: Cigarettes     Last attempt to quit: 2020     Years since quittin.2     Smokeless tobacco: Never Used     Tobacco comment: tobacco cessation discuseed - tried to quit: no - passive smoke exposure quitting on own    Substance Use Topics     Alcohol use: No     Family History   Problem Relation Age of Onset     Hypertension Mother      Hyperlipidemia Mother      Cardiovascular Father      Hypertension Father      Hyperlipidemia Father      Heart Failure Father      Sleep Apnea Father              Current Outpatient Medications   Medication Sig Dispense Refill     DULoxetine (CYMBALTA) 60 MG capsule TAKE 1 CAPSULE (60 MG) BY MOUTH DAILY 30 capsule 11     MAGNESIUM OXIDE PO Take 400 mg by mouth daily Take 2 tabs evening       methylphenidate (RITALIN LA) 20 MG 24 hr capsule Take 20 mg by mouth daily 30 capsule 0     multivitamin, therapeutic (THERA-VIT) TABS Take 1 tablet by mouth daily       omeprazole (PRILOSEC) 40 MG DR capsule TAKE 1 CAPSULE BY MOUTH ONCE DAILY 30-60 MINUTES BEFORE A MEAL. 30 capsule 0     propranolol ER (INDERAL LA) 80 MG 24 hr capsule TAKE 1 CAPSULE (80 MG) BY MOUTH DAILY 30 capsule 5      temazepam (RESTORIL) 15 MG capsule Take 1 - 2 caps at bedtime as needed for insomnia 60 capsule 3     mirtazapine (REMERON) 15 MG tablet Take 1/2 tab evening for one week then increase to 1 tab evening (Patient not taking: Reported on 5/22/2020) 30 tablet 3       Allergies   Allergen Reactions     Dust Mite Extract      Hydrocodone Other (See Comments) and Itching     Skin felt like it was burning.     Penicillins Rash         Recent Labs   Lab Test 03/19/19 09/10/18  1136 07/30/18  0948  08/23/17  1530  01/15/17 08/01/16  1905   ALT 15  --   --   --   --   --  15 19   CR 0.86 0.78 0.65   < >  --    < > 0.80 0.65   GFRESTIMATED >60 82 >90  --   --    < >  --  >90  Non  GFR Calc     GFRESTBLACK  --  >90 >90  --   --    < >  --  >90   GFR Calc     POTASSIUM 3.4 4.0 4.0   < >  --    < > 3.0* 3.6   TSH  --   --  0.53  --  0.87   < >  --  1.21    < > = values in this interval not displayed.          BP Readings from Last 3 Encounters:   02/18/20 112/68   12/18/19 108/60   10/28/19 104/58    Wt Readings from Last 3 Encounters:   05/22/20 54.4 kg (120 lb)   02/18/20 49.9 kg (110 lb)   12/18/19 49.9 kg (110 lb)                Reviewed and updated as needed this visit by Provider         Review of Systems   Constitutional, HEENT, cardiovascular, pulmonary, gi and gu systems are negative, except as otherwise noted.       Objective   Reported vitals:  Wt 54.4 kg (120 lb)   BMI 20.60 kg/m     healthy, alert and no distress  PSYCH: Alert and oriented times 3; coherent speech, normal   rate and volume, able to articulate logical thoughts, able   to abstract reason, no tangential thoughts, no hallucinations   or delusions  Her affect is normal  RESP: No cough, no audible wheezing, able to talk in full sentences  Remainder of exam unable to be completed due to telephone visits            Assessment/Plan:    1. Gastroesophageal reflux disease without esophagitis  - omeprazole (PRILOSEC) 40 MG   capsule; TAKE 1 CAPSULE BY MOUTH ONCE DAILY 30-60 MINUTES BEFORE A MEAL.  Dispense: 90 capsule; Refill: 3    2. Encounter for screening mammogram for breast cancer  - MA SCREENING DIGITAL BILATERAL ([place]); Future    3. Mild episode of recurrent major depressive disorder (H)  - Continue Follow-up with Dr. Abebe    4. Migraine without aura and without status migrainosus, not intractable  - propranolol ER (INDERAL LA) 80 MG 24 hr capsule; Take 1 capsule (80 mg) by mouth daily  Dispense: 90 capsule; Refill: 1 July physical will be scheduled      Phone call duration:  20  minutes    Adrienne CARCAMO  [phone]

## 2020-05-23 ASSESSMENT — ANXIETY QUESTIONNAIRES: GAD7 TOTAL SCORE: 21

## 2020-05-27 DIAGNOSIS — G47.00 PERSISTENT INSOMNIA: ICD-10-CM

## 2020-05-28 NOTE — TELEPHONE ENCOUNTER
restoril      Last Written Prescription Date:  4/20/2020  Last Fill Quantity: 60,   # refills: 3  Last Office Visit: 5/22/2020

## 2020-06-02 RX ORDER — TEMAZEPAM 15 MG/1
CAPSULE ORAL
Qty: 60 CAPSULE | Refills: 1 | Status: SHIPPED | OUTPATIENT
Start: 2020-06-24 | End: 2020-07-29

## 2020-06-25 DIAGNOSIS — F90.2 ADHD (ATTENTION DEFICIT HYPERACTIVITY DISORDER), COMBINED TYPE: ICD-10-CM

## 2020-06-29 RX ORDER — METHYLPHENIDATE HCL 20 MG
CAPSULE,EXTENDED RELEASE BIPHASIC 50-50 ORAL
Qty: 30 CAPSULE | Refills: 0 | Status: SHIPPED | OUTPATIENT
Start: 2020-06-29 | End: 2020-10-14

## 2020-07-01 ENCOUNTER — VIRTUAL VISIT (OUTPATIENT)
Dept: PSYCHIATRY | Facility: OTHER | Age: 40
End: 2020-07-01
Attending: PSYCHIATRY & NEUROLOGY
Payer: COMMERCIAL

## 2020-07-01 DIAGNOSIS — F90.2 ADHD (ATTENTION DEFICIT HYPERACTIVITY DISORDER), COMBINED TYPE: ICD-10-CM

## 2020-07-01 DIAGNOSIS — F41.1 GAD (GENERALIZED ANXIETY DISORDER): Primary | ICD-10-CM

## 2020-07-01 PROCEDURE — 99214 OFFICE O/P EST MOD 30 MIN: CPT | Mod: 95 | Performed by: PSYCHIATRY & NEUROLOGY

## 2020-07-01 RX ORDER — METHYLPHENIDATE HYDROCHLORIDE 20 MG/1
20 CAPSULE, EXTENDED RELEASE ORAL DAILY
Qty: 30 CAPSULE | Refills: 0 | Status: SHIPPED | OUTPATIENT
Start: 2020-07-29 | End: 2020-11-18

## 2020-07-01 RX ORDER — MIRTAZAPINE 30 MG/1
30 TABLET, FILM COATED ORAL AT BEDTIME
Qty: 30 TABLET | Refills: 4 | Status: SHIPPED | OUTPATIENT
Start: 2020-07-01 | End: 2020-07-01 | Stop reason: DRUGHIGH

## 2020-07-01 RX ORDER — MIRTAZAPINE 30 MG/1
30 TABLET, FILM COATED ORAL AT BEDTIME
Qty: 30 TABLET | Refills: 4 | COMMUNITY
Start: 2020-07-01 | End: 2021-02-02

## 2020-07-01 ASSESSMENT — ANXIETY QUESTIONNAIRES
IF YOU CHECKED OFF ANY PROBLEMS ON THIS QUESTIONNAIRE, HOW DIFFICULT HAVE THESE PROBLEMS MADE IT FOR YOU TO DO YOUR WORK, TAKE CARE OF THINGS AT HOME, OR GET ALONG WITH OTHER PEOPLE: SOMEWHAT DIFFICULT
2. NOT BEING ABLE TO STOP OR CONTROL WORRYING: SEVERAL DAYS
GAD7 TOTAL SCORE: 16
4. TROUBLE RELAXING: NEARLY EVERY DAY
6. BECOMING EASILY ANNOYED OR IRRITABLE: NEARLY EVERY DAY
5. BEING SO RESTLESS THAT IT IS HARD TO SIT STILL: NEARLY EVERY DAY
7. FEELING AFRAID AS IF SOMETHING AWFUL MIGHT HAPPEN: NOT AT ALL
1. FEELING NERVOUS, ANXIOUS, OR ON EDGE: NEARLY EVERY DAY
3. WORRYING TOO MUCH ABOUT DIFFERENT THINGS: NEARLY EVERY DAY

## 2020-07-01 ASSESSMENT — PATIENT HEALTH QUESTIONNAIRE - PHQ9: SUM OF ALL RESPONSES TO PHQ QUESTIONS 1-9: 10
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
## 2020-07-02 ASSESSMENT — ANXIETY QUESTIONNAIRES: GAD7 TOTAL SCORE: 16

## 2020-07-08 ENCOUNTER — ANCILLARY PROCEDURE (OUTPATIENT)
Dept: MAMMOGRAPHY | Facility: OTHER | Age: 40
End: 2020-07-08
Attending: NURSE PRACTITIONER
Payer: COMMERCIAL

## 2020-07-08 DIAGNOSIS — Z12.31 ENCOUNTER FOR SCREENING MAMMOGRAM FOR BREAST CANCER: ICD-10-CM

## 2020-07-08 PROCEDURE — 77067 SCR MAMMO BI INCL CAD: CPT | Mod: TC

## 2020-07-24 DIAGNOSIS — G47.00 PERSISTENT INSOMNIA: ICD-10-CM

## 2020-07-27 PROBLEM — F41.1 GAD (GENERALIZED ANXIETY DISORDER): Status: RESOLVED | Noted: 2019-08-29 | Resolved: 2020-07-27

## 2020-07-28 NOTE — TELEPHONE ENCOUNTER
restoril      Last Written Prescription Date:  6/24/20  Last Fill Quantity: 60,   # refills: 1  Last Office Visit: 7/1/20  Future Office visit:    Next 5 appointments (look out 90 days)    Aug 11, 2020  4:20 PM CDT  (Arrive by 4:05 PM)  Return Visit with Brandy Abebe MD  [facility] ([facility] ) [address] [place] [id number] [phone]   Aug 24, 2020  1:30 PM CDT  (Arrive by 1:15 PM)  PHYSICAL with Adrienne Dailey CNP  [facility] ([facility] ) [address]  [place] [address]  [phone]           Routing refill request to provider for review/approval because:  Drug not on the [facility], [facility] or [facility] refill protocol or controlled substance

## 2020-07-29 RX ORDER — TEMAZEPAM 15 MG/1
CAPSULE ORAL
Qty: 60 CAPSULE | Refills: 1 | Status: SHIPPED | OUTPATIENT
Start: 2020-08-20 | End: 2020-09-16

## 2020-08-19 NOTE — PATIENT INSTRUCTIONS
ASSESSMENT/PLAN:   1. Routine general medical examination at a health care facility  - TSH with free T4 reflex    2. Screening for malignant neoplasm of cervix  - UTD at [facility]    3. Primary insomnia  - Follow-up with Dr. Coyle    4. Moderate episode of recurrent major depressive disorder (H)  - Continue plan of care  - Follow-up with Dr. Abebe    5. Anxiety state  - As above    6. Migraine with aura and without status migrainosus, not intractable  - Continue plan of care    7. Gastroesophageal reflux disease, esophagitis presence not specified  - Continue plan of care          Preventive Health Recommendations  Female Ages 40 to 49    Yearly exam:     See your health care provider every year in order to  1. Review health changes.   2. Discuss preventive care.    3. Review your medicines if your doctor prescribed any.      Get a Pap test every three years (unless you have an abnormal result and your provider advises testing more often).      If you get Pap tests with HPV test, you only need to test every 5 years, unless you have an abnormal result. You do not need a Pap test if your uterus was removed (hysterectomy) and you have not had cancer.      You should be tested each year for STDs (sexually transmitted diseases), if you're at risk.     Ask your doctor if you should have a mammogram.      Have a colonoscopy (test for colon cancer) if someone in your family has had colon cancer or polyps before age 50.       Have a cholesterol test every 5 years.       Have a diabetes test (fasting glucose) after age 45. If you are at risk for diabetes, you should have this test every 3 years.    Shots: Get a flu shot each year. Get a tetanus shot every 10 years.     Nutrition:     Eat at least 5 servings of fruits and vegetables each day.    Eat whole-grain bread, whole-wheat pasta and brown rice instead of white grains and rice.    Get adequate Calcium and Vitamin D.      Lifestyle    Exercise at least 150 minutes  a week (an average of 30 minutes a day, 5 days a week). This will help you control your weight and prevent disease.    Limit alcohol to one drink per day.    No smoking.     Wear sunscreen to prevent skin cancer.    See your dentist every six months for an exam and cleaning.

## 2020-08-19 NOTE — PROGRESS NOTES
SUBJECTIVE:   CC: Nasra Ortiz is an 40 year old woman who presents for preventive health visit.       Healthy Habits:    Do you get at least three servings of calcium containing foods daily (dairy, green leafy vegetables, etc.)? yes    Amount of exercise or daily activities, outside of work: 0-1 day(s) per week    Problems taking medications regularly No    Medication side effects: No    Have you had an eye exam in the past two years? yes    Do you see a dentist twice per year? yes    Do you have sleep apnea, excessive snoring or daytime drowsiness?no      Depression and Anxiety Follow-Up    How are you doing with your depression since your last visit? Improved     How are you doing with your anxiety since your last visit?  Improved     Are you having other symptoms that might be associated with depression or anxiety? No    Have you had a significant life event? OTHER: Divorce     Do you have any concerns with your use of alcohol or other drugs? No       Migraine     Since your last clinic visit, how have your headaches changed?  Improved    How often are you getting headaches or migraines? Hasn't had a migraine in a few months     Are you able to do normal daily activities when you have a migraine? No    Are you taking rescue/relief medications? (Select all that apply) No    How helpful is your rescue/relief medication?  The relief is inconsistent    Are you taking any medications to prevent migraines? (Select all that apply)  Inderal    In the past 4 weeks, how often have you gone to urgent care or the emergency room because of your headaches?  0      How many servings of fruits and vegetables do you eat daily?  4 or more    On average, how many sweetened beverages do you drink each day (Examples: soda, juice, sweet tea, etc.  Do NOT count diet or artificially sweetened beverages)?   0    How many days per week do you exercise enough to make your heart beat faster? 3 or less    How many minutes a day do  you exercise enough to make your heart beat faster? 9 or less    How many days per week do you miss taking your medication? 0    GERD/Heartburn  Onset: ongoing    Description:     Burning in chest: no    Intensity: mild, moderate    Progression of Symptoms: same and intermittent    Accompanying Signs & Symptoms:  Does it feel like food gets stuck: no  Nausea: no  Vomiting (bloody?): no  Abdominal Pain: no  Black-Tarry stools: no:  Bloody stools: no    History:   Previous ulcers: no    Precipitating factors:   Caffeine use: YES  Alcohol use: no  NSAID/Aspirin use: no  Tobacco use: YES  Worse with spicy foods, alcohol and deep fried.    Alleviating factors:  Omeprazole    Therapies Tried and outcome:Omeprazole         Social History     Tobacco Use     Smoking status: Former Smoker     Packs/day: 0.40     Years: 10.00     Pack years: 4.00     Types: Cigarettes     Last attempt to quit: 2020     Years since quittin.5     Smokeless tobacco: Never Used     Tobacco comment: tobacco cessation discuseed - tried to quit: no - passive smoke exposure quitting on own    Substance Use Topics     Alcohol use: No     Drug use: No         PHQ 2020   PHQ-9 Total Score 10 17 17   Q9: Thoughts of better off dead/self-harm past 2 weeks Not at all Not at all Not at all   F/U: Thoughts of suicide or self-harm - - -   F/U: Safety concerns - - -     MAYKEL-7 SCORE 2020   Total Score 16 18 18       Suicide Assessment Five-step Evaluation and Treatment (SAFE-T)      Abuse: Current or Past(Physical, Sexual or Emotional)- No  Do you feel safe in your environment? Yes      Social History     Tobacco Use     Smoking status: Former Smoker     Packs/day: 0.40     Years: 10.00     Pack years: 4.00     Types: Cigarettes     Last attempt to quit: 2020     Years since quittin.5     Smokeless tobacco: Never Used     Tobacco comment: tobacco cessation discuseed - tried to quit: no - passive  smoke exposure quitting on own    Substance Use Topics     Alcohol use: No     If you drink alcohol do you typically have >3 drinks per day or >7 drinks per week? Yes - AUDIT SCORE:     No flowsheet data found.                     Reviewed orders with patient.  Reviewed health maintenance and updated orders accordingly - Yes  Lab work is in process  Labs reviewed in EPIC  BP Readings from Last 3 Encounters:   20 112/76   20 112/68   19 108/60    Wt Readings from Last 3 Encounters:   20 56.7 kg (125 lb)   20 54.4 kg (120 lb)   20 49.9 kg (110 lb)                  Patient Active Problem List   Diagnosis     Migraine with aura and without status migrainosus, not intractable     Anxiety state     Primary insomnia     ACP (advance care planning)     Gastroesophageal reflux disease, esophagitis presence not specified     Vitamin deficiency     Recurrent major depressive disorder (H)     Tobacco abuse     Hydronephrosis     Panic disorder without agoraphobia     Attention deficit hyperactivity disorder (ADHD), combined type     Past Surgical History:   Procedure Laterality Date      SECTION  2012     COLONOSCOPY N/A 2019    Procedure: COLONOSCOPY DIAGNOSTIC WITH RANDOM COLON BIOPSIES ANOSCOPY; Surgeon: Igor Burgos MD; Location: [facility] OR       ESOPHAGOSCOPY, GASTROSCOPY, DUODENOSCOPY (EGD), COMBINED  2019    Procedure: ESOPHAGOGASTRODUODENOSCOPY DIAGNOSTIC with biopsies; Surgeon: Igor Burgos MD; Location: [facility] OR        HYSTEROSCOPY, SURGICAL; W/ ENDOMETRIAL ABLATION, ANY METHOD N/A 2018     HYSTEROSCOPY  2018     LAPAROSCOPIC APPENDECTOMY N/A 2012     OOPHORECTOMY Right 2012    post operative hemorrhage with ruptured ovarian vein,      SALPINGECTOMY Left 2019    Procedure: LAPAROSCOPY LEFT SALPINGECTOMY; Surgeon: Regina Sweeney MD; Location: [facility] OR         Social History     Tobacco Use     Smoking status: Former  Smoker     Packs/day: 0.40     Years: 10.00     Pack years: 4.00     Types: Cigarettes     Last attempt to quit: 2020     Years since quittin.5     Smokeless tobacco: Never Used     Tobacco comment: tobacco cessation discuseed - tried to quit: no - passive smoke exposure quitting on own    Substance Use Topics     Alcohol use: No     Family History   Problem Relation Age of Onset     Hypertension Mother      Hyperlipidemia Mother      Cardiovascular Father      Hypertension Father      Hyperlipidemia Father      Heart Failure Father      Sleep Apnea Father          Current Outpatient Medications   Medication Sig Dispense Refill     DULoxetine (CYMBALTA) 60 MG capsule TAKE 1 CAPSULE (60 MG) BY MOUTH DAILY 30 capsule 11     MAGNESIUM OXIDE PO Take 400 mg by mouth daily Take 2 tabs evening       mirtazapine (REMERON) 30 MG tablet Take 1 tablet (30 mg) by mouth At Bedtime 30 tablet 4     multivitamin, therapeutic (THERA-VIT) TABS Take 1 tablet by mouth daily       omeprazole (PRILOSEC) 40 MG DR capsule TAKE 1 CAPSULE BY MOUTH ONCE DAILY 30-60 MINUTES BEFORE A MEAL. 90 capsule 3     propranolol ER (INDERAL LA) 80 MG 24 hr capsule Take 1 capsule (80 mg) by mouth daily 90 capsule 1     RITALIN LA 20 MG 24 hr capsule TAKE 1 CAPSULE (20MG) BY MOUTH ONCE DAILY. 30 capsule 0     temazepam (RESTORIL) 15 MG capsule TAKE 1 - 2 CAPS AT BEDTIME AS NEEDED FOR INSOMNIA. 60 capsule 1     methylphenidate (RITALIN LA) 20 MG 24 hr capsule Take 20 mg by mouth daily (Patient not taking: Reported on 2020) 30 capsule 0     Allergies   Allergen Reactions     Dust Mite Extract      Hydrocodone Other (See Comments) and Itching     Skin felt like it was burning.     Penicillins Rash     Recent Labs   Lab Test 03/19/19 09/10/18  1136 18  0948  17  1530  01/15/17 08/01/16  1905   ALT 15  --   --   --   --   --  15 19   CR 0.86 0.78 0.65   < >  --    < > 0.80 0.65   GFRESTIMATED >60 82 >90  --   --    < >  --  >90  Non   GFR Calc     GFRESTBLACK  --  >90 >90  --   --    < >  --  >90   GFR Calc     POTASSIUM 3.4 4.0 4.0   < >  --    < > 3.0* 3.6   TSH  --   --  0.53  --  0.87   < >  --  1.21    < > = values in this interval not displayed.        Mammo UTD    Pertinent mammograms are reviewed under the imaging tab.  History of abnormal Pap smear: Paps are done with Dr. Rashid at [facility]     Reviewed and updated as needed this visit by clinical staff  Tobacco  Allergies  Meds  Med Hx  Surg Hx  Fam Hx  Soc Hx        Reviewed and updated as needed this visit by Provider        Past Medical History:   Diagnosis Date     Acquired hypothyroidism 2014     Anxiety disorder 2012     Migraine with aura and without status migrainosus, not intractable 2014     Migraines      Primary insomnia 2015     Recurrent major depressive disorder (H) 2018     Tobacco abuse 2018     Vitamin deficiency 3/16/2018      Past Surgical History:   Procedure Laterality Date      SECTION  2012     COLONOSCOPY N/A 2019    Procedure: COLONOSCOPY DIAGNOSTIC WITH RANDOM COLON BIOPSIES ANOSCOPY; Surgeon: Igor Burgos MD; Location: [facility] OR       ESOPHAGOSCOPY, GASTROSCOPY, DUODENOSCOPY (EGD), COMBINED  2019    Procedure: ESOPHAGOGASTRODUODENOSCOPY DIAGNOSTIC with biopsies; Surgeon: Igor Burgos MD; Location: [facility] OR        HYSTEROSCOPY, SURGICAL; W/ ENDOMETRIAL ABLATION, ANY METHOD N/A 2018     HYSTEROSCOPY  2018     LAPAROSCOPIC APPENDECTOMY N/A 2012     OOPHORECTOMY Right 2012    post operative hemorrhage with ruptured ovarian vein,      SALPINGECTOMY Left 2019    Procedure: LAPAROSCOPY LEFT SALPINGECTOMY; Surgeon: Regina Sweeney MD; Location: [facility] OR       OB History   No obstetric history on file.       ROS:  CONSTITUTIONAL: NEGATIVE for fever, chills, change in weight  INTEGUMENTARU/SKIN: NEGATIVE for worrisome rashes, moles or  lesions  EYES: NEGATIVE for vision changes or irritation  ENT: NEGATIVE for ear, mouth and throat problems  RESP: NEGATIVE for significant cough or SOB  CV: NEGATIVE for chest pain, palpitations or peripheral edema  GI: NEGATIVE for nausea, abdominal pain, heartburn, or change in bowel habits  : NEGATIVE for unusual urinary or vaginal symptoms. Periods are regular.  MUSCULOSKELETAL: NEGATIVE for significant arthralgias or myalgia  NEURO: NEGATIVE for weakness, dizziness or paresthesias  PSYCHIATRIC: NEGATIVE for changes in mood or affect      OBJECTIVE:   /76 (BP Location: Right arm, Patient Position: Sitting, Cuff Size: Adult Regular)   Pulse 89   Temp 99  F (37.2  C) (Tympanic)   Wt 56.7 kg (125 lb)   SpO2 98%   BMI 21.46 kg/m         EXAM:  GENERAL: healthy, alert and no distress  EYES: Eyes grossly normal to inspection, PERRL and conjunctivae and sclerae normal  NECK: no adenopathy, no asymmetry, masses, or scars and thyroid normal to palpation  RESP: lungs clear to auscultation - no rales, rhonchi or wheezes  CV: regular rate and rhythm, normal S1 S2, no S3 or S4, no murmur, click or rub, no peripheral edema and peripheral pulses strong  MS: no gross musculoskeletal defects noted, no edema  SKIN: no suspicious lesions or rashes  NEURO: Normal strength and tone, mentation intact and speech normal  PSYCH: mentation appears normal, affect normal/bright        ASSESSMENT/PLAN:   1. Routine general medical examination at a health care facility  - TSH with free T4 reflex    2. Screening for malignant neoplasm of cervix  - UTD at [facility]    3. Primary insomnia  - Follow-up with Dr. Coyle    4. Moderate episode of recurrent major depressive disorder (H)  - Continue plan of care  - Follow-up with Dr. Abebe    5. Anxiety state  - As above    6. Migraine with aura and without status migrainosus, not intractable  - Continue plan of care    7. Gastroesophageal reflux disease, esophagitis presence not  "specified  - Continue plan of care        COUNSELING:   Reviewed preventive health counseling, as reflected in patient instructions       Regular exercise       Healthy diet/nutrition       Vision screening    Estimated body mass index is 21.46 kg/m  as calculated from the following:    Height as of 2/18/20: 1.626 m (5' 4\").    Weight as of this encounter: 56.7 kg (125 lb).         reports that she quit smoking about 6 months ago. Her smoking use included cigarettes. She has a 4.00 pack-year smoking history. She has never used smokeless tobacco.      Counseling Resources:  ATP IV Guidelines  Pooled Cohorts Equation Calculator  Breast Cancer Risk Calculator  FRAX Risk Assessment  ICSI Preventive Guidelines  Dietary Guidelines for Americans, 2010  USDA's MyPlate  ASA Prophylaxis  Lung CA Screening    Adrienne Dailey CNP  [facility] [place]  "

## 2020-08-24 ENCOUNTER — OFFICE VISIT (OUTPATIENT)
Dept: FAMILY MEDICINE | Facility: OTHER | Age: 40
End: 2020-08-24
Attending: NURSE PRACTITIONER
Payer: COMMERCIAL

## 2020-08-24 VITALS
DIASTOLIC BLOOD PRESSURE: 76 MMHG | TEMPERATURE: 99 F | SYSTOLIC BLOOD PRESSURE: 112 MMHG | HEART RATE: 89 BPM | BODY MASS INDEX: 21.46 KG/M2 | WEIGHT: 125 LBS | OXYGEN SATURATION: 98 %

## 2020-08-24 DIAGNOSIS — F51.01 PRIMARY INSOMNIA: ICD-10-CM

## 2020-08-24 DIAGNOSIS — F33.1 MODERATE EPISODE OF RECURRENT MAJOR DEPRESSIVE DISORDER (H): ICD-10-CM

## 2020-08-24 DIAGNOSIS — K21.9 GASTROESOPHAGEAL REFLUX DISEASE, ESOPHAGITIS PRESENCE NOT SPECIFIED: ICD-10-CM

## 2020-08-24 DIAGNOSIS — G43.109 MIGRAINE WITH AURA AND WITHOUT STATUS MIGRAINOSUS, NOT INTRACTABLE: ICD-10-CM

## 2020-08-24 DIAGNOSIS — Z12.4 SCREENING FOR MALIGNANT NEOPLASM OF CERVIX: ICD-10-CM

## 2020-08-24 DIAGNOSIS — F41.1 ANXIETY STATE: ICD-10-CM

## 2020-08-24 DIAGNOSIS — Z00.00 ROUTINE GENERAL MEDICAL EXAMINATION AT A HEALTH CARE FACILITY: Primary | ICD-10-CM

## 2020-08-24 LAB — TSH SERPL DL<=0.005 MIU/L-ACNC: 0.68 MU/L (ref 0.4–4)

## 2020-08-24 PROCEDURE — 36415 COLL VENOUS BLD VENIPUNCTURE: CPT | Mod: ZL | Performed by: NURSE PRACTITIONER

## 2020-08-24 PROCEDURE — 84443 ASSAY THYROID STIM HORMONE: CPT | Mod: ZL | Performed by: NURSE PRACTITIONER

## 2020-08-24 PROCEDURE — 99396 PREV VISIT EST AGE 40-64: CPT | Performed by: NURSE PRACTITIONER

## 2020-08-24 ASSESSMENT — ANXIETY QUESTIONNAIRES
2. NOT BEING ABLE TO STOP OR CONTROL WORRYING: NEARLY EVERY DAY
1. FEELING NERVOUS, ANXIOUS, OR ON EDGE: MORE THAN HALF THE DAYS
3. WORRYING TOO MUCH ABOUT DIFFERENT THINGS: NEARLY EVERY DAY
IF YOU CHECKED OFF ANY PROBLEMS ON THIS QUESTIONNAIRE, HOW DIFFICULT HAVE THESE PROBLEMS MADE IT FOR YOU TO DO YOUR WORK, TAKE CARE OF THINGS AT HOME, OR GET ALONG WITH OTHER PEOPLE: VERY DIFFICULT
3. WORRYING TOO MUCH ABOUT DIFFERENT THINGS: NEARLY EVERY DAY
GAD7 TOTAL SCORE: 18
5. BEING SO RESTLESS THAT IT IS HARD TO SIT STILL: MORE THAN HALF THE DAYS
6. BECOMING EASILY ANNOYED OR IRRITABLE: MORE THAN HALF THE DAYS
2. NOT BEING ABLE TO STOP OR CONTROL WORRYING: NEARLY EVERY DAY
1. FEELING NERVOUS, ANXIOUS, OR ON EDGE: MORE THAN HALF THE DAYS
IF YOU CHECKED OFF ANY PROBLEMS ON THIS QUESTIONNAIRE, HOW DIFFICULT HAVE THESE PROBLEMS MADE IT FOR YOU TO DO YOUR WORK, TAKE CARE OF THINGS AT HOME, OR GET ALONG WITH OTHER PEOPLE: VERY DIFFICULT
GAD7 TOTAL SCORE: 18
7. FEELING AFRAID AS IF SOMETHING AWFUL MIGHT HAPPEN: NEARLY EVERY DAY
4. TROUBLE RELAXING: NEARLY EVERY DAY
7. FEELING AFRAID AS IF SOMETHING AWFUL MIGHT HAPPEN: NEARLY EVERY DAY
6. BECOMING EASILY ANNOYED OR IRRITABLE: MORE THAN HALF THE DAYS
5. BEING SO RESTLESS THAT IT IS HARD TO SIT STILL: MORE THAN HALF THE DAYS

## 2020-08-24 ASSESSMENT — PATIENT HEALTH QUESTIONNAIRE - PHQ9
5. POOR APPETITE OR OVEREATING: NEARLY EVERY DAY
SUM OF ALL RESPONSES TO PHQ QUESTIONS 1-9: 17

## 2020-08-24 ASSESSMENT — PAIN SCALES - GENERAL: PAINLEVEL: NO PAIN (0)

## 2020-08-24 NOTE — NURSING NOTE
"Chief Complaint   Patient presents with     Physical       Initial /76 (BP Location: Right arm, Patient Position: Sitting, Cuff Size: Adult Regular)   Pulse 89   Temp 99  F (37.2  C) (Tympanic)   Wt 56.7 kg (125 lb)   SpO2 98%   BMI 21.46 kg/m   Estimated body mass index is 21.46 kg/m  as calculated from the following:    Height as of 2/18/20: 1.626 m (5' 4\").    Weight as of this encounter: 56.7 kg (125 lb).  Medication Reconciliation: complete  Ashley A. Lechevalier, LPN  "

## 2020-08-24 NOTE — LETTER
My Depression Action Plan  Name: Nasra Ortiz   Date of Birth 1980  Date: 8/24/2020    My doctor: Adrienne Dailey   My clinic: [facility]  [address]  [place] [address]  [phone]          GREEN    ZONE   Good Control    What it looks like:     Things are going generally well. You have normal ups and downs. You may even feel depressed from time to time, but bad moods usually last less than a day.   What you need to do:  1. Continue to care for yourself (see self care plan)  2. Check your depression survival kit and update it as needed  3. Follow your physician s recommendations including any medication.  4. Do not stop taking medication unless you consult with your physician first.           YELLOW         ZONE Getting Worse    What it looks like:     Depression is starting to interfere with your life.     It may be hard to get out of bed; you may be starting to isolate yourself from others.    Symptoms of depression are starting to last most all day and this has happened for several days.     You may have suicidal thoughts but they are not constant.   What you need to do:     1. Call your care team. Your response to treatment will improve if you keep your care team informed of your progress. Yellow periods are signs an adjustment may need to be made.     2. Continue your self-care.  Just get dressed and ready for the day.  Don't give yourself time to talk yourself out of it.    3. Talk to someone in your support network.    4. Open up your Depression Self-Care Plan/Wellness Kit.           RED    ZONE Medical Alert - Get Help    What it looks like:     Depression is seriously interfering with your life.     You may experience these or other symptoms: You can t get out of bed most days, can t work or engage in other necessary activities, you have trouble taking care of basic hygiene, or basic responsibilities, thoughts of suicide or death that will not  go away, self-injurious behavior.     What you need to do:  1. Call your care team and request a same-day appointment. If they are not available (weekends or after hours) call your local crisis line, emergency room or 911.            Depression Self-Care Plan / Wellness Kit    Self-Care for Depression  Here s the deal. Your body and mind are really not as separate as most people think.  What you do and think affects how you feel and how you feel influences what you do and think. This means if you do things that people who feel good do, it will help you feel better.  Sometimes this is all it takes.  There is also a place for medication and therapy depending on how severe your depression is, so be sure to consult with your medical provider and/ or Behavioral Health Consultant if your symptoms are worsening or not improving.     In order to better manage my stress, I will:    Exercise  Get some form of exercise, every day. This will help reduce pain and release endorphins, the  feel good  chemicals in your brain. This is almost as good as taking antidepressants!  This is not the same as joining a gym and then never going! (they count on that by the way ) It can be as simple as just going for a walk or doing some gardening, anything that will get you moving.      Hygiene   Maintain good hygiene (get out of bed in the morning, make your bed, brush your teeth, take a shower, and get dressed like you were going to work, even if you are unemployed).  If your clothes don't fit try to get ones that do.    Diet  Strive to eat foods that are good for me, drink plenty of water, and avoid excessive sugar, caffeine, alcohol, and other mood-altering substances.  Some foods that are helpful in depression are: complex carbohydrates, B vitamins, flaxseed, fish or fish oil, fresh fruits and vegetables.    Psychotherapy  Agree to participate in Individual Therapy (if recommended).    Medication  If prescribed medications, I agree to  take them.  Missing doses can result in serious side effects.  I understand that drinking alcohol, or other illicit drug use, may cause potential side effects.  I will not stop my medication abruptly without first discussing it with my provider.    Staying Connected With Others  Stay in touch with my friends, family members, and my primary care provider/team.    Use your imagination  Be creative.  We all have a creative side; it doesn t matter if it s oil painting, sand castles, or mud pies! This will also kick up the endorphins.    Witness Beauty  (AKA stop and smell the roses) Take a look outside, even in mid-winter. Notice colors, textures. Watch the squirrels and birds.     Service to others  Be of service to others.  There is always someone else in need.  By helping others we can  get out of ourselves  and remember the really important things.  This also provides opportunities for practicing all the other parts of the program.    Humor  Laugh and be silly!  Adjust your TV habits for less news and crime-drama and more comedy.    Control your stress  Try breathing deep, massage therapy, biofeedback, and meditation. Find time to relax each day.     Crisis Text Line  http://www.crisistextline.org    The Crisis Text Line serves anyone, in any type of crisis, providing access to free, 24/7 support and information via the medium people already use and trust:    Here's how it works:  1.  Text 116-210 from anywhere in the [place], anytime, about any type of crisis.  2.  A live, trained Crisis Counselor receives the text and responds quickly.  3.  The volunteer Crisis Counselor will help you move from a 'hot moment to a cool moment'.    My support system    Clinic Contact:  Phone number:    Contact 1:  Phone number:    Contact 2:  Phone number:    Baptism/:  Phone number:    Therapist:  Phone number:    Local crisis center:    Phone number:    Other community support:  Phone number:

## 2020-08-25 ASSESSMENT — ANXIETY QUESTIONNAIRES: GAD7 TOTAL SCORE: 18

## 2020-09-14 DIAGNOSIS — G47.00 PERSISTENT INSOMNIA: ICD-10-CM

## 2020-09-15 NOTE — TELEPHONE ENCOUNTER
Restoril 15 mg      Last Written Prescription Date:  8/20/20  Last Fill Quantity: 60,   # refills: 1  Last Office Visit: 7/1/20  Future Office visit:       Routing refill request to provider for review/approval because:

## 2020-09-16 RX ORDER — TEMAZEPAM 15 MG/1
CAPSULE ORAL
Qty: 60 CAPSULE | Refills: 1 | Status: SHIPPED | OUTPATIENT
Start: 2020-10-12 | End: 2020-11-10

## 2020-10-12 DIAGNOSIS — F90.2 ADHD (ATTENTION DEFICIT HYPERACTIVITY DISORDER), COMBINED TYPE: ICD-10-CM

## 2020-10-14 RX ORDER — METHYLPHENIDATE HCL 20 MG
CAPSULE,EXTENDED RELEASE BIPHASIC 50-50 ORAL
Qty: 30 CAPSULE | Refills: 0 | Status: SHIPPED | OUTPATIENT
Start: 2020-10-14 | End: 2021-01-30

## 2020-10-14 NOTE — TELEPHONE ENCOUNTER
Ritalin       Last Written Prescription Date:  7/29/2020  Last Fill Quantity: 30,   # refills: 0  Last Office Visit: 7/1/2020  Future Office visit:

## 2020-11-18 ENCOUNTER — VIRTUAL VISIT (OUTPATIENT)
Dept: PSYCHIATRY | Facility: OTHER | Age: 40
End: 2020-11-18
Attending: PSYCHIATRY & NEUROLOGY
Payer: COMMERCIAL

## 2020-11-18 DIAGNOSIS — F90.0 ATTENTION DEFICIT HYPERACTIVITY DISORDER (ADHD), PREDOMINANTLY INATTENTIVE TYPE: Primary | ICD-10-CM

## 2020-11-18 PROCEDURE — 99214 OFFICE O/P EST MOD 30 MIN: CPT | Mod: 95 | Performed by: PSYCHIATRY & NEUROLOGY

## 2020-11-18 RX ORDER — METHYLPHENIDATE HYDROCHLORIDE 20 MG/1
20 CAPSULE, EXTENDED RELEASE ORAL DAILY
Qty: 30 CAPSULE | Refills: 0 | Status: SHIPPED | OUTPATIENT
Start: 2020-11-18 | End: 2020-12-18

## 2020-11-18 RX ORDER — METHYLPHENIDATE HYDROCHLORIDE 20 MG/1
20 CAPSULE, EXTENDED RELEASE ORAL DAILY
Qty: 30 CAPSULE | Refills: 0 | Status: SHIPPED | OUTPATIENT
Start: 2020-12-16 | End: 2021-01-15

## 2020-11-18 ASSESSMENT — ANXIETY QUESTIONNAIRES
GAD7 TOTAL SCORE: 18
3. WORRYING TOO MUCH ABOUT DIFFERENT THINGS: NEARLY EVERY DAY
1. FEELING NERVOUS, ANXIOUS, OR ON EDGE: MORE THAN HALF THE DAYS
2. NOT BEING ABLE TO STOP OR CONTROL WORRYING: NEARLY EVERY DAY
6. BECOMING EASILY ANNOYED OR IRRITABLE: MORE THAN HALF THE DAYS
5. BEING SO RESTLESS THAT IT IS HARD TO SIT STILL: MORE THAN HALF THE DAYS
7. FEELING AFRAID AS IF SOMETHING AWFUL MIGHT HAPPEN: NEARLY EVERY DAY

## 2020-11-18 ASSESSMENT — PATIENT HEALTH QUESTIONNAIRE - PHQ9
SUM OF ALL RESPONSES TO PHQ QUESTIONS 1-9: 10
5. POOR APPETITE OR OVEREATING: NEARLY EVERY DAY

## 2020-11-18 ASSESSMENT — PAIN SCALES - GENERAL: PAINLEVEL: NO PAIN (0)

## 2020-11-18 NOTE — NURSING NOTE
"Chief Complaint   Patient presents with     RECHECK     Telephone visit.  DMV paperwork       Initial There were no vitals taken for this visit. Estimated body mass index is 21.46 kg/m  as calculated from the following:    Height as of 2/18/20: 1.626 m (5' 4\").    Weight as of 8/24/20: 56.7 kg (125 lb).  Medication Reconciliation: complete  JADA CASTRO LPN    "
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
## 2020-11-19 ASSESSMENT — ANXIETY QUESTIONNAIRES: GAD7 TOTAL SCORE: 18

## 2020-12-20 ENCOUNTER — HEALTH MAINTENANCE LETTER (OUTPATIENT)
Age: 40
End: 2020-12-20

## 2020-12-28 DIAGNOSIS — F41.1 GAD (GENERALIZED ANXIETY DISORDER): ICD-10-CM

## 2020-12-28 DIAGNOSIS — G43.009 MIGRAINE WITHOUT AURA AND WITHOUT STATUS MIGRAINOSUS, NOT INTRACTABLE: ICD-10-CM

## 2020-12-28 RX ORDER — MIRTAZAPINE 30 MG/1
30 TABLET, FILM COATED ORAL AT BEDTIME
Qty: 30 TABLET | Refills: 4 | OUTPATIENT
Start: 2020-12-28

## 2020-12-28 RX ORDER — DULOXETIN HYDROCHLORIDE 60 MG/1
60 CAPSULE, DELAYED RELEASE ORAL DAILY
Qty: 30 CAPSULE | Refills: 11 | Status: SHIPPED | OUTPATIENT
Start: 2020-12-28 | End: 2021-11-04

## 2020-12-28 RX ORDER — PROPRANOLOL HYDROCHLORIDE 80 MG/1
80 CAPSULE, EXTENDED RELEASE ORAL DAILY
Qty: 30 CAPSULE | Refills: 1 | Status: SHIPPED | OUTPATIENT
Start: 2020-12-28 | End: 2021-03-01

## 2020-12-28 NOTE — TELEPHONE ENCOUNTER
Inderal       Last Written Prescription Date:  5/22/2020  Last Fill Quantity: 90,   # refills: 1  Last Office Visit: 8/24/2020  Future Office visit:

## 2020-12-28 NOTE — TELEPHONE ENCOUNTER
Remeron       Last Written Prescription Date:  7/1/2020  Last Fill Quantity: 30,   # refills: 4    Cymbalta       Last Written Prescription Date:  12/9/2020  Last Fill Quantity: 30,   # refills: 11  Last Office Visit: 11/18/2020  Future Office visit:

## 2021-01-04 DIAGNOSIS — G47.00 PERSISTENT INSOMNIA: ICD-10-CM

## 2021-01-04 RX ORDER — TEMAZEPAM 15 MG/1
CAPSULE ORAL
Qty: 60 CAPSULE | Refills: 0 | Status: SHIPPED | OUTPATIENT
Start: 2021-01-06 | End: 2021-03-01

## 2021-01-04 NOTE — TELEPHONE ENCOUNTER
Restoril   Last visit: 11.18.20  Last refill: 12.7.20 per [place] . Post dated Rx for 1.6.21    Future appointments:

## 2021-01-29 DIAGNOSIS — F90.2 ADHD (ATTENTION DEFICIT HYPERACTIVITY DISORDER), COMBINED TYPE: ICD-10-CM

## 2021-01-29 DIAGNOSIS — R79.0 LOW MAGNESIUM LEVEL: Primary | ICD-10-CM

## 2021-01-29 RX ORDER — MAGNESIUM OXIDE 400 MG/1
TABLET ORAL
Qty: 60 TABLET | Refills: 0 | Status: SHIPPED | OUTPATIENT
Start: 2021-01-29 | End: 2021-03-01

## 2021-01-29 NOTE — TELEPHONE ENCOUNTER
Please advise      RITALIN LA 20 MG 24 hr capsule     Last Written Prescription Date:  10/14/20  Last Fill Quantity: 30,   # refills: 0  Last Office Visit: 8/24/20  Future Office visit:       Routing refill request to provider for review/approval because:  Drug not on the [facility], [facility] or [facility] refill protocol or controlled substance

## 2021-01-29 NOTE — TELEPHONE ENCOUNTER
Ox  Last Written Prescription Date: Patient reported in Epic.  Routing refill request to provider for review/approval because:  Medication is reported/historical-Needs DX.      Joann Pollard RN

## 2021-01-30 RX ORDER — METHYLPHENIDATE HCL 20 MG
CAPSULE,EXTENDED RELEASE BIPHASIC 50-50 ORAL
Qty: 30 CAPSULE | Refills: 0 | Status: SHIPPED | OUTPATIENT
Start: 2021-01-30 | End: 2021-02-02

## 2021-02-02 ENCOUNTER — VIRTUAL VISIT (OUTPATIENT)
Dept: PSYCHIATRY | Facility: OTHER | Age: 41
End: 2021-02-02
Attending: PSYCHIATRY & NEUROLOGY
Payer: COMMERCIAL

## 2021-02-02 DIAGNOSIS — G43.109 MIGRAINE WITH AURA AND WITHOUT STATUS MIGRAINOSUS, NOT INTRACTABLE: Primary | ICD-10-CM

## 2021-02-02 DIAGNOSIS — F90.2 ADHD (ATTENTION DEFICIT HYPERACTIVITY DISORDER), COMBINED TYPE: ICD-10-CM

## 2021-02-02 PROCEDURE — 99213 OFFICE O/P EST LOW 20 MIN: CPT | Mod: 95 | Performed by: PSYCHIATRY & NEUROLOGY

## 2021-02-02 RX ORDER — METHYLPHENIDATE HCL 20 MG
1 CAPSULE,EXTENDED RELEASE BIPHASIC 50-50 ORAL DAILY
Qty: 30 CAPSULE | Refills: 0 | Status: SHIPPED | OUTPATIENT
Start: 2021-02-26 | End: 2021-03-01

## 2021-02-02 RX ORDER — TOPIRAMATE 25 MG/1
25 TABLET, FILM COATED ORAL EVERY EVENING
Qty: 30 TABLET | Refills: 4 | Status: SHIPPED | OUTPATIENT
Start: 2021-02-02 | End: 2021-03-23

## 2021-02-02 ASSESSMENT — ANXIETY QUESTIONNAIRES
5. BEING SO RESTLESS THAT IT IS HARD TO SIT STILL: NEARLY EVERY DAY
6. BECOMING EASILY ANNOYED OR IRRITABLE: NEARLY EVERY DAY
2. NOT BEING ABLE TO STOP OR CONTROL WORRYING: NEARLY EVERY DAY
GAD7 TOTAL SCORE: 21
1. FEELING NERVOUS, ANXIOUS, OR ON EDGE: NEARLY EVERY DAY
7. FEELING AFRAID AS IF SOMETHING AWFUL MIGHT HAPPEN: NEARLY EVERY DAY
3. WORRYING TOO MUCH ABOUT DIFFERENT THINGS: NEARLY EVERY DAY

## 2021-02-02 ASSESSMENT — PATIENT HEALTH QUESTIONNAIRE - PHQ9
SUM OF ALL RESPONSES TO PHQ QUESTIONS 1-9: 19
5. POOR APPETITE OR OVEREATING: NEARLY EVERY DAY

## 2021-02-02 ASSESSMENT — PAIN SCALES - GENERAL: PAINLEVEL: NO PAIN (0)
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
## 2021-02-02 NOTE — NURSING NOTE
"Chief Complaint   Patient presents with     RECHECK     Telephone visit.  Patient c/o really bad migraines again.  Struggling with sleep issues.       Initial There were no vitals taken for this visit. Estimated body mass index is 21.46 kg/m  as calculated from the following:    Height as of 2/18/20: 1.626 m (5' 4\").    Weight as of 8/24/20: 56.7 kg (125 lb).  Medication Reconciliation: complete  JADA CASTRO LPN    "

## 2021-02-03 ASSESSMENT — ANXIETY QUESTIONNAIRES: GAD7 TOTAL SCORE: 21

## 2021-03-01 DIAGNOSIS — G47.00 PERSISTENT INSOMNIA: ICD-10-CM

## 2021-03-01 DIAGNOSIS — F90.2 ADHD (ATTENTION DEFICIT HYPERACTIVITY DISORDER), COMBINED TYPE: ICD-10-CM

## 2021-03-01 DIAGNOSIS — R79.0 LOW MAGNESIUM LEVEL: ICD-10-CM

## 2021-03-01 DIAGNOSIS — G43.009 MIGRAINE WITHOUT AURA AND WITHOUT STATUS MIGRAINOSUS, NOT INTRACTABLE: ICD-10-CM

## 2021-03-01 RX ORDER — MAGNESIUM OXIDE 400 MG/1
TABLET ORAL
Qty: 60 TABLET | Refills: 0 | Status: SHIPPED | OUTPATIENT
Start: 2021-03-01 | End: 2021-03-29

## 2021-03-01 RX ORDER — PROPRANOLOL HYDROCHLORIDE 80 MG/1
80 CAPSULE, EXTENDED RELEASE ORAL DAILY
Qty: 30 CAPSULE | Refills: 1 | Status: SHIPPED | OUTPATIENT
Start: 2021-03-01 | End: 2021-04-26

## 2021-03-01 RX ORDER — METHYLPHENIDATE HCL 20 MG
CAPSULE,EXTENDED RELEASE BIPHASIC 50-50 ORAL
Qty: 30 CAPSULE | Refills: 0 | Status: SHIPPED | OUTPATIENT
Start: 2021-03-01 | End: 2021-03-23

## 2021-03-01 RX ORDER — TEMAZEPAM 15 MG/1
CAPSULE ORAL
Qty: 60 CAPSULE | Refills: 0 | Status: SHIPPED | OUTPATIENT
Start: 2021-03-01 | End: 2021-03-23

## 2021-03-01 NOTE — TELEPHONE ENCOUNTER
Temazepam  Last Written Prescription Date: 1/6/21  Last Fill Quantity: 60 # of Refills: 0  Last Office Visit: 2/2/21    Magnesium  Last Written Prescription Date: 1/29/21  Last Fill Quantity: 60 # of Refills: 0  Last Office Visit: 2/2/21    Ritalin  Last Written Prescription Date: 2/26/21  Last Fill Quantity: 30 # of Refills: 0  Last Office Visit: 2/2/21

## 2021-03-01 NOTE — TELEPHONE ENCOUNTER
Propranolol ER 80 mg  Last Visit 2/02/21  Last Fill 12/28/20  Next Visit not scheduled    Thank you

## 2021-03-23 ENCOUNTER — TELEPHONE (OUTPATIENT)
Dept: BEHAVIORAL HEALTH | Facility: HOSPITAL | Age: 41
End: 2021-03-23

## 2021-03-23 ENCOUNTER — VIRTUAL VISIT (OUTPATIENT)
Dept: PSYCHIATRY | Facility: OTHER | Age: 41
End: 2021-03-23
Attending: PSYCHIATRY & NEUROLOGY
Payer: COMMERCIAL

## 2021-03-23 DIAGNOSIS — F90.2 ADHD (ATTENTION DEFICIT HYPERACTIVITY DISORDER), COMBINED TYPE: ICD-10-CM

## 2021-03-23 DIAGNOSIS — G47.00 PERSISTENT INSOMNIA: ICD-10-CM

## 2021-03-23 DIAGNOSIS — F41.1 GAD (GENERALIZED ANXIETY DISORDER): Primary | ICD-10-CM

## 2021-03-23 PROCEDURE — 99214 OFFICE O/P EST MOD 30 MIN: CPT | Mod: 95 | Performed by: PSYCHIATRY & NEUROLOGY

## 2021-03-23 RX ORDER — METHYLPHENIDATE HCL 20 MG
1 CAPSULE,EXTENDED RELEASE BIPHASIC 50-50 ORAL DAILY
Qty: 30 CAPSULE | Refills: 0 | Status: SHIPPED | OUTPATIENT
Start: 2021-03-26 | End: 2021-04-12

## 2021-03-23 RX ORDER — TEMAZEPAM 15 MG/1
CAPSULE ORAL
Qty: 60 CAPSULE | Refills: 3 | Status: SHIPPED | OUTPATIENT
Start: 2021-03-29 | End: 2021-06-22

## 2021-03-23 ASSESSMENT — ANXIETY QUESTIONNAIRES
2. NOT BEING ABLE TO STOP OR CONTROL WORRYING: NEARLY EVERY DAY
GAD7 TOTAL SCORE: 21
6. BECOMING EASILY ANNOYED OR IRRITABLE: NEARLY EVERY DAY
7. FEELING AFRAID AS IF SOMETHING AWFUL MIGHT HAPPEN: NEARLY EVERY DAY
1. FEELING NERVOUS, ANXIOUS, OR ON EDGE: NEARLY EVERY DAY
5. BEING SO RESTLESS THAT IT IS HARD TO SIT STILL: NEARLY EVERY DAY
3. WORRYING TOO MUCH ABOUT DIFFERENT THINGS: NEARLY EVERY DAY

## 2021-03-23 ASSESSMENT — PAIN SCALES - GENERAL: PAINLEVEL: NO PAIN (0)

## 2021-03-23 ASSESSMENT — PATIENT HEALTH QUESTIONNAIRE - PHQ9
5. POOR APPETITE OR OVEREATING: NEARLY EVERY DAY
SUM OF ALL RESPONSES TO PHQ QUESTIONS 1-9: 13

## 2021-03-23 NOTE — TELEPHONE ENCOUNTER
I followed up with Nasra by phone call to explain what Partial Hospitalization Program is.  She is interested in attending.  I will have [facility] verify insurance coverage for her involvement.
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
## 2021-03-23 NOTE — NURSING NOTE
"Chief Complaint   Patient presents with     RECHECK     Telephone visit.  Patient c/o increased anxiety.       Initial There were no vitals taken for this visit. Estimated body mass index is 21.46 kg/m  as calculated from the following:    Height as of 2/18/20: 1.626 m (5' 4\").    Weight as of 8/24/20: 56.7 kg (125 lb).  Medication Reconciliation: complete  JADA CASTRO LPN    "

## 2021-03-24 ASSESSMENT — ANXIETY QUESTIONNAIRES: GAD7 TOTAL SCORE: 21

## 2021-03-25 ENCOUNTER — TELEPHONE (OUTPATIENT)
Dept: PSYCHIATRY | Facility: OTHER | Age: 41
End: 2021-03-25

## 2021-03-25 NOTE — TELEPHONE ENCOUNTER
3/25/21**MAURO to schedule patient 1 month follow up with Van (approx. 4/26/21).    -Hilaria VALVERDE

## 2021-03-29 DIAGNOSIS — K21.9 GASTROESOPHAGEAL REFLUX DISEASE WITHOUT ESOPHAGITIS: ICD-10-CM

## 2021-03-29 DIAGNOSIS — R79.0 LOW MAGNESIUM LEVEL: ICD-10-CM

## 2021-03-29 RX ORDER — OMEPRAZOLE 40 MG/1
CAPSULE, DELAYED RELEASE ORAL
Qty: 30 CAPSULE | Refills: 3 | Status: SHIPPED | OUTPATIENT
Start: 2021-03-29 | End: 2021-07-26

## 2021-03-29 RX ORDER — MAGNESIUM OXIDE 400 MG/1
TABLET ORAL
Qty: 60 TABLET | Refills: 0 | Status: ON HOLD | OUTPATIENT
Start: 2021-03-29 | End: 2021-06-12

## 2021-03-29 NOTE — TELEPHONE ENCOUNTER
Magnesium Oxide (Mag-ox) 400 mg tablet  Take 2 tablets by mouth in the evening  Last Written Prescription Date:  3-1-2021  Last Fill Quantity: 60,   # refills: 0  Last Office Visit: 3-  Future Office visit:   4-12-21

## 2021-03-29 NOTE — TELEPHONE ENCOUNTER
Mag-ox     Last Written Prescription Date:  3.1.2021  Last Fill Quantity: 60,   # refills: 0  Last Office Visit: 3.23.2021  Future Office visit:       Routing refill request to provider for review/approval because:  Drug not on the [facility], [facility] or [facility] refill protocol or controlled substance    Joann Pollard RN

## 2021-04-08 ENCOUNTER — HOSPITAL ENCOUNTER (OUTPATIENT)
Dept: BEHAVIORAL HEALTH | Facility: HOSPITAL | Age: 41
Discharge: HOME | End: 2021-04-08
Attending: PSYCHIATRY & NEUROLOGY | Admitting: PSYCHIATRY & NEUROLOGY
Payer: COMMERCIAL

## 2021-04-08 PROCEDURE — 90791 PSYCH DIAGNOSTIC EVALUATION: CPT | Performed by: SOCIAL WORKER

## 2021-04-08 ASSESSMENT — ANXIETY QUESTIONNAIRES
3. WORRYING TOO MUCH ABOUT DIFFERENT THINGS: NEARLY EVERY DAY
1. FEELING NERVOUS, ANXIOUS, OR ON EDGE: NEARLY EVERY DAY
5. BEING SO RESTLESS THAT IT IS HARD TO SIT STILL: NEARLY EVERY DAY
2. NOT BEING ABLE TO STOP OR CONTROL WORRYING: NEARLY EVERY DAY
6. BECOMING EASILY ANNOYED OR IRRITABLE: NEARLY EVERY DAY
IF YOU CHECKED OFF ANY PROBLEMS ON THIS QUESTIONNAIRE, HOW DIFFICULT HAVE THESE PROBLEMS MADE IT FOR YOU TO DO YOUR WORK, TAKE CARE OF THINGS AT HOME, OR GET ALONG WITH OTHER PEOPLE: EXTREMELY DIFFICULT
7. FEELING AFRAID AS IF SOMETHING AWFUL MIGHT HAPPEN: NEARLY EVERY DAY
GAD7 TOTAL SCORE: 21

## 2021-04-08 ASSESSMENT — PATIENT HEALTH QUESTIONNAIRE - PHQ9
SUM OF ALL RESPONSES TO PHQ QUESTIONS 1-9: 20
5. POOR APPETITE OR OVEREATING: NEARLY EVERY DAY
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
## 2021-04-09 ASSESSMENT — ANXIETY QUESTIONNAIRES: GAD7 TOTAL SCORE: 21
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
## 2021-04-11 NOTE — TREATMENT PLAN
Individualized Treatment Plan     Date of Plan: 2021    Name: Nasra Lovelace MRN: [id number]    : 1980    Programs:  [facility] ()     DSM-V Diagnoses: 300.02 (F41.1) Generalized Anxiety Disorder;  296.32 (F33.1) Major Depressive Disorder, recurrent, moderate, with anxious distress; 309.81 (F43.10) PTSD  DA Date: 2021  Psychosocial & Contextual Factors: health issues, limited social support, mental health symptoms, occupational / vocational stress, parent-child stress and relationship stress  WHODAS: 33  LOCUS: 21      Team Members Contributing to Plan:  Dr. Brandy White, [facility]  Zuleyka Verdin, [facility]  Radha Mendoza MICHA    Client Strengths:  caring, committed to sobriety, educated, empathetic, goal-focused, good listener, has a previous history of therapy, insightful, intelligent, motivated, open to learning, open to suggestions / feedback, responsible parent, support of family, friends and providers, supportive, wants to learn, willing to ask questions, willing to relate to others and work history     Client Participation in Plan:  Contributed to goals and plan   Agrees with plan   Received copy of treatment plan     Areas of Vulnerability:  Anxiety  Depressive symptoms   Trauma/Abuse/Neglect  Active/history of addiction/substance abuse     Long-Term Goals:  Knowledge about illness and management of symptoms   Maintenance of personal safety   Maintenance of sobriety   Effective management of impulsivity     Abuse Prevention Plan:  Safe, therapeutic environment   Safety coping plan as needed   Education regarding illness and skill development   Coordination with care providers   Impluse control education and intervention   Medication adjustment/management (MI/CD)     Discharge Criteria:  Satisfactory progress toward treatment goals   Improvement re: identified problems and symptoms   Ability to continue recovery at next level of service   Has a discharge plan in  place   Has safety/coping plan in place   Ability to maintain sobriety  Regular attendance as scheduled         Areas of Treatment Focus            Area of Treatment Focus:   Symptom Stabilization and Management  Start Date:    4/12/2021    Goal:  Target Date: 4/16/2021 Status: Active  Will report on symptoms and identify skills to use to manage anxiety and Will learn and practice 1-2 coping skills to help improve the symptoms and report back daily in group how you are practicing the skills      Progress:             Treatment Strategies:   Engage in safety planning when indicated  Facilitate increased self awareness  Teach adaptive coping skills and communication skills    These services will include group therapy and OT group therapy daily and individual therapy and medications management as needed.                 Area of Treatment Focus:   Symptom Stabilization and Management  Start Date:    4/19/2021    Goal:  Target Date: 4/23/2021 Status: Active  Will learn and practice 1-2 coping skills to help improve symptoms, report daily in group and start to build confidence in using your skills and Will Improve Mindfulness / Stay in the Here and Now Intentionally bringing your attention to something beautiful, pleasant, or interesting that is occurring or is present in your immediate environment or experience on a regular basis.      Progress:             Treatment Strategies:   Engage in safety planning when indicated  Facilitate increased self awareness  Provide education regarding Mindfulness and grounding techniques  Teach adaptive coping skills and communication skills    These services will include group therapy and OT group therapy daily and individual therapy and medications management as needed.               Area of Treatment Focus:   Community Resources / Support and Discharge Planning  Start Date:    4/26//2021    Goal:  Target Date: 4/30/2021 Status: Active  Will improve wellness related behaviors by  reporting in group the skills you have built confidence in using to help symptoms and Will develop an aftercare / transition plan by the end of the program      Progress:             Treatment Strategies:   Assist with discharge planning  Engage in safety planning when indicated  Facilitate increased self awareness  Provide education regarding community based services  Teach adaptive coping skills and communication skills     These services will include group therapy and OT group therapy daily and individual therapy and medications management as needed.

## 2021-04-12 ENCOUNTER — TELEPHONE (OUTPATIENT)
Dept: PSYCHIATRY | Facility: OTHER | Age: 41
End: 2021-04-12

## 2021-04-12 ENCOUNTER — HOSPITAL ENCOUNTER (OUTPATIENT)
Dept: BEHAVIORAL HEALTH | Facility: HOSPITAL | Age: 41
End: 2021-04-12
Attending: PSYCHIATRY & NEUROLOGY
Payer: COMMERCIAL

## 2021-04-12 ENCOUNTER — VIRTUAL VISIT (OUTPATIENT)
Dept: PSYCHIATRY | Facility: OTHER | Age: 41
End: 2021-04-12
Attending: PSYCHIATRY & NEUROLOGY
Payer: COMMERCIAL

## 2021-04-12 DIAGNOSIS — F90.2 ADHD (ATTENTION DEFICIT HYPERACTIVITY DISORDER), COMBINED TYPE: ICD-10-CM

## 2021-04-12 DIAGNOSIS — F41.1 GAD (GENERALIZED ANXIETY DISORDER): Primary | ICD-10-CM

## 2021-04-12 PROCEDURE — H0035 MH PARTIAL HOSP TX UNDER 24H: HCPCS | Performed by: SOCIAL WORKER

## 2021-04-12 PROCEDURE — 99214 OFFICE O/P EST MOD 30 MIN: CPT | Mod: 95 | Performed by: PSYCHIATRY & NEUROLOGY

## 2021-04-12 RX ORDER — LORAZEPAM 0.5 MG/1
0.5 TABLET ORAL DAILY PRN
Qty: 30 TABLET | Refills: 1 | Status: SHIPPED | OUTPATIENT
Start: 2021-04-12 | End: 2021-05-11

## 2021-04-12 RX ORDER — METHYLPHENIDATE HCL 20 MG
1 CAPSULE,EXTENDED RELEASE BIPHASIC 50-50 ORAL DAILY
Qty: 30 CAPSULE | Refills: 0 | Status: SHIPPED | OUTPATIENT
Start: 2021-04-19 | End: 2021-06-22

## 2021-04-12 ASSESSMENT — PAIN SCALES - GENERAL: PAINLEVEL: MILD PAIN (3)

## 2021-04-12 NOTE — PROGRESS NOTES
Group Therapy Progress Notes     Client Initial Individualized Goals for Treatment:     Will report on symptoms and identify skills to use to manage anxiety and Will learn and practice 1-2 coping skills to help improve the symptoms and report back daily in group how you are practicing the skills    Area of Treatment Focus:  Symptom Stabilization and Management    Therapeutic Interventions/Treatment Strategies:  Engage in safety planning when indicated  Facilitate increased self awareness  Teach adaptive coping skills and communication skills    Response to Treatment Strategies:  Accepted Feedback, Gave Feedback, Listened  and Focused on Goals    Name of Groups:  Relationship Circles - Time: 10-10:50;  Default/Focused Mode 1-1:50    Description and Therapeutic Outcome:     During Relationship Circles group, Nasra presented as relaxed, social and easily participatory. She initially identified herself as being in her inner Relationship Wainwright, but after discussion, she shifted herself to an outer Wainwright. She recognized that she has her dad in her inner Wainwright and that she is the dependable one that helps him. Discussed self care, Me time, and 3 C's.    During Default/Focused Mode, Nasra owned Defaulting to enabling, Perfectionism, and to the Known. She will work on Focused Mode and implement the 3C's, Present Moment, Just Like Me, Acceptance and Drop the Rope. Nasra presents as intentional regarding her wellness and remains appropriate for PHP.    Is this a Weekly Review of the Progress on the Treatment Plan?    NO    Are Treatment Plan Goals being addressed?  YES      Are Treatment Plan Strategies to Address Goals Effective?  YES      Are there any current contracts in place?  YES
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
## 2021-04-12 NOTE — TELEPHONE ENCOUNTER
4/12/21**LM to schedule patient with 1 month follow up appointment.  Van (approx. 5/12/21).    -Hilaria VALVERDE

## 2021-04-12 NOTE — PROGRESS NOTES
Group Therapy Progress Notes     Client Initial Individualized Goals for Treatment: Will report on symptoms and identify skills to use to manage anxiety and Will learn and practice 1-2 coping skills to help improve the symptoms and report back daily in group how you are practicing the skills    Area of Treatment Focus:  Symptom Stabilization and Management    Therapeutic Interventions/Treatment Strategies:  Engage in safety planning when indicated  Facilitate increased self awareness  Teach adaptive coping skills and communication skills    Response to Treatment Strategies:  Listened  and Alert    Name of Groups:  Sleep - Time: 11:10-12:00    Description and Therapeutic Outcome:   OT Life Skills Group - Healthy Sleep Habits  Clients will explore healthy sleep habits to utilize as well as look at their current sleep habits. Clients will identify importance of sleep for overall physical and mental health, receive education on how lack of sleep affects the body, identify barriers to healthy sleep, develop a healthy sleep routine, and utilize handouts and sleep diary to track sleep habits and daily routines affecting sleep.       Is this a Weekly Review of the Progress on the Treatment Plan?  NO    Are Treatment Plan Goals being addressed?  YES      Are Treatment Plan Strategies to Address Goals Effective?  YES      Are there any current contracts in place?  YES

## 2021-04-12 NOTE — NURSING NOTE
"Chief Complaint   Patient presents with     RECHECK     Telephone visit.  Patient started PHP today.       Initial There were no vitals taken for this visit. Estimated body mass index is 21.46 kg/m  as calculated from the following:    Height as of 2/18/20: 1.626 m (5' 4\").    Weight as of 8/24/20: 56.7 kg (125 lb).  Medication Reconciliation: complete  JADA CASTRO LPN    "

## 2021-04-13 NOTE — PROGRESS NOTES
"Group Therapy Progress Notes     Client Initial Individualized Goals for Treatment:     Will report on symptoms and identify skills to use to manage anxiety and Will learn and practice 1-2 coping skills to help improve the symptoms and report back daily in group how you are practicing the skills    Area of Treatment Focus:  Symptom Stabilization and Management    Therapeutic Interventions/Treatment Strategies:  Engage in safety planning when indicated  Facilitate increased self awareness  Teach adaptive coping skills and communication skills    Response to Treatment Strategies:  Accepted Feedback, Gave Feedback, Listened  and Focused on Goals    Name of Groups:  Psychotherapy group 9-9:50    Description and Therapeutic Outcome:   In psychotherapy group, today is Nasra's first day of group.  She is friendly and engaged and shares that she was looking forward starting the PHP program.  Nasra was very supportive of her peers and asked them questions about the program and how they are able to remember all of the skills.  She shares that she has been in other services and has been very \"stuck\" the last few months and wanted to attend a more structured program. Nasra is appropriate for PHP.    Is this a Weekly Review of the Progress on the Treatment Plan?    NO    Are Treatment Plan Goals being addressed?  YES      Are Treatment Plan Strategies to Address Goals Effective?  YES      Are there any current contracts in place?  YES           "

## 2021-04-19 ENCOUNTER — HOSPITAL ENCOUNTER (OUTPATIENT)
Dept: BEHAVIORAL HEALTH | Facility: HOSPITAL | Age: 41
End: 2021-04-19
Attending: PSYCHIATRY & NEUROLOGY
Payer: COMMERCIAL

## 2021-04-19 PROCEDURE — H0035 MH PARTIAL HOSP TX UNDER 24H: HCPCS | Performed by: SOCIAL WORKER

## 2021-04-19 NOTE — PROGRESS NOTES
Group Therapy Progress Notes     Client Initial Individualized Goals for Treatment:     Will report on symptoms and identify skills to use to manage anxiety and Will learn and practice 1-2 coping skills to help improve the symptoms and report back daily in group how you are practicing the skills.    Area of Treatment Focus:  Cultural/ Racial / Health / Spiritual    Therapeutic Interventions/Treatment Strategies:  Facilitate increased self awareness  Provide education regarding medication management.     Response to Treatment Strategies:  Accepted Feedback, Gave Feedback, Listened , Focused on Goals, Attentive, Accepted Support and Alert    Name of Groups:  Medication Management - Time: 11:10-12:00    Description and Therapeutic Outcome:   Life Skills OT group - Medication Management  Clients will identify importance of medication compliance for recovery and address barriers to successful medication management.  Education provided in handouts and discussion on the general role of medications, side effects, drug interactions, management strategies, and safe/effective use.  Clients will identify tools to support medication compliance.  The goal is to help clients understand the many aspects involved with medication management and to utilize tools to develop a routine to maximize independence and wellness recovery specific to medication management.  Nasra noted that she has dealt with wikthdrawl symptoms from a medication in the past. She states that she uses a pill box to aid with medication management.           Is this a Weekly Review of the Progress on the Treatment Plan?  NO    Are Treatment Plan Goals being addressed?  YES      Are Treatment Plan Strategies to Address Goals Effective?  YES      Are there any current contracts in place?  YES

## 2021-04-19 NOTE — TREATMENT PLAN
Individualized Treatment Plan     Date of Plan: 2021    Name: Nasra Lovelace MRN: [id number]    : 1980    Programs:  [facility] ()     DSM-V Diagnoses: 300.02 (F41.1) Generalized Anxiety Disorder;  296.32 (F33.1) Major Depressive Disorder, recurrent, moderate, with anxious distress; 309.81 (F43.10) PTSD  DA Date: 2021  Psychosocial & Contextual Factors: health issues, limited social support, mental health symptoms, occupational / vocational stress, parent-child stress and relationship stress  WHODAS: 33  LOCUS: 21      Team Members Contributing to Plan:  Dr. Brandy White, [facility]  Zuleyka Verdin, [facility]  Radha Mendoza MICHA    Client Strengths:  caring, committed to sobriety, educated, empathetic, goal-focused, good listener, has a previous history of therapy, insightful, intelligent, motivated, open to learning, open to suggestions / feedback, responsible parent, support of family, friends and providers, supportive, wants to learn, willing to ask questions, willing to relate to others and work history     Client Participation in Plan:  Contributed to goals and plan   Agrees with plan   Received copy of treatment plan     Areas of Vulnerability:  Anxiety  Depressive symptoms   Trauma/Abuse/Neglect  Active/history of addiction/substance abuse     Long-Term Goals:  Knowledge about illness and management of symptoms   Maintenance of personal safety   Maintenance of sobriety   Effective management of impulsivity     Abuse Prevention Plan:  Safe, therapeutic environment   Safety coping plan as needed   Education regarding illness and skill development   Coordination with care providers   Impluse control education and intervention   Medication adjustment/management (MI/CD)     Discharge Criteria:  Satisfactory progress toward treatment goals   Improvement re: identified problems and symptoms   Ability to continue recovery at next level of service   Has a discharge plan in  place   Has safety/coping plan in place   Ability to maintain sobriety  Regular attendance as scheduled         Areas of Treatment Focus            Area of Treatment Focus:   Symptom Stabilization and Management  Start Date:    4/12/2021    Goal:  Target Date: 4/16/2021 Status: Active  Will report on symptoms and identify skills to use to manage anxiety and Will learn and practice 1-2 coping skills to help improve the symptoms and report back daily in group how you are practicing the skills      Progress:  Due to significant pain issues with her tooth, Ayesha was only able to attend programming one day last week.  She was able to go to the dentist on Wednesday and then had a preplanned appointment on Friday.  Because she was only able to attend one day last week, we will continue this goal again this week an focus our treatment around this goal.             Treatment Strategies:   Engage in safety planning when indicated  Facilitate increased self awareness  Teach adaptive coping skills and communication skills    These services will include group therapy and OT group therapy daily and individual therapy and medications management as needed.                 Area of Treatment Focus:   Symptom Stabilization and Management  Start Date:    4/19/2021    Goal:  Target Date: 4/23/2021 Status: Active   Will report on symptoms and identify skills to use to manage anxiety and Will learn and practice 1-2 coping skills to help improve the symptoms and report back daily in group how you are practicing the skills        Progress:             Treatment Strategies:   Engage in safety planning when indicated  Facilitate increased self awareness  Provide education regarding Mindfulness and grounding techniques  Teach adaptive coping skills and communication skills    These services will include group therapy and OT group therapy daily and individual therapy and medications management as needed.               Area of Treatment Focus:    Symptom Stabilization and Management  Start Date:    4/26//2021    Goal:  Target Date: 4/30/2021 Status: Active   Will learn and practice 1-2 coping skills to help improve symptoms, report daily in group and start to build confidence in using your skills and Will Improve Mindfulness / Stay in the Here and Now Intentionally bringing your attention to something beautiful, pleasant, or interesting that is occurring or is present in your immediate environment or experience on a regular basis.        Progress:             Treatment Strategies:   Assist with discharge planning  Engage in safety planning when indicated  Facilitate increased self awareness  Provide education regarding community based services  Teach adaptive coping skills and communication skills     These services will include group therapy and OT group therapy daily and individual therapy and medications management as needed.           Area of Treatment Focus:   Community Resources / Support and Discharge Planning  Start Date:    5/3/21    Goal:  Target Date: 5/7/21 Status: Active   Will improve wellness related behaviors by reporting in group the skills you have built confidence in using to help symptoms and Will develop an aftercare / transition plan by the end of the program      Progress:             Treatment Strategies:   Assist with discharge planning  Engage in safety planning when indicated  Facilitate increased self awareness  Provide education regarding community based services  Teach adaptive coping skills and communication skills     These services will include group therapy and OT group therapy daily and individual therapy and medications management as needed.

## 2021-04-19 NOTE — PROGRESS NOTES
"Group Therapy Progress Notes     Client Initial Individualized Goals for Treatment:     Will report on symptoms and identify skills to use to manage anxiety and Will learn and practice 1-2 coping skills to help improve the symptoms and report back daily in group how you are practicing the skills    Area of Treatment Focus:  Symptom Stabilization and Management    Therapeutic Interventions/Treatment Strategies:  Engage in safety planning when indicated  Facilitate increased self awareness  Teach adaptive coping skills and communication skills    Response to Treatment Strategies:  Accepted Feedback, Gave Feedback, Listened  and Focused on Goals    Name of Groups:  Coping Skills - Time: 10-10:50; Core Beliefs 1-1:50    Description and Therapeutic Outcome:     During Coping Skills group, Nasra presented as social, focused and participatory. She provided positive feedback to the group process and specifically to a peer. Nasra practiced \"My Name is skill...\"; Just Do It rather than \"I'll try\". She will also work on 3 C's to not own her family's \"questioning\" her and support her daughter with the 3 C's. She recognizes applying the 'Comparisons' skill will be beneficial to be aware of others issues and Common Humanity as well as comparing her present to times when she did feel better.    During the Core Beliefs group, Nasra identified \"I'm abnormal\" as her Core Belief. She countered this with \"I am smart; Care-giving; love to have fun\". She is also aware of a relapse sign evidenced in \"putting stuff in the wrong place\". Nasra presents as intentional regarding her wellness and remains approriate for PHP.      Is this a Weekly Review of the Progress on the Treatment Plan?  YES    Are Treatment Plan Goals being addressed?  YES      Are Treatment Plan Strategies to Address Goals Effective?  YES Continue Treatment goals      Are there any current contracts in place?  YES           "

## 2021-04-19 NOTE — PROGRESS NOTES
"Group Therapy Progress Notes     Client Initial Individualized Goals for Treatment: Will report on symptoms and identify skills to use to manage anxiety and Will learn and practice 1-2 coping skills to help improve the symptoms and report back daily in group how you are practicing the skills    Area of Treatment Focus:  Symptom Stabilization and Management    Therapeutic Interventions/Treatment Strategies:  Engage in safety planning when indicated  Facilitate increased self awareness  Teach adaptive coping skills and communication skills    Response to Treatment Strategies:  Accepted Feedback, Listened , Focused on Goals and Accepted Support    Name of Groups:  Psychtherapy Wrap Up - Time: 2:00-3:00    Description and Therapeutic Outcome:   In psychotherapy wrap up group, Ayesha reviewed her take away from today - reviewed coping skills - the Be metric and just do it really stood out for her today - often says \"I can't\" and is now seeing that in her daughter so is having more awareness when she is saying it.  Discussed her anxiety and panic - was open and honest about her weekend struggles - very poor sleep, hot flashes wakes up sweaty and panic attacks - meds did not help - discussed some additional coping skills to practice as well as making a doctor appointment about the night sweats.  No safety issues noted.  Ayesha remains appropriate for PHP.      Is this a Weekly Review of the Progress on the Treatment Plan?  NO    Are Treatment Plan Goals being addressed?  YES      Are Treatment Plan Strategies to Address Goals Effective?  YES      Are there any current contracts in place?  YES             "

## 2021-04-20 ENCOUNTER — HOSPITAL ENCOUNTER (OUTPATIENT)
Dept: BEHAVIORAL HEALTH | Facility: HOSPITAL | Age: 41
End: 2021-04-20
Attending: PSYCHIATRY & NEUROLOGY
Payer: COMMERCIAL

## 2021-04-20 PROCEDURE — H0035 MH PARTIAL HOSP TX UNDER 24H: HCPCS | Performed by: SOCIAL WORKER

## 2021-04-20 NOTE — PROGRESS NOTES
Group Therapy Progress Notes     Client Initial Individualized Goals for Treatment: Will report on symptoms and identify skills to use to manage anxiety and Will learn and practice 1-2 coping skills to help improve the symptoms and report back daily in group how you are practicing the skills     Area of Treatment Focus:  Symptom Stabilization and Management    Therapeutic Interventions/Treatment Strategies:  Engage in safety planning when indicated  Facilitate increased self awareness  Teach adaptive coping skills and communication skills    Response to Treatment Strategies:  Accepted Feedback, Gave Feedback, Listened , Focused on Goals, Attentive, Accepted Support and Alert    Name of Groups:  Time Management - Time: 11:10-12:00    Description and Therapeutic Outcome:   Self development group - OT - Time Management  Time management group focuses on assisting clients to define self and increase positive self -regard.  Psychoeducation provided related to developing strategies/skills to support effective time management.  Clients will increase knowledge and awareness of time management, increase knowledge/awareness of skills/techniques/behaviors that support time management and when/how to utilize new time management strategies.  Clients will recognize that other peers share similar feelings, thoughts, and problems, and will expand their knowledge/skills through observing others self exploration and working through issues and personal development.  The goal is to help clients learn strategies to have success with time management ie. prioritizing, task analysis, limiting distractions, giving yourself enough time, and use of lists.  In OT group today, Nasra is focused and engaged.  States that she her time management is poor right now.  Reflects on when she was working and feels that having a regular routine and schedule improves her time management.  Discussed her support and how her boyfriend encourages her when  meeting her goals for the day.  Discussed only focusing on things she can control and letting go do the thing that are out of her control.        Is this a Weekly Review of the Progress on the Treatment Plan?  NO    Are Treatment Plan Goals being addressed?  YES      Are Treatment Plan Strategies to Address Goals Effective?  YES      Are there any current contracts in place?  YES
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
## 2021-04-20 NOTE — PROGRESS NOTES
Group Therapy Progress Notes     Client Initial Individualized Goals for Treatment:     Will report on symptoms and identify skills to use to manage anxiety and Will learn and practice 1-2 coping skills to help improve the symptoms and report back daily in group how you are practicing the skills    Area of Treatment Focus:  Symptom Stabilization and Management    Therapeutic Interventions/Treatment Strategies:  Engage in safety planning when indicated  Facilitate increased self awareness  Teach adaptive coping skills and communication skills    Response to Treatment Strategies:  Accepted Feedback, Gave Feedback, Listened  and Focused on Goals    Name of Groups:  Psychotherapy group 9-9:50    Description and Therapeutic Outcome:   In psychotherapy group, Abhay states that she had a challenging weekend with anxiety and that her boyfriend has been strongly encouraging her to get to PHP group every day.  She states that she has been waking up middle of the night drenched in sweat and has to change her clothes and sheets, she states that she is also experiencing increased anxiety at that time and then will get the chills from being cold and wet from night sweats.  Abhay states that she has not seen her PCP about this but adds that she has not had an actual temperature and that she is fine in the morning and during the day.  Abhay states that she has been taking her PRN's for anxiety but thinks that her nighttime incidents are either hormonal or a medical issue. She is engaging with the other peer who just started PHP and was supportive of the peer when she was sharing about some concerns in her life. Abhay is appropriate for PHP.    Is this a Weekly Review of the Progress on the Treatment Plan?  YES    Are Treatment Plan Goals being addressed?  YES      Are Treatment Plan Strategies to Address Goals Effective?  YES Continue Treatment goals      Are there any current contracts in place?  YES

## 2021-04-21 ENCOUNTER — HOSPITAL ENCOUNTER (OUTPATIENT)
Dept: BEHAVIORAL HEALTH | Facility: HOSPITAL | Age: 41
End: 2021-04-21
Attending: PSYCHIATRY & NEUROLOGY
Payer: COMMERCIAL

## 2021-04-21 PROCEDURE — H0035 MH PARTIAL HOSP TX UNDER 24H: HCPCS | Performed by: SOCIAL WORKER

## 2021-04-21 PROCEDURE — 99213 OFFICE O/P EST LOW 20 MIN: CPT | Performed by: NURSE PRACTITIONER

## 2021-04-21 NOTE — PROGRESS NOTES
Group Therapy Progress Notes     Client Initial Individualized Goals for Treatment: Will report on symptoms and identify skills to use to manage anxiety and Will learn and practice 1-2 coping skills to help improve the symptoms and report back daily in group how you are practicing the skills    Area of Treatment Focus:  Symptom Stabilization and Management    Therapeutic Interventions/Treatment Strategies:  Engage in safety planning when indicated  Facilitate increased self awareness  Teach adaptive coping skills and communication skills    Response to Treatment Strategies:  Accepted Feedback, Listened , Focused on Goals and Accepted Support    Name of Groups:  Psychtherapy Wrap Up - Time: 2:00-3:00    Description and Therapeutic Outcome:   In psychotherapy wrap up group, Ayesha reviewed her take away from today -  Reviewed goals, thank you not sorry, and dialectical.  She made the goal of needing to track her stimulus checks - had issues with name changes and such - needs to call social security - has a lot of anxiety around this idea - offered to have her bring the number and into the office and make the call from here so she has support - she agreed this would assist with her anxiety and will bring the number in tomorrow.  Good discussion about Dialectical - good feedback for a peer in the group as well as discussion for herself.  Plans to go and see her dad annabelle as she spends every Wednesday with him helping him - will practice holding her ground with him and saying no for things she can not do, setting a firm schedule and setting good boundaries with him.  No safety issues noted.  Ayesha remains appropriate for PHP.      Is this a Weekly Review of the Progress on the Treatment Plan?  NO    Are Treatment Plan Goals being addressed?  YES      Are Treatment Plan Strategies to Address Goals Effective?  YES      Are there any current contracts in place?  YES

## 2021-04-21 NOTE — PROGRESS NOTES
"Group Therapy Progress Notes     Client Initial Individualized Goals for Treatment:     Will report on symptoms and identify skills to use to manage anxiety and Will learn and practice 1-2 coping skills to help improve the symptoms and report back daily in group how you are practicing the skills       Area of Treatment Focus:  Cultural/ Racial / Health / Spiritual    Therapeutic Interventions/Treatment Strategies:  Assist to identify treatment goals    Response to Treatment Strategies:  Accepted Feedback, Gave Feedback, Listened , Focused on Goals, Attentive and Accepted Support    Name of Groups:  Healthy Goals - Time: 11:10-12:00    Description and Therapeutic Outcome:   Healthy Goals  OT life skills group with focus on identification of appropriate goals for overall health and wellbeing.  Clients will create individualized goals, identify barriers to meeting those goals, and problem solve to make goals achievable.  Educated in creating a SMART goal (Specific, Measurable, Attainable, Relevant, Timely).  \"Setting healthy goals\" handout provided as well as individualized goal setting worksheet.   Nasra notes that she has 3 goals: finding out what happened to her stimulus check, finalizing changing her name with social security, and getting her taxes completed.        Is this a Weekly Review of the Progress on the Treatment Plan?  NO    Are Treatment Plan Goals being addressed?  YES      Are Treatment Plan Strategies to Address Goals Effective?  YES      Are there any current contracts in place?  YES             "

## 2021-04-21 NOTE — PROGRESS NOTES
Group Therapy Progress Notes     Client Initial Individualized Goals for Treatment: Will report on symptoms and identify skills to use to manage anxiety and Will learn and practice 1-2 coping skills to help improve the symptoms and report back daily in group how you are practicing the skills    Area of Treatment Focus:  Symptom Stabilization and Management    Therapeutic Interventions/Treatment Strategies:  Engage in safety planning when indicated  Facilitate increased self awareness  Teach adaptive coping skills and communication skills    Response to Treatment Strategies:  Accepted Feedback, Listened , Focused on Goals and Accepted Support    Name of Groups:  Psychtherapy Wrap Up - Time: 2:00-3:00    Description and Therapeutic Outcome:   In psychotherapy wrap up group, Ayesha reviewed her take away from today -  Started the group by reviewing and practicing some breathing techniques that do not involve focus on breathing as she tends to hyperventilate - reviewed left/right breathing, a variation with her hand on her face and blowing out on her other hand, as well as pressing her thumb and finger together and moving up and down each of her fingers.  She was able to practice these and agreed that she did not notice she was breathing - it also allows her to do some muscle relaxation at the same time - she agreed to practice these over the next 4 days while her boyfriend is on nights.  Also discussed co-dependency, setting boundaries, saying no - time management was helpful - has the awareness that when she is able to plan her day ahead of time, it reduces her anxiety and she feels some sense of purpose - struggling with no purpose from not having a job. Ayesha engaged very well today and continues to accept support and feedback well.  No safety issues noted.  Ayesha remains appropriate for PHP.      Is this a Weekly Review of the Progress on the Treatment Plan?  NO    Are Treatment Plan Goals being  addressed?  YES      Are Treatment Plan Strategies to Address Goals Effective?  YES      Are there any current contracts in place?  YES

## 2021-04-21 NOTE — PROGRESS NOTES
"Group Therapy Progress Notes     Client Initial Individualized Goals for Treatment:     Will report on symptoms and identify skills to use to manage anxiety and Will learn and practice 1-2 coping skills to help improve the symptoms and report back daily in group how you are practicing the skills    Area of Treatment Focus:  Symptom Stabilization and Management    Therapeutic Interventions/Treatment Strategies:  Engage in safety planning when indicated  Facilitate increased self awareness  Teach adaptive coping skills and communication skills    Response to Treatment Strategies:  Accepted Feedback, Gave Feedback, Listened  and Focused on Goals    Name of Groups:  Gratitude - Time: 10-10:50; Dialectical Communication 1-1:50    Description and Therapeutic Outcome:     During Gratitude group, despite having \"regular hot flushes\", Nasra presented as pleasant, social and participatory. She grasped the concept of saying 'Thank you' rather than sorry \"as I say sorry a lot\". She will implement this in her relationship with her boyfriend as well as with her daughter to impart gratitude rather than negatives.    During Dialectical Communication group, Nasra grasped the concept of 'Finding the Middle Ground'. She will implement the 3 C's, Just Like Me; Time Limit; One Thing, and especially \"Know My Truth \" as \"Truth can stand alone and needs no defense\". Nasra presents as intentional regarding her wellness and remains appropriate for PHP.    Is this a Weekly Review of the Progress on the Treatment Plan?  NO    Are Treatment Plan Goals being addressed?  YES      Are Treatment Plan Strategies to Address Goals Effective?  YES      Are there any current contracts in place?  YES           "
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
## 2021-04-22 NOTE — PROGRESS NOTES
"Group Therapy Progress Notes     Client Initial Individualized Goals for Treatment:     Will report on symptoms and identify skills to use to manage anxiety and Will learn and practice 1-2 coping skills to help improve the symptoms and report back daily in group how you are practicing the skills    Area of Treatment Focus:  Symptom Stabilization and Management    Therapeutic Interventions/Treatment Strategies:  Engage in safety planning when indicated  Facilitate increased self awareness  Teach adaptive coping skills and communication skills    Response to Treatment Strategies:  Accepted Feedback, Gave Feedback, Listened  and Focused on Goals    Name of Groups:  Psychotherapy group 9-9:50    Description and Therapeutic Outcome:   In psychotherapy group, Ayesha shares that she had a really solid sleep last night and did not experience night sweats, she attributes this to her menstrual cycle finishing yesterday.  She states that she she did not wake up anxious and had a very hard sleep.  Ayesha shares that she spilled a whole bin of cat food on the kitchen floor before she left this morning and instead of cleaning it up and being late for group, she delegated to her boyfriend to clean it up so that she could leave on time.  Ayesha was proud of herself that she allowed herself to delegate and states that she does not do that often.  She admits that typically she would have cleaned it up and been mad at her boyfriend for not helping her, she states she used 3 C's and gratitude that her boyfriend is supportive and will help her.  Ayesha also shares that she used skills with her daughter including present moment and GAP when she was being assertive with her about having a friend over; she states that they would usually get into an argument about her saying No to her daughter but that she was calm and her daughter also was calm and cooperative.    Ayesha states that she also used \"one thing\" and metric last night.  Ayesha is " appropriate for PHP.      Is this a Weekly Review of the Progress on the Treatment Plan?  NO    Are Treatment Plan Goals being addressed?  YES      Are Treatment Plan Strategies to Address Goals Effective?  YES      Are there any current contracts in place?  YES

## 2021-04-23 ENCOUNTER — HOSPITAL ENCOUNTER (OUTPATIENT)
Dept: BEHAVIORAL HEALTH | Facility: HOSPITAL | Age: 41
End: 2021-04-23
Attending: PSYCHIATRY & NEUROLOGY
Payer: COMMERCIAL

## 2021-04-23 PROCEDURE — H0035 MH PARTIAL HOSP TX UNDER 24H: HCPCS | Performed by: SOCIAL WORKER

## 2021-04-23 NOTE — PROGRESS NOTES
"Nasra called about 9am to state she had fallen into her sump pump hole in the basement and injured both of her feet and dropped her phone.  She couldn't find her phone not realizing she had dropped it in the basement  (reason for not calling in her absence to [facility] yesterday).  She states she is having hot flashes every hour along with her hands and feet being tingly, she reports she is anxious about driving for fear of fainting at the wheel related to hot flashes.  I asked her if it has happened to her before, she stated \"no\".  I asked when she sees her physician next, she reported next week, Tuesday at 10:15.     I asked hif something had triggered her, she reported \"yes, being alone\", with her significant other on midnights, kids weither working or at dad's home.  I focused on Paced Breathing and [place] to promote grounding which helped her to the point of her stating she will attend [facility], will get dressed and drive to [facility].  After waiting for her one and half hours, I called her back she stated she and her boyfriend talked and she is now on her way to [facility].    "

## 2021-04-23 NOTE — PROGRESS NOTES
Group Therapy Progress Notes     Client Initial Individualized Goals for Treatment: Will report on symptoms and identify skills to use to manage anxiety and Will learn and practice 1-2 coping skills to help improve the symptoms and report back daily in group how you are practicing the skills    Area of Treatment Focus:  Symptom Stabilization and Management    Therapeutic Interventions/Treatment Strategies:  Engage in safety planning when indicated  Facilitate increased self awareness  Teach adaptive coping skills and communication skills    Response to Treatment Strategies:  Accepted Feedback, Listened , Focused on Goals and Accepted Support    Name of Groups:  Psychotherapy group 9-9:50    Description and Therapeutic Outcome:     Nasra did not attend Psychotherapy group this morning.  She contacted the PHP Coordinator-Phuong Blackwood-please see chart note by Phuong.        Is this a Weekly Review of the Progress on the Treatment Plan?  NO    Are Treatment Plan Goals being addressed?  YES      Are Treatment Plan Strategies to Address Goals Effective?  YES      Are there any current contracts in place?  YES

## 2021-04-23 NOTE — PROGRESS NOTES
"Group Therapy Progress Notes     Client Initial Individualized Goals for Treatment:     Will report on symptoms and identify skills to use to manage anxiety and Will learn and practice 1-2 coping skills to help improve the symptoms and report back daily in group how you are practicing the skills    Area of Treatment Focus:  Symptom Stabilization and Management    Therapeutic Interventions/Treatment Strategies:  Engage in safety planning when indicated  Facilitate increased self awareness  Teach adaptive coping skills and communication skills    Response to Treatment Strategies:  Nasra was not present during Gratitude group;    Name of Groups:  Gratitude - Time: 10-10:50; \"I Statements\" 1-1:50    Description and Therapeutic Outcome:     Nasra was not present during Gratitude group.    During \"I Statements\" group, Nasra presented as focused and engaged. She was easily able to practice the I Statements and recognized how much she uses \"You\" especially with her 9-year old daughter. She will implement this approach in communication and also work on Grateful...because, 3 C's; 3 Good Things; Present Moment. She will also work on assertiveness with the family regarding caring for her dad while she is not available. Nasra presents as intentional regarding her wellness and remains appropriate for PHP.    Is this a Weekly Review of the Progress on the Treatment Plan?  NO    Are Treatment Plan Goals being addressed?  YES      Are Treatment Plan Strategies to Address Goals Effective?  YES      Are there any current contracts in place?  YES           "

## 2021-04-23 NOTE — PROGRESS NOTES
Group Therapy Progress Notes     Client Initial Individualized Goals for Treatment: Will report on symptoms and identify skills to use to manage anxiety and Will learn and practice 1-2 coping skills to help improve the symptoms and report back daily in group how you are practicing the skills    Area of Treatment Focus:  Symptom Stabilization and Management    Therapeutic Interventions/Treatment Strategies:  Facilitate increased self awareness  Provide education regarding progressive muscle relaxation and mandalynth tracing therapy  Teach adaptive coping skills and communication skills    Response to Treatment Strategies:  Accepted Feedback, Gave Feedback, Listened , Attentive and Accepted Support    Name of Groups:  Stress Management - Time: 11:10-12:00    Description and Therapeutic Outcome:   OT group today was focused on mindfulness and mandalynth tracing therapy. Handout provided on progressive muscle relaxation.  States that she has used in the past and found it helpful for managing symptoms.  Discussed mandalynth tracing and was given the opportunity to practice the tracing routines.  Ended group with engaging in multiple mandalynth tracings and discussed how the tracing affected emotions and anxiety level.  In OT group today, Nasra is engaged and focused.  Enjoyed the mandalynths ans states that is reminds her of the mandala coloring she does with her daughter.  Also discussed her use of color when coloring as colors affect our mood and emotions.        Is this a Weekly Review of the Progress on the Treatment Plan?  NO    Are Treatment Plan Goals being addressed?  YES      Are Treatment Plan Strategies to Address Goals Effective?  YES      Are there any current contracts in place?  YES

## 2021-04-24 PROBLEM — R23.2 HOT FLASHES: Status: ACTIVE | Noted: 2021-04-24

## 2021-04-26 ENCOUNTER — HOSPITAL ENCOUNTER (OUTPATIENT)
Dept: BEHAVIORAL HEALTH | Facility: HOSPITAL | Age: 41
End: 2021-04-26
Attending: PSYCHIATRY & NEUROLOGY
Payer: COMMERCIAL

## 2021-04-26 DIAGNOSIS — G43.009 MIGRAINE WITHOUT AURA AND WITHOUT STATUS MIGRAINOSUS, NOT INTRACTABLE: ICD-10-CM

## 2021-04-26 PROCEDURE — H0035 MH PARTIAL HOSP TX UNDER 24H: HCPCS | Performed by: SOCIAL WORKER

## 2021-04-26 RX ORDER — PROPRANOLOL HYDROCHLORIDE 80 MG/1
80 CAPSULE, EXTENDED RELEASE ORAL DAILY
Qty: 30 CAPSULE | Refills: 1 | Status: SHIPPED | OUTPATIENT
Start: 2021-04-26 | End: 2021-06-22

## 2021-04-26 NOTE — TELEPHONE ENCOUNTER
propranolol      Last Written Prescription Date:  3/1/21  Last Fill Quantity: 30,   # refills: 1  Last Office Visit: 8/24/2020  Future Office visit:    Next 5 appointments (look out 90 days)    Apr 27, 2021  9:00 AM  (Arrive by 8:45 AM)  Office Visit with David Franke Frow, MD  [facility] ([facility] ) [address] [place] [address]  [phone]   May 06, 2021  3:15 PM  (Arrive by 3:00 PM)  PHYSICAL with Annette Montenegro NP  [facility] ([facility] ) [address] [place] [address]  [phone]

## 2021-04-26 NOTE — PROGRESS NOTES
"Group Therapy Progress Notes     Client Initial Individualized Goals for Treatment: Will learn and practice 1-2 coping skills to help improve symptoms, report daily in group and start to build confidence in using your skills and Will Improve Mindfulness / Stay in the Here and Now Intentionally bringing your attention to something beautiful, pleasant, or interesting that is occurring or is present in your immediate environment or experience on a regular basis.    Area of Treatment Focus:  Symptom Stabilization and Management    Therapeutic Interventions/Treatment Strategies:  Assist clients in establishing / strengthening support network  Assist with discharge planning  Assess / reassess level of potential for harm to self or others  Engage in safety planning when indicated  Facilitate increased self awareness  Teach adaptive coping skills and communication skills  Use reality based supportive approach    Response to Treatment Strategies:  Accepted Feedback, Gave Feedback, Listened , Attentive and Accepted Support    Name of Groups:  Psychotherapy - Time: 9-9:50    Description and Therapeutic Outcome:   In psychotherapy group, Ayesha reviews her weekend and shares that she reached out to her family to help out with her dad.  She shares that she also had a good talk with her younger sister who is pregnant and was asked to be involved in the birth plan in August.  Ayesha states that she focused on self care this weekend and stayed off her feet to allow her injuries from last week to heal. She shares that she applied GAP and Metric/Just do it this weekend by \"gapping\" the guild about not doing everything for her dad and being ok with his house not being perfectly clean.  And used metric by following through with self care and asking her mom to help her pay for the vitamin supplements she takes for her health.  Ayesha is very attentive and engaged this morning, she offered support to her peers and was helpful explaining the " PHP group day to a new group member. Ayesha continues to be appropriate for PHP.      Is this a Weekly Review of the Progress on the Treatment Plan?  YES    Are Treatment Plan Goals being addressed?  YES      Are Treatment Plan Strategies to Address Goals Effective?  YES      Are there any current contracts in place?  NO

## 2021-04-26 NOTE — PROGRESS NOTES
"Group Therapy Progress Notes     Client Initial Individualized Goals for Treatment:     Will learn and practice 1-2 coping skills to help improve symptoms, report daily in group and start to build confidence in using your skills and Will Improve Mindfulness / Stay in the Here and Now Intentionally bringing your attention to something beautiful, pleasant, or interesting that is occurring or is present in your immediate environment or experience on a regular basis.    Area of Treatment Focus:  Symptom Stabilization and Management    Therapeutic Interventions/Treatment Strategies:  Assist with discharge planning  Engage in safety planning when indicated  Facilitate increased self awareness  Provide education regarding Community-based services  Provide feedback about social skills  Teach adaptive coping skills and communication skills    Response to Treatment Strategies:  Accepted Feedback, Gave Feedback, Listened  and Focused on Goals    Name of Groups: Default/Focused Modes 10-10:50; 3 Cycles of 21 Days 1-1:50    Description and Therapeutic Outcome:     During Default/Focused Mode group, Nasra presented as Focused, social and participatory. She grasped the concept of the Modes and owns Defaults as manipulation and Codependency. She will work on Boundaries; Awareness; Adulting; and I Think and Choose referring to healthy thinking.    During 3 Cycles of 21 Days group, Nasra recognized overwhelm from cycles of Codependency. She will rewire her thinking cycles with \"interruption; the THE skill; 3 Good Things; My Name Is...; I Choose; Present Moment; Gratitude; Boundaries\" to develop her Focused Mode. Nasra presents as intentional regarding her wellness and remains appropriate for PHP.      Is this a Weekly Review of the Progress on the Treatment Plan?  YES    Are Treatment Plan Goals being addressed?  YES      Are Treatment Plan Strategies to Address Goals Effective?  YES      Are there any current contracts " in place?  YES

## 2021-04-26 NOTE — PROGRESS NOTES
Group Therapy Progress Notes     Client Initial Individualized Goals for Treatment:   Will learn and practice 1-2 coping skills to help improve symptoms, report daily in group and start to build confidence in using your skills and Will Improve Mindfulness / Stay in the Here and Now Intentionally bringing your attention to something beautiful, pleasant, or interesting that is occurring or is present in your immediate environment or experience on a regular basis.    Area of Treatment Focus:  Cultural/ Racial / Health / Spiritual    Therapeutic Interventions/Treatment Strategies:  Facilitate increased self awareness  Provide education regarding hygiene and self care.     Response to Treatment Strategies:  Accepted Feedback, Gave Feedback and Listened     Name of Groups:  Hygeine/Self Care - Time: 11:10-12:00    Description and Therapeutic Outcome:   OT group - Hygiene and Self Care  This group focus is on self care and proper hygiene.  Clients will explore self care topics such as diet, exercise, spirituality, overall health management, self improvement, hygiene, self regulation, leisure, sleep, and sobriety; as well as learn how those aspects can be addressed for overall health and wellness.  Clients will have the opportunity to assess their current hygiene routines and habits, and make changes for improvement if necessary.    Nasra notes that she is aware that getting adequate sleep and eating regularly is a part of self care that she would like to improve.        Is this a Weekly Review of the Progress on the Treatment Plan?  NO    Are Treatment Plan Goals being addressed?  YES      Are Treatment Plan Strategies to Address Goals Effective?  YES      Are there any current contracts in place?  YES

## 2021-04-26 NOTE — TREATMENT PLAN
Individualized Treatment Plan     Date of Plan: 2021    Name: Nasra Lovelace MRN: [id number]    : 1980    Programs:  [facility] ()     DSM-V Diagnoses: 300.02 (F41.1) Generalized Anxiety Disorder;  296.32 (F33.1) Major Depressive Disorder, recurrent, moderate, with anxious distress; 309.81 (F43.10) PTSD  DA Date: 2021  Psychosocial & Contextual Factors: health issues, limited social support, mental health symptoms, occupational / vocational stress, parent-child stress and relationship stress  WHODAS: 33  LOCUS: 21      Team Members Contributing to Plan:  Dr. Brandy White, [facility]  Zuleyka Verdin, [facility]  Radha Mendoza MICHA    Client Strengths:  caring, committed to sobriety, educated, empathetic, goal-focused, good listener, has a previous history of therapy, insightful, intelligent, motivated, open to learning, open to suggestions / feedback, responsible parent, support of family, friends and providers, supportive, wants to learn, willing to ask questions, willing to relate to others and work history     Client Participation in Plan:  Contributed to goals and plan   Agrees with plan   Received copy of treatment plan     Areas of Vulnerability:  Anxiety  Depressive symptoms   Trauma/Abuse/Neglect  Active/history of addiction/substance abuse     Long-Term Goals:  Knowledge about illness and management of symptoms   Maintenance of personal safety   Maintenance of sobriety   Effective management of impulsivity     Abuse Prevention Plan:  Safe, therapeutic environment   Safety coping plan as needed   Education regarding illness and skill development   Coordination with care providers   Impluse control education and intervention   Medication adjustment/management (MI/CD)     Discharge Criteria:  Satisfactory progress toward treatment goals   Improvement re: identified problems and symptoms   Ability to continue recovery at next level of service   Has a discharge plan in  place   Has safety/coping plan in place   Ability to maintain sobriety  Regular attendance as scheduled         Areas of Treatment Focus            Area of Treatment Focus:   Symptom Stabilization and Management  Start Date:    4/12/2021    Goal:  Target Date: 4/16/2021 Status: Active  Will report on symptoms and identify skills to use to manage anxiety and Will learn and practice 1-2 coping skills to help improve the symptoms and report back daily in group how you are practicing the skills      Progress:  Due to significant pain issues with her tooth, Ayesha was only able to attend programming one day last week.  She was able to go to the dentist on Wednesday and then had a preplanned appointment on Friday.  Because she was only able to attend one day last week, we will continue this goal again this week an focus our treatment around this goal.             Treatment Strategies:   Engage in safety planning when indicated  Facilitate increased self awareness  Teach adaptive coping skills and communication skills    These services will include group therapy and OT group therapy daily and individual therapy and medications management as needed.                 Area of Treatment Focus:   Symptom Stabilization and Management  Start Date:    4/19/2021    Goal:  Target Date: 4/23/2021 Status: Active   Will report on symptoms and identify skills to use to manage anxiety and Will learn and practice 1-2 coping skills to help improve the symptoms and report back daily in group how you are practicing the skills        Progress: This has been a much better week for Ayesha - she has been social, focused and participatory.  She is engaging and provides positive feedback.  She practiced My Name is skill, Just do it - rather than I'll try, 3 C's, 3 Good things about herself, also reviewed setting boundaries with her dad, being okay with saying no, as well as planning ahead and having purpose to assist with her anxiety.  She reported  practicing thank you instead of I'm sorry and the need to practice using gratitudes.  She ended the week learning about I statements and how she can use these with her daughter as well as practice grounding techniques of naming her environment outloud as she is driving.    This next week we will continue to encourage Ayesha to practice her skills and discuss daily in group and will teach her additional mindfulness and grounding techniques.           Treatment Strategies:   Engage in safety planning when indicated  Facilitate increased self awareness  Provide education regarding Mindfulness and grounding techniques  Teach adaptive coping skills and communication skills    These services will include group therapy and OT group therapy daily and individual therapy and medications management as needed.               Area of Treatment Focus:   Symptom Stabilization and Management  Start Date:    4/26//2021    Goal:  Target Date: 4/30/2021 Status: Active   Will learn and practice 1-2 coping skills to help improve symptoms, report daily in group and start to build confidence in using your skills and Will Improve Mindfulness / Stay in the Here and Now Intentionally bringing your attention to something beautiful, pleasant, or interesting that is occurring or is present in your immediate environment or experience on a regular basis.        Progress:             Treatment Strategies:   Assist with discharge planning  Engage in safety planning when indicated  Facilitate increased self awareness  Provide education regarding community based services  Teach adaptive coping skills and communication skills     These services will include group therapy and OT group therapy daily and individual therapy and medications management as needed.           Area of Treatment Focus:   Community Resources / Support and Discharge Planning  Start Date:    5/3/21    Goal:  Target Date: 5/7/21 Status: Active   Will improve wellness related behaviors  by reporting in group the skills you have built confidence in using to help symptoms and Will develop an aftercare / transition plan by the end of the program      Progress:             Treatment Strategies:   Assist with discharge planning  Engage in safety planning when indicated  Facilitate increased self awareness  Provide education regarding community based services  Teach adaptive coping skills and communication skills     These services will include group therapy and OT group therapy daily and individual therapy and medications management as needed.

## 2021-04-26 NOTE — PROGRESS NOTES
"Group Therapy Progress Notes     Client Initial Individualized Goals for Treatment: Will report on symptoms and identify skills to use to manage anxiety and Will learn and practice 1-2 coping skills to help improve the symptoms and report back daily in group how you are practicing the skills    Area of Treatment Focus:  Symptom Stabilization and Management    Therapeutic Interventions/Treatment Strategies:  Engage in safety planning when indicated  Facilitate increased self awareness  Teach adaptive coping skills and communication skills    Response to Treatment Strategies:  Accepted Feedback, Listened , Focused on Goals and Accepted Support    Name of Groups:  Psychtherapy Wrap Up - Time: 2:00-3:00    Description and Therapeutic Outcome:   In psychotherapy wrap up group, Ayesha reviewed her take away from today -  Reviewed I statements and gratitudes.  Discussed injuring herself at home and triggering significant anxiety and panic - used thank you instead of sorry with her boyfriend - and I can't imagine how this makes you feel - really changed how the conversation went.  Reviewing I statements - would like to practice these with her daughter - also discussed how it is hard to add the \"because\" after gratitudes - discussed using mindfulness and \"my name is\" as well as specific observation - she plans to practice this while driving to reduce her anxiety.  Her boyfriend is home this weekend so is hoping it she will have less anxiety.  No safety issues noted.  Ayesha remains appropriate for PHP.      Is this a Weekly Review of the Progress on the Treatment Plan?  NO    Are Treatment Plan Goals being addressed?  YES      Are Treatment Plan Strategies to Address Goals Effective?  YES      Are there any current contracts in place?  YES             "

## 2021-04-27 ENCOUNTER — OFFICE VISIT (OUTPATIENT)
Dept: OBGYN | Facility: OTHER | Age: 41
End: 2021-04-27
Attending: OBSTETRICS & GYNECOLOGY
Payer: COMMERCIAL

## 2021-04-27 ENCOUNTER — HOSPITAL ENCOUNTER (OUTPATIENT)
Dept: BEHAVIORAL HEALTH | Facility: HOSPITAL | Age: 41
End: 2021-04-27
Attending: PSYCHIATRY & NEUROLOGY
Payer: COMMERCIAL

## 2021-04-27 VITALS
HEIGHT: 64 IN | DIASTOLIC BLOOD PRESSURE: 60 MMHG | SYSTOLIC BLOOD PRESSURE: 90 MMHG | TEMPERATURE: 97.3 F | HEART RATE: 95 BPM | BODY MASS INDEX: 19.97 KG/M2 | OXYGEN SATURATION: 100 % | WEIGHT: 117 LBS

## 2021-04-27 DIAGNOSIS — N94.5 SECONDARY DYSMENORRHEA: ICD-10-CM

## 2021-04-27 DIAGNOSIS — R23.2 HOT FLASHES: ICD-10-CM

## 2021-04-27 DIAGNOSIS — N92.1 MENORRHAGIA WITH IRREGULAR CYCLE: ICD-10-CM

## 2021-04-27 PROBLEM — Z30.8 ENCOUNTER FOR OTHER CONTRACEPTIVE MANAGEMENT: Status: ACTIVE | Noted: 2021-04-27

## 2021-04-27 LAB
ALBUMIN SERPL-MCNC: 3.9 G/DL (ref 3.4–5)
ALP SERPL-CCNC: 61 U/L (ref 40–150)
ALT SERPL W P-5'-P-CCNC: 19 U/L (ref 0–50)
ANION GAP SERPL CALCULATED.3IONS-SCNC: 1 MMOL/L (ref 3–14)
AST SERPL W P-5'-P-CCNC: 19 U/L (ref 0–45)
BASOPHILS # BLD AUTO: 0.1 10E9/L (ref 0–0.2)
BASOPHILS NFR BLD AUTO: 0.5 %
BILIRUB SERPL-MCNC: 0.3 MG/DL (ref 0.2–1.3)
BUN SERPL-MCNC: 12 MG/DL (ref 7–30)
CALCIUM SERPL-MCNC: 9 MG/DL (ref 8.5–10.1)
CHLORIDE SERPL-SCNC: 102 MMOL/L (ref 94–109)
CO2 SERPL-SCNC: 32 MMOL/L (ref 20–32)
CREAT SERPL-MCNC: 0.72 MG/DL (ref 0.52–1.04)
DIFFERENTIAL METHOD BLD: NORMAL
EOSINOPHIL # BLD AUTO: 0 10E9/L (ref 0–0.7)
EOSINOPHIL NFR BLD AUTO: 0.4 %
ERYTHROCYTE [DISTWIDTH] IN BLOOD BY AUTOMATED COUNT: 13 % (ref 10–15)
GFR SERPL CREATININE-BSD FRML MDRD: >90 ML/MIN/{1.73_M2}
GLUCOSE SERPL-MCNC: 123 MG/DL (ref 70–99)
HCT VFR BLD AUTO: 44.1 % (ref 35–47)
HGB BLD-MCNC: 14.3 G/DL (ref 11.7–15.7)
IMM GRANULOCYTES # BLD: 0.1 10E9/L (ref 0–0.4)
IMM GRANULOCYTES NFR BLD: 0.5 %
LYMPHOCYTES # BLD AUTO: 2 10E9/L (ref 0.8–5.3)
LYMPHOCYTES NFR BLD AUTO: 19.6 %
MCH RBC QN AUTO: 31.7 PG (ref 26.5–33)
MCHC RBC AUTO-ENTMCNC: 32.4 G/DL (ref 31.5–36.5)
MCV RBC AUTO: 98 FL (ref 78–100)
MONOCYTES # BLD AUTO: 0.7 10E9/L (ref 0–1.3)
MONOCYTES NFR BLD AUTO: 6.4 %
NEUTROPHILS # BLD AUTO: 7.4 10E9/L (ref 1.6–8.3)
NEUTROPHILS NFR BLD AUTO: 72.6 %
NRBC # BLD AUTO: 0 10*3/UL
NRBC BLD AUTO-RTO: 0 /100
PLATELET # BLD AUTO: 327 10E9/L (ref 150–450)
POTASSIUM SERPL-SCNC: 3.7 MMOL/L (ref 3.4–5.3)
PROLACTIN SERPL-MCNC: 8 UG/L (ref 3–27)
PROT SERPL-MCNC: 7.2 G/DL (ref 6.8–8.8)
RBC # BLD AUTO: 4.51 10E12/L (ref 3.8–5.2)
SODIUM SERPL-SCNC: 135 MMOL/L (ref 133–144)
T3FREE SERPL-MCNC: 2.8 PG/ML (ref 2.3–4.2)
T4 FREE SERPL-MCNC: 0.82 NG/DL (ref 0.76–1.46)
TSH SERPL DL<=0.005 MIU/L-ACNC: 0.42 MU/L (ref 0.4–4)
WBC # BLD AUTO: 10.2 10E9/L (ref 4–11)

## 2021-04-27 PROCEDURE — 80053 COMPREHEN METABOLIC PANEL: CPT | Mod: ZL | Performed by: OBSTETRICS & GYNECOLOGY

## 2021-04-27 PROCEDURE — 36415 COLL VENOUS BLD VENIPUNCTURE: CPT | Mod: ZL | Performed by: OBSTETRICS & GYNECOLOGY

## 2021-04-27 PROCEDURE — 85025 COMPLETE CBC W/AUTO DIFF WBC: CPT | Mod: ZL | Performed by: OBSTETRICS & GYNECOLOGY

## 2021-04-27 PROCEDURE — 84439 ASSAY OF FREE THYROXINE: CPT | Mod: ZL | Performed by: OBSTETRICS & GYNECOLOGY

## 2021-04-27 PROCEDURE — H0035 MH PARTIAL HOSP TX UNDER 24H: HCPCS | Performed by: SOCIAL WORKER

## 2021-04-27 PROCEDURE — 84146 ASSAY OF PROLACTIN: CPT | Mod: ZL | Performed by: OBSTETRICS & GYNECOLOGY

## 2021-04-27 PROCEDURE — 99204 OFFICE O/P NEW MOD 45 MIN: CPT | Performed by: OBSTETRICS & GYNECOLOGY

## 2021-04-27 PROCEDURE — 84443 ASSAY THYROID STIM HORMONE: CPT | Mod: ZL | Performed by: OBSTETRICS & GYNECOLOGY

## 2021-04-27 PROCEDURE — 84481 FREE ASSAY (FT-3): CPT | Mod: ZL | Performed by: OBSTETRICS & GYNECOLOGY

## 2021-04-27 PROCEDURE — G0463 HOSPITAL OUTPT CLINIC VISIT: HCPCS

## 2021-04-27 RX ORDER — PROGESTERONE 100 MG/1
100 CAPSULE ORAL 2 TIMES DAILY WITH MEALS
Qty: 60 CAPSULE | Refills: 11 | Status: ON HOLD | OUTPATIENT
Start: 2021-04-27 | End: 2021-06-12

## 2021-04-27 RX ORDER — UBIDECARENONE 100 MG
100 CAPSULE ORAL DAILY
COMMUNITY
End: 2021-11-04

## 2021-04-27 RX ORDER — NAPROXEN 500 MG/1
500 TABLET ORAL 2 TIMES DAILY WITH MEALS
Qty: 20 TABLET | Refills: 4 | Status: ON HOLD | OUTPATIENT
Start: 2021-04-27 | End: 2021-06-12

## 2021-04-27 ASSESSMENT — MIFFLIN-ST. JEOR: SCORE: 1180.71

## 2021-04-27 ASSESSMENT — PAIN SCALES - GENERAL: PAINLEVEL: NO PAIN (0)

## 2021-04-27 NOTE — PROGRESS NOTES
Group Therapy Progress Notes     Client Initial Individualized Goals for Treatment: Will learn and practice 1-2 coping skills to help improve symptoms, report daily in group and start to build confidence in using your skills and Will Improve Mindfulness / Stay in the Here and Now Intentionally bringing your attention to something beautiful, pleasant, or interesting that is occurring or is present in your immediate environment or experience on a regular basis.    Area of Treatment Focus:  Symptom Stabilization and Management    Therapeutic Interventions/Treatment Strategies:  Assist with discharge planning  Facilitate increased self awareness  Teach adaptive coping skills and communication skills    Response to Treatment Strategies:  Accepted Feedback, Gave Feedback, Listened , Focused on Goals, Attentive, Accepted Support and Alert    Name of Groups:  Warning Signs - Time: 11:10-12:00    Description and Therapeutic Outcome:   OT group - Recognizing warning signs of relapse - aftercare planning  This group provides psychoeducation related to developing strategies/skills to support effective identification of warning signs as well as identifying strategies to prevent relapse.  Education provided on development of healthy coping skills to manage and reduce the distress of symptoms.  Handouts provided on the principles of self management: warning signs, taking action, and seeking outside help.  Clients will build knowledge and awareness around activities that promote self management, build resiliency, and prevent relapse.  Education/discussion provided around what to do if/when relapse occurs.  In OT group today, Nasra is focused and engaged.  Is able to identify her warning signs and talks about how she would like to a therapist as part of her supports.  States that she needs to focus more on herself and self care.         Is this a Weekly Review of the Progress on the Treatment Plan?  NO    Are Treatment Plan  Goals being addressed?  YES      Are Treatment Plan Strategies to Address Goals Effective?  YES      Are there any current contracts in place?  YES

## 2021-04-27 NOTE — PROGRESS NOTES
CONSULT for Alee Dailey NP      FOR:   Hot flashes      S:   Patient having hot flashes.  She is also having some heavier menses.  Her periods are increasingly painful.    She has a previous history of an Endometrial Ablation in 2018 and did well for over a year.    She prefers not to have surgery if at all possible.    She is in active Psych therapy and doing well and seems to be very stable on her medications for ADHD, Depression, Anxiety, Panic disorder, Insomnia.        Surgical HX:   Previous RSO                         Previous L Salpingectomy                         Endometrial Ablation 2018    Past Medical HX:  Previous Hydronephosis                              2011                               Migraine Headache                               GERD      EXAM:    [facility] in [facility]                 VSS                 Afebrile    Annual with pap is scheduled with Annette      A:   Hot flashes due to anovulation         Dysmenorrhea         Menorrhagia      P:   Discussed about BIO-PROGESTIN therapy with Prometrium 100 mg daily for now with increase if needed.  The Prometrium will also help with the dysmenorrhea.  Naproxen therapy for dysmenorrhea  500 mg BID    CBC, TSH, Free T4, Free T3, Prolactin  CMP    US with follow-up same day next week.

## 2021-04-27 NOTE — PROGRESS NOTES
Group Therapy Progress Notes     Client Initial Individualized Goals for Treatment: Will learn and practice 1-2 coping skills to help improve symptoms, report daily in group and start to build confidence in using your skills and Will Improve Mindfulness / Stay in the Here and Now Intentionally bringing your attention to something beautiful, pleasant, or interesting that is occurring or is present in your immediate environment or experience on a regular basis.    Area of Treatment Focus:  Symptom Stabilization and Management    Therapeutic Interventions/Treatment Strategies:  Assist with discharge planning  Engage in safety planning when indicated  Facilitate increased self awareness  Provide education regarding Mindfulness and grounding techniques   Teach adaptive coping skills and communication skills    Response to Treatment Strategies:  Accepted Feedback, Gave Feedback, Listened , Focused on Goals, Attentive and Accepted Support    Name of Groups:  Psychtherapy Wrap Up - Time: 2:00-3:00    Description and Therapeutic Outcome:   In psychotherapy wrap up group, Ayesha reviewed her take away from today - reviewed that she has been practicing skills and her anxiety has been lower - her leg is better, she is dryer from hot flashes, had a good conversation with her sister and mom and is back on vitamins again - made the connection between bleeding/sweating/migraines - looking forward to her doctor appointment in the morning.  Feels that her anxiety is better using skills and knowing that some of this is related to other things.  Today reviewed 5 to 1, 2 brain modes - focused and default, 3 cycles of 21 days and is feeling more settled - plans to practice present moment and some mindfulness to prepare for tomorrow - also needs to call the insurance company to change her name.  Good group for Ayesha - very engaging and supportive of peers.  She remains appropriate for PHP.      Is this a Weekly Review of the Progress on  the Treatment Plan?  YES    Are Treatment Plan Goals being addressed?  YES      Are Treatment Plan Strategies to Address Goals Effective?  YES      Are there any current contracts in place?  YES

## 2021-04-27 NOTE — PROGRESS NOTES
Group Therapy Progress Notes     Client Initial Individualized Goals for Treatment:  Will learn and practice 1-2 coping skills to help improve symptoms, report daily in group and start to build confidence in using your skills and Will Improve Mindfulness / Stay in the Here and Now Intentionally bringing your attention to something beautiful, pleasant, or interesting that is occurring or is present in your immediate environment or experience on a regular basis.    Area of Treatment Focus:  Cultural/ Racial / Health / Spiritual    Therapeutic Interventions/Treatment Strategies:  Facilitate increased self awareness  Provide education regarding hygiene and self care.     Response to Treatment Strategies:  Accepted Feedback, Gave Feedback and Listened      Name of Groups:  Distress Tolerance - Time: 10-10:50     Description and Therapeutic Outcome:   During Distress Tolerance - IMPROVE information was presented and Nasra was focused, actively engaged in discussion and talked of her ways to improve the moment when in times of distress as being:  Mindfulness in the moment, the use of Meanng to find a purpose or meaning in a painful situation, use of prayer, and to turn off her phone for a day and/or take a brief vacation from responsibility.  She also spoke of the following as being helpful for her in a stressful situation, removing self, drinking a glass of water, getting outside and use of distraction.  She presents as intentional regarding her wellness and remains appropriate for continuing PHP.        Is this a Weekly Review of the Progress on the Treatment Plan?  YES    Are Treatment Plan Goals being addressed?  YES      Are Treatment Plan Strategies to Address Goals Effective?  YES      Are there any current contracts in place?  YES

## 2021-04-27 NOTE — PATIENT INSTRUCTIONS
Discussed Progestin therapy with BIO-Progesterone (Prometrium)  Discussed Naproxen therapy for dysmenorrhea    US

## 2021-04-27 NOTE — NURSING NOTE
"Chief Complaint   Patient presents with     Menopausal Sx     Pt has a hx of heavy bleeding, cramping, ablasion and passing blood clots.       Initial BP 90/60 (BP Location: Left arm, Patient Position: Chair, Cuff Size: Adult Regular)   Pulse 95   Temp 97.3  F (36.3  C) (Tympanic)   Ht 1.626 m (5' 4\")   Wt 53.1 kg (117 lb)   SpO2 100%   BMI 20.08 kg/m   Estimated body mass index is 20.08 kg/m  as calculated from the following:    Height as of this encounter: 1.626 m (5' 4\").    Weight as of this encounter: 53.1 kg (117 lb).  Medication Reconciliation: complete  INEZ LITTLEJOHN LPN    "

## 2021-04-28 ENCOUNTER — HOSPITAL ENCOUNTER (OUTPATIENT)
Dept: BEHAVIORAL HEALTH | Facility: HOSPITAL | Age: 41
End: 2021-04-28
Attending: PSYCHIATRY & NEUROLOGY
Payer: COMMERCIAL

## 2021-04-28 PROCEDURE — H0035 MH PARTIAL HOSP TX UNDER 24H: HCPCS | Performed by: SOCIAL WORKER

## 2021-04-28 NOTE — PROGRESS NOTES
"Group Therapy Progress Notes     Client Initial Individualized Goals for Treatment: Will learn and practice 1-2 coping skills to help improve symptoms, report daily in group and start to build confidence in using your skills and Will Improve Mindfulness / Stay in the Here and Now Intentionally bringing your attention to something beautiful, pleasant, or interesting that is occurring or is present in your immediate environment or experience on a regular basis.    Area of Treatment Focus:  Symptom Stabilization and Management    Therapeutic Interventions/Treatment Strategies:  Assist with discharge planning  Engage in safety planning when indicated  Facilitate increased self awareness  Provide education regarding Mindfulness and grounding techniques   Teach adaptive coping skills and communication skills    Response to Treatment Strategies:  Accepted Feedback, Gave Feedback, Listened , Focused on Goals, Attentive and Accepted Support    Name of Groups:  Safety Planning - Red and Green Flags - 1:00 - 1:50    Description and Therapeutic Outcome:   In safety planning group - reviewed red and green flags - Ayesha identified isolation and taking on too much responsibility as danger red flags for her.  Discussed Mild, moderate and serious danger signs - for mild red flags - she identified sleeping patterns and will stay safe by setting a bedtime for self.  For moderate red flags - manic mood - taking on more responsibility and other people's things - being in a robotic mode - will stay safe by talking to her mom as her mom puts her back in check and tells her to \"watch her own bobber\".  For serious red flags - not leaving her home - isolation - what to do to stay safe is Dameon - he is able to ground her and keep her safe.  Good group discussion about warning signs and progression as well as the need for supports and people willing to \"call you out\" when they see risks in your behavior.      Is this a Weekly Review of the " Progress on the Treatment Plan?  YES    Are Treatment Plan Goals being addressed?  YES      Are Treatment Plan Strategies to Address Goals Effective?  YES      Are there any current contracts in place?  YES

## 2021-04-28 NOTE — PROGRESS NOTES
Group Therapy Progress Notes     Client Initial Individualized Goals for Treatment: Will learn and practice 1-2 coping skills to help improve symptoms, report daily in group and start to build confidence in using your skills and Will Improve Mindfulness / Stay in the Here and Now Intentionally bringing your attention to something beautiful, pleasant, or interesting that is occurring or is present in your immediate environment or experience on a regular basis.    Area of Treatment Focus:  Symptom Stabilization and Management    Therapeutic Interventions/Treatment Strategies:  Assist with discharge planning  Engage in safety planning when indicated  Facilitate increased self awareness  Provide education regarding Mindfulness and grounding techniques   Teach adaptive coping skills and communication skills    Response to Treatment Strategies:  Accepted Feedback, Gave Feedback, Listened , Focused on Goals, Attentive and Accepted Support    Name of Groups:  Psychtherapy Wrap Up - Time: 2:00-3:00    Description and Therapeutic Outcome:   In psychotherapy wrap up group, Ayesha reviewed her take away from today - reviewed her doctor appointment from today - feels very validated as she is starting new meds and the dr told her that it is not about anxiety but is hormonal - also have an ultrasound and some labs.  Worked on emotion regulation stuff today - early warning signs - good discussions and awareness - has a very busy evening and plans to practice focus mode and present moment so she is able to manage it well.  Ayesha was engaging and focused in group today.  She remains appropriate for PHP.      Is this a Weekly Review of the Progress on the Treatment Plan?  YES    Are Treatment Plan Goals being addressed?  YES      Are Treatment Plan Strategies to Address Goals Effective?  YES      Are there any current contracts in place?  YES

## 2021-04-28 NOTE — PROGRESS NOTES
Group Therapy Progress Notes     Client Initial Individualized Goals for Treatment:     Will learn and practice 1-2 coping skills to help improve symptoms, report daily in group and start to build confidence in using your skills and Will Improve Mindfulness / Stay in the Here and Now Intentionally bringing your attention to something beautiful, pleasant, or interesting that is occurring or is present in your immediate environment or experience on a regular basis.    Area of Treatment Focus:  Symptom Stabilization and Management    Therapeutic Interventions/Treatment Strategies:  Assist with discharge planning  Engage in safety planning when indicated  Facilitate increased self awareness  Provide education regarding Community-based services  Provide feedback about social skills  Teach adaptive coping skills and communication skills    Response to Treatment Strategies:  Accepted Feedback, Gave Feedback, Listened  and Focused on Goals    Name of Group: Safety skills group 12:30-1:30pm    Description and Therapeutic Outcome:   In safety skills group, Ayesha was engaged and transparent. She shares that she feels safe when she has her pets around her and when she is not alone. Ayesha identifies several coping skills she is using from the coping skills list including asking for help, leaving a bad scene, crying and boundaries. She shares that safety coping skills she would like to start using include choosing self respect, create a new story and move towards the opposite.  Ayesha also provided positive feedback for her peer during this group discussion.  Ayesha is appropriate for PHP.    Is this a Weekly Review of the Progress on the Treatment Plan?  YES    Are Treatment Plan Goals being addressed?  YES      Are Treatment Plan Strategies to Address Goals Effective?  YES      Are there any current contracts in place?  YES

## 2021-04-28 NOTE — PROGRESS NOTES
Group Therapy Progress Notes     Client Initial Individualized Goals for Treatment:     Will learn and practice 1-2 coping skills to help improve symptoms, report daily in group and start to build confidence in using your skills and Will Improve Mindfulness / Stay in the Here and Now Intentionally bringing your attention to something beautiful, pleasant, or interesting that is occurring or is present in your immediate environment or experience on a regular basis.    Area of Treatment Focus:  Symptom Stabilization and Management    Therapeutic Interventions/Treatment Strategies:  Assist with discharge planning  Engage in safety planning when indicated  Facilitate increased self awareness  Provide education regarding Community-based services  Provide feedback about social skills  Teach adaptive coping skills and communication skills    Response to Treatment Strategies:  Accepted Feedback, Gave Feedback, Listened  and Focused on Goals    Name of Groups: Psychotherapy group 9-9:50    Description and Therapeutic Outcome:   In psychotherapy group, Ayesha shares that she had a good night last night.  She had an appointment this morning with an OB GYN physician and reported that this was a good appointment and she felt relieved that she met with him. Ayesha shares that she will have some follow up appointments in the next few weeks for further testing and scans.  Ayesha states that she did use skills last night including present moment, gratitude and 3C's.  She adds that this morning she was anxious about her appointment this morning and applied deep breathing and GAP.  Ayesha is appropriate for PHP.      Is this a Weekly Review of the Progress on the Treatment Plan?  YES    Are Treatment Plan Goals being addressed?  YES      Are Treatment Plan Strategies to Address Goals Effective?  YES      Are there any current contracts in place?  YES

## 2021-04-28 NOTE — PROGRESS NOTES
"Group Therapy Progress Notes     Client Initial Individualized Goals for Treatment:   Will learn and practice 1-2 coping skills to help improve symptoms, report daily in group and start to build confidence in using your skills and Will Improve Mindfulness / Stay in the Here and Now Intentionally bringing your attention to something beautiful, pleasant, or interesting that is occurring or is present in your immediate environment or experience on a regular basis.    Area of Treatment Focus:  Develop / Improve Independent Living / Socialization Skills    Therapeutic Interventions/Treatment Strategies:  Facilitate increased self awareness  Provide education regarding emotion regulation.     Response to Treatment Strategies:  Accepted Feedback, Gave Feedback, Listened , Focused on Goals, Attentive and Accepted Support    Name of Groups:  Emotion Regulation - Time: 11:10-12:00    Description and Therapeutic Outcome:   OT group- Emotion Management  OT group today was focused on emotion regulation.  Clients are given handouts outlining 10 emotion regulation skills.  In group, specific emotion regulation skills are discussed and practiced together.  Handout \"Checking your basic needs to aide in self/emotion regulation\" was issued and discussed.  Encouraged clients to identify what basic needs they are doing well with as well as set a goal to work on improving 1-2 basic needs.    Nasra notes that she is meeting a basic need by eating healthy.          Is this a Weekly Review of the Progress on the Treatment Plan?  NO    Are Treatment Plan Goals being addressed?  YES      Are Treatment Plan Strategies to Address Goals Effective?  YES      Are there any current contracts in place?  YES             "

## 2021-04-28 NOTE — PROGRESS NOTES
Group Therapy Progress Notes     Client Initial Individualized Goals for Treatment: Will learn and practice 1-2 coping skills to help improve symptoms, report daily in group and start to build confidence in using your skills and Will Improve Mindfulness / Stay in the Here and Now Intentionally bringing your attention to something beautiful, pleasant, or interesting that is occurring or is present in your immediate environment or experience on a regular basis.    Area of Treatment Focus:  Symptom Stabilization and Management    Therapeutic Interventions/Treatment Strategies:  Assist with discharge planning  Engage in safety planning when indicated  Facilitate increased self awareness  Provide education regarding Mindfulness and grounding techniques   Teach adaptive coping skills and communication skills    Response to Treatment Strategies:  Accepted Feedback, Gave Feedback, Listened , Focused on Goals, Attentive and Accepted Support    Name of Groups:  Psychtherapy Wrap Up - Time: 2:00-3:00    Description and Therapeutic Outcome:   In psychotherapy wrap up group, Ayesha reviewed her take away from today - reviewed danger signs and safety planning - good awareness for her of the progression of her behaviors and how it can slip.  Also how eating poorly can start the progression - how she can pretend it is about anxiety but really it is about self care and more about not eating and self restricting her intake.  Ayesha continues to be open and transparent in the group - engages well with this group of ladies.  Also was open about seeing her drinking as a moderate red flag behavior and how this fits into her world.  Is going to be home alone tonight - initially discussed being anxious about this - good discussion about default mode in her mind - about to discuss with the group new ways to look at the evening - has a show she wants to watch, food just she wants to eat, grounding statements she can say to herself to remind  herself that she is safe and start to rewire her thinking about being alone.  No safety issues noted.  She remains appropriate for PHP.      Is this a Weekly Review of the Progress on the Treatment Plan?  YES    Are Treatment Plan Goals being addressed?  YES      Are Treatment Plan Strategies to Address Goals Effective?  YES      Are there any current contracts in place?  YES

## 2021-04-28 NOTE — PROGRESS NOTES
Group Therapy Progress Notes     Client Initial Individualized Goals for Treatment:   Will learn and practice 1-2 coping skills to help improve symptoms, report daily in group and start to build confidence in using your skills and Will Improve Mindfulness / Stay in the Here and Now Intentionally bringing your attention to something beautiful, pleasant, or interesting that is occurring or is present in your immediate environment or experience on a regular basis.    Area of Treatment Focus:  Cultural/ Racial / Health / Spiritual    Therapeutic Interventions/Treatment Strategies:  Facilitate increased self awareness  Provide education regarding hygiene and self care.     Response to Treatment Strategies:  Accepted Feedback, Gave Feedback and Listened      Name of Groups: Distress Tolerance - Time: 10-10:50      Description and Therapeutic Outcome:   Nasra attended Distress Tolerance - Identifying and using Safety Crutches to dealing with her anxiety and other problems to provide temporary relief yet not for the long run. Nasra discussed Procrastination and Doing Everything Herself as others may not do things as well as she does.  She also spoke of her past using alcohol while at work  and being triggered by loneliness, resulting in much turmoil in her early young adult life with signing over her children to her first , quitting a job and resulting in pancreatitis.  We discussed the pros/cons of safety crutches.  She was very focused on the topic and provided appropriate feedback and support to peers.  Nasra presents as intentional regarding her wellness.      Is this a Weekly Review of the Progress on the Treatment Plan?  YES    Are Treatment Plan Goals being addressed?  YES      Are Treatment Plan Strategies to Address Goals Effective?  YES      Are there any current contracts in place?  YES

## 2021-04-28 NOTE — PROGRESS NOTES
Group Therapy Progress Notes     Client Initial Individualized Goals for Treatment:     Will learn and practice 1-2 coping skills to help improve symptoms, report daily in group and start to build confidence in using your skills and Will Improve Mindfulness / Stay in the Here and Now Intentionally bringing your attention to something beautiful, pleasant, or interesting that is occurring or is present in your immediate environment or experience on a regular basis.    Area of Treatment Focus:  Symptom Stabilization and Management    Therapeutic Interventions/Treatment Strategies:  Assist with discharge planning  Engage in safety planning when indicated  Facilitate increased self awareness  Provide education regarding Community-based services  Provide feedback about social skills  Teach adaptive coping skills and communication skills    Response to Treatment Strategies:  Accepted Feedback, Gave Feedback, Listened  and Focused on Goals    Name of Group: Psychotherapy group 9-9:50    Description and Therapeutic Outcome:   In psychotherapy group, Ayesha is engaged and focused, she states that she had a good night last night and was relieved about her drDavid blumt yesterday and her upcoming appointments to address her hot flashes and anxiety.  She states that her labs came back yesterday and looked good which she was relieved about. Ayesha identifies using metric skill, 3c's, present moment. Ayesha shares that she is feeling a bit anxious about the next couple of nights because her boyfriend is back to midnights again and her daughter will be at her dads. She plans to rake her yard tonight and has some other tasks she needs to do to keep her busy. Ayesha continues to be appropriate for PHP.    Is this a Weekly Review of the Progress on the Treatment Plan?  YES    Are Treatment Plan Goals being addressed?  YES      Are Treatment Plan Strategies to Address Goals Effective?  YES      Are there any current contracts in  place?  YES

## 2021-04-29 ENCOUNTER — HOSPITAL ENCOUNTER (OUTPATIENT)
Dept: BEHAVIORAL HEALTH | Facility: HOSPITAL | Age: 41
End: 2021-04-29
Attending: PSYCHIATRY & NEUROLOGY
Payer: COMMERCIAL

## 2021-04-29 PROCEDURE — H0035 MH PARTIAL HOSP TX UNDER 24H: HCPCS | Performed by: SOCIAL WORKER

## 2021-04-29 NOTE — PROGRESS NOTES
Group Therapy Progress Notes     Client Initial Individualized Goals for Treatment:   Will learn and practice 1-2 coping skills to help improve symptoms, report daily in group and start to build confidence in using your skills and Will Improve Mindfulness / Stay in the Here and Now Intentionally bringing your attention to something beautiful, pleasant, or interesting that is occurring or is present in your immediate environment or experience on a regular basis.    Area of Treatment Focus:  Develop / Improve Independent Living / Socialization Skills    Therapeutic Interventions/Treatment Strategies:  Facilitate increased self awareness  Provide education regarding emotion regulation.     Response to Treatment Strategies:  Accepted Feedback, Gave Feedback, Listened , Focused on Goals, Attentive and Accepted Support    Name of Groups:  Distress Tolerance - Time: 10-10:50        Description and Therapeutic Outcome:   During discussion of Distress Tolerance and giving up her safety crutch of procrastination of picking up her mail from the post office and opening it she states there are definite consequences ( eg., disconnect notices).  She identified ways to counter her procrastination by setting a schedule to picking up the mail and another set schedule to opening the mail.  She reports comfort in giving up her safety crutch and implementing a plan for getting/opening mail.  She provided impactful feedback to another person in group who had shared they were not ready to give up their safety crutch, the other person was grateful for Nasra's input.  Nasra presents as intentional and remains appropriate for PHP.    Is this a Weekly Review of the Progress on the Treatment Plan?  YES    Are Treatment Plan Goals being addressed?  YES      Are Treatment Plan Strategies to Address Goals Effective?  YES      Are there any current contracts in place?  YES

## 2021-04-29 NOTE — PROGRESS NOTES
Group Therapy Progress Notes     Client Initial Individualized Goals for Treatment:     Will learn and practice 1-2 coping skills to help improve symptoms, report daily in group and start to build confidence in using your skills and Will Improve Mindfulness / Stay in the Here and Now Intentionally bringing your attention to something beautiful, pleasant, or interesting that is occurring or is present in your immediate environment or experience on a regular basis.    Area of Treatment Focus:  Develop / Improve Independent Living / Socialization Skills and Cultural/ Racial / Health / Spiritual    Therapeutic Interventions/Treatment Strategies:  Facilitate increased self awareness  Provide education regarding learning style.     Response to Treatment Strategies:  Accepted Feedback, Gave Feedback, Listened , Focused on Goals, Attentive and Accepted Support    Name of Groups:  Memory - Time: 11:10-12:00    Description and Therapeutic Outcome:   OT group on memory discusses and aids group participants to identify what type of learner they are I.e. Right vs left brained and Kinesthetic, auditory, or visual. Which allows to gain insight into strengths that they possess in order to be successful with everyday challenges and to manage tasks that require memory. For example: appointments, taking medication, compensatory techniques involving time management etc. Provided handouts on tips to aid your memory as well as ways to minimize memory loss.   Nasra notes that her primary learning style is visual.        Is this a Weekly Review of the Progress on the Treatment Plan?  NO    Are Treatment Plan Goals being addressed?  YES      Are Treatment Plan Strategies to Address Goals Effective?  YES      Are there any current contracts in place?  YES

## 2021-04-29 NOTE — PROGRESS NOTES
Group Therapy Progress Notes     Client Initial Individualized Goals for Treatment: Will learn and practice 1-2 coping skills to help improve symptoms, report daily in group and start to build confidence in using your skills and Will Improve Mindfulness / Stay in the Here and Now Intentionally bringing your attention to something beautiful, pleasant, or interesting that is occurring or is present in your immediate environment or experience on a regular basis.    Area of Treatment Focus:  Symptom Stabilization and Management    Therapeutic Interventions/Treatment Strategies:  Assist with discharge planning  Engage in safety planning when indicated  Facilitate increased self awareness  Provide education regarding Mindfulness and grounding techniques   Teach adaptive coping skills and communication skills    Response to Treatment Strategies:  Accepted Feedback, Gave Feedback, Listened , Focused on Goals, Attentive and Accepted Support    Name of Groups:  Psychtherapy Wrap Up - Time: 2:00-3:00    Description and Therapeutic Outcome:   In psychotherapy wrap up group, Ayesha reviewed her take away from today - reviewed learning styles - thought she was more of a hands on learner but has a more mixed style.  Reviewed Core beliefs - doesn't feel like she had trauma in her childhood but realizes that she turned into her mom and not in a good way - things that she struggled with with her mom as a kid.  Was able to do some grounding techniques last night - did some extra running for her dad, called her mom and sister - could have been drinking instead and called them instead - stayed in her room, took a sleeping pill and watched her show but stayed in her room - normally she does other things but made the intentional decision to stay in her room.  Still did not sleep well but did not feel afraid.  Good group for Ayesha as she was open and honest about self concept and core beliefs.  No safety issues noted.  She remains  appropriate for PHP.      Is this a Weekly Review of the Progress on the Treatment Plan?  YES    Are Treatment Plan Goals being addressed?  YES      Are Treatment Plan Strategies to Address Goals Effective?  YES      Are there any current contracts in place?  YES

## 2021-04-30 ENCOUNTER — CARE COORDINATION (OUTPATIENT)
Dept: BEHAVIORAL HEALTH | Facility: OTHER | Age: 41
End: 2021-04-30

## 2021-04-30 ENCOUNTER — HOSPITAL ENCOUNTER (OUTPATIENT)
Dept: BEHAVIORAL HEALTH | Facility: HOSPITAL | Age: 41
End: 2021-04-30
Attending: PSYCHIATRY & NEUROLOGY
Payer: COMMERCIAL

## 2021-04-30 PROCEDURE — H0035 MH PARTIAL HOSP TX UNDER 24H: HCPCS | Performed by: SOCIAL WORKER

## 2021-04-30 NOTE — PROGRESS NOTES
"Group Therapy Progress Notes     Client Initial Individualized Goals for Treatment:  Will learn and practice 1-2 coping skills to help improve symptoms, report daily in group and start to build confidence in using your skills and Will Improve Mindfulness / Stay in the Here and Now Intentionally bringing your attention to something beautiful, pleasant, or interesting that is occurring or is present in your immediate environment or experience on a regular basis.    Area of Treatment Focus:  Develop / Improve Independent Living / Socialization Skills and Cultural/ Racial / Health / Spiritual    Therapeutic Interventions/Treatment Strategies:  Facilitate increased self awareness  Provide education regarding learning style    Response to Treatment Strategies:  Accepted Feedback, Gave Feedback, Listened , Focused on Goals and Attentive    Name of Groups:  Gratitude - Time: 10-10:50    Description and Therapeutic Outcome:   During Gratitude group, Nasra presented as bright in affect, social and verbally engaged with others.  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  She said last week she wouldn't have thought about accepting a position in the restaurant and now she is going to accept it.  When asked what has changed between last week and this week, she replied \"I have better head space now\" and the new job will provide her with tip money and socialization.  She was able to identify psychological, health, and social benefits of identifying gratitude for self.  aNsra present as intentional regarding practicing skills and remains appropriate for PHP.      Is this a Weekly Review of the Progress on the Treatment Plan?  YES    Are Treatment Plan Goals being addressed?  YES      Are Treatment Plan Strategies to Address Goals Effective?  YES      Are there any current contracts in place?  YES           "

## 2021-04-30 NOTE — PROGRESS NOTES
Group Therapy Progress Notes     Client Initial Individualized Goals for Treatment: Will learn and practice 1-2 coping skills to help improve symptoms, report daily in group and start to build confidence in using your skills and Will Improve Mindfulness / Stay in the Here and Now Intentionally bringing your attention to something beautiful, pleasant, or interesting that is occurring or is present in your immediate environment or experience on a regular basis.    Area of Treatment Focus:  Symptom Stabilization and Management and Develop / Improve Independent Living / Socialization Skills    Therapeutic Interventions/Treatment Strategies:  Facilitate increased self awareness  Teach adaptive coping skills and communication skills    Response to Treatment Strategies:  Accepted Feedback, Gave Feedback, Listened , Focused on Goals, Attentive and Accepted Support    Name of Groups:  Stress Management - Time: 11:10-12:00    Description and Therapeutic Outcome:   Education provided on sensory coping skills and how to identify when needing to alert up vs. calm down.  Scenarios provided and sensory coping skills identified to utilize in times of distress.  Education on emotional signs, physical signs, and behavioral signs when recognizing states of arousal to guide the need for calming vs alerting sensory input. Pt completed sensory toolbox kit: items offered include weighted tool, fidgets, aromatherapy, mind engaging activities, stress balls, crayons, and bubbles.  Verbalizes/demos understanding of sensory toolbox and what else could be added at home.  In OT group today, Nasra is bright and engaged.  Created her own sensory kit today and made a weighted collar for coping and calming.         Is this a Weekly Review of the Progress on the Treatment Plan?  NO    Are Treatment Plan Goals being addressed?  YES      Are Treatment Plan Strategies to Address Goals Effective?  YES      Are there any current contracts in  place?  YES

## 2021-04-30 NOTE — PROGRESS NOTES
GERMAN NATHAN met with patient today to offer [facility] services, patient states they are interested in working with [facility]. Patient states paperwork is something she would like assistance with. The paperwork she is needing assistance with is state appeal paperwork for past job, following up on stimulus checks as patient has not received any and any tax filing resources.     Patient also would like to work with a therapist but states she is limited due to knowing most of the therapists in the area. GERMAN NATHAN will see if Roxanna MANCIA will work with patient at [facility].    Patient scheduled to meet with GERMAN NATHAN on May 17th 2021 at 1:00pm.    The author of this note documented a reason for not sharing it with the patient.    MARITZA Lee  Social Work Care Coordinator  [facility]   [phone] opt 1

## 2021-05-03 ENCOUNTER — HOSPITAL ENCOUNTER (OUTPATIENT)
Dept: BEHAVIORAL HEALTH | Facility: HOSPITAL | Age: 41
End: 2021-05-03
Attending: PSYCHIATRY & NEUROLOGY
Payer: COMMERCIAL

## 2021-05-03 PROCEDURE — H0035 MH PARTIAL HOSP TX UNDER 24H: HCPCS | Performed by: SOCIAL WORKER

## 2021-05-03 NOTE — PROGRESS NOTES
Group Therapy Progress Notes     Client Initial Individualized Goals for Treatment: Will improve wellness related behaviors by reporting in group the skills you have built confidence in using to help symptoms and Will develop an aftercare / transition plan by the end of the program    Area of Treatment Focus:  Community Resources / Support and Discharge Planning    Therapeutic Interventions/Treatment Strategies:  Assist with discharge planning  Engage in safety planning when indicated  Facilitate increased self awareness  Provide education regarding community based services  Teach adaptive coping skills and communication skills    Response to Treatment Strategies:  Accepted Feedback, Gave Feedback, Listened , Focused on Goals, Attentive and Accepted Support    Name of Groups:  Psychtherapy Wrap Up - Time: 2:00-3:00    Description and Therapeutic Outcome:   Ayesha was not in group today.       Is this a Weekly Review of the Progress on the Treatment Plan?  YES    Are Treatment Plan Goals being addressed?  YES      Are Treatment Plan Strategies to Address Goals Effective?  YES      Are there any current contracts in place?  YES

## 2021-05-03 NOTE — PROGRESS NOTES
"At the start of OT group, pt states that she has a \"sour stomach\" and is not feeling great.  By the end of OT group at 12pm, she states that she is not feeling well and is going home for afternoon.  Plans to take a nap and get some rest this afternoon and return to [facility] tomorrow.    "

## 2021-05-03 NOTE — PROGRESS NOTES
Group Therapy Progress Notes     Client Initial Individualized Goals for Treatment: Will improve wellness related behaviors by reporting in group the skills you have built confidence in using to help symptoms and Will develop an aftercare / transition plan by the end of the program    Area of Treatment Focus:  Community Resources / Support and Discharge Planning    Therapeutic Interventions/Treatment Strategies:  Assist with discharge planning  Engage in safety planning when indicated  Facilitate increased self awareness  Provide education regarding communication  Teach adaptive coping skills and communication skills    Response to Treatment Strategies:  Accepted Feedback, Gave Feedback, Listened , Focused on Goals, Attentive and Accepted Support    Name of Groups:  Assertiveness - Time: 11:10-12:00    Description and Therapeutic Outcome:   Assertiveness   OT Group - Assertiveness, Boundaries, and Positive Affirmations  This group focuses on passive, aggressive, and assertive communication styles and educates clients on how each of the styles can affect our relationships and how we respond to situations. Reviewed each type of communication and benefits and consequences of each type.  Also encouraged assertive body language and the use of SAS (State the problem, Ask for what you need, and Spell out the benefits of cooperation).  Clients are given the opportunity to share scenarios where they utilized each type of communication and what the end result of each type was.  In OT group today, Nasra is focused and engaged.  States that she primarily uses passive communication and that it often affects her relationships negatively because she will hold in her feelings and they end up exploding out later and are portrayed as aggressive.  Good insight into her communication and what she needs to do to be healthier.          Is this a Weekly Review of the Progress on the Treatment Plan?  NO    Are Treatment Plan Goals  being addressed?  YES      Are Treatment Plan Strategies to Address Goals Effective?  YES      Are there any current contracts in place?  YES

## 2021-05-04 ENCOUNTER — TELEPHONE (OUTPATIENT)
Dept: BEHAVIORAL HEALTH | Facility: HOSPITAL | Age: 41
End: 2021-05-04

## 2021-05-05 ENCOUNTER — HOSPITAL ENCOUNTER (OUTPATIENT)
Dept: BEHAVIORAL HEALTH | Facility: HOSPITAL | Age: 41
End: 2021-05-05
Attending: PSYCHIATRY & NEUROLOGY
Payer: COMMERCIAL

## 2021-05-05 PROCEDURE — H0035 MH PARTIAL HOSP TX UNDER 24H: HCPCS | Performed by: SOCIAL WORKER

## 2021-05-05 NOTE — PROGRESS NOTES
Group Therapy Progress Notes     Client Initial Individualized Goals for Treatment: Will improve wellness related behaviors by reporting in group the skills you have built confidence in using to help symptoms and Will develop an aftercare / transition plan by the end of the program    Area of Treatment Focus:  Community Resources / Support and Discharge Planning    Therapeutic Interventions/Treatment Strategies:  Assist with discharge planning  Engage in safety planning when indicated  Provide education regarding network development  Teach adaptive coping skills and communication skills    Response to Treatment Strategies:  Accepted Feedback, Gave Feedback, Listened , Focused on Goals and Attentive    Name of Groups:  Network Development - Time: 11:10-12:00    Description and Therapeutic Outcome:   Discussed the steps to consider when thinking about network or support development I.e. Getting to know yourself/insight, development of physical and emotional resources, senait your passion not people, and being proactive and invite people into your life. Provided handout on rating and identifying symptoms of depression and anxiety, Identification of what is helpful and not helpful when experiencing anxiety or depressive episodes, and levels of support during these episodes.  Discussed identifying who in your support group is best suited for different symptoms. Lastly discussed ways to build and assess support. Provided group members with a list of chemical and mental health resources.  In OT group today, Nasra is focused and engaged.  Has good insight into her supports and also talks about needing to set stricter boundaries with some of her family.         Is this a Weekly Review of the Progress on the Treatment Plan?  NO    Are Treatment Plan Goals being addressed?  YES      Are Treatment Plan Strategies to Address Goals Effective?  YES      Are there any current contracts in place?  YES

## 2021-05-05 NOTE — PROGRESS NOTES
Group Therapy Progress Notes     Client Initial Individualized Goals for Treatment: Will improve wellness related behaviors by reporting in group the skills you have built confidence in using to help symptoms and Will develop an aftercare / transition plan by the end of the program    Area of Treatment Focus:  Community Resources / Support and Discharge Planning    Therapeutic Interventions/Treatment Strategies:  Assist with discharge planning  Engage in safety planning when indicated  Facilitate increased self awareness  Provide education regarding community based services  Teach adaptive coping skills and communication skills    Response to Treatment Strategies:  Accepted Feedback, Gave Feedback, Listened , Focused on Goals, Attentive and Accepted Support    Name of Groups:  Mindfulness - 1:00-2:00    Description and Therapeutic Outcome:   In mindfulness group, reviewed Window of Tolerance - Ayesha reviewed an incident from this morning in which the dog got out - reviewed how a couple weeks ago, this would have sent her into a panic attack, she would have woken up Dameon to take care of it - now she got her daughter moving, drove around, found the dog, got her back in the house, missed the bus, drove her to school and regroup and got to PHP on time.  Good discussion about the hyper-arousal and hypo-arousal and increased awareness as she increases her window of tolerance as well as an awareness of when she is going outside of that window.  Also participated in an exercise of eating chocolate and being mindful - able to report the differences between the two candies and what she noticed about her mindfulness.  Good group for Ayesha - she engaged very well and is showing such nice progress - able to share this in an encouraging and helpful way with peers.      Is this a Weekly Review of the Progress on the Treatment Plan?  YES    Are Treatment Plan Goals being addressed?  YES      Are Treatment Plan Strategies to Address  Goals Effective?  YES      Are there any current contracts in place?  YES

## 2021-05-05 NOTE — PROGRESS NOTES
"Group Therapy Progress Notes     Client Initial Individualized Goals for Treatment:   improve wellness related behaviors by reporting in group the skills you have built confidence in using to help symptoms and Will develop an aftercare / transition plan by the end of the program    Area of Treatment Focus:  Community Resources / Support and Discharge Planning    Therapeutic Interventions/Treatment Strategies:  Assist with discharge planning  Engage in safety planning when indicated  Facilitate increased self awareness  Provide education regarding communication  Teach adaptive coping skills and communication skills    Response to Treatment Strategies:  Accepted Feedback, Gave Feedback, Listened , Focused on Goals and Attentive    Name of Groups:  Time Management - Time: 10-10:50    Description and Therapeutic Outcome:   During Time Management group, Nasra presented as bright in affect, social, and provided beneficial feedback to peer.  Nasra identified the importance of keeping a schedule for  the people in her household and when asked what about your personal wishes for your schedule, she added she wants to walk the dog and walk to the park daily.  When asked which skills she is wanting to add to her lifestyle to accomplish her goals she reports \"being Metric by just doing it\" as well as blocking out time for herself, she states symptoms of ADHD may interfere with accomplishing her goals.  Discussed importance of intentionally setting goals for self to manage mental well-being.  Nasra is intentional regarding practicing skills and remains appropriate for PHP.      Is this a Weekly Review of the Progress on the Treatment Plan?  NO    Are Treatment Plan Goals being addressed?  YES      Are Treatment Plan Strategies to Address Goals Effective?  YES      Are there any current contracts in place?  YES           "

## 2021-05-05 NOTE — PROGRESS NOTES
"Group Therapy Progress Notes     Client Initial Individualized Goals for Treatment: Will improve wellness related behaviors by reporting in group the skills you have built confidence in using to help symptoms and Will develop an aftercare / transition plan by the end of the program    Area of Treatment Focus:  Community Resources / Support and Discharge Planning    Therapeutic Interventions/Treatment Strategies:  Assist with discharge planning  Engage in safety planning when indicated  Facilitate increased self awareness  Provide education regarding community based services  Teach adaptive coping skills and communication skills    Response to Treatment Strategies:  Accepted Feedback, Gave Feedback, Listened , Focused on Goals, Attentive and Accepted Support    Name of Groups:  Psychtherapy Wrap Up - Time: 2:00-3:00    Description and Therapeutic Outcome:   In psychotherapy wrap up group, Ayesha reviewed her take away from today - reviewed Networking and principals of effective time management.  Has to be very specific of what supports can do, sometimes people don't know what to so.  Wants to have a specific plan to check in with someone daily - good or bad - and not her kids - owned that she has had poor boundaries with her kids in the past and is working hard to re-establish better and more clear boundaries with this now that they are adults.  Also doesn't want it to always be Dameon - he often is working - she was slightly tearful talking about how she sometimes feels he is too good for her - about to go back to interrupting that thought with 3 good things about herself.  Also discussed how to start talking about things with Dameon - starting with things like the success from the morning so she can slowly ease into talking about her \"little wins\" and talk with him about some of the coping skills she is learning as well as things she is still struggling with.  Very good group for Ayesha - open and vulnerable but also " willing to accept support from the group.  No safety issues noted - Ayesha remains appropriate for PHP.       Is this a Weekly Review of the Progress on the Treatment Plan?  YES    Are Treatment Plan Goals being addressed?  YES      Are Treatment Plan Strategies to Address Goals Effective?  YES      Are there any current contracts in place?  YES

## 2021-05-06 ENCOUNTER — OFFICE VISIT (OUTPATIENT)
Dept: OBGYN | Facility: OTHER | Age: 41
End: 2021-05-06
Attending: NURSE PRACTITIONER
Payer: COMMERCIAL

## 2021-05-06 VITALS
DIASTOLIC BLOOD PRESSURE: 70 MMHG | SYSTOLIC BLOOD PRESSURE: 100 MMHG | HEART RATE: 79 BPM | WEIGHT: 117 LBS | BODY MASS INDEX: 19.97 KG/M2 | TEMPERATURE: 98.4 F | OXYGEN SATURATION: 99 % | HEIGHT: 64 IN

## 2021-05-06 DIAGNOSIS — Z01.419 WELL WOMAN EXAM WITH ROUTINE GYNECOLOGICAL EXAM: ICD-10-CM

## 2021-05-06 DIAGNOSIS — Z12.4 SCREENING FOR CERVICAL CANCER: Primary | ICD-10-CM

## 2021-05-06 DIAGNOSIS — N92.1 MENORRHAGIA WITH IRREGULAR CYCLE: ICD-10-CM

## 2021-05-06 PROCEDURE — 87624 HPV HI-RISK TYP POOLED RSLT: CPT | Mod: ZL | Performed by: NURSE PRACTITIONER

## 2021-05-06 PROCEDURE — G0123 SCREEN CERV/VAG THIN LAYER: HCPCS | Mod: ZL | Performed by: NURSE PRACTITIONER

## 2021-05-06 PROCEDURE — 99396 PREV VISIT EST AGE 40-64: CPT | Performed by: NURSE PRACTITIONER

## 2021-05-06 ASSESSMENT — MIFFLIN-ST. JEOR: SCORE: 1180.71

## 2021-05-06 ASSESSMENT — PAIN SCALES - GENERAL: PAINLEVEL: NO PAIN (0)

## 2021-05-06 NOTE — NURSING NOTE
"Chief Complaint   Patient presents with     Gyn Exam       Initial /70 (BP Location: Left arm, Patient Position: Chair, Cuff Size: Adult Regular)   Pulse 79   Temp 98.4  F (36.9  C) (Tympanic)   Ht 1.626 m (5' 4\")   Wt 53.1 kg (117 lb)   SpO2 99%   BMI 20.08 kg/m   Estimated body mass index is 20.08 kg/m  as calculated from the following:    Height as of this encounter: 1.626 m (5' 4\").    Weight as of this encounter: 53.1 kg (117 lb).  Medication Reconciliation: complete  INEZ LITTLEJOHN LPN    "

## 2021-05-07 ENCOUNTER — HOSPITAL ENCOUNTER (OUTPATIENT)
Dept: ULTRASOUND IMAGING | Facility: HOSPITAL | Age: 41
End: 2021-05-07
Attending: OBSTETRICS & GYNECOLOGY
Payer: COMMERCIAL

## 2021-05-07 ENCOUNTER — HOSPITAL ENCOUNTER (OUTPATIENT)
Dept: BEHAVIORAL HEALTH | Facility: HOSPITAL | Age: 41
End: 2021-05-07
Attending: PSYCHIATRY & NEUROLOGY
Payer: COMMERCIAL

## 2021-05-07 ENCOUNTER — OFFICE VISIT (OUTPATIENT)
Dept: OBGYN | Facility: OTHER | Age: 41
End: 2021-05-07
Attending: OBSTETRICS & GYNECOLOGY
Payer: COMMERCIAL

## 2021-05-07 VITALS
DIASTOLIC BLOOD PRESSURE: 70 MMHG | BODY MASS INDEX: 19.97 KG/M2 | SYSTOLIC BLOOD PRESSURE: 108 MMHG | HEIGHT: 64 IN | WEIGHT: 117 LBS | HEART RATE: 64 BPM

## 2021-05-07 DIAGNOSIS — N94.5 SECONDARY DYSMENORRHEA: ICD-10-CM

## 2021-05-07 DIAGNOSIS — R23.2 HOT FLASHES: ICD-10-CM

## 2021-05-07 DIAGNOSIS — N92.1 MENORRHAGIA WITH IRREGULAR CYCLE: Primary | ICD-10-CM

## 2021-05-07 DIAGNOSIS — N92.1 MENORRHAGIA WITH IRREGULAR CYCLE: ICD-10-CM

## 2021-05-07 PROCEDURE — G0463 HOSPITAL OUTPT CLINIC VISIT: HCPCS

## 2021-05-07 PROCEDURE — H0035 MH PARTIAL HOSP TX UNDER 24H: HCPCS

## 2021-05-07 PROCEDURE — 99214 OFFICE O/P EST MOD 30 MIN: CPT | Performed by: OBSTETRICS & GYNECOLOGY

## 2021-05-07 PROCEDURE — 76830 TRANSVAGINAL US NON-OB: CPT

## 2021-05-07 ASSESSMENT — PATIENT HEALTH QUESTIONNAIRE - PHQ9
SUM OF ALL RESPONSES TO PHQ QUESTIONS 1-9: 9
5. POOR APPETITE OR OVEREATING: MORE THAN HALF THE DAYS
SUM OF ALL RESPONSES TO PHQ QUESTIONS 1-9: 10

## 2021-05-07 ASSESSMENT — ANXIETY QUESTIONNAIRES
2. NOT BEING ABLE TO STOP OR CONTROL WORRYING: SEVERAL DAYS
3. WORRYING TOO MUCH ABOUT DIFFERENT THINGS: SEVERAL DAYS
7. FEELING AFRAID AS IF SOMETHING AWFUL MIGHT HAPPEN: SEVERAL DAYS
IF YOU CHECKED OFF ANY PROBLEMS ON THIS QUESTIONNAIRE, HOW DIFFICULT HAVE THESE PROBLEMS MADE IT FOR YOU TO DO YOUR WORK, TAKE CARE OF THINGS AT HOME, OR GET ALONG WITH OTHER PEOPLE: SOMEWHAT DIFFICULT
GAD7 TOTAL SCORE: 7
5. BEING SO RESTLESS THAT IT IS HARD TO SIT STILL: SEVERAL DAYS
1. FEELING NERVOUS, ANXIOUS, OR ON EDGE: SEVERAL DAYS
6. BECOMING EASILY ANNOYED OR IRRITABLE: NOT AT ALL

## 2021-05-07 ASSESSMENT — MIFFLIN-ST. JEOR: SCORE: 1180.71

## 2021-05-07 ASSESSMENT — PAIN SCALES - GENERAL: PAINLEVEL: NO PAIN (0)

## 2021-05-07 NOTE — DISCHARGE SUMMARY
Adult Partial Hospitalization Program Discharge Summary/Instructions      Patient: Nasra Lovelace    MRN: [id number]  : 1980    Age: 41 year old Sex: female    Admission Date: 2021  Discharge Date: 2021  Diagnosis: Generalized Anxiety Disorder;  296.32 (F33.1) Major Depressive Disorder, recurrent, moderate, with anxious distress; 309.81 (F43.10) PTSD      Client Strengths:  caring, committed to sobriety, educated, empathetic, goal-focused, good listener, has a previous history of therapy, insightful, intelligent, motivated, open to learning, open to suggestions / feedback, responsible parent, support of family, friends and providers, supportive, wants to learn, willing to ask questions, willing to relate to others and work history     Long-Term Goals:  Knowledge about illness and management of symptoms   Maintenance of personal safety   Maintenance of sobriety   Effective management of impulsivity       Discharge Criteria:  Satisfactory progress toward treatment goals   Improvement re: identified problems and symptoms   Ability to continue recovery at next level of service   Has a discharge plan in place   Has safety/coping plan in place   Ability to maintain sobriety  Regular attendance as scheduled     Area of Treatment Focus:  Progress   Personal Safety, Symptom Stabilization and Management and Community Resources / Support and Discharge Planning    Due to significant pain issues with her tooth, Ayesha was only able to attend one day programming her first week - we were able to reset and start again the following week.      This proved to be a much better week for Ayesha - she was social, focused and participatory.  She was engaging and provided positive feedback.  She practiced My Name is skill, Just do it - rather than I'll try, 3 C's, 3 Good things about herself, also reviewed setting boundaries with her dad, being okay with saying no, as well as planning ahead and having purpose to assist with  her anxiety.  She reported practicing thank you instead of I'm sorry and the need to practice using gratitudes.  She ended the week learning about I statements and how she can use these with her daughter as well as practice grounding techniques of naming her environment outloud as she was driving.    Ayesha continued to be engaging, social and provides good feedback and support to her peers.  She was able to practice some grounding techniques to help with her anxiety of being home alone at night when her boyfriend was working nights as well as rewiring her thinking about being alone, including present moment, gratitudes, 3C's, deep breathing and the GAP.  We also reviewed distress tolerance, coping skills, safety planning - red and green flags as well as safety crutches.  She ended the week meeting with Magali from the [facility] program, focusing on gratitudes and complete a sensory toolbox.    This last week Ayesha has been positive and engaged, she states that she has noticed a difference in how she is handling stressful situations and that she has been applying present moment and 3C's which has helped her control the racing thoughts she experiences. Ayesha states that she feels she has been more level headed and able to think through situations without getting reactive. She describes that skills that have helped her with this have been GAP, AAA, THINK, 5:1 and deep breathing.  Ayesha also applied using Thank You when she was receiving compliments from the group regarding her progress.  She has been able to discuss application of her skills in situations that would have cause panic attacks for her a couple weeks ago.    Symptom Stabilization and Management - Ayesha was able to demonstrate the ability to interrupt negative thoughts and to use coping skills to manage her anxiety and depression.  Specific coping skills that have worked for include Stay Metric - Just do it, Ask for help with my dad, Keep a realistic schedule -  bedtime daily, allow time for fun, Lower expectations, Present moment, Grounding techniques.  She was able to demonstrate the ability to use these skills in a variety of settings and under different circumstances.     Community Resources/Support and Discharge planning - Ayesha was able to develop the ability to continue recovery at next level service.  Her supports and discharge plan include Dameon, dad, mom and step dad, Dr. Abebe, PCP - Mitzi Dailey, [facility] staff - Magali.      Prognosis: Very good - Ayesha has done a great job in the program - she has been engaging in the group, supportive to peers, gives great feedback but also is able to receive support and feedback from staff and peers.  She has made amazing progress in the program and continues to report how she is using her skills.  She has been willing to move on to the next level of care with additional services in the community and will be meeting with the [facility] staff to start working with that program.  We have really enjoyed working with Ayesha and are proud of the work she has done.    Personal Safety:     * Follow your safety plan    * Call crisis lines as needed:    PHP program - [phone] - call if you have questions or are struggling    [place] Area:  [facility], Crisis stabilization housing- [phone]  [facility] Crisis Line: 1-187.315.8728  [facility]: 067-2137  Sexual Assault Program [facility]: [phone] or 1-678.881.9208  [facility] Battered Women's Program: 1-810.415.7553 Ext: 279       Calls answered Mon-Fri-8:00 am--4:30 pm    [place]:  [facility]: [phone]  [facility] first call for help: 1-536.673.5094  [facility]:  [phone]      [place] Area:  Warm Line: 1-411.922.9305       Calls answered Tuesday--Saturday 4:00 pm--10:00 pm  [facility] Crisis Line - [phone]  [place] [facility] [phone]    [place] Statewide:  [place] Crisis and Referral Services:  3-554-546-5046  National Suicide Prevention Lifeline: 8-989-487-TALK (6256)   - guq4jqjq- Text  Life  to [address]  First Call for Help: 2-1-1  [facility] Helpline- 2-783-BTVX-HELP      I have learned to manage my symptoms by:  Using the above skills    My Supports are:  Dr. Brandy Abebe, Mitzi Dailey - PCP, Magali [facility], Marianne and Lv Smuk - mom and Step dad, Nas [place] - dad, Dameon - partner.    Managing Symptoms and Preventing Relapse    * Go to all of your appointments    * Take all medications as directed.      * Carry a current list if medication with you    * Do not use illicit (street) drugs.  Avoid alcohol    * Report these symptoms to your care team. These are early signs of relapse:   Thoughts of suicide   Losing more sleep   Increased confusion   Mood getting worse   Feeling more In   Other:  Increased panic attacks    *Use these skills daily:  Use your skills early and interrupt your negative thoughts    Ayesha was seen during the program by Timur Luque NP from [facility] for medications management.    Follow up with medications manager: Dr. Brandy Abebe    Next visit: May 10    Current Outpatient Medications:      co-enzyme Q-10 100 MG CAPS capsule, Take 100 mg by mouth daily, Disp: , Rfl:      DULoxetine (CYMBALTA) 60 MG capsule, TAKE 1 CAPSULE (60 MG) BY MOUTH DAILY, Disp: 30 capsule, Rfl: 11     LORazepam (ATIVAN) 0.5 MG tablet, Take 1 tablet (0.5 mg) by mouth daily as needed for anxiety, Disp: 30 tablet, Rfl: 1     magnesium oxide (MAG-OX) 400 MG tablet, TAKE 2 TABLETS BY MOUTH IN THE EVENING., Disp: 60 tablet, Rfl: 0     naproxen (NAPROSYN) 500 MG tablet, Take 1 tablet (500 mg) by mouth 2 times daily (with meals) Take with a full meal, begin with onset menstrual symptoms, Disp: 20 tablet, Rfl: 4     omeprazole (PRILOSEC) 40 MG DR capsule, TAKE 1 CAPSULE BY MOUTH ONCE DAILY 30-60 MINUTES BEFORE A MEAL., Disp: 30 capsule, Rfl: 3     progesterone (PROMETRIUM) 100 MG capsule, Take 1 capsule (100  mg) by mouth 2 times daily (with meals), Disp: 60 capsule, Rfl: 11     propranolol ER (INDERAL LA) 80 MG 24 hr capsule, TAKE 1 CAPSULE (80 MG) BY MOUTH DAILY, Disp: 30 capsule, Rfl: 1     RITALIN LA 20 MG 24 hr capsule, Take 1 capsule by mouth daily, Disp: 30 capsule, Rfl: 0     temazepam (RESTORIL) 15 MG capsule, Take 1 - 2 caps evening, Disp: 60 capsule, Rfl: 3    Follow up with your therapist: EDILBERTO Vela   Next visit: To be set by [facility]    Follow with other  supports: [facility] - Meeting with Magali on 5/17/2021 - will meeting with EDILBERTO Manuel on that day also    Go to group therapy and / or support groups at: Invited to call the [facility] office for support as needed    See your medical doctor about:  As scheduled

## 2021-05-07 NOTE — PROGRESS NOTES
CHIEF COMPLAINT / REASON FOR VISIT  Patient presents for Gynecology follow up for recurrent menorrhagia after prior endometrial placement procedure.    HISTORY OF PRESENT ILLNESS  Nasra Lovelace is a 41 year old  with Patient's last menstrual period was 2021. who presents for evaluation of recurrent menorrhagia.  The patient had an endometrial ablation procedure in 2018 for menorrhagia and dysmenorrhea symptoms.  Her symptoms resolved after the procedure and she became amenorrheic.  Now for the last year she has return of menstrual bleeding and dysmenorrhea.  Over the last several months her menstrual cycles have become heavier, now to the point of menorrhagia with heavy blood flow and dark clots.  She now has monthly menses the last for 4 to 7 days with heavy blood flow for the first 4 days.  She also reports return of strong dysmenorrhea symptoms similar to before the ablation.  Over the last couple months the patient also developed migraine headaches that began a couple days prior to her menses as well as vasomotor symptoms including hot flashes and night sweats and worsening anxiety. The patient started Prometrium and naproxen last month and now has resolution of her vasomotor symptoms but denies improvement in her dysmenorrhea or bleeding symptoms.  The patient does not want to continue with Prometrium long-term as she feels the extra hormone side effects will not help her mental health. The patient has previously had bilateral salpingectomy and she is interested in definitive surgical therapy of hysterectomy.    The patient is in active psychiatric therapy and is doing well.  She reports that she is stable on her medications for ADHD, depression, anxiety, panic disorder, and insomnia.  She is completing the [facility] program for mental health treatment.    MENSTRUAL HISTORY  Menarche was at age 10.  Menses: Regular, q 30 days for the last year.  Previously she was amenorrheic after endometrial  ablation.  How many days do you bleed: 4-7  Heavy menses: Yes, as above for the first 4 days.  Intermenstrual bleeding: Denies.  How often do you have to change a pad/ tampon: Frequently on heavy flow days.  Soiling of clothing: Denies  Dysmenorrhea: She has return of significant dysmenorrhea.  She is sexually active and denies issues with intercourse.   Dyspareunia: Denies.  Postcoital spotting: Denies.  She is currently using salpingectomy for sterilization.  History of STD or PID: Denies    Pap smear history: She has a history of cervical dysplasia in the past.    ALLERGIES     Allergies   Allergen Reactions     Dust Mite Extract      Hydrocodone Other (See Comments) and Itching     Skin felt like it was burning.     Penicillins Rash     MEDICATIONS    Current Outpatient Medications:      co-enzyme Q-10 100 MG CAPS capsule, Take 100 mg by mouth daily, Disp: , Rfl:      DULoxetine (CYMBALTA) 60 MG capsule, TAKE 1 CAPSULE (60 MG) BY MOUTH DAILY, Disp: 30 capsule, Rfl: 11     LORazepam (ATIVAN) 0.5 MG tablet, Take 1 tablet (0.5 mg) by mouth daily as needed for anxiety, Disp: 30 tablet, Rfl: 1     magnesium oxide (MAG-OX) 400 MG tablet, TAKE 2 TABLETS BY MOUTH IN THE EVENING., Disp: 60 tablet, Rfl: 0     naproxen (NAPROSYN) 500 MG tablet, Take 1 tablet (500 mg) by mouth 2 times daily (with meals) Take with a full meal, begin with onset menstrual symptoms, Disp: 20 tablet, Rfl: 4     omeprazole (PRILOSEC) 40 MG DR capsule, TAKE 1 CAPSULE BY MOUTH ONCE DAILY 30-60 MINUTES BEFORE A MEAL., Disp: 30 capsule, Rfl: 3     progesterone (PROMETRIUM) 100 MG capsule, Take 1 capsule (100 mg) by mouth 2 times daily (with meals) (Patient taking differently: Take 100 mg by mouth daily ), Disp: 60 capsule, Rfl: 11     propranolol ER (INDERAL LA) 80 MG 24 hr capsule, TAKE 1 CAPSULE (80 MG) BY MOUTH DAILY, Disp: 30 capsule, Rfl: 1     RITALIN LA 20 MG 24 hr capsule, Take 1 capsule by mouth daily, Disp: 30 capsule, Rfl: 0     temazepam  (RESTORIL) 15 MG capsule, Take 1 - 2 caps evening, Disp: 60 capsule, Rfl: 3    REVIEW OF SYSTEMS  As per the HPI, otherwise negative.    PAST MEDICAL HISTORY  Past Medical History:   Diagnosis Date     Acquired hypothyroidism 2014     Anxiety disorder 2012     Anxiety state 2014     Attention deficit hyperactivity disorder (ADHD), combined type 2019    Patient is followed by  for ongoing prescription of stimulants.  All refills should be approved by this provider, or covering partner.  Medication(s): Ritalin 10 mg.  Maximum quantity per month: 60 Clinic visit frequency required: Q 3 months   Controlled substance agreement on file: Yes      Date(s): 19 Neuropsych evaluation for ADD completed:  managed by   Last [facility] webs     Gastroesophageal reflux disease, esophagitis presence not specified 2017     IMO Regulatory Load OCT 2020     Hydronephrosis 2011     Menorrhagia with irregular cycle--Endometrial Ablation---2021     Migraine with aura and without status migrainosus, not intractable 2014     Panic disorder without agoraphobia 2006     Primary insomnia 2015     Recurrent major depressive disorder (H) 2018     Secondary dysmenorrhea--PROMETRIUM, NAPROXEN---2021     Tobacco abuse 2018     Vitamin deficiency 2018     PAST SURGICAL HISTORY  Past Surgical History:   Procedure Laterality Date      SECTION N/A 2000      SECTION N/A 2012    postop hemm with ruptured R Ov vein, transfusion     COLONOSCOPY N/A 2019    Procedure: COLONOSCOPY DIAGNOSTIC WITH RANDOM COLON BIOPSIES ANOSCOPY; Surgeon: Igor Burgos MD; Location: [facility] OR       COLPOSCOPY CERVIX, BIOPSY CERVIX, ENDOCERVICAL CURETTAGE, COMBINED N/A 2008     DILATION AND CURETTAGE, ABLATE ENDOMETRIUM NOVASURE, COMBINED N/A 2018    Novasure Endometrial ablation     ESOPHAGOSCOPY, GASTROSCOPY,  DUODENOSCOPY (EGD), COMBINED  2019    Procedure: ESOPHAGOGASTRODUODENOSCOPY DIAGNOSTIC with biopsies; Surgeon: Igor Burgos MD; Location: [facility] OR       HYSTEROSCOPY DIAGNOSTIC N/A 2018    Hysteroscopy, D&C     LAPAROSCOPIC APPENDECTOMY N/A 2012     OOPHORECTOMY Right 2012    post operative hemorrhage with ruptured ovarian vein,      SALPINGECTOMY Left 2019    Procedure: LAPAROSCOPY LEFT SALPINGECTOMY; Surgeon: Regina Sweeney MD; Location: [facility] OR       OBSTETRIC HISTORY  OB History    Para Term  AB Living   5 3 3 0 2 3   SAB TAB Ectopic Multiple Live Births   1 0 1 0 3      # Outcome Date GA Lbr Nikita/2nd Weight Sex Delivery Anes PTL Lv   5 Term 12   2.835 kg (6 lb 4 oz) F CS-Unspec  N CHIQUIS      Birth Comments: hemorrhage, ruptured right ovarian vein and right oophrectomy, transfusion      Complications: Hemorrhage   4 SAB 2011     SAB      3 Ectopic 2010     ECTOPIC      2 Term 00 40w0d  3.204 kg (7 lb 1 oz) M CS-Unspec  N CHIQUIS      Birth Comments: Non-reassuring heart rate pattern secondary to cord around the neck X3, around the body once and early abruptio placenta.      Complications: Fetal Intolerance, Abruptio Placenta   1 Term 1998   3.147 kg (6 lb 15 oz) M Vag-Spont  N CHIQUIS      Obstetric Comments   2012  section with post-op hemorrhage on 2012, ruptured right ovarian vein, right oophorectomy, blood transfusion     SOCIAL HISTORY  Social History     Socioeconomic History     Marital status:      Spouse name: Not on file     Number of children: Not on file     Years of education: Not on file     Highest education level: Not on file   Occupational History     Not on file   Social Needs     Financial resource strain: Not on file     Food insecurity     Worry: Not on file     Inability: Not on file     Transportation needs     Medical: Not on file     Non-medical: Not on file   Tobacco Use     Smoking status: Current  Every Day Smoker     Packs/day: 0.40     Years: 10.00     Pack years: 4.00     Types: Cigarettes     Last attempt to quit: 2020     Years since quittin.2     Smokeless tobacco: Never Used     Tobacco comment: tobacco cessation discuseed - tried to quit: no - passive smoke exposure quitting on own    Substance and Sexual Activity     Alcohol use: No     Drug use: No     Sexual activity: Yes     Partners: Male   Lifestyle     Physical activity     Days per week: Not on file     Minutes per session: Not on file     Stress: Not on file   Relationships     Social connections     Talks on phone: Not on file     Gets together: Not on file     Attends [employer] service: Not on file     Active member of club or organization: Not on file     Attends meetings of clubs or organizations: Not on file     Relationship status: Not on file     Intimate partner violence     Fear of current or ex partner: Not on file     Emotionally abused: Not on file     Physically abused: Not on file     Forced sexual activity: Not on file   Other Topics Concern      Service Not Asked     Blood Transfusions Yes     Caffeine Concern Yes     Comment: 2 cups coffee daily     Occupational Exposure Not Asked     Hobby Hazards Not Asked     Sleep Concern Not Asked     Stress Concern Not Asked     Weight Concern Not Asked     Special Diet Not Asked     Back Care Not Asked     Exercise Yes     Comment: walking, weights, housework daily     Bike Helmet Not Asked     Seat Belt Not Asked     Self-Exams Not Asked     Parent/sibling w/ CABG, MI or angioplasty before 65F 55M? No   Social History Narrative     Not on file     FAMILY HISTORY  Family History   Problem Relation Age of Onset     Hypertension Mother      Hyperlipidemia Mother      Cardiovascular Father      Hypertension Father      Hyperlipidemia Father      Heart Failure Father      Sleep Apnea Father      Kidney failure Father      OBJECTIVE  /70   Pulse 64   Ht 1.626 m (5'  "4\")   Wt 53.1 kg (117 lb)   LMP 04/13/2021   BMI 20.08 kg/m      General:  Well-developed, well-nourished female in no apparent distress.  Neurological: Alert and oriented x3.  Lungs:  Clear to auscultation bilaterally with good inspiratory effort.  No wheezing rhonchi or rales noted. Breathing nonlabored.  Heart:  Regular rate and rhythm without murmur. No JVD.  No peripheral vascular disease.  Abdomen: Soft, nontender, nondistended, positive bowel sounds.  No organomegaly. No rebound, no guarding.  Well-healed Pfannenstiel and laparoscopic incisions.  Pelvic exam:  Normal external female genitalia without lesions or abnormalities. Normal pubic hair distribution. Urethral meatus normal in appearance and without masses. Normal Bartholin, Urethral and Kiln's glands. Vaginal mucosa is pink and moist with a small amount of physiologic discharge present in the posterior vaginal fornix. No vaginal lesions.  Well estrogenized vaginal mucosa.  No cystocele or rectocele.  Good lateral vaginal support.  Normal multiparous cervix without lesions or abnormalities. No cervical motion tenderness to palpation. The bladder and urethra are nontender to palpation. Uterus is normal size, shape, consistency, retroflexed, mobile, and nontender. Uterine descent is minimal.  No adnexal masses or tenderness bilaterally.  Rectal exam:  No external hemorrhoids noted. No rectovaginal nodularity noted. Examination confirms the vaginal exam.  Extremities:  No clubbing cyanosis or edema. Nontender bilaterally.    Chaperone: Paradise Maguire LPN    DIAGNOSTICS  4/27/2021 BMP normal, TSH 0.42, T4 0.82, T3 2.8, Prolactin 8  4/27/2021 Hbg 14.3, Hematocrit 44.1, Plt 327  5/06/2021 Pap: pending    5/07/2021 Pelvic ultrasound: MEASUREMENTS: Uterus: 9.1 x 4 x 5.1 cm (Length x Height x Width).  Endometrium: mm in thickness. Right Ovary: Surgically removed (Length x Height x Width). Left Ovary:  3 x 1.8 x 2.7 cm (Length x Height x Width). UTERUS: There is " a small cyst seen in the body of the uterus possibly a focus of adenomyosis. ADNEXA: There is an echogenic nodule in the left ovary most likely a recently ruptured follicle. Arterial and venous flow is identified in the left ovary. MISC: No free fluid is seen in the cul-de-sac.  IMPRESSION: Small focus of adenomyosis in the myometrium. Findings most consistent with a recently ruptured follicle in the left ovary.    ASSESSMENT / PLAN  Nasra Lovelace is a 41 year old  female who presents for recurrent menorrhagia and dysmenorrhea.    1 Recurrent menorrhagia  2 Dysmenorrhea  3 Vasomotor symptoms secondary to anovulation  4 Suspect possible adenomyosis  5 Tobacco abuse    - I discussed recurrent menorrhagia and dysmenorrhea with the patient. She had an endometrial ablation procedure in 2018 for menorrhagia and dysmenorrhea symptoms and now has return of her symptoms.  - Her pelvic ultrasound shows a normal-sized uterus with a thin endometrium without endometrial masses.  The right ovary surgically absent and the left ovary appears normal.  There does appear to be an area of focal adenomyosis in the myometrium.  I discussed with the patient that this possible area of adenomyosis may be contributing to her dysmenorrhea symptoms.  -A review of her pathology in 2018 shows benign proliferative endometrium prior to her endometrial ablation procedure.  -I discussed the option of repeating an endometrial biopsy to obtain endometrial tissue to evaluate for possible endometrial hyperplasia, atypia or even carcinoma.  The patient declined endometrial biopsy which I feel is reasonable given her thin endometrium on ultrasound today and prior pathology showing benign tissue.  - The patient's vasomotor symptoms have resolved on Prometrium.  I suspect these were secondary to anovulation.  -I discussed management options with the patient including observation, hormone therapy, and surgical options.  The patient has not had any  improvement in her menorrhagia or dysmenorrhea symptoms on Prometrium.  She does not want to continue with Prometrium long-term.  She declines trial of Mirena IUD.  I do not recommend a repeat endometrial ablation procedure.  I discussed the option of hysterectomy.  - I reviewed the risks, benefits, alternatives, and indication of hysterectomy with the patient.  The patient is interested in a hysterectomy.  I discussed possible options and given her 2 prior  sections and minimal uterine descent on exam, I do not feel that she is a good candidate for vaginal hysterectomy.  I recommend a laparoscopic assisted vaginal hysterectomy.  I discussed the option of abdominal hysterectomy.  I gave the patient literature to review on hysterectomy.  - The patient has had multiple abdominal surgeries in the past including  section x2, exploratory laparotomy with right oophorectomy for postoperative hemorrhage with ruptured right ovarian vein, laparoscopic appendectomy with ruptured appendix, and laparoscopic salpingectomy.  I discussed the possibility that she may have pelvic adhesive disease and that we may need to convert a laparoscopic hysterectomy into an abdominal procedure.  She verbalizes understanding and desires to proceed.  - I reviewed the [place] sterilization consent form with the patient and the fact that a hysterectomy will result in permanent sterilization and the inability to have further children.  Patient has previously had a laparoscopic salpingectomy resulting in sterilization.  The patient desires hysterectomy and has signed the [place] sterilization consent form.  Patient is aware that we will need to wait 30 days before performing the procedure.  - The patient will discuss her options with her family and contact me with her management decision.  At this point she is strongly considering proceeding forward with hysterectomy as above.  We will schedule a hysterectomy after the patient  discusses her options with her family.  - The patient will continue with Prometrium and naproxen as prescribed.  - The patient asked appropriate questions and these were answered for her.  - Bleeding precautions are reviewed with the patient.    - Problem list reviewed and updated.  - Follow up in 2 weeks.    - 30 minute visit with greater than 50% spent in counseling.    Rito Soler MD  Obstetrics and Gynecology

## 2021-05-07 NOTE — PROGRESS NOTES
CC:  Annual exam  HPI:  Nasra Lovelace is a 41 year old female P1, I NVD, 2 CD.   She has an extensive gyn history of abnormal paps with LEEP, RSO for ovarian aneurysm, left tubal occlusion, and endometrial ablation.  She also experienced a ruptured appendix. Over the past year her bleeding has continued to increased following a year of absence after her ablation.  Periods are now regular, 7 days, and 4 being very heavy with silver dollar size clots.  She is experiencing migraines beginning a couple of day prior to menses and is having hot flashes and night sweats during this time.  She was recently seen by Dr Ruiz and was offered prometrium treatment and naproxen.  She has not started this as she feels the extra hormone side effects are not helpful to her mental health.  She is currently completing the [facility] program for mental health treatment and is concerned about a backslide if she were to start on hormones. She is scheduled for a pelvic US 5/07/21 and will see Dr. Soler following.  All prior labs and imaging should be available in Care Everywhere. She is otherwise up to date with her PCP.  No other c/o.      Past GYN history:  HPV  STI testing offered?  Declined       Last PAP smear:  Normal  Mammograms up to date: yes  Last TSH: 4/27/21, normal?  Yes      Past Medical History:   Diagnosis Date     Acquired hypothyroidism 08/11/2014     Anxiety disorder 09/05/2012     Anxiety state 08/11/2014     Attention deficit hyperactivity disorder (ADHD), combined type 08/29/2019    Patient is followed by  for ongoing prescription of stimulants.  All refills should be approved by this provider, or covering partner.  Medication(s): Ritalin 10 mg.  Maximum quantity per month: 60 Clinic visit frequency required: Q 3 months   Controlled substance agreement on file: Yes      Date(s): 8.27.19 Neuropsych evaluation for ADD completed:  managed by   Last [facility]     Gastroesophageal reflux disease,  esophagitis presence not specified 2017     IMO Regulatory Load OCT 2020     Hydronephrosis 2011     Menorrhagia with irregular cycle--Endometrial Ablation---2021     Migraine with aura and without status migrainosus, not intractable 2014     Panic disorder without agoraphobia 2006     Primary insomnia 2015     Recurrent major depressive disorder (H) 2018     Secondary dysmenorrhea--PROMETRIUM, NAPROXEN---2021     Tobacco abuse 2018     Vitamin deficiency 2018       Past Surgical History:   Procedure Laterality Date      SECTION N/A 2000      SECTION N/A 2012    postop hemm with ruptured R Ov vein, transfusion     COLONOSCOPY N/A 2019    Procedure: COLONOSCOPY DIAGNOSTIC WITH RANDOM COLON BIOPSIES ANOSCOPY; Surgeon: Igor Burgos MD; Location: [facility] OR       COLPOSCOPY CERVIX, BIOPSY CERVIX, ENDOCERVICAL CURETTAGE, COMBINED N/A 2008     DILATION AND CURETTAGE, ABLATE ENDOMETRIUM NOVASURE, COMBINED N/A 2018    Novasure Endometrial ablation     ESOPHAGOSCOPY, GASTROSCOPY, DUODENOSCOPY (EGD), COMBINED  2019    Procedure: ESOPHAGOGASTRODUODENOSCOPY DIAGNOSTIC with biopsies; Surgeon: Igor Burgos MD; Location: [facility] OR       HYSTEROSCOPY DIAGNOSTIC N/A 2018    Hysteroscopy, D&C     LAPAROSCOPIC APPENDECTOMY N/A 2012     OOPHORECTOMY Right 2012    post operative hemorrhage with ruptured ovarian vein,      SALPINGECTOMY Left 2019    Procedure: LAPAROSCOPY LEFT SALPINGECTOMY; Surgeon: Regina Sweeney MD; Location: [facility] OR         Family History   Problem Relation Age of Onset     Hypertension Mother      Hyperlipidemia Mother      Cardiovascular Father      Hypertension Father      Hyperlipidemia Father      Heart Failure Father      Sleep Apnea Father      Kidney failure Father        Current Outpatient Medications   Medication Sig Dispense Refill     co-enzyme  "Q-10 100 MG CAPS capsule Take 100 mg by mouth daily       DULoxetine (CYMBALTA) 60 MG capsule TAKE 1 CAPSULE (60 MG) BY MOUTH DAILY 30 capsule 11     LORazepam (ATIVAN) 0.5 MG tablet Take 1 tablet (0.5 mg) by mouth daily as needed for anxiety 30 tablet 1     magnesium oxide (MAG-OX) 400 MG tablet TAKE 2 TABLETS BY MOUTH IN THE EVENING. 60 tablet 0     naproxen (NAPROSYN) 500 MG tablet Take 1 tablet (500 mg) by mouth 2 times daily (with meals) Take with a full meal, begin with onset menstrual symptoms 20 tablet 4     omeprazole (PRILOSEC) 40 MG DR capsule TAKE 1 CAPSULE BY MOUTH ONCE DAILY 30-60 MINUTES BEFORE A MEAL. 30 capsule 3     progesterone (PROMETRIUM) 100 MG capsule Take 1 capsule (100 mg) by mouth 2 times daily (with meals) 60 capsule 11     propranolol ER (INDERAL LA) 80 MG 24 hr capsule TAKE 1 CAPSULE (80 MG) BY MOUTH DAILY 30 capsule 1     RITALIN LA 20 MG 24 hr capsule Take 1 capsule by mouth daily 30 capsule 0     temazepam (RESTORIL) 15 MG capsule Take 1 - 2 caps evening 60 capsule 3       Allergies: Dust mite extract, Hydrocodone, and Penicillins    ROS:  CONSTITUTIONAL:NEGATIVE for fever, chills, change in weight  BREAST: NEGATIVE for masses, tenderness or discharge  : negative for, dysparunia, incontinence and vaginal discharge  ENDOCRINE: POSITIVE for hot flashes and night sweats  PSYCHIATRIC: NEGATIVE for changes in mood or affect    EXAM:  Blood pressure 100/70, pulse 79, temperature 98.4  F (36.9  C), temperature source Tympanic, height 1.626 m (5' 4\"), weight 53.1 kg (117 lb), SpO2 99 %.   BMI= Body mass index is 20.08 kg/m .  General - pleasant female in no acute distress.  Neck - supple without lymphadenopathy or thyromegaly.  Lungs - clear to auscultation bilaterally.  Heart - regular rate and rhythm without murmur.  Breast - no nodularity, asymmetry or nipple discharge bilaterally.  Abdomen - soft, nontender, nondistended, no hepatosplenomegaly.  Pelvic - EG: normal adult female, BUS: " within normal limits, Vagina: well rugated, no discharge, Cervix: no lesions or CMT, Uterus: firm, retroflexed, normal sized and nontender, Adnexae: no masses or tenderness.  Rectovaginal - deferred.  Musculoskeletal - no gross deformities.  Neurological - normal strength, sensation, and mental status.      ASSESSMENT/PLAN:  (Z12.4) Screening for cervical cancer  (primary encounter diagnosis)  Comment:   Plan: A pap thin layer screen with  HPV - recommended        age 30 - 65 years (select HPV order below), HPV        High Risk Types DNA Cervical            (Z01.419) Well woman exam with routine gynecological exam  Comment:   Plan: return annually    (N92.1) Menorrhagia with irregular cycle--Endometrial Ablation---2019  Comment:   Plan: consult visit scheduled following pelvic US      Discussed exercise and healthy eating, including calcium intake.  She should return to the clinic in one year, or sooner if problems arise.

## 2021-05-07 NOTE — NURSING NOTE
"Chief Complaint   Patient presents with     Follow Up     ultrasound       Initial /70   Pulse 64   Ht 1.626 m (5' 4\")   Wt 53.1 kg (117 lb)   BMI 20.08 kg/m   Estimated body mass index is 20.08 kg/m  as calculated from the following:    Height as of this encounter: 1.626 m (5' 4\").    Weight as of this encounter: 53.1 kg (117 lb).  Medication Reconciliation: complete  Paradise Maguire LPN    "

## 2021-05-07 NOTE — PROGRESS NOTES
Group Therapy Progress Notes     Client Initial Individualized Goals for Treatment: Will improve wellness related behaviors by reporting in group the skills you have built confidence in using to help symptoms and Will develop an aftercare / transition plan by the end of the program    Area of Treatment Focus:  Community Resources / Support and Discharge Planning    Therapeutic Interventions/Treatment Strategies:  Assist with discharge planning  Engage in safety planning when indicated  Facilitate increased self awareness  Provide education regarding ALpha-stim    Response to Treatment Strategies:  Accepted Feedback, Gave Feedback, Listened , Focused on Goals, Attentive and Accepted Support    Name of Groups:  Stress Management - Time: 11:10-12:00    Description and Therapeutic Outcome:   OT group today was focused on Alpha-stim.  Nasra presented as bright and engaged today.  Handout and education provided.  Nasra did use the Alpha-stim during group as well.  Has used it in the past but did not find it helpful then as she had a number of health issues going on at the time.  Reports more effectiveness today.  Encouraged Nasra look into purchasing her own Alpha-stim.      Frequency in HZ: 0.5  Time. 20  Current in microamperes: 5  Rating of symptoms prior to treatment (indicate symptom I.e pain, insomnia, anxiety or depression): Reports no anxiety as start.  Rating of symptoms after treatment (indicate symptom I.e pain, insomnia, anxiety or depression) : none  Skin was cleaned prior to applying electrodes and assessed prior and post treatment. No metal was in contact with the electrode probes or clips.   Irritation of the skin post treatment was noted: no    Nasra states that she feels she in a good place at this time and is feeling no anxiety at this time.  Is thankful for her relationship with boyfriend and feels their communication has improved since PHP.  Encouraged her to continue to spend time on  self care and she reports she has a strong desire to follow through with all her upcoming appointments for her MH and physical health.       Is this a Weekly Review of the Progress on the Treatment Plan?  NO    Are Treatment Plan Goals being addressed?  YES      Are Treatment Plan Strategies to Address Goals Effective?  YES      Are there any current contracts in place?  YES

## 2021-05-08 ASSESSMENT — ANXIETY QUESTIONNAIRES: GAD7 TOTAL SCORE: 7

## 2021-05-09 ENCOUNTER — E-VISIT (OUTPATIENT)
Dept: URGENT CARE | Facility: CLINIC | Age: 41
End: 2021-05-09
Payer: COMMERCIAL

## 2021-05-09 DIAGNOSIS — Z20.822 SUSPECTED COVID-19 VIRUS INFECTION: Primary | ICD-10-CM

## 2021-05-09 PROCEDURE — 99421 OL DIG E/M SVC 5-10 MIN: CPT | Performed by: PHYSICIAN ASSISTANT

## 2021-05-10 ENCOUNTER — NURSE TRIAGE (OUTPATIENT)
Dept: FAMILY MEDICINE | Facility: OTHER | Age: 41
End: 2021-05-10

## 2021-05-10 ENCOUNTER — VIRTUAL VISIT (OUTPATIENT)
Dept: PSYCHIATRY | Facility: OTHER | Age: 41
End: 2021-05-10
Attending: PSYCHIATRY & NEUROLOGY
Payer: COMMERCIAL

## 2021-05-10 ENCOUNTER — TELEPHONE (OUTPATIENT)
Dept: FAMILY MEDICINE | Facility: OTHER | Age: 41
End: 2021-05-10

## 2021-05-10 DIAGNOSIS — F41.1 GAD (GENERALIZED ANXIETY DISORDER): Primary | ICD-10-CM

## 2021-05-10 DIAGNOSIS — Z20.822 SUSPECTED COVID-19 VIRUS INFECTION: Primary | ICD-10-CM

## 2021-05-10 PROCEDURE — 99213 OFFICE O/P EST LOW 20 MIN: CPT | Mod: 95 | Performed by: PSYCHIATRY & NEUROLOGY

## 2021-05-10 ASSESSMENT — PAIN SCALES - GENERAL: PAINLEVEL: MODERATE PAIN (4)

## 2021-05-10 NOTE — TELEPHONE ENCOUNTER
"    Reason for Disposition    [1] COVID-19 infection diagnosed or suspected AND [2] mild symptoms (fever, cough) AND [3] no trouble breathing or other complications    Answer Assessment - Initial Assessment Questions  1. COVID-19 DIAGNOSIS: \"Who made your Coronavirus (COVID-19) diagnosis?\" \"Was it confirmed by a positive lab test?\" If not diagnosed by a HCP, ask \"Are there lots of cases (community spread) where you live?\" (See public health department website, if unsure)    * MAJOR community spread: high number of cases; numbers of cases are increasing; many people hospitalized.    * MINOR community spread: low number of cases; not increasing; few or no people hospitalized      no  2. ONSET: \"When did the COVID-19 symptoms start?\"       Saturday  3. WORST SYMPTOM: \"What is your worst symptom?\" (e.g., cough, fever, shortness of breath, muscle aches)      Cough fever sob and body aches  4. COUGH: \"Do you have a cough?\" If so, ask: \"How bad is the cough?\"        cough  5. FEVER: \"Do you have a fever?\" If so, ask: \"What is your temperature, how was it measured, and when did it start?\"      Nothing over 100  6. RESPIRATORY STATUS: \"Describe your breathing?\" (e.g., shortness of breath, wheezing, unable to speak)       wheezing  7. BETTER-SAME-WORSE: \"Are you getting better, staying the same or getting worse compared to yesterday?\"  If getting worse, ask, \"In what way?\"      worse  8. HIGH RISK DISEASE: \"Do you have any chronic medical problems?\" (e.g., asthma, heart or lung disease, weak immune system, etc.)      no  9. PREGNANCY: \"Is there any chance you are pregnant?\" \"When was your last menstrual period?\"      no  10. OTHER SYMPTOMS: \"Do you have any other symptoms?\"  (e.g., runny nose, headache, sore throat, loss of smell)        Runnynose, headache sore throat  Loss of smell and taste    Protocols used: CORONAVIRUS (COVID-19)  DIAGNOSED OR [id number]      "

## 2021-05-10 NOTE — PATIENT INSTRUCTIONS
Dear Nasra Lovelace,    Your symptoms show that you may have coronavirus (COVID-19). This illness can cause fever, cough and trouble breathing. Many people get a mild case and get better on their own. Some people can get very sick.    Will I be tested for COVID-19?  We would like to test you for Covid-19 virus. I have placed orders for this test.     To schedule: go to your [employer] home page and scroll down to the section that says  You have an appointment that needs to be scheduled  and click the large green button that says  Schedule Now  and follow the steps to find the next available openings.    If you are unable to complete these [employer] scheduling steps, please call [phone] to schedule your testing.     Return to work/school/ guidance:  Please let your workplace manager and staffing office know when your quarantine ends     We can t give you an exact date as it depends on the above. You can calculate this on your own or work with your manager/staffing office to calculate this. (For example if you were exposed on 10/4, you would have to quarantine for 14 full days. That would be through 10/18. You could return on 10/19.)      If you receive a positive COVID-19 test result, follow the guidance of the those who are giving you the results. Usually the return to work is 10 (or in some cases 20 days from symptom onset.) If you work at [facility], you must also be cleared by Employee Occupational Health and Safety to return to work.        If you receive a negative COVID-19 test result and did not have a high risk exposure to someone with a known positive COVID-19 test, you can return to work once you're free of fever for 24 hours without fever-reducing medication and your symptoms are improving or resolved.      If you receive a negative COVID-19 test and If you had a high risk exposure to someone who has tested positive for COVID-19 then you can return to work 14 days after your last contact  with the positive individual    Note: If you have ongoing exposure to the covid positive person, this quarantine period may be more than 14 days. (For example, if you are continued to be exposed to your child who tested positive and cannot isolate from them, then the quarantine of 7-14 days can't start until your child is no longer contagious. This is typically 10 days from onset of the child's symptoms. So the total duration may be 17-24 days in this case.)    Sign up for [employer].   We know it's scary to hear that you might have COVID-19. We want to track your symptoms to make sure you're okay over the next 2 weeks. Please look for an email from [employer]--this is a free, online program that we'll use to keep in touch. To sign up, follow the link in the email you will receive. Learn more at [email or web address]    How can I take care of myself?    Get lots of rest. Drink extra fluids (unless a doctor has told you not to)    Take Tylenol (acetaminophen) or ibuprofen for fever or pain. If you have liver or kidney problems, ask your family doctor if it's okay to take Tylenol o ibuprofen    If you have other health problems (like cancer, heart failure, an organ transplant or severe kidney disease): Call your specialty clinic if you don't feel better in the next 2 days.    Know when to call 911. Emergency warning signs include:  o Trouble breathing or shortness of breath  o Pain or pressure in the chest that doesn't go away  o Feeling confused like you haven't felt before, or not being able to wake up  o Bluish-colored lips or face    Where can I get more information?  [facility] - About COVID-19:   [email or web address]    CDC - What to Do If You're Sick:   www.cdc.gov/coronavirus/2019-ncov/about/steps-when-sick.html    May 9, 2021  RE:  Nasra Lovelace                                                                                                                  [address]  [address]   [place] [address]      To whom it may concern:    I evaluated Nasra Lovelace on May 9, 2021. Nasra Lovelace should be excused from work/school.     They should let their workplace manager and staffing office know when their quarantine ends.    We can not give an exact date as it depends on the information below. They can calculate this on their own or work with their manager/staffing office to calculate this. (For example if they were exposed on 10/04, they would have to quarantine for 14 full days. That would be through 10/18. They could return on 10/19.)    Quarantine Guidelines:      If patient receives a positive COVID-19 test result, they should follow the guidance of those who are giving the results. Usually the return to work is 10 (or in some cases 20 days from symptom onset.) If they work at [employer], they must be cleared by Employee Occupational Health and Safety to return to work.        If patient receives a negative COVID-19 test result and did not have a high risk exposure to someone with a known positive COVID-19 test, they can return to work once they're free of fever for 24 hours without fever-reducing medication and their symptoms are improving or resolved.      If patient receives a negative COVID-19 test and if they had a high risk exposure to someone who has tested positive for COVID-19 then they can return to work 14 days after their last contact with the positive individual    Note: If there is ongoing exposure to the covid positive person, this quarantine period may be longer than 14 days. (For example, if they are continually exposed to their child, who tested positive and cannot isolate from them, then the quarantine of 7-14 days can't start until their child is no longer contagious. This is typically 10 days from onset to the child's symptoms. So the total duration may be 17-24 days in this case.)     Sincerely,  Milly Thomas PA-C

## 2021-05-10 NOTE — PROGRESS NOTES
Group Therapy Progress Notes     Client Initial Individualized Goals for Treatment: Will learn and practice 1-2 coping skills to help improve symptoms, report daily in group and start to build confidence in using your skills and Will Improve Mindfulness / Stay in the Here and Now Intentionally bringing your attention to something beautiful, pleasant, or interesting that is occurring or is present in your immediate environment or experience on a regular basis.    Area of Treatment Focus:  Symptom Stabilization and Management    Therapeutic Interventions/Treatment Strategies:  Assist with discharge planning  Engage in safety planning when indicated  Facilitate increased self awareness  Provide education regarding Mindfulness and grounding techniques   Teach adaptive coping skills and communication skills    Response to Treatment Strategies:  Accepted Feedback, Gave Feedback, Listened , Focused on Goals, Attentive and Accepted Support    Name of Groups:  Psychtherapy Wrap Up - Time: 2:00-3:00    Description and Therapeutic Outcome:   In psychotherapy wrap up group, Ayesha reviewed her take away from today. She share that she liked the topic in OT today regarding stress and time management and admits that this is something she wants to continue to work to improve.  Ayesha discusses that she has been very focused on decreasing perfectionistic behaviors and that she has learned skills to help her with this.  She states that she plans to organize a daily calendar that is realistic to what she can accomplish in a day and is intentional and purposeful.  Ayesha states that she plans to practice One Thing and Little Wins as skills this weekend.  She does not identify any safety concerns for the weekend.  Ayesha is appropriate for PHP.    Is this a Weekly Review of the Progress on the Treatment Plan?  YES    Are Treatment Plan Goals being addressed?  YES      Are Treatment Plan Strategies to Address Goals Effective?  YES      Are  there any current contracts in place?  YES

## 2021-05-10 NOTE — PROGRESS NOTES
"Group Therapy Progress Notes     Client Initial Individualized Goals for Treatment:  Will learn and practice 1-2 coping skills to help improve symptoms, report daily in group and start to build confidence in using your skills and Will Improve Mindfulness / Stay in the Here and Now Intentionally bringing your attention to something beautiful, pleasant, or interesting that is occurring or is present in your immediate environment or experience on a regular basis.    Area of Treatment Focus:  Develop / Improve Independent Living / Socialization Skills and Cultural/ Racial / Health / Spiritual    Therapeutic Interventions/Treatment Strategies:  Facilitate increased self awareness  Provide education regarding learning style    Response to Treatment Strategies:  Accepted Feedback, Gave Feedback, Listened , Focused on Goals and Attentive    Name of Groups:  Coping skills group 12:30-1:30p    Description and Therapeutic Outcome:   In skills group, Ayesha was talkative and engaged. Ayesha identified that she tends to become very self critical and shares about how she has become perfectionistic throughout the years.  Ayesha describes that she has been a \"people pleaser\" most of her life and that this has impacted her to where it becomes intrusive in her life. Ayesha finds that she has set unrealistic expectations for herself regarding cleaning and taking care of others and she will become upset with herself when she does not meet these standards.  She identifies self defeating thoughts which trigger intense anxiety and depression of \"not being good enough\".  Ayesha shares that this is a default thought process and shares that she has been using \"drop the rope\", 3C's, and Little Wins as skills that help her interrupt this thoughts.  Ayesha was very supportive of her other group members and gave good feedback to them. She is appropriate for PHP.    Is this a Weekly Review of the Progress on the Treatment Plan?  YES    Are Treatment " Plan Goals being addressed?  YES      Are Treatment Plan Strategies to Address Goals Effective?  YES      Are there any current contracts in place?  YES

## 2021-05-10 NOTE — PROGRESS NOTES
"Group Therapy Progress Notes     Client Initial Individualized Goals for Treatment: Will learn and practice 1-2 coping skills to help improve symptoms, report daily in group and start to build confidence in using your skills and Will Improve Mindfulness / Stay in the Here and Now Intentionally bringing your attention to something beautiful, pleasant, or interesting that is occurring or is present in your immediate environment or experience on a regular basis.    Area of Treatment Focus:  Symptom Stabilization and Management    Therapeutic Interventions/Treatment Strategies:  Assist with discharge planning  Engage in safety planning when indicated  Facilitate increased self awareness  Provide education regarding Mindfulness and grounding techniques   Teach adaptive coping skills and communication skills    Response to Treatment Strategies:  Accepted Feedback, Gave Feedback, Listened , Focused on Goals, Attentive and Accepted Support    Name of Groups:  Psychotherapy group 9-9:50    Description and Therapeutic Outcome:   In psychotherapy group, Ayesha reviews her evening and states that she was able to do some grounding techniques last night. She admits she did some extra running for her dad and called her mom and sister for support. She shares that she could have chosen drinking but instead chose to reach out to her family for support. Ayesha adds that she did follow through on her plan to \"be ok\" while her significant other was working nights and stayed in her room.  Ayesha states that she took a sleeping pill and watched her show to relax and was intentional about but staying in her room.   Still did not sleep well but did not feel afraid. She remains appropriate for PHP.      Is this a Weekly Review of the Progress on the Treatment Plan?  YES    Are Treatment Plan Goals being addressed?  YES      Are Treatment Plan Strategies to Address Goals Effective?  YES      Are there any current contracts in place?  YES       "

## 2021-05-10 NOTE — NURSING NOTE
"Chief Complaint   Patient presents with     RECHECK     Telephone visit.  Medication review.  Finished up PHP.  Possible increase in Cymbalta.  anxiety is high, crying everyday. Patient feels she could use something for depression.       Initial LMP 04/13/2021  Estimated body mass index is 20.08 kg/m  as calculated from the following:    Height as of 5/7/21: 1.626 m (5' 4\").    Weight as of 5/7/21: 53.1 kg (117 lb).  Medication Reconciliation: complete  JADA CASTRO LPN    "

## 2021-05-10 NOTE — PROGRESS NOTES
"Group Therapy Progress Notes     Client Initial Individualized Goals for Treatment: Will learn and practice 1-2 coping skills to help improve symptoms, report daily in group and start to build confidence in using your skills and Will Improve Mindfulness / Stay in the Here and Now Intentionally bringing your attention to something beautiful, pleasant, or interesting that is occurring or is present in your immediate environment or experience on a regular basis.    Area of Treatment Focus:  Symptom Stabilization and Management    Therapeutic Interventions/Treatment Strategies:  Assist with discharge planning  Engage in safety planning when indicated  Facilitate increased self awareness  Provide education regarding Mindfulness and grounding techniques   Teach adaptive coping skills and communication skills    Response to Treatment Strategies:  Accepted Feedback, Gave Feedback, Listened , Focused on Goals, Attentive and Accepted Support    Name of Groups:  Psychotherapy group 9-9:50    Description and Therapeutic Outcome:   In psychotherapy group, Abhay reviews her evening, she shares that she had a good night and spent time with her daughter and family. Abhay states that she practiced skills last night including GAP, drop the rope, \"I'm worth it\", and present moment.  She states that she has noticed that her focus and moods have felt more regulated since she started the progesterone this week and his hopeful that it is going to continue to improve.  Abhay describes that she did sleep a little better last night and that she had her daughter stay home with her instead of staying at her dads so that Abhay wasn't alone.  She is very positive and engaged this morning, she continues to be appropriate for PHP.     Is this a Weekly Review of the Progress on the Treatment Plan?  YES    Are Treatment Plan Goals being addressed?  YES    Are Treatment Plan Strategies to Address Goals Effective?  YES      Are there any current " contracts in place?  YES
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
## 2021-05-11 ENCOUNTER — OFFICE VISIT (OUTPATIENT)
Dept: FAMILY MEDICINE | Facility: OTHER | Age: 41
End: 2021-05-11
Attending: NURSE PRACTITIONER
Payer: COMMERCIAL

## 2021-05-11 ENCOUNTER — TELEPHONE (OUTPATIENT)
Dept: OBGYN | Facility: OTHER | Age: 41
End: 2021-05-11

## 2021-05-11 DIAGNOSIS — F41.1 GAD (GENERALIZED ANXIETY DISORDER): ICD-10-CM

## 2021-05-11 DIAGNOSIS — Z20.822 SUSPECTED COVID-19 VIRUS INFECTION: ICD-10-CM

## 2021-05-11 LAB
COPATH REPORT: NORMAL
PAP: NORMAL
SARS-COV-2 RNA RESP QL NAA+PROBE: NORMAL
SPECIMEN SOURCE: NORMAL

## 2021-05-11 PROCEDURE — U0005 INFEC AGEN DETEC AMPLI PROBE: HCPCS | Mod: ZL | Performed by: NURSE PRACTITIONER

## 2021-05-11 PROCEDURE — U0003 INFECTIOUS AGENT DETECTION BY NUCLEIC ACID (DNA OR RNA); SEVERE ACUTE RESPIRATORY SYNDROME CORONAVIRUS 2 (SARS-COV-2) (CORONAVIRUS DISEASE [COVID-19]), AMPLIFIED PROBE TECHNIQUE, MAKING USE OF HIGH THROUGHPUT TECHNOLOGIES AS DESCRIBED BY CMS-2020-01-R: HCPCS | Mod: ZL | Performed by: NURSE PRACTITIONER

## 2021-05-11 RX ORDER — LORAZEPAM 0.5 MG/1
0.5 TABLET ORAL DAILY PRN
Qty: 30 TABLET | Refills: 1 | Status: SHIPPED | OUTPATIENT
Start: 2021-05-11 | End: 2021-07-06

## 2021-05-11 NOTE — LETTER
May 12, 2021      Nasra Lovelace  [address]   [place] [address]          COVID-19 Virus PCR to [facility] - Result (no units)   Date Value   05/11/2021     Test received-See reflex to IDDL test SARS CoV2 (COVID-19) Virus RT-PCR       SARS-CoV-2 PCR Result (no units)   Date Value   05/11/2021 POSITIVE (AA)       This letter provides a written record that you were tested for COVID-19. Your result was positive. This means you have COVID-19 (coronavirus).    How can I protect others?If you have symptoms, stay home and away from others (self-isolate) until:You ve had no fever--and no medicine that reduces fever--for 1 full day (24 hours). And      Your other symptoms have gotten better. For example, your cough or breathing has improved. And     At least 10 days have passed since your symptoms started. (If you've been told by a doctor that you have a weak immune system, wait 20 days).    If you don t have symptoms: Stay home and away from others (self-isolate) until at least 10 days have passed since your first positive COVID-19 test. If you have a weak immune system, please self-isolate for 20 days.    During this time:    Stay in your own room, including for meals. Use your own bathroom if you can.    Stay away from others in your home. No hugging, kissing or shaking hands. No visitors.     Don t go to work, school or anywhere else.     Clean  high touch  surfaces often (doorknobs, counters, handles, etc.). Use a household cleaning spray or wipes. You ll find a full list on the EPA website at www.epa.gov/pesticide-registration/list-n-disinfectants-use-against-sars-cov-2.     Cover your mouth and nose with a mask or other face covering to avoid spreading germs.    Wash your hands and face often with soap and water.    Make a list of people you have been in close contact with recently, even if either of you wore a face covering.  o Start your list from 2 days before you became ill or had a positive  test.  o Include anyone that was within 6 feet of you for a cumulative total of 15 minutes or more in 24 hours. (Example: if you sat next to Rito for 5 minutes in the morning and 10 minutes in the afternoon, then you were in close contact for 15 minutes total that day. Rito would be added to your list.)        A public health worker will call or text you. It is important that you answer. They will ask you questions about possible exposures to COVID-19, such as people you have been in direct contact with and places you have visited.    Tell the people on your list that you have COVID-19; they should stay away from others for 14 days starting form the last time they were in contact with you (unless you are told something different from a public health worker).    Caregivers in these groups are at risk for severe illness due to COVID-19:  o People 65 years and older  o People who live in a nursing home or long-term care facility  o People with chronic disease (lung, heart, cancer, diabetes, kidney, liver, immunologic)  o People who have a weakened immune system, including those who:  - Are in cancer treatment  - Take medicine that weakens the immune system, such as corticosteroids  - Had a bone marrow or organ transplant  - Have an immune deficiency  - Have poorly controlled HIV or AIDS  - Are obese (body mass index of 40 or higher)  - Smoke regularly    Caregivers should wear gloves while washing dishes, handling laundry and cleaning bedrooms and bathrooms.    Wash and dry laundry with special caution. Don t shake dirty laundry, and use the warmest water setting you can.    If you have a weakened immune system, ask your doctor about other actions you should take.    For more tips, go to www.cdc.gov/coronavirus/2019-ncov/downloads/10Things.pdf.    You should not go back to work until you meet the guidelines above for ending your home isolation. You don't need to be retested for COVID-19 before going back to work-  studies show that you won't spread the virus if it's been at least 10 days since your symptoms started (or 20 days, if you have a weak immune system).    Employers: This document serves as formal notice of your employee s medical guidelines for going back to work. They must meet the above guidelines before going back to work in person.    How can I take care of myself?    1. Get lots of rest. Drink extra fluids (unless a doctor has told you not to).    2. Take Tylenol (acetaminophen) for fever or pain. If you have liver or kidney problems, ask your family doctor if it s okay to take Tylenol.     Take either:     650 mg (two 325 mg pills) every 4 to 6 hours, or     1,000 mg (two 500 mg pills) every 8 hours as needed.     Note: Don t take more than 3,000 mg in one day. Acetaminophen is found in many medicines (both prescribed and over-the-counter medicines). Read all labels to be sure you don t take too much.    For children, check the Tylenol bottle for the right dose (based on their age or weight).    3. If you have other health problems (like cancer, heart failure, an organ transplant or severe kidney disease): Call your specialty clinic if you don t feel better in the next 2 days.    4. Know when to call 911: Emergency warning signs include:    Trouble breathing or shortness of breath    Pain or pressure in the chest that doesn t go away    Feeling confused like you haven t felt before, or not being able to wake up    Bluish-colored lips or face    5. Sign up for [employer]. We know it s scary to hear that you have COVID-19. We want to track your symptoms to make sure you re okay over the next 2 weeks. Please look for an email from [employer]--this is a free, online program that we ll use to keep in touch. To sign up, follow the link in the email. Learn more at [email or web address].      Where can I get more information?    [facility] [place]: [email or web address]    Coronavirus Basics:  www.health.[facility].[place]./diseases/coronavirus/basics.html    What to Do If You re Sick: www.cdc.gov/coronavirus/2019-ncov/about/steps-when-sick.html    Ending Home Isolation: www.cdc.gov/coronavirus/2019-ncov/hcp/disposition-in-home-patients.html     Caring for Someone with COVID-19: www.cdc.gov/coronavirus/2019-ncov/if-you-are-sick/care-for-someone.html     [facility] clinical trials (COVID-19 research studies): [email or web address]-clinical-trials

## 2021-05-11 NOTE — TELEPHONE ENCOUNTER
Ativan  Last Written Prescription Date: 4/12/21  Last Fill Quantity: 30 # of Refills: 1  Last Office Visit: 5/10/21

## 2021-05-11 NOTE — TELEPHONE ENCOUNTER
Pt is calling to set up her hysterectomy. Is there a day that you are thinking since is has to be 30 days from the sterilization form signing? Looks like the first Friday would be 6/11/2021.  Paradise Maguire LPN

## 2021-05-12 ENCOUNTER — TELEPHONE (OUTPATIENT)
Dept: FAMILY MEDICINE | Facility: OTHER | Age: 41
End: 2021-05-12

## 2021-05-12 ENCOUNTER — PREP FOR PROCEDURE (OUTPATIENT)
Dept: OBGYN | Facility: OTHER | Age: 41
End: 2021-05-12

## 2021-05-12 DIAGNOSIS — N92.0 MENORRHAGIA WITH REGULAR CYCLE: ICD-10-CM

## 2021-05-12 DIAGNOSIS — N92.0 MENORRHAGIA: Primary | ICD-10-CM

## 2021-05-12 DIAGNOSIS — N94.6 DYSMENORRHEA: ICD-10-CM

## 2021-05-12 LAB
LABORATORY COMMENT REPORT: ABNORMAL
SARS-COV-2 RNA RESP QL NAA+PROBE: POSITIVE
SPECIMEN SOURCE: ABNORMAL

## 2021-05-12 NOTE — PROGRESS NOTES
"Group Therapy Progress Notes     Client Initial Individualized Goals for Treatment: Will improve wellness related behaviors by reporting in group the skills you have built confidence in using to help symptoms and Will develop an aftercare / transition plan by the end of the program    Area of Treatment Focus:  Community Resources / Support and Discharge Planning    Therapeutic Interventions/Treatment Strategies:  Assist with discharge planning  Engage in safety planning when indicated  Facilitate increased self awareness  Provide education regarding community based services  Teach adaptive coping skills and communication skills    Response to Treatment Strategies:  Accepted Feedback, Gave Feedback, Listened , Focused on Goals, Attentive and Accepted Support    Name of Groups: psychotherapy wrap up group - 1:30-2:30    Description and Therapeutic Outcome:   In psychotherapy wrap up group, Ayesha reviewed her take aways from the program - went through things she found most helpful, where she sees the most progress in herself - her \"little wins\", and where she still sees she needs some support.  She will follow up with [facility] - will see a therapist through that program and will follow up with her medical appointments - she knows she needs to get her medical issue taken care of to feel right also.  See discharge summary for more details - Ayesha will graduate from the program today.    Is this a Weekly Review of the Progress on the Treatment Plan?  YES    Are Treatment Plan Goals being addressed?  YES      Are Treatment Plan Strategies to Address Goals Effective?  YES      Are there any current contracts in place?  YES             "

## 2021-05-12 NOTE — RESULT ENCOUNTER NOTE
COVID positive lab result      Self quarantine, avoid contact with family members, stay within your home  Aspirin 81 mg daily unless this is contraindicated for you for a healthcare reason  OTC Zinc per package instruction  Vitamin D3 5000 U daily for 2 weeks  Multiple Vitamin Daily  Insure adequate fluid intake  Get plenty of rest  Monitor often for temp at home, treat with OTC Tylenol, do not take Ibuprofen   Humidity at home   To UC or ER with persistent, worsening, or concerning symptoms  Please reach out to us if you have any questions or concerns regarding your care

## 2021-05-12 NOTE — PROGRESS NOTES
Group Therapy Progress Notes     Client Initial Individualized Goals for Treatment: Will improve wellness related behaviors by reporting in group the skills you have built confidence in using to help symptoms and Will develop an aftercare / transition plan by the end of the program    Area of Treatment Focus:  Community Resources / Support and Discharge Planning    Therapeutic Interventions/Treatment Strategies:  Assist with discharge planning  Engage in safety planning when indicated  Facilitate increased self awareness  Provide education regarding community based services  Teach adaptive coping skills and communication skills    Response to Treatment Strategies:  Accepted Feedback, Gave Feedback, Listened , Focused on Goals, Attentive and Accepted Support    Name of Groups:  Mindfulness group - 12:30-1:30    Description and Therapeutic Outcome:   In Mindfulness group, reviewed Grounding techniques - Ayesha reviewed the list and outlined the ones she uses the most - 5 to 1 - reviewed variations also; describing driving activities;  imagining TV remote in your head and changing the channel - to 3 Good things for racing thoughts; Safety statement - My name is ..., uses this in the car and at home; uses humor sometimes; washing hands or face in cold water; sometimes using texture with her hands or pressing her fingers together; stretching - pilates; being mindful with her eating - is going to stick with the goal of having dinner every Sunday at her table and having people join her - no tv or phones; focus on breathing - this is getting easier - is using the finger breathing; thinking about people she cares about; listening to music; wants to start journaling but will type not write; being future oriented and following through; aroma therapy and weighted items.  Ayesha was able to talk through each of these and discuss how she has practiced the skill and how it has worked for her.  Good group for Ayesha.       Is this a  Weekly Review of the Progress on the Treatment Plan?  YES    Are Treatment Plan Goals being addressed?  YES      Are Treatment Plan Strategies to Address Goals Effective?  YES      Are there any current contracts in place?  YES

## 2021-05-12 NOTE — PROGRESS NOTES
"Group Therapy Progress Notes     Client Initial Individualized Goals for Treatment: Will improve wellness related behaviors by reporting in group the skills you have built confidence in using to help symptoms and Will develop an aftercare / transition plan by the end of the program    Area of Treatment Focus:  Community Resources / Support and Discharge Planning    Therapeutic Interventions/Treatment Strategies:  Assist with discharge planning  Engage in safety planning when indicated  Facilitate increased self awareness  Provide education regarding community based services  Teach adaptive coping skills and communication skills    Response to Treatment Strategies:  Accepted Feedback, Gave Feedback, Listened , Focused on Goals, Attentive and Accepted Support    Name of Groups:  Psychotherapy group 10-11    Description and Therapeutic Outcome:   Ayesha was late this morning due to having multiple medical appointments, however due to scheduling accommodations, we moved psychotherapy group to this slot today.  She reviewed having a long talk with Dameon about group and things that she is learning - also let him know finally the internal struggle she is having with not feeling \"worthy\" - states it was a hard conversation but ended up being good - not totally sure if he understands everything but she was able to start sharing some things with him.  Ayesha was engaging and open as she shared her struggling in being vulnerable in her relationship.       Is this a Weekly Review of the Progress on the Treatment Plan?  YES    Are Treatment Plan Goals being addressed?  YES      Are Treatment Plan Strategies to Address Goals Effective?  YES      Are there any current contracts in place?  YES             "

## 2021-05-12 NOTE — TELEPHONE ENCOUNTER
"-Coronavirus (COVID-19) Notification    Caller Name (Patient, parent, daughter/son, grandparent, etc)  Patient     Reason for call  Notify of Positive Coronavirus (COVID-19) lab results, assess symptoms,  review  [facility] recommendations    Lab Result    Lab test:  2019-nCoV rRt-PCR or SARS-CoV-2 PCR    Oropharyngeal AND/OR nasopharyngeal swabs is POSITIVE for 2019-nCoV RNA/SARS-COV-2 PCR (COVID-19 virus)    RN Recommendations/Instructions per [facility] Coronavirus COVID-19 recommendations    Brief introduction script  Introduce self then review script:  \"I am calling on behalf of [facility] [employer].  We were notified that your Coronavirus test (COVID-19) for was POSITIVE for the virus.  I have some information to relay to you but first I wanted to mention that the [place] Dept of Health will be contacting you shortly [it's possible MD already called Patient] to talk to you more about how you are feeling and other people you have had contact with who might now also have the virus.  Also,  [facility] is Partnering with the [facility] for Covid-19 research, you may be contacted directly by research staff.\"    Assessment (Inquire about Patient's current symptoms)   Assessment   Current Symptoms at time of phone call: (if no symptoms, document No symptoms] Loss of taste and smell, fever, vomiting and hot flash   Symptoms onset (if applicable) 4 days ago     If at time of call, Patients symptoms hare worsened, the Patient should contact 911 or have someone drive them to Emergency Dept promptly:      If Patient calling 911, inform 911 personal that you have tested positive for the Coronavirus (COVID-19).  Place mask on and await 911 to arrive.    If Emergency Dept, If possible, please have another adult drive you to the Emergency Dept but you need to wear mask when in contact with other people.      Monoclonal Antibody Administration    You may be eligible to receive a new treatment with a monoclonal " "antibody for preventing hospitalization in patients at high risk for complications from COVID-19.   This medication is still experimental and available on a limited basis; it is given through an IV and must be given at an infusion center. Please note that not all people who are eligible will receive the medication since it is in limited supply.     Are you interested in being considered for this medication?  No.   Does the patient fit the criteria: No    If patient qualifies based on above criteria:  \"You will be contacted if you are selected to receive this treatment in the next 1-2 business days.   This is time sensitive and if you are not selected in the next 1-2 business days, you will not receive the medication.  If you do not receive a call to schedule, you have not been selected.\"      Review information with Patient    Your result was positive. This means you have COVID-19 (coronavirus).  We have sent you a letter that reviews the information that I'll be reviewing with you now.    How can I protect others?    If you have symptoms: stay home and away from others (self-isolate) until:    You've had no fever--and no medicine that reduces fever--for 1 full day (24 hours). And       Your other symptoms have gotten better. For example, your cough or breathing has improved. And     At least 10 days have passed since your symptoms started. (If you've been told by a doctor that you have a weak immune system, wait 20 days.)     If you don't have symptoms: Stay home and away from others (self-isolate) until at least 10 days have passed since your first positive COVID-19 test. (Date test collected)    During this time:    Stay in your own room, including for meals. Use your own bathroom if you can.    Stay away from others in your home. No hugging, kissing or shaking hands. No visitors.     Don't go to work, school or anywhere else.     Clean  high touch  surfaces often (doorknobs, counters, handles, etc.). Use a " household cleaning spray or wipes. You'll find a full list on the EPA website at www.epa.gov/pesticide-registration/list-n-disinfectants-use-against-sars-cov-2.     Cover your mouth and nose with a mask, tissue or other face covering to avoid spreading germs.    Wash your hands and face often with soap and water.    Make a list of people you have been in close contact with recently, even if either of you wore a face covering.   ; Start your list from 2 days before you became ill or had a positive test.  ; Include anyone that was within 6 feet of you for a cumulative total of 15 minutes or more in 24 hours. (Example: if you sat next to Rito for 5 minutes in the morning and 10 minutes in the afternoon, then you were in close contact for 15 minutes total that day. Rito would be added to your list.)    A public health worker will call or text you. It is important that you answer. They will ask you questions about possible exposures to COVID-19, such as people you have been in direct contact with and places you have visited.    Tell the people on your list that you have COVID-19; they should stay away from others for 14 days starting from the last time they were in contact with you (unless you are told something different from a public health worker).     Caregivers in these groups are at risk for severe illness due to COVID-19:  o People 65 years and older  o People who live in a nursing home or long-term care facility  o People with chronic disease (lung, heart, cancer, diabetes, kidney, liver, immunologic)  o People who have a weakened immune system, including those who:  - Are in cancer treatment  - Take medicine that weakens the immune system, such as corticosteroids  - Had a bone marrow or organ transplant  - Have an immune deficiency  - Have poorly controlled HIV or AIDS  - Are obese (body mass index of 40 or higher)  - Smoke regularly    Caregivers should wear gloves while washing dishes, handling laundry and  cleaning bedrooms and bathrooms.    Wash and dry laundry with special caution. Don't shake dirty laundry, and use the warmest water setting you can.    If you have a weakened immune system, ask your doctor about other actions you should take.    For more tips, go to www.cdc.gov/coronavirus/2019-ncov/downloads/10Things.pdf.    You should not go back to work until you meet the guidelines above for ending your home isolation. You don't need to be retested for COVID-19 before going back to work--studies show that you won't spread the virus if it's been at least 10 days since your symptoms started (or 20 days, if you have a weak immune system).    Employers: This document serves as formal notice of your employee's medical guidelines for going back to work. They must meet the above guidelines before going back to work in person.    How can I take care of myself?    1. Get lots of rest. Drink extra fluids (unless a doctor has told you not to).    2. Take Tylenol (acetaminophen) for fever or pain. If you have liver or kidney problems, ask your family doctor if it's okay to take Tylenol.     Take either:     650 mg (two 325 mg pills) every 4 to 6 hours, or     1,000 mg (two 500 mg pills) every 8 hours as needed.     Note: Don't take more than 3,000 mg in one day. Acetaminophen is found in many medicines (both prescribed and over-the-counter medicines). Read all labels to be sure you don't take too much.    For children, check the Tylenol bottle for the right dose (based on their age or weight).    3. If you have other health problems (like cancer, heart failure, an organ transplant or severe kidney disease): Call your specialty clinic if you don't feel better in the next 2 days.    4. Know when to call 911: Emergency warning signs include:    Trouble breathing or shortness of breath    Pain or pressure in the chest that doesn't go away    Feeling confused like you haven't felt before, or not being able to wake  up    Bluish-colored lips or face    5. Sign up for [facility]. We know it's scary to hear that you have COVID-19. We want to track your symptoms to make sure you're okay over the next 2 weeks. Please look for an email from [employer]--this is a free, online program that we'll use to keep in touch. To sign up, follow the link in the email. Learn more at [email or web address].    Where can I get more information?    [facility]: [email or web address]    Coronavirus Basics: www.health.[facility].[place]./diseases/coronavirus/basics.html    What to Do If You're Sick: www.cdc.gov/coronavirus/2019-ncov/about/steps-when-sick.html    Ending Home Isolation: www.cdc.gov/coronavirus/2019-ncov/hcp/disposition-in-home-patients.html     Caring for Someone with COVID-19: www.cdc.gov/coronavirus/2019-ncov/if-you-are-sick/care-for-someone.html     [facility] clinical trials (COVID-19 research studies): [email or web address]-[email or web address]-[email or web address]     A Positive COVID-19 letter will be sent via [employer] or the mail. (Exception, no letters sent to Presurgerical/Preprocedure Patients)    Nadeen Purcell LPN

## 2021-05-13 ENCOUNTER — PREP FOR PROCEDURE (OUTPATIENT)
Dept: OBGYN | Facility: OTHER | Age: 41
End: 2021-05-13

## 2021-05-13 DIAGNOSIS — N94.6 DYSMENORRHEA: ICD-10-CM

## 2021-05-13 DIAGNOSIS — N92.0 MENORRHAGIA: Primary | ICD-10-CM

## 2021-05-13 LAB
FINAL DIAGNOSIS: NORMAL
HPV HR 12 DNA CVX QL NAA+PROBE: NEGATIVE
HPV16 DNA SPEC QL NAA+PROBE: NEGATIVE
HPV18 DNA SPEC QL NAA+PROBE: NEGATIVE
SPECIMEN DESCRIPTION: NORMAL
SPECIMEN SOURCE CVX/VAG CYTO: NORMAL

## 2021-05-17 ENCOUNTER — OFFICE VISIT (OUTPATIENT)
Dept: BEHAVIORAL HEALTH | Facility: OTHER | Age: 41
End: 2021-05-17
Attending: SOCIAL WORKER
Payer: COMMERCIAL

## 2021-05-17 DIAGNOSIS — R69 DIAGNOSIS DEFERRED: Primary | ICD-10-CM

## 2021-05-17 NOTE — PROGRESS NOTES
GERMAN NATHAN attempted to call patient, but was unsuccessful. GERMAN NATHAN left message for patient to call and reschedule.     The author of this note documented a reason for not sharing it with the patient.        MARITZA Lee  Social Work Care Coordinator  [facility]   [phone] opt 1

## 2021-05-24 ENCOUNTER — TELEPHONE (OUTPATIENT)
Dept: BEHAVIORAL HEALTH | Facility: OTHER | Age: 41
End: 2021-05-24

## 2021-05-24 NOTE — TELEPHONE ENCOUNTER
GERMAN CC left message to offer to reschedule missed appointment on 5/17/21, will wait for patient to return message.        MARITZA Lee  Social Work Care Coordinator  [facility]   [phone] opt 1

## 2021-05-26 NOTE — H&P (VIEW-ONLY)
[facility]  [address]  [facility] [address]  Phone: [phone]  Primary Provider: Adrienne Feliciano  Pre-op Performing Provider: ADRIENNE FELICIANO      PREOPERATIVE EVALUATION:  Today's date: 2021    Nasra Lovelace is a 41 year old female who presents for a preoperative evaluation.    Surgical Information:  Surgery/Procedure: laparoscopic supracervical hysterectomy  Surgery Location: [facility]  Surgeon: Dr. Soler - OB GYN  Surgery Date:   Time of Surgery: TBD  Where patient plans to recover: At home with family  Fax number for surgical facility: Note does not need to be faxed, will be available electronically in Epic.    Type of Anesthesia Anticipated: to be determined        Subjective     HPI related to upcoming procedure:   Recurrent Menorrhagia after prior endometrial placement procedures      Notes from 21 OB GYN visit are attached below         HISTORY OF PRESENT ILLNESS  Nasra Lovelace is a 41 year old  with Patient's last menstrual period was 2021. who presents for evaluation of recurrent menorrhagia.  The patient had an endometrial ablation procedure in 2018 for menorrhagia and dysmenorrhea symptoms.  Her symptoms resolved after the procedure and she became amenorrheic.  Now for the last year she has return of menstrual bleeding and dysmenorrhea.  Over the last several months her menstrual cycles have become heavier, now to the point of menorrhagia with heavy blood flow and dark clots.  She now has monthly menses the last for 4 to 7 days with heavy blood flow for the first 4 days.  She also reports return of strong dysmenorrhea symptoms similar to before the ablation.  Over the last couple months the patient also developed migraine headaches that began a couple days prior to her menses as well as vasomotor symptoms including hot flashes and night sweats and worsening anxiety. The patient started Prometrium and naproxen last month and now has  resolution of her vasomotor symptoms but denies improvement in her dysmenorrhea or bleeding symptoms.  The patient does not want to continue with Prometrium long-term as she feels the extra hormone side effects will not help her mental health. The patient has previously had bilateral salpingectomy and she is interested in definitive surgical therapy of hysterectomy.     The patient is in active psychiatric therapy and is doing well.  She reports that she is stable on her medications for ADHD, depression, anxiety, panic disorder, and insomnia.  She is completing the [facility] program for mental health treatment.       MENSTRUAL HISTORY  Menarche was at age 10.  Menses: Regular, q 30 days for the last year.  Previously she was amenorrheic after endometrial ablation.  How many days do you bleed: 4-7  Heavy menses: Yes, as above for the first 4 days.  Intermenstrual bleeding: Denies.  How often do you have to change a pad/ tampon: Frequently on heavy flow days.  Soiling of clothing: Denies  Dysmenorrhea: She has return of significant dysmenorrhea.  She is sexually active and denies issues with intercourse.   Dyspareunia: Denies.  Postcoital spotting: Denies.  She is currently using salpingectomy for sterilization.  History of STD or PID: Denies  Pap smear history: She has a history of cervical dysplasia in the past.        PROCEDURE: US PELVIC COMPLETE WITH TRANSVAGINAL     HISTORY: Hot flashes; Menorrhagia with irregular cycle; Secondary  dysmenorrhea     TECHNIQUE: Transabdominal and transvaginal ultrasound of the pelvis.     COMPARISON: none     FINDINGS:      MEASUREMENTS:  : 9.1 x 4 x 5.1 cm (Length x Height x Width)  Endometrium: mm in thickness  Right Ovary: Surgically removed (Length x Height x Width)  Left Ovary:  3 x 1.8 x 2.7 cm (Length x Height x Width)     UTERUS: There is a small cyst seen in the body of the uterus possibly  a focus of adenomyosis.     ADNEXA: There is an echogenic nodule in the left ovary most  likely a  recently ruptured follicle. Arterial and venous flow is identified in  the left ovary     MISC: No free fluid is seen in the cul-de-sac                                                                  IMPRESSION:      Small focus of adenomyosis in the myometrium.     Findings most consistent with a recently ruptured follicle in the left  ovary.     CASH CASTILLO MD        Preop Questions 6/1/2021   1. Have you ever had a heart attack or stroke? No   2. Have you ever had surgery on your heart or blood vessels, such as a stent placement, a coronary artery bypass, or surgery on an artery in your head, neck, heart, or legs? No   3. Do you have chest pain with activity? No   4. Do you have a history of  heart failure? No   5. Do you currently have a cold, bronchitis or symptoms of other infection? No   6. Do you have a cough, shortness of breath, or wheezing? YES - post covid    7. Do you or anyone in your family have previous history of blood clots? No   8. Do you or does anyone in your family have a serious bleeding problem such as prolonged bleeding following surgeries or cuts? No   9. Have you ever had problems with anemia or been told to take iron pills? YES - hx of anemia    10. Have you had any abnormal blood loss such as black, tarry or bloody stools, or abnormal vaginal bleeding? YES - hx of ovarian aneurysm    11. Have you ever had a blood transfusion? YES - due to anureysm    11a. Have you ever had a transfusion reaction? YES - non   12. Are you willing to have a blood transfusion if it is medically needed before, during, or after your surgery? Yes   13. Have you or any of your relatives ever had problems with anesthesia? No   14. Do you have sleep apnea, excessive snoring or daytime drowsiness? No   15. Do you have any artifical heart valves or other implanted medical devices like a pacemaker, defibrillator, or continuous glucose monitor? No   16. Do you have artificial joints? No   17. Are you  allergic to latex? No   18. Is there any chance that you may be pregnant? No       Health Care Directive:  Patient does not have a Health Care Directive or Living Will: Discussed advance care planning with patient; however, patient declined at this time.      Status of Chronic Conditions:  See problem list for active medical problems.  Problems all longstanding and stable, except as noted/documented.  See ROS for pertinent symptoms related to these conditions.      PHQ 5/7/2021 5/7/2021 6/1/2021   PHQ-9 Total Score 9 10 7   Q9: Thoughts of better off dead/self-harm past 2 weeks Not at all Not at all Not at all   F/U: Thoughts of suicide or self-harm - - -   F/U: Safety concerns - - -       MAYKEL-7 SCORE 4/8/2021 5/7/2021 6/1/2021   Total Score 21 7 8         Review of Systems  CONSTITUTIONAL: NEGATIVE for fever, chills, change in weight  INTEGUMENTARY/SKIN: NEGATIVE for worrisome rashes, moles or lesions  EYES: NEGATIVE for vision changes or irritation  ENT/MOUTH: NEGATIVE for ear, mouth and throat problems  RESP: NEGATIVE for significant cough or SOB  CV: NEGATIVE for chest pain, palpitations or peripheral edema  GI: NEGATIVE for nausea, abdominal pain, heartburn, or change in bowel habits  : NEGATIVE for frequency, dysuria, or hematuria  MUSCULOSKELETAL: NEGATIVE for significant arthralgias or myalgia  NEURO: NEGATIVE for weakness, dizziness or paresthesias  ENDOCRINE: NEGATIVE for temperature intolerance, skin/hair changes  HEME: NEGATIVE for bleeding problems  PSYCHIATRIC: NEGATIVE for changes in mood or affect        Patient Active Problem List    Diagnosis Date Noted     History of COVID-19 - 5/11/21 06/01/2021     Priority: Medium     Menorrhagia with irregular cycle--Endometrial Ablation---2018 04/27/2021     Priority: Medium     Hot flashes---4/2021 04/24/2021     Priority: Medium     Attention deficit hyperactivity disorder (ADHD), combined type 08/29/2019     Priority: Medium     Patient is followed by   for ongoing prescription of stimulants.  All refills should be approved by this provider, or covering partner.    Medication(s): Ritalin 10 mg.   Maximum quantity per month: 60  Clinic visit frequency required: Q 3 months     Controlled substance agreement on file: Yes       Date(s): 8.27.19  Neuropsych evaluation for ADD completed:  managed by     [facility] website verification:  done on 8.27.19  [email or web address]           Recurrent major depressive disorder (H) 07/30/2018     Priority: Medium     Tobacco abuse 07/30/2018     Priority: Medium     Vitamin deficiency 03/16/2018     Priority: Medium     Gastroesophageal reflux disease, esophagitis presence not specified 04/17/2017     Priority: Medium     IMO Regulatory Load OCT 2020       ACP (advance care planning) 01/26/2017     Priority: Medium     Advance Care Planning 1/26/2017: ACP Review of Chart / Resources Provided:  Reviewed chart for advance care plan.  Nasra Ortiz has been provided information and resources to begin or update their advance care plan.  Added by EMERALD THOMPSON             Primary insomnia 12/11/2015     Priority: Medium     Migraine with aura and without status migrainosus, not intractable 08/11/2014     Priority: Medium     Anxiety state 08/11/2014     Priority: Medium     Hydronephrosis 12/21/2011     Priority: Medium     Panic disorder without agoraphobia 11/26/2006     Priority: Medium          Past Medical History:   Diagnosis Date     Acquired hypothyroidism 08/11/2014     Anxiety disorder 09/05/2012     Anxiety state 08/11/2014     Attention deficit hyperactivity disorder (ADHD), combined type 08/29/2019    Patient is followed by  for ongoing prescription of stimulants.  All refills should be approved by this provider, or covering partner.  Medication(s): Ritalin 10 mg.  Maximum quantity per month: 60 Clinic visit frequency required: Q 3 months   Controlled substance agreement  on file: Yes      Date(s): 19 Neuropsych evaluation for ADD completed:  managed by   Last [facility] webs     Gastroesophageal reflux disease, esophagitis presence not specified 2017     IMO Regulatory Load OCT 2020     History of COVID-19 - 21     Hydronephrosis 2011     Menorrhagia with irregular cycle--Endometrial Ablation---2021     Migraine with aura and without status migrainosus, not intractable 2014     Panic disorder without agoraphobia 2006     Primary insomnia 2015     Recurrent major depressive disorder (H) 2018     Secondary dysmenorrhea--PROMETRIUM, NAPROXEN---2021     Tobacco abuse 2018     Vitamin deficiency 2018         Past Surgical History:   Procedure Laterality Date      SECTION N/A 2000      SECTION N/A 2012    postop hemm with ruptured R Ov vein, transfusion     COLONOSCOPY N/A 2019    Procedure: COLONOSCOPY DIAGNOSTIC WITH RANDOM COLON BIOPSIES ANOSCOPY; Surgeon: Igor Burgos MD; Location: [facility] OR       COLPOSCOPY CERVIX, BIOPSY CERVIX, ENDOCERVICAL CURETTAGE, COMBINED N/A 2008     DILATION AND CURETTAGE, ABLATE ENDOMETRIUM NOVASURE, COMBINED N/A 2018    Novasure Endometrial ablation     ESOPHAGOSCOPY, GASTROSCOPY, DUODENOSCOPY (EGD), COMBINED  2019    Procedure: ESOPHAGOGASTRODUODENOSCOPY DIAGNOSTIC with biopsies; Surgeon: Igor Burgos MD; Location: [facility] OR       HYSTEROSCOPY DIAGNOSTIC N/A 2018    Hysteroscopy, D&C     LAPAROSCOPIC APPENDECTOMY N/A 2012     OOPHORECTOMY Right 2012    post operative hemorrhage with ruptured ovarian vein,      SALPINGECTOMY Left 2019    Procedure: LAPAROSCOPY LEFT SALPINGECTOMY; Surgeon: Regina Sweeney MD; Location: [facility] OR           Current Outpatient Medications   Medication Sig Dispense Refill     co-enzyme Q-10 100 MG CAPS capsule Take 100 mg by mouth daily        DULoxetine (CYMBALTA) 60 MG capsule TAKE 1 CAPSULE (60 MG) BY MOUTH DAILY 30 capsule 11     LORazepam (ATIVAN) 0.5 MG tablet TAKE 1 TABLET (0.5 MG) BY MOUTH DAILY AS NEEDED FOR ANXIETY 30 tablet 1     magnesium oxide (MAG-OX) 400 MG tablet TAKE 2 TABLETS BY MOUTH IN THE EVENING. 60 tablet 0     omeprazole (PRILOSEC) 40 MG DR capsule TAKE 1 CAPSULE BY MOUTH ONCE DAILY 30-60 MINUTES BEFORE A MEAL. 30 capsule 3     progesterone (PROMETRIUM) 100 MG capsule Take 1 capsule (100 mg) by mouth 2 times daily (with meals) 60 capsule 11     propranolol ER (INDERAL LA) 80 MG 24 hr capsule TAKE 1 CAPSULE (80 MG) BY MOUTH DAILY 30 capsule 1     RITALIN LA 20 MG 24 hr capsule Take 1 capsule by mouth daily 30 capsule 0     temazepam (RESTORIL) 15 MG capsule Take 1 - 2 caps evening 60 capsule 3     naproxen (NAPROSYN) 500 MG tablet Take 1 tablet (500 mg) by mouth 2 times daily (with meals) Take with a full meal, begin with onset menstrual symptoms (Patient not taking: Reported on 5/10/2021) 20 tablet 4       Allergies   Allergen Reactions     Dust Mite Extract      Hydrocodone Other (See Comments) and Itching     Skin felt like it was burning.     Penicillins Rash        Social History     Tobacco Use     Smoking status: Current Every Day Smoker     Packs/day: 0.40     Years: 10.00     Pack years: 4.00     Types: Cigarettes     Last attempt to quit: 2020     Years since quittin.2     Smokeless tobacco: Never Used     Tobacco comment: tobacco cessation discuseed - tried to quit: no - passive smoke exposure quitting on own    Substance Use Topics     Alcohol use: No       Family History   Problem Relation Age of Onset     Hypertension Mother      Hyperlipidemia Mother      Cardiovascular Father      Hypertension Father      Hyperlipidemia Father      Heart Failure Father      Sleep Apnea Father      Kidney failure Father      History   Drug Use No           Objective     /62 (BP Location: Right arm, Patient Position:  "Chair, Cuff Size: Adult Regular)   Pulse 82   Temp 98.6  F (37  C) (Tympanic)   Ht 1.626 m (5' 4\")   Wt 53.1 kg (117 lb)   SpO2 99%   BMI 20.08 kg/m          Physical Exam    GENERAL APPEARANCE: healthy, alert and no distress     EYES: EOMI, PERRL     HENT: ear canals and TM's normal and nose and mouth without ulcers or lesions     NECK: no adenopathy, no asymmetry, masses, or scars and thyroid normal to palpation     RESP: lungs clear to auscultation - no rales, rhonchi or wheezes     CV: regular rates and rhythm, normal S1 S2, no S3 or S4 and no murmur, click or rub     MS: extremities normal- no gross deformities noted, no evidence of inflammation in joints, FROM in all extremities.     NEURO: Normal strength and tone, sensory exam grossly normal, mentation intact and speech normal     PSYCH: mentation appears normal. and affect normal/bright        Recent Labs   Lab Test 04/27/21  0928   HGB 14.3         POTASSIUM 3.7   CR 0.72        Diagnostics:  Recent Results (from the past 24 hour(s))   CBC with platelets and differential    Collection Time: 06/01/21 10:11 AM   Result Value Ref Range    WBC 7.3 4.0 - 11.0 10e9/L    RBC Count 4.45 3.8 - 5.2 10e12/L    Hemoglobin 14.3 11.7 - 15.7 g/dL    Hematocrit 42.5 35.0 - 47.0 %    MCV 96 78 - 100 fl    MCH 32.1 26.5 - 33.0 pg    MCHC 33.6 31.5 - 36.5 g/dL    RDW 13.1 10.0 - 15.0 %    Platelet Count 381 150 - 450 10e9/L    % Neutrophils 42.3 %    % Lymphocytes 46.7 %    % Monocytes 9.7 %    % Eosinophils 1.0 %    % Basophils 0.3 %    Absolute Neutrophil 3.1 1.6 - 8.3 10e9/L    Absolute Lymphocytes 3.4 0.8 - 5.3 10e9/L    Absolute Monocytes 0.7 0.0 - 1.3 10e9/L    Absolute Eosinophils 0.1 0.0 - 0.7 10e9/L    Absolute Basophils 0.0 0.0 - 0.2 10e9/L    Diff Method Automated Method    Comprehensive metabolic panel (BMP + Alb, Alk Phos, ALT, AST, Total. Bili, TP)    Collection Time: 06/01/21 10:11 AM   Result Value Ref Range    Sodium 138 133 - 144 mmol/L "    Potassium 3.9 3.4 - 5.3 mmol/L    Chloride 101 94 - 109 mmol/L    Carbon Dioxide 29 20 - 32 mmol/L    Anion Gap 8 3 - 14 mmol/L    Glucose 86 70 - 99 mg/dL    Urea Nitrogen 7 7 - 30 mg/dL    Creatinine 0.80 0.52 - 1.04 mg/dL    GFR Estimate >90 >60 mL/min/[1.73_m2]    GFR Estimate If Black >90 >60 mL/min/[1.73_m2]    Calcium 9.3 8.5 - 10.1 mg/dL    Bilirubin Total 0.4 0.2 - 1.3 mg/dL    Albumin 3.8 3.4 - 5.0 g/dL    Protein Total 7.5 6.8 - 8.8 g/dL    Alkaline Phosphatase 62 40 - 150 U/L    ALT 19 0 - 50 U/L    AST 25 0 - 45 U/L   Hemoglobin A1c    Collection Time: 06/01/21 10:11 AM   Result Value Ref Range    Hemoglobin A1C 5.1 0 - 5.6 %   Estimated Average Glucose    Collection Time: 06/01/21 10:11 AM   Result Value Ref Range    Estimated Average Glucose 100 mg/dL   *UA reflex to Microscopic and Culture - [facility]    Collection Time: 06/01/21 10:15 AM    Specimen: Midstream Urine   Result Value Ref Range    Color Urine Yellow     Appearance Urine Clear     Glucose Urine Negative NEG^Negative mg/dL    Bilirubin Urine Negative NEG^Negative    Ketones Urine Negative NEG^Negative mg/dL    Specific Gravity Urine <=1.005 1.003 - 1.035    Blood Urine Negative NEG^Negative    pH Urine 6.5 5.0 - 7.0 pH    Protein Albumin Urine Negative NEG^Negative mg/dL    Urobilinogen Urine 1.0 0.2 - 1.0 EU/dL    Nitrite Urine Negative NEG^Negative    Leukocyte Esterase Urine Negative NEG^Negative    Source Midstream Urine    HCG qualitative urine    Collection Time: 06/01/21 10:15 AM   Result Value Ref Range    HCG Qual Urine Negative NEG^Negative          EKG: appears normal, NSR    Revised Cardiac Risk Index (RCRI):  The patient has the following serious cardiovascular risks for perioperative complications:   - No serious cardiac risks = 0 points     RCRI Interpretation: 0 points: Class I (very low risk - 0.4% complication rate)          Assessment & Plan     The proposed surgical procedure is considered LOW  risk.      Preop general physical exam  - CBC with platelets and differential  - *UA reflex to Microscopic and Culture - [facility]  - HCG qualitative urine  - Comprehensive metabolic panel (BMP + Alb, Alk Phos, ALT, AST, Total. Bili, TP)  - EKG 12-lead complete w/read - (Clinic Performed)    Menorrhagia with irregular cycle--Endometrial Ablation---2018  - Hemoglobin A1c    Hot flashes---4/2021  - Hemoglobin A1c    Secondary dysmenorrhea--PROMETRIUM, MAPROXEN---4/27/2021  - Hemoglobin A1c       Risks and Recommendations:  The patient has the following additional risks and recommendations for perioperative complications:   - No identified additional risk factors other than previously addressed        RECOMMENDATION:  APPROVAL GIVEN to proceed with proposed procedure, without further diagnostic evaluation.         Signed Electronically by: Adrienne Dailey CNP  Copy of this evaluation report is provided to requesting physician.

## 2021-05-26 NOTE — PROGRESS NOTES
[facility]  [address]  [facility] [address]  Phone: [phone]  Primary Provider: Adrienne Feliciano  Pre-op Performing Provider: ADRIENNE FELICIANO      PREOPERATIVE EVALUATION:  Today's date: 2021    Nasra Lovelace is a 41 year old female who presents for a preoperative evaluation.    Surgical Information:  Surgery/Procedure: laparoscopic supracervical hysterectomy  Surgery Location: [facility]  Surgeon: Dr. Soler - OB GYN  Surgery Date:   Time of Surgery: TBD  Where patient plans to recover: At home with family  Fax number for surgical facility: Note does not need to be faxed, will be available electronically in Epic.    Type of Anesthesia Anticipated: to be determined        Subjective     HPI related to upcoming procedure:   Recurrent Menorrhagia after prior endometrial placement procedures      Notes from 21 OB GYN visit are attached below         HISTORY OF PRESENT ILLNESS  Nasra Lovelace is a 41 year old  with Patient's last menstrual period was 2021. who presents for evaluation of recurrent menorrhagia.  The patient had an endometrial ablation procedure in 2018 for menorrhagia and dysmenorrhea symptoms.  Her symptoms resolved after the procedure and she became amenorrheic.  Now for the last year she has return of menstrual bleeding and dysmenorrhea.  Over the last several months her menstrual cycles have become heavier, now to the point of menorrhagia with heavy blood flow and dark clots.  She now has monthly menses the last for 4 to 7 days with heavy blood flow for the first 4 days.  She also reports return of strong dysmenorrhea symptoms similar to before the ablation.  Over the last couple months the patient also developed migraine headaches that began a couple days prior to her menses as well as vasomotor symptoms including hot flashes and night sweats and worsening anxiety. The patient started Prometrium and naproxen last month and now has  resolution of her vasomotor symptoms but denies improvement in her dysmenorrhea or bleeding symptoms.  The patient does not want to continue with Prometrium long-term as she feels the extra hormone side effects will not help her mental health. The patient has previously had bilateral salpingectomy and she is interested in definitive surgical therapy of hysterectomy.     The patient is in active psychiatric therapy and is doing well.  She reports that she is stable on her medications for ADHD, depression, anxiety, panic disorder, and insomnia.  She is completing the [facility] program for mental health treatment.       MENSTRUAL HISTORY  Menarche was at age 10.  Menses: Regular, q 30 days for the last year.  Previously she was amenorrheic after endometrial ablation.  How many days do you bleed: 4-7  Heavy menses: Yes, as above for the first 4 days.  Intermenstrual bleeding: Denies.  How often do you have to change a pad/ tampon: Frequently on heavy flow days.  Soiling of clothing: Denies  Dysmenorrhea: She has return of significant dysmenorrhea.  She is sexually active and denies issues with intercourse.   Dyspareunia: Denies.  Postcoital spotting: Denies.  She is currently using salpingectomy for sterilization.  History of STD or PID: Denies  Pap smear history: She has a history of cervical dysplasia in the past.        PROCEDURE: US PELVIC COMPLETE WITH TRANSVAGINAL     HISTORY: Hot flashes; Menorrhagia with irregular cycle; Secondary  dysmenorrhea     TECHNIQUE: Transabdominal and transvaginal ultrasound of the pelvis.     COMPARISON: none     FINDINGS:      MEASUREMENTS:  : 9.1 x 4 x 5.1 cm (Length x Height x Width)  Endometrium: mm in thickness  Right Ovary: Surgically removed (Length x Height x Width)  Left Ovary:  3 x 1.8 x 2.7 cm (Length x Height x Width)     UTERUS: There is a small cyst seen in the body of the uterus possibly  a focus of adenomyosis.     ADNEXA: There is an echogenic nodule in the left ovary most  likely a  recently ruptured follicle. Arterial and venous flow is identified in  the left ovary     MISC: No free fluid is seen in the cul-de-sac                                                                  IMPRESSION:      Small focus of adenomyosis in the myometrium.     Findings most consistent with a recently ruptured follicle in the left  ovary.     CASH CASTILLO MD        Preop Questions 6/1/2021   1. Have you ever had a heart attack or stroke? No   2. Have you ever had surgery on your heart or blood vessels, such as a stent placement, a coronary artery bypass, or surgery on an artery in your head, neck, heart, or legs? No   3. Do you have chest pain with activity? No   4. Do you have a history of  heart failure? No   5. Do you currently have a cold, bronchitis or symptoms of other infection? No   6. Do you have a cough, shortness of breath, or wheezing? YES - post covid    7. Do you or anyone in your family have previous history of blood clots? No   8. Do you or does anyone in your family have a serious bleeding problem such as prolonged bleeding following surgeries or cuts? No   9. Have you ever had problems with anemia or been told to take iron pills? YES - hx of anemia    10. Have you had any abnormal blood loss such as black, tarry or bloody stools, or abnormal vaginal bleeding? YES - hx of ovarian aneurysm    11. Have you ever had a blood transfusion? YES - due to anureysm    11a. Have you ever had a transfusion reaction? YES - non   12. Are you willing to have a blood transfusion if it is medically needed before, during, or after your surgery? Yes   13. Have you or any of your relatives ever had problems with anesthesia? No   14. Do you have sleep apnea, excessive snoring or daytime drowsiness? No   15. Do you have any artifical heart valves or other implanted medical devices like a pacemaker, defibrillator, or continuous glucose monitor? No   16. Do you have artificial joints? No   17. Are you  allergic to latex? No   18. Is there any chance that you may be pregnant? No       Health Care Directive:  Patient does not have a Health Care Directive or Living Will: Discussed advance care planning with patient; however, patient declined at this time.      Status of Chronic Conditions:  See problem list for active medical problems.  Problems all longstanding and stable, except as noted/documented.  See ROS for pertinent symptoms related to these conditions.      PHQ 5/7/2021 5/7/2021 6/1/2021   PHQ-9 Total Score 9 10 7   Q9: Thoughts of better off dead/self-harm past 2 weeks Not at all Not at all Not at all   F/U: Thoughts of suicide or self-harm - - -   F/U: Safety concerns - - -       MAYKEL-7 SCORE 4/8/2021 5/7/2021 6/1/2021   Total Score 21 7 8         Review of Systems  CONSTITUTIONAL: NEGATIVE for fever, chills, change in weight  INTEGUMENTARY/SKIN: NEGATIVE for worrisome rashes, moles or lesions  EYES: NEGATIVE for vision changes or irritation  ENT/MOUTH: NEGATIVE for ear, mouth and throat problems  RESP: NEGATIVE for significant cough or SOB  CV: NEGATIVE for chest pain, palpitations or peripheral edema  GI: NEGATIVE for nausea, abdominal pain, heartburn, or change in bowel habits  : NEGATIVE for frequency, dysuria, or hematuria  MUSCULOSKELETAL: NEGATIVE for significant arthralgias or myalgia  NEURO: NEGATIVE for weakness, dizziness or paresthesias  ENDOCRINE: NEGATIVE for temperature intolerance, skin/hair changes  HEME: NEGATIVE for bleeding problems  PSYCHIATRIC: NEGATIVE for changes in mood or affect        Patient Active Problem List    Diagnosis Date Noted     History of COVID-19 - 5/11/21 06/01/2021     Priority: Medium     Menorrhagia with irregular cycle--Endometrial Ablation---2018 04/27/2021     Priority: Medium     Hot flashes---4/2021 04/24/2021     Priority: Medium     Attention deficit hyperactivity disorder (ADHD), combined type 08/29/2019     Priority: Medium     Patient is followed by   for ongoing prescription of stimulants.  All refills should be approved by this provider, or covering partner.    Medication(s): Ritalin 10 mg.   Maximum quantity per month: 60  Clinic visit frequency required: Q 3 months     Controlled substance agreement on file: Yes       Date(s): 8.27.19  Neuropsych evaluation for ADD completed:  managed by     [facility] website verification:  done on 8.27.19  [email or web address]           Recurrent major depressive disorder (H) 07/30/2018     Priority: Medium     Tobacco abuse 07/30/2018     Priority: Medium     Vitamin deficiency 03/16/2018     Priority: Medium     Gastroesophageal reflux disease, esophagitis presence not specified 04/17/2017     Priority: Medium     IMO Regulatory Load OCT 2020       ACP (advance care planning) 01/26/2017     Priority: Medium     Advance Care Planning 1/26/2017: ACP Review of Chart / Resources Provided:  Reviewed chart for advance care plan.  Nasra Ortiz has been provided information and resources to begin or update their advance care plan.  Added by EMERALD THOMPSON             Primary insomnia 12/11/2015     Priority: Medium     Migraine with aura and without status migrainosus, not intractable 08/11/2014     Priority: Medium     Anxiety state 08/11/2014     Priority: Medium     Hydronephrosis 12/21/2011     Priority: Medium     Panic disorder without agoraphobia 11/26/2006     Priority: Medium          Past Medical History:   Diagnosis Date     Acquired hypothyroidism 08/11/2014     Anxiety disorder 09/05/2012     Anxiety state 08/11/2014     Attention deficit hyperactivity disorder (ADHD), combined type 08/29/2019    Patient is followed by  for ongoing prescription of stimulants.  All refills should be approved by this provider, or covering partner.  Medication(s): Ritalin 10 mg.  Maximum quantity per month: 60 Clinic visit frequency required: Q 3 months   Controlled substance agreement  on file: Yes      Date(s): 19 Neuropsych evaluation for ADD completed:  managed by   Last [facility] webs     Gastroesophageal reflux disease, esophagitis presence not specified 2017     IMO Regulatory Load OCT 2020     History of COVID-19 - 21     Hydronephrosis 2011     Menorrhagia with irregular cycle--Endometrial Ablation---2021     Migraine with aura and without status migrainosus, not intractable 2014     Panic disorder without agoraphobia 2006     Primary insomnia 2015     Recurrent major depressive disorder (H) 2018     Secondary dysmenorrhea--PROMETRIUM, NAPROXEN---2021     Tobacco abuse 2018     Vitamin deficiency 2018         Past Surgical History:   Procedure Laterality Date      SECTION N/A 2000      SECTION N/A 2012    postop hemm with ruptured R Ov vein, transfusion     COLONOSCOPY N/A 2019    Procedure: COLONOSCOPY DIAGNOSTIC WITH RANDOM COLON BIOPSIES ANOSCOPY; Surgeon: Igor Burgos MD; Location: [facility] OR       COLPOSCOPY CERVIX, BIOPSY CERVIX, ENDOCERVICAL CURETTAGE, COMBINED N/A 2008     DILATION AND CURETTAGE, ABLATE ENDOMETRIUM NOVASURE, COMBINED N/A 2018    Novasure Endometrial ablation     ESOPHAGOSCOPY, GASTROSCOPY, DUODENOSCOPY (EGD), COMBINED  2019    Procedure: ESOPHAGOGASTRODUODENOSCOPY DIAGNOSTIC with biopsies; Surgeon: Igor Burgos MD; Location: [facility] OR       HYSTEROSCOPY DIAGNOSTIC N/A 2018    Hysteroscopy, D&C     LAPAROSCOPIC APPENDECTOMY N/A 2012     OOPHORECTOMY Right 2012    post operative hemorrhage with ruptured ovarian vein,      SALPINGECTOMY Left 2019    Procedure: LAPAROSCOPY LEFT SALPINGECTOMY; Surgeon: Regina Sweeney MD; Location: [facility] OR           Current Outpatient Medications   Medication Sig Dispense Refill     co-enzyme Q-10 100 MG CAPS capsule Take 100 mg by mouth daily        DULoxetine (CYMBALTA) 60 MG capsule TAKE 1 CAPSULE (60 MG) BY MOUTH DAILY 30 capsule 11     LORazepam (ATIVAN) 0.5 MG tablet TAKE 1 TABLET (0.5 MG) BY MOUTH DAILY AS NEEDED FOR ANXIETY 30 tablet 1     magnesium oxide (MAG-OX) 400 MG tablet TAKE 2 TABLETS BY MOUTH IN THE EVENING. 60 tablet 0     omeprazole (PRILOSEC) 40 MG DR capsule TAKE 1 CAPSULE BY MOUTH ONCE DAILY 30-60 MINUTES BEFORE A MEAL. 30 capsule 3     progesterone (PROMETRIUM) 100 MG capsule Take 1 capsule (100 mg) by mouth 2 times daily (with meals) 60 capsule 11     propranolol ER (INDERAL LA) 80 MG 24 hr capsule TAKE 1 CAPSULE (80 MG) BY MOUTH DAILY 30 capsule 1     RITALIN LA 20 MG 24 hr capsule Take 1 capsule by mouth daily 30 capsule 0     temazepam (RESTORIL) 15 MG capsule Take 1 - 2 caps evening 60 capsule 3     naproxen (NAPROSYN) 500 MG tablet Take 1 tablet (500 mg) by mouth 2 times daily (with meals) Take with a full meal, begin with onset menstrual symptoms (Patient not taking: Reported on 5/10/2021) 20 tablet 4       Allergies   Allergen Reactions     Dust Mite Extract      Hydrocodone Other (See Comments) and Itching     Skin felt like it was burning.     Penicillins Rash        Social History     Tobacco Use     Smoking status: Current Every Day Smoker     Packs/day: 0.40     Years: 10.00     Pack years: 4.00     Types: Cigarettes     Last attempt to quit: 2020     Years since quittin.2     Smokeless tobacco: Never Used     Tobacco comment: tobacco cessation discuseed - tried to quit: no - passive smoke exposure quitting on own    Substance Use Topics     Alcohol use: No       Family History   Problem Relation Age of Onset     Hypertension Mother      Hyperlipidemia Mother      Cardiovascular Father      Hypertension Father      Hyperlipidemia Father      Heart Failure Father      Sleep Apnea Father      Kidney failure Father      History   Drug Use No           Objective     /62 (BP Location: Right arm, Patient Position:  "Chair, Cuff Size: Adult Regular)   Pulse 82   Temp 98.6  F (37  C) (Tympanic)   Ht 1.626 m (5' 4\")   Wt 53.1 kg (117 lb)   SpO2 99%   BMI 20.08 kg/m          Physical Exam    GENERAL APPEARANCE: healthy, alert and no distress     EYES: EOMI, PERRL     HENT: ear canals and TM's normal and nose and mouth without ulcers or lesions     NECK: no adenopathy, no asymmetry, masses, or scars and thyroid normal to palpation     RESP: lungs clear to auscultation - no rales, rhonchi or wheezes     CV: regular rates and rhythm, normal S1 S2, no S3 or S4 and no murmur, click or rub     MS: extremities normal- no gross deformities noted, no evidence of inflammation in joints, FROM in all extremities.     NEURO: Normal strength and tone, sensory exam grossly normal, mentation intact and speech normal     PSYCH: mentation appears normal. and affect normal/bright        Recent Labs   Lab Test 04/27/21  0928   HGB 14.3         POTASSIUM 3.7   CR 0.72        Diagnostics:  Recent Results (from the past 24 hour(s))   CBC with platelets and differential    Collection Time: 06/01/21 10:11 AM   Result Value Ref Range    WBC 7.3 4.0 - 11.0 10e9/L    RBC Count 4.45 3.8 - 5.2 10e12/L    Hemoglobin 14.3 11.7 - 15.7 g/dL    Hematocrit 42.5 35.0 - 47.0 %    MCV 96 78 - 100 fl    MCH 32.1 26.5 - 33.0 pg    MCHC 33.6 31.5 - 36.5 g/dL    RDW 13.1 10.0 - 15.0 %    Platelet Count 381 150 - 450 10e9/L    % Neutrophils 42.3 %    % Lymphocytes 46.7 %    % Monocytes 9.7 %    % Eosinophils 1.0 %    % Basophils 0.3 %    Absolute Neutrophil 3.1 1.6 - 8.3 10e9/L    Absolute Lymphocytes 3.4 0.8 - 5.3 10e9/L    Absolute Monocytes 0.7 0.0 - 1.3 10e9/L    Absolute Eosinophils 0.1 0.0 - 0.7 10e9/L    Absolute Basophils 0.0 0.0 - 0.2 10e9/L    Diff Method Automated Method    Comprehensive metabolic panel (BMP + Alb, Alk Phos, ALT, AST, Total. Bili, TP)    Collection Time: 06/01/21 10:11 AM   Result Value Ref Range    Sodium 138 133 - 144 mmol/L "    Potassium 3.9 3.4 - 5.3 mmol/L    Chloride 101 94 - 109 mmol/L    Carbon Dioxide 29 20 - 32 mmol/L    Anion Gap 8 3 - 14 mmol/L    Glucose 86 70 - 99 mg/dL    Urea Nitrogen 7 7 - 30 mg/dL    Creatinine 0.80 0.52 - 1.04 mg/dL    GFR Estimate >90 >60 mL/min/[1.73_m2]    GFR Estimate If Black >90 >60 mL/min/[1.73_m2]    Calcium 9.3 8.5 - 10.1 mg/dL    Bilirubin Total 0.4 0.2 - 1.3 mg/dL    Albumin 3.8 3.4 - 5.0 g/dL    Protein Total 7.5 6.8 - 8.8 g/dL    Alkaline Phosphatase 62 40 - 150 U/L    ALT 19 0 - 50 U/L    AST 25 0 - 45 U/L   Hemoglobin A1c    Collection Time: 06/01/21 10:11 AM   Result Value Ref Range    Hemoglobin A1C 5.1 0 - 5.6 %   Estimated Average Glucose    Collection Time: 06/01/21 10:11 AM   Result Value Ref Range    Estimated Average Glucose 100 mg/dL   *UA reflex to Microscopic and Culture - [facility]    Collection Time: 06/01/21 10:15 AM    Specimen: Midstream Urine   Result Value Ref Range    Color Urine Yellow     Appearance Urine Clear     Glucose Urine Negative NEG^Negative mg/dL    Bilirubin Urine Negative NEG^Negative    Ketones Urine Negative NEG^Negative mg/dL    Specific Gravity Urine <=1.005 1.003 - 1.035    Blood Urine Negative NEG^Negative    pH Urine 6.5 5.0 - 7.0 pH    Protein Albumin Urine Negative NEG^Negative mg/dL    Urobilinogen Urine 1.0 0.2 - 1.0 EU/dL    Nitrite Urine Negative NEG^Negative    Leukocyte Esterase Urine Negative NEG^Negative    Source Midstream Urine    HCG qualitative urine    Collection Time: 06/01/21 10:15 AM   Result Value Ref Range    HCG Qual Urine Negative NEG^Negative          EKG: appears normal, NSR    Revised Cardiac Risk Index (RCRI):  The patient has the following serious cardiovascular risks for perioperative complications:   - No serious cardiac risks = 0 points     RCRI Interpretation: 0 points: Class I (very low risk - 0.4% complication rate)          Assessment & Plan     The proposed surgical procedure is considered LOW  risk.      Preop general physical exam  - CBC with platelets and differential  - *UA reflex to Microscopic and Culture - [facility]  - HCG qualitative urine  - Comprehensive metabolic panel (BMP + Alb, Alk Phos, ALT, AST, Total. Bili, TP)  - EKG 12-lead complete w/read - (Clinic Performed)    Menorrhagia with irregular cycle--Endometrial Ablation---2018  - Hemoglobin A1c    Hot flashes---4/2021  - Hemoglobin A1c    Secondary dysmenorrhea--PROMETRIUM, MAPROXEN---4/27/2021  - Hemoglobin A1c       Risks and Recommendations:  The patient has the following additional risks and recommendations for perioperative complications:   - No identified additional risk factors other than previously addressed        RECOMMENDATION:  APPROVAL GIVEN to proceed with proposed procedure, without further diagnostic evaluation.         Signed Electronically by: Adrienne Dailey CNP  Copy of this evaluation report is provided to requesting physician.

## 2021-05-26 NOTE — PATIENT INSTRUCTIONS

## 2021-06-01 ENCOUNTER — OFFICE VISIT (OUTPATIENT)
Dept: FAMILY MEDICINE | Facility: OTHER | Age: 41
End: 2021-06-01
Attending: NURSE PRACTITIONER
Payer: COMMERCIAL

## 2021-06-01 VITALS
HEIGHT: 64 IN | SYSTOLIC BLOOD PRESSURE: 100 MMHG | WEIGHT: 117 LBS | DIASTOLIC BLOOD PRESSURE: 62 MMHG | BODY MASS INDEX: 19.97 KG/M2 | OXYGEN SATURATION: 99 % | TEMPERATURE: 98.6 F | HEART RATE: 82 BPM

## 2021-06-01 DIAGNOSIS — Z01.818 PREOP GENERAL PHYSICAL EXAM: Primary | ICD-10-CM

## 2021-06-01 DIAGNOSIS — R23.2 HOT FLASHES: ICD-10-CM

## 2021-06-01 DIAGNOSIS — N92.1 MENORRHAGIA WITH IRREGULAR CYCLE: ICD-10-CM

## 2021-06-01 DIAGNOSIS — Z12.31 ENCOUNTER FOR SCREENING MAMMOGRAM FOR MALIGNANT NEOPLASM OF BREAST: ICD-10-CM

## 2021-06-01 PROBLEM — N94.5 SECONDARY DYSMENORRHEA: Status: RESOLVED | Noted: 2021-04-27 | Resolved: 2021-06-01

## 2021-06-01 PROBLEM — Z30.8 ENCOUNTER FOR OTHER CONTRACEPTIVE MANAGEMENT: Status: RESOLVED | Noted: 2021-04-27 | Resolved: 2021-06-01

## 2021-06-01 PROBLEM — N94.6 DYSMENORRHEA: Status: RESOLVED | Noted: 2021-05-13 | Resolved: 2021-06-01

## 2021-06-01 PROBLEM — Z86.16 HISTORY OF COVID-19: Status: ACTIVE | Noted: 2021-06-01

## 2021-06-01 PROBLEM — N92.0 MENORRHAGIA WITH REGULAR CYCLE: Status: RESOLVED | Noted: 2021-05-13 | Resolved: 2021-06-01

## 2021-06-01 LAB
ALBUMIN SERPL-MCNC: 3.8 G/DL (ref 3.4–5)
ALBUMIN UR-MCNC: NEGATIVE MG/DL
ALP SERPL-CCNC: 62 U/L (ref 40–150)
ALT SERPL W P-5'-P-CCNC: 19 U/L (ref 0–50)
ANION GAP SERPL CALCULATED.3IONS-SCNC: 8 MMOL/L (ref 3–14)
APPEARANCE UR: CLEAR
AST SERPL W P-5'-P-CCNC: 25 U/L (ref 0–45)
BASOPHILS # BLD AUTO: 0 10E9/L (ref 0–0.2)
BASOPHILS NFR BLD AUTO: 0.3 %
BILIRUB SERPL-MCNC: 0.4 MG/DL (ref 0.2–1.3)
BILIRUB UR QL STRIP: NEGATIVE
BUN SERPL-MCNC: 7 MG/DL (ref 7–30)
CALCIUM SERPL-MCNC: 9.3 MG/DL (ref 8.5–10.1)
CHLORIDE SERPL-SCNC: 101 MMOL/L (ref 94–109)
CO2 SERPL-SCNC: 29 MMOL/L (ref 20–32)
COLOR UR AUTO: YELLOW
CREAT SERPL-MCNC: 0.8 MG/DL (ref 0.52–1.04)
DIFFERENTIAL METHOD BLD: NORMAL
EOSINOPHIL # BLD AUTO: 0.1 10E9/L (ref 0–0.7)
EOSINOPHIL NFR BLD AUTO: 1 %
ERYTHROCYTE [DISTWIDTH] IN BLOOD BY AUTOMATED COUNT: 13.1 % (ref 10–15)
EST. AVERAGE GLUCOSE BLD GHB EST-MCNC: 100 MG/DL
GFR SERPL CREATININE-BSD FRML MDRD: >90 ML/MIN/{1.73_M2}
GLUCOSE SERPL-MCNC: 86 MG/DL (ref 70–99)
GLUCOSE UR STRIP-MCNC: NEGATIVE MG/DL
HBA1C MFR BLD: 5.1 % (ref 0–5.6)
HCG UR QL: NEGATIVE
HCT VFR BLD AUTO: 42.5 % (ref 35–47)
HGB BLD-MCNC: 14.3 G/DL (ref 11.7–15.7)
HGB UR QL STRIP: NEGATIVE
KETONES UR STRIP-MCNC: NEGATIVE MG/DL
LEUKOCYTE ESTERASE UR QL STRIP: NEGATIVE
LYMPHOCYTES # BLD AUTO: 3.4 10E9/L (ref 0.8–5.3)
LYMPHOCYTES NFR BLD AUTO: 46.7 %
MCH RBC QN AUTO: 32.1 PG (ref 26.5–33)
MCHC RBC AUTO-ENTMCNC: 33.6 G/DL (ref 31.5–36.5)
MCV RBC AUTO: 96 FL (ref 78–100)
MONOCYTES # BLD AUTO: 0.7 10E9/L (ref 0–1.3)
MONOCYTES NFR BLD AUTO: 9.7 %
NEUTROPHILS # BLD AUTO: 3.1 10E9/L (ref 1.6–8.3)
NEUTROPHILS NFR BLD AUTO: 42.3 %
NITRATE UR QL: NEGATIVE
PH UR STRIP: 6.5 PH (ref 5–7)
PLATELET # BLD AUTO: 381 10E9/L (ref 150–450)
POTASSIUM SERPL-SCNC: 3.9 MMOL/L (ref 3.4–5.3)
PROT SERPL-MCNC: 7.5 G/DL (ref 6.8–8.8)
RBC # BLD AUTO: 4.45 10E12/L (ref 3.8–5.2)
SODIUM SERPL-SCNC: 138 MMOL/L (ref 133–144)
SOURCE: NORMAL
SP GR UR STRIP: <=1.005 (ref 1–1.03)
UROBILINOGEN UR STRIP-ACNC: 1 EU/DL (ref 0.2–1)
WBC # BLD AUTO: 7.3 10E9/L (ref 4–11)

## 2021-06-01 PROCEDURE — 85025 COMPLETE CBC W/AUTO DIFF WBC: CPT | Mod: ZL | Performed by: NURSE PRACTITIONER

## 2021-06-01 PROCEDURE — 36415 COLL VENOUS BLD VENIPUNCTURE: CPT | Mod: ZL | Performed by: NURSE PRACTITIONER

## 2021-06-01 PROCEDURE — G0463 HOSPITAL OUTPT CLINIC VISIT: HCPCS | Mod: 25

## 2021-06-01 PROCEDURE — 999N001182 HC STATISTIC ESTIMATED AVERAGE GLUCOSE: Mod: ZL | Performed by: NURSE PRACTITIONER

## 2021-06-01 PROCEDURE — 93005 ELECTROCARDIOGRAM TRACING: CPT

## 2021-06-01 PROCEDURE — 81003 URINALYSIS AUTO W/O SCOPE: CPT | Mod: ZL | Performed by: NURSE PRACTITIONER

## 2021-06-01 PROCEDURE — 99214 OFFICE O/P EST MOD 30 MIN: CPT | Performed by: NURSE PRACTITIONER

## 2021-06-01 PROCEDURE — 83036 HEMOGLOBIN GLYCOSYLATED A1C: CPT | Mod: ZL | Performed by: NURSE PRACTITIONER

## 2021-06-01 PROCEDURE — 80053 COMPREHEN METABOLIC PANEL: CPT | Mod: ZL | Performed by: NURSE PRACTITIONER

## 2021-06-01 PROCEDURE — 93010 ELECTROCARDIOGRAM REPORT: CPT | Performed by: INTERNAL MEDICINE

## 2021-06-01 PROCEDURE — 81025 URINE PREGNANCY TEST: CPT | Mod: ZL | Performed by: NURSE PRACTITIONER

## 2021-06-01 ASSESSMENT — ANXIETY QUESTIONNAIRES
2. NOT BEING ABLE TO STOP OR CONTROL WORRYING: SEVERAL DAYS
4. TROUBLE RELAXING: SEVERAL DAYS
5. BEING SO RESTLESS THAT IT IS HARD TO SIT STILL: MORE THAN HALF THE DAYS
IF YOU CHECKED OFF ANY PROBLEMS ON THIS QUESTIONNAIRE, HOW DIFFICULT HAVE THESE PROBLEMS MADE IT FOR YOU TO DO YOUR WORK, TAKE CARE OF THINGS AT HOME, OR GET ALONG WITH OTHER PEOPLE: SOMEWHAT DIFFICULT
7. FEELING AFRAID AS IF SOMETHING AWFUL MIGHT HAPPEN: SEVERAL DAYS
6. BECOMING EASILY ANNOYED OR IRRITABLE: SEVERAL DAYS
GAD7 TOTAL SCORE: 8
1. FEELING NERVOUS, ANXIOUS, OR ON EDGE: SEVERAL DAYS
3. WORRYING TOO MUCH ABOUT DIFFERENT THINGS: SEVERAL DAYS

## 2021-06-01 ASSESSMENT — PAIN SCALES - GENERAL: PAINLEVEL: NO PAIN (0)

## 2021-06-01 ASSESSMENT — PATIENT HEALTH QUESTIONNAIRE - PHQ9: SUM OF ALL RESPONSES TO PHQ QUESTIONS 1-9: 7

## 2021-06-01 ASSESSMENT — MIFFLIN-ST. JEOR: SCORE: 1180.71

## 2021-06-01 NOTE — NURSING NOTE
"Chief Complaint   Patient presents with     Pre-Op Exam       Initial /62 (BP Location: Right arm, Patient Position: Chair, Cuff Size: Adult Regular)   Pulse 82   Temp 98.6  F (37  C) (Tympanic)   Ht 1.626 m (5' 4\")   Wt 53.1 kg (117 lb)   SpO2 99%   BMI 20.08 kg/m   Estimated body mass index is 20.08 kg/m  as calculated from the following:    Height as of this encounter: 1.626 m (5' 4\").    Weight as of this encounter: 53.1 kg (117 lb).  Medication Reconciliation: complete  Елена Echavarria LPN    "

## 2021-06-02 ASSESSMENT — ANXIETY QUESTIONNAIRES: GAD7 TOTAL SCORE: 8

## 2021-06-02 NOTE — PROGRESS NOTES
Dr. Rito Soler notified evaluation is complete and EKG tracing faxed to [phone] for his review.

## 2021-06-03 ENCOUNTER — OFFICE VISIT (OUTPATIENT)
Dept: OBGYN | Facility: OTHER | Age: 41
End: 2021-06-03
Attending: OBSTETRICS & GYNECOLOGY
Payer: COMMERCIAL

## 2021-06-03 VITALS
HEIGHT: 64 IN | WEIGHT: 115 LBS | HEART RATE: 60 BPM | DIASTOLIC BLOOD PRESSURE: 66 MMHG | SYSTOLIC BLOOD PRESSURE: 104 MMHG | BODY MASS INDEX: 19.63 KG/M2

## 2021-06-03 DIAGNOSIS — Z01.818 PRE-OPERATIVE EXAMINATION: ICD-10-CM

## 2021-06-03 DIAGNOSIS — N92.1 MENORRHAGIA WITH IRREGULAR CYCLE: Primary | ICD-10-CM

## 2021-06-03 PROCEDURE — G0463 HOSPITAL OUTPT CLINIC VISIT: HCPCS

## 2021-06-03 PROCEDURE — 99213 OFFICE O/P EST LOW 20 MIN: CPT | Performed by: OBSTETRICS & GYNECOLOGY

## 2021-06-03 RX ORDER — TRANEXAMIC ACID 10 MG/ML
1 INJECTION, SOLUTION INTRAVENOUS ONCE
Status: CANCELLED | OUTPATIENT
Start: 2021-06-03 | End: 2021-06-03

## 2021-06-03 RX ORDER — ACETAMINOPHEN 325 MG/1
975 TABLET ORAL ONCE
Status: CANCELLED | OUTPATIENT
Start: 2021-06-03 | End: 2021-06-03

## 2021-06-03 RX ORDER — CLINDAMYCIN PHOSPHATE 900 MG/50ML
900 INJECTION, SOLUTION INTRAVENOUS SEE ADMIN INSTRUCTIONS
Status: CANCELLED | OUTPATIENT
Start: 2021-06-03

## 2021-06-03 RX ORDER — CLINDAMYCIN PHOSPHATE 900 MG/50ML
900 INJECTION, SOLUTION INTRAVENOUS
Status: CANCELLED | OUTPATIENT
Start: 2021-06-03

## 2021-06-03 ASSESSMENT — MIFFLIN-ST. JEOR: SCORE: 1171.64

## 2021-06-03 ASSESSMENT — PAIN SCALES - GENERAL: PAINLEVEL: NO PAIN (0)

## 2021-06-03 NOTE — NURSING NOTE
"Chief Complaint   Patient presents with     Follow Up       Initial /66   Pulse 60   Ht 1.626 m (5' 4\")   Wt 52.2 kg (115 lb)   BMI 19.74 kg/m   Estimated body mass index is 19.74 kg/m  as calculated from the following:    Height as of this encounter: 1.626 m (5' 4\").    Weight as of this encounter: 52.2 kg (115 lb).  Medication Reconciliation: complete  Paradise Maguire LPN    "

## 2021-06-03 NOTE — PROGRESS NOTES
CHIEF COMPLAINT / REASON FOR VISIT  Patient presents for Gynecology visit for pre-op.    HISTORY OF PRESENT ILLNESS  Nasra Lovelace is a 41 year old  with Patient's last menstrual period was 05/15/2021 (approximate). who presents for pre-op.  She has recurrent menorrhagia after prior endometrial placement procedure. Prior clinic notes from 2021 are reviewed at today's visit. The patient has recurrent menorrhagia after prior endometrial placement procedure in 2018.  She became amenorrheic after her endometrial ablation but now for the last year she has return of menstrual bleeding and dysmenorrhea.  For the last 5 months she has menorrhagia with heavy blood flow and clots.  She also has strong dysmenorrhea.  For the last several months the patient also complains of migraine headaches that are associated with her menstrual cycle as well as vasomotor symptoms including hot flashes, night sweats, worsening anxiety with her menstrual cycles.  Patient does not feel comfortable taking hormones and feels that this will make her mental health worse.  Patient requests a hysterectomy.  She has previously had bilateral salpingectomy.    MENSTRUAL HISTORY  Menarche was at age 10.  Menses: Regular, q 30 days for the last year.  Previously she was amenorrheic after endometrial ablation.  How many days do you bleed: 4-7  Heavy menses: Yes, as above for the first 4 days.  Intermenstrual bleeding: Denies.  How often do you have to change a pad/ tampon: Frequently on heavy flow days.  Soiling of clothing: Denies  Dysmenorrhea: She has return of significant dysmenorrhea.  She is sexually active and denies issues with intercourse.   Dyspareunia: Denies.  Postcoital spotting: Denies.  She is currently using salpingectomy for sterilization.  History of STD or PID: Denies     Pap smear history: She has a history of cervical dysplasia in the past.    ALLERGIES     Allergies   Allergen Reactions     Dust Mite Extract       "Hydrocodone Other (See Comments) and Itching     Skin felt like it was burning.     Penicillins Rash     MEDICATIONS    Current Outpatient Medications:      co-enzyme Q-10 100 MG CAPS capsule, Take 100 mg by mouth daily, Disp: , Rfl:      DULoxetine (CYMBALTA) 60 MG capsule, TAKE 1 CAPSULE (60 MG) BY MOUTH DAILY, Disp: 30 capsule, Rfl: 11     LORazepam (ATIVAN) 0.5 MG tablet, TAKE 1 TABLET (0.5 MG) BY MOUTH DAILY AS NEEDED FOR ANXIETY, Disp: 30 tablet, Rfl: 1     magnesium oxide (MAG-OX) 400 MG tablet, TAKE 2 TABLETS BY MOUTH IN THE EVENING., Disp: 60 tablet, Rfl: 0     naproxen (NAPROSYN) 500 MG tablet, Take 1 tablet (500 mg) by mouth 2 times daily (with meals) Take with a full meal, begin with onset menstrual symptoms, Disp: 20 tablet, Rfl: 4     omeprazole (PRILOSEC) 40 MG DR capsule, TAKE 1 CAPSULE BY MOUTH ONCE DAILY 30-60 MINUTES BEFORE A MEAL., Disp: 30 capsule, Rfl: 3     progesterone (PROMETRIUM) 100 MG capsule, Take 1 capsule (100 mg) by mouth 2 times daily (with meals), Disp: 60 capsule, Rfl: 11     propranolol ER (INDERAL LA) 80 MG 24 hr capsule, TAKE 1 CAPSULE (80 MG) BY MOUTH DAILY, Disp: 30 capsule, Rfl: 1     RITALIN LA 20 MG 24 hr capsule, Take 1 capsule by mouth daily, Disp: 30 capsule, Rfl: 0     temazepam (RESTORIL) 15 MG capsule, Take 1 - 2 caps evening, Disp: 60 capsule, Rfl: 3    REVIEW OF SYSTEMS  As per HPI, otherwise negative    The patient's Medical Hx, Surgical Hx, Obstetrical Hx, Social Hx, and Family Hx have been reviewed and updated in the electronic medical record.    OBJECTIVE  /66   Pulse 60   Ht 1.626 m (5' 4\")   Wt 52.2 kg (115 lb)   LMP 05/15/2021 (Approximate)   BMI 19.74 kg/m      General:  Well-developed, well-nourished female in no apparent distress.  Neurological: Alert and oriented x3.  Lungs:  Clear to auscultation bilaterally with good inspiratory effort.  No wheezing rhonchi or rales noted. Breathing nonlabored.  Heart:  Regular rate and rhythm without murmur. " No JVD.  No peripheral vascular disease.  Abdomen: Soft, nontender, nondistended, positive bowel sounds.  No organomegaly. No rebound, no guarding. Well-healed Pfannenstiel and laparoscopic incisions.  Pelvic exam: Deferred.  Please see exam 2021.  Extremities:  No clubbing cyanosis or edema. Nontender bilaterally.    DIAGNOSTICS  2021 TSH 0.42, T4 0.82, T3 2.8, Prolactin 8  2021 Pap: NIL, HPV negative  2021 Covid Positive  2021 HCG Negative  2021 BMP normal  2021 Hgb 14.3, hematocrit 42.5, Plt 381  2021 UA Negative     2021 Pelvic ultrasound: MEASUREMENTS: Uterus: 9.1 x 4 x 5.1 cm (Length x Height x Width).  Endometrium: mm in thickness. Right Ovary: Surgically removed (Length x Height x Width). Left Ovary:  3 x 1.8 x 2.7 cm (Length x Height x Width). UTERUS: There is a small cyst seen in the body of the uterus possibly a focus of adenomyosis. ADNEXA: There is an echogenic nodule in the left ovary most likely a recently ruptured follicle. Arterial and venous flow is identified in the left ovary. MISC: No free fluid is seen in the cul-de-sac.  IMPRESSION: Small focus of adenomyosis in the myometrium. Findings most consistent with a recently ruptured follicle in the left ovary.    ASSESSMENT / PLAN  Nasra Lovelace is a 41 year old  female who presents for pre-op. She has recurrent menorrhagia after prior endometrial placement procedure.    1 Recurrent menorrhagia  2 Dysmenorrhea  3 Vasomotor symptoms secondary to anovulation  4 Suspect possible adenomyosis  5 Tobacco abuse  6 Mental health issues including ADHD, depression, anxiety, panic disorder, and insomnia    - I discussed recurrent menorrhagia and dysmenorrhea with the patient. She had an endometrial ablation procedure in 2018 for menorrhagia and dysmenorrhea symptoms and now has return of her symptoms.  - Her pelvic ultrasound shows a normal-sized uterus with a thin endometrium without endometrial  masses.  The right ovary surgically absent and the left ovary appears normal.  There does appear to be an area of focal adenomyosis in the myometrium.  I discussed with the patient that this possible area of adenomyosis may be contributing to her dysmenorrhea symptoms.  - The patient's vasomotor symptoms resolved on Prometrium.  I suspect these were secondary to anovulation.  - I discussed management options with the patient including observation, hormone therapy, and surgical options.  - The patient desires hysterectomy.  I discussed hysterectomy options with the patient and I recommend laparoscopic supracervical hysterectomy given her multiple abdominal surgeries in the past including  section x2, exploratory laparotomy with right oophorectomy, laparoscopic appendectomy with ruptured appendix, and laparoscopic salpingectomy.  - I discussed the possibility that she may have pelvic adhesive disease and that we may need to convert a laparoscopic hysterectomy into an abdominal procedure.  She verbalizes understanding and desires to proceed.  - I discussed risks, benefits, alternatives, indications, and expected outcomes of laparoscopic supracervical hysterectomy with possible laparotomy and abdominal hysterectomy procedure with the patient including the risk of infection, bleeding, need for blood transfusion, injury to organs with need for repair, anesthesia risk, deep vein thrombosis risk, cardiovascular risk, unexpected findings, failure to relieve symptoms, recurrence of symptoms and rarely death. I discussed the routine recovery process, post-operative pain medication, use of a hammer catheter and the anticipated hospital stay.  - She verbalized understanding and desired to proceed.  - The patient asked appropriate questions and these were answered for her.  - The patient has previously had a sterilization procedure performed with salpingectomy.  - The patient has reviewed and signed the [place]  sterilization consent form on 05/07/2021.  - The consent form was reviewed and signed.    - The patient is scheduled for laparoscopic supracervical hysterectomy with possible laparotomy and abdominal hysterectomy procedure on 06/11/2021.  - Preoperative instructions were discussed.  - The patient will be NPO after midnight.  - The patient will see her primary provider for pre-op clearance.  - Problem list reviewed and updated.  - Follow up in 1 weeks for surgery or sooner as needed.    Rito Soler MD  Obstetrics and Gynecology

## 2021-06-04 ENCOUNTER — ANESTHESIA EVENT (OUTPATIENT)
Dept: SURGERY | Facility: HOSPITAL | Age: 41
End: 2021-06-04
Payer: COMMERCIAL

## 2021-06-04 ASSESSMENT — LIFESTYLE VARIABLES: TOBACCO_USE: 1

## 2021-06-04 NOTE — ANESTHESIA PREPROCEDURE EVALUATION
Anesthesia Pre-Procedure Evaluation    Patient: Nasra Lovelace   MRN: [id number] : 1980        Preoperative Diagnosis: Dysmenorrhea [N94.6]  Menorrhagia with regular cycle [N92.0]   Procedure : Procedure(s):  laparoscopic supracervical hysterectomy,possible laparotomy,possible cystoscopy and indicted procedures  Possible Laparotomy  Possible cystoscopy and indicated procedures     Past Medical History:   Diagnosis Date     Acquired hypothyroidism 2014     Anxiety disorder 2012     Anxiety state 2014     Attention deficit hyperactivity disorder (ADHD), combined type 2019    Patient is followed by  for ongoing prescription of stimulants.  All refills should be approved by this provider, or covering partner.  Medication(s): Ritalin 10 mg.  Maximum quantity per month: 60 Clinic visit frequency required: Q 3 months   Controlled substance agreement on file: Yes      Date(s): 19 Neuropsych evaluation for ADD completed:  managed by   Last [facility]     Gastroesophageal reflux disease, esophagitis presence not specified 2017     IMO Regulatory Load OCT 2020     History of COVID-19 - 2021     Hydronephrosis 2011     Menorrhagia with irregular cycle 2021    Recurrent menorrhagia after endometrial ablation      Migraine with aura and without status migrainosus, not intractable 2014     Panic disorder without agoraphobia 2006     Primary insomnia 2015     Recurrent major depressive disorder (H) 2018     Secondary dysmenorrhea 2021     Tobacco abuse 2018     Vitamin deficiency 2018      Past Surgical History:   Procedure Laterality Date      SECTION N/A 2000      SECTION N/A 2012    postop hemm with ruptured R Ov vein, transfusion     COLONOSCOPY N/A 2019    Procedure: COLONOSCOPY DIAGNOSTIC WITH RANDOM COLON BIOPSIES ANOSCOPY; Surgeon: Igor Burgos MD;  Location: [facility] OR       COLPOSCOPY CERVIX, BIOPSY CERVIX, ENDOCERVICAL CURETTAGE, COMBINED N/A 2008     DILATION AND CURETTAGE, ABLATE ENDOMETRIUM NOVASURE, COMBINED N/A 2018    [facility] Endometrial ablation     ESOPHAGOSCOPY, GASTROSCOPY, DUODENOSCOPY (EGD), COMBINED  2019    Procedure: ESOPHAGOGASTRODUODENOSCOPY DIAGNOSTIC with biopsies; Surgeon: Igor Burgos MD; Location: [facility] OR       HYSTEROSCOPY DIAGNOSTIC N/A 2018    Hysteroscopy, D&C     LAPAROSCOPIC APPENDECTOMY N/A 2012     OOPHORECTOMY Right 2012    post operative hemorrhage with ruptured ovarian vein,      SALPINGECTOMY Left 2019    Procedure: LAPAROSCOPY LEFT SALPINGECTOMY; Surgeon: Regina Sweeney MD; Location: [facility] OR        Allergies   Allergen Reactions     Dust Mite Extract      Hydrocodone Other (See Comments) and Itching     Skin felt like it was burning.     Penicillins Rash      Social History     Tobacco Use     Smoking status: Current Every Day Smoker     Packs/day: 0.40     Years: 10.00     Pack years: 4.00     Types: Cigarettes     Last attempt to quit: 2020     Years since quittin.3     Smokeless tobacco: Never Used     Tobacco comment: tobacco cessation discuseed - tried to quit: no - passive smoke exposure quitting on own    Substance Use Topics     Alcohol use: No      Wt Readings from Last 1 Encounters:   21 52.2 kg (115 lb)        Anesthesia Evaluation   Pt has had prior anesthetic. Type: General, Regional and MAC.    History of anesthetic complications  - PONV.      ROS/MED HX  ENT/Pulmonary: Comment: Do you have a cough, shortness of breath, or wheezing? YES - post covid         (+) tobacco use, Current use, 1 packs/day,     Neurologic:     (+) migraines,     Cardiovascular:  - neg cardiovascular ROS   (+) -----Previous cardiac testing   Echo: Date: Results:    Stress Test: Date: Results:    ECG Reviewed: Date: 21 Results:  NSR  Cath: Date: Results:       METS/Exercise Tolerance: >4 METS    Hematologic: Comments:  hx of ovarian aneurysm     (+) anemia, history of blood transfusion, no previous transfusion reaction,     Musculoskeletal:  - neg musculoskeletal ROS     GI/Hepatic:     (+) GERD, Asymptomatic on medication,     Renal/Genitourinary: Comment: Hydronephrosis      Endo:     (+) thyroid problem, hypothyroidism,     Psychiatric/Substance Use: Comment: ADHD  Panic disorder    (+) psychiatric history depression, anxiety and other (comment) (ADHD, panic disorder, insomnia) Recreational drug usage: Cannabis.    Infectious Disease:  - neg infectious disease ROS     Malignancy:  - neg malignancy ROS     Other: Comment: Hx ovarian aneurysm           Physical Exam    Airway        Mallampati: I   TM distance: > 3 FB   Neck ROM: full   Mouth opening: > 3 cm    Respiratory Devices and Support         Dental  no notable dental history         Cardiovascular   cardiovascular exam normal       Rhythm and rate: regular and normal     Pulmonary   pulmonary exam normal        breath sounds clear to auscultation           OUTSIDE LABS:  CBC:   Lab Results   Component Value Date    WBC 7.3 06/01/2021    WBC 10.2 04/27/2021    HGB 14.3 06/01/2021    HGB 14.3 04/27/2021    HCT 42.5 06/01/2021    HCT 44.1 04/27/2021     06/01/2021     04/27/2021     BMP:   Lab Results   Component Value Date     06/01/2021     04/27/2021    POTASSIUM 3.9 06/01/2021    POTASSIUM 3.7 04/27/2021    CHLORIDE 101 06/01/2021    CHLORIDE 102 04/27/2021    CO2 29 06/01/2021    CO2 32 04/27/2021    BUN 7 06/01/2021    BUN 12 04/27/2021    CR 0.80 06/01/2021    CR 0.72 04/27/2021    GLC 86 06/01/2021     (H) 04/27/2021     COAGS:   Lab Results   Component Value Date    INR 1.0 07/06/2018     POC:   Lab Results   Component Value Date    HCG Negative 06/01/2021     HEPATIC:   Lab Results   Component Value Date    ALBUMIN 3.8 06/01/2021    PROTTOTAL 7.5 06/01/2021    ALT 19  06/01/2021    AST 25 06/01/2021    ALKPHOS 62 06/01/2021    BILITOTAL 0.4 06/01/2021     OTHER:   Lab Results   Component Value Date    A1C 5.1 06/01/2021    RAÚL 9.3 06/01/2021    LIPASE 352 07/25/2015    TSH 0.42 04/27/2021    T4 0.82 04/27/2021    CRP <2.9 11/13/2017    SED 5 11/13/2017       Anesthesia Plan    ASA Status:  3   NPO Status:  NPO Appropriate    Anesthesia Type: General.     - Airway: ETT   Induction: Intravenous, Propofol.   Maintenance: Balanced.        Consents    Anesthesia Plan(s) and associated risks, benefits, and realistic alternatives discussed. Questions answered and patient/representative(s) expressed understanding.     - Discussed with:  Patient         Postoperative Care    Pain management: IV analgesics.   PONV prophylaxis: Ondansetron (or other 5HT-3), Dexamethasone or Solumedrol, Scopolamine patch     Comments:    Discussed risks and benefits with patient for general anesthesia including sore throat, nausea, vomiting, aspiration, dental damage, loss of airway, CV complications, stroke, MI, death. Pt wishes to proceed.   HP 6-1-21  hcg neg 6-1-21            CARLITO Norton CRNA

## 2021-06-08 ENCOUNTER — TELEPHONE (OUTPATIENT)
Dept: FAMILY MEDICINE | Facility: OTHER | Age: 41
End: 2021-06-08

## 2021-06-08 DIAGNOSIS — U07.1 CLINICAL DIAGNOSIS OF COVID-19: Primary | ICD-10-CM

## 2021-06-08 DIAGNOSIS — R06.02 SOB (SHORTNESS OF BREATH): ICD-10-CM

## 2021-06-08 PROBLEM — N94.6 DYSMENORRHEA: Status: ACTIVE | Noted: 2021-05-13

## 2021-06-08 PROBLEM — N92.0 MENORRHAGIA WITH REGULAR CYCLE: Status: ACTIVE | Noted: 2021-05-13

## 2021-06-08 RX ORDER — ALBUTEROL SULFATE 90 UG/1
2 AEROSOL, METERED RESPIRATORY (INHALATION) EVERY 6 HOURS
Qty: 18 G | Refills: 1 | Status: SHIPPED | OUTPATIENT
Start: 2021-06-08 | End: 2021-10-22

## 2021-06-08 NOTE — TELEPHONE ENCOUNTER
Pt called, reports lingering covid symptoms. Pt tested positive for covid on 5/11/21. Pt reports that she occasionally experiences mild SOB. She is requesting a PRN inhaler. States that the recent heat has made her symptoms worse. Reports productive cough. No fever. Please advise. Thank you!

## 2021-06-08 NOTE — TELEPHONE ENCOUNTER
Rx for albuterol sent to [facility]        Aspirin 81 mg daily unless this is contraindicated for you for a healthcare reason  OTC Zinc per package instruction  Vitamin D3 5000 U daily for 2 weeks  Multiple Vitamin Daily  Insure adequate fluid intake  Get plenty of rest  Monitor often for temp at home, treat with OTC Tylenol, do not take Ibuprofen   Humidity at home   To UC or ER with persistent, worsening, or concerning symptoms  Please reach out to us if you have any questions or concerns regarding your care      Adrienne CARCAMO  [phone]

## 2021-06-11 ENCOUNTER — APPOINTMENT (OUTPATIENT)
Dept: LAB | Facility: HOSPITAL | Age: 41
End: 2021-06-11
Attending: OBSTETRICS & GYNECOLOGY
Payer: COMMERCIAL

## 2021-06-11 ENCOUNTER — HOSPITAL ENCOUNTER (OUTPATIENT)
Facility: HOSPITAL | Age: 41
Discharge: HOME | End: 2021-06-12
Attending: OBSTETRICS & GYNECOLOGY | Admitting: OBSTETRICS & GYNECOLOGY
Payer: COMMERCIAL

## 2021-06-11 ENCOUNTER — ANESTHESIA (OUTPATIENT)
Dept: SURGERY | Facility: HOSPITAL | Age: 41
End: 2021-06-11
Payer: COMMERCIAL

## 2021-06-11 DIAGNOSIS — N92.0 MENORRHAGIA WITH REGULAR CYCLE: ICD-10-CM

## 2021-06-11 DIAGNOSIS — N94.6 DYSMENORRHEA: ICD-10-CM

## 2021-06-11 DIAGNOSIS — N92.1 MENORRHAGIA WITH IRREGULAR CYCLE: ICD-10-CM

## 2021-06-11 LAB
ABO + RH BLD: NORMAL
ABO + RH BLD: NORMAL
BLD GP AB SCN SERPL QL: NORMAL
BLOOD BANK CMNT PATIENT-IMP: NORMAL
HCG UR QL: NEGATIVE
SPECIMEN EXP DATE BLD: NORMAL

## 2021-06-11 PROCEDURE — 250N000011 HC RX IP 250 OP 636: Performed by: OBSTETRICS & GYNECOLOGY

## 2021-06-11 PROCEDURE — 250N000009 HC RX 250: Performed by: NURSE ANESTHETIST, CERTIFIED REGISTERED

## 2021-06-11 PROCEDURE — 58550 LAPARO-ASST VAG HYSTERECTOMY: CPT | Performed by: OBSTETRICS & GYNECOLOGY

## 2021-06-11 PROCEDURE — 88307 TISSUE EXAM BY PATHOLOGIST: CPT | Mod: TC | Performed by: OBSTETRICS & GYNECOLOGY

## 2021-06-11 PROCEDURE — 258N000003 HC RX IP 258 OP 636: Performed by: NURSE ANESTHETIST, CERTIFIED REGISTERED

## 2021-06-11 PROCEDURE — 250N000011 HC RX IP 250 OP 636: Performed by: NURSE ANESTHETIST, CERTIFIED REGISTERED

## 2021-06-11 PROCEDURE — 250N000009 HC RX 250: Performed by: OBSTETRICS & GYNECOLOGY

## 2021-06-11 PROCEDURE — 258N000003 HC RX IP 258 OP 636: Performed by: OBSTETRICS & GYNECOLOGY

## 2021-06-11 PROCEDURE — 360N000076 HC SURGERY LEVEL 3, PER MIN: Performed by: OBSTETRICS & GYNECOLOGY

## 2021-06-11 PROCEDURE — 86850 RBC ANTIBODY SCREEN: CPT | Performed by: OBSTETRICS & GYNECOLOGY

## 2021-06-11 PROCEDURE — 36415 COLL VENOUS BLD VENIPUNCTURE: CPT | Performed by: OBSTETRICS & GYNECOLOGY

## 2021-06-11 PROCEDURE — 272N000001 HC OR GENERAL SUPPLY STERILE: Performed by: OBSTETRICS & GYNECOLOGY

## 2021-06-11 PROCEDURE — 58550 LAPARO-ASST VAG HYSTERECTOMY: CPT | Mod: 80 | Performed by: OBSTETRICS & GYNECOLOGY

## 2021-06-11 PROCEDURE — 94640 AIRWAY INHALATION TREATMENT: CPT

## 2021-06-11 PROCEDURE — 81025 URINE PREGNANCY TEST: CPT | Performed by: NURSE ANESTHETIST, CERTIFIED REGISTERED

## 2021-06-11 PROCEDURE — 86900 BLOOD TYPING SEROLOGIC ABO: CPT | Performed by: OBSTETRICS & GYNECOLOGY

## 2021-06-11 PROCEDURE — 86901 BLOOD TYPING SEROLOGIC RH(D): CPT | Performed by: OBSTETRICS & GYNECOLOGY

## 2021-06-11 PROCEDURE — 58542 LSH W/T/O UT 250 G OR LESS: CPT | Performed by: NURSE ANESTHETIST, CERTIFIED REGISTERED

## 2021-06-11 PROCEDURE — 999N000157 HC STATISTIC RCP TIME EA 10 MIN

## 2021-06-11 PROCEDURE — 64488 TAP BLOCK BI INJECTION: CPT | Mod: XU | Performed by: NURSE ANESTHETIST, CERTIFIED REGISTERED

## 2021-06-11 PROCEDURE — 250N000013 HC RX MED GY IP 250 OP 250 PS 637

## 2021-06-11 PROCEDURE — 250N000009 HC RX 250

## 2021-06-11 PROCEDURE — 370N000017 HC ANESTHESIA TECHNICAL FEE, PER MIN: Performed by: OBSTETRICS & GYNECOLOGY

## 2021-06-11 PROCEDURE — 999N000141 HC STATISTIC PRE-PROCEDURE NURSING ASSESSMENT: Performed by: OBSTETRICS & GYNECOLOGY

## 2021-06-11 PROCEDURE — 250N000013 HC RX MED GY IP 250 OP 250 PS 637: Performed by: OBSTETRICS & GYNECOLOGY

## 2021-06-11 PROCEDURE — 710N000010 HC RECOVERY PHASE 1, LEVEL 2, PER MIN: Performed by: OBSTETRICS & GYNECOLOGY

## 2021-06-11 RX ORDER — DEXAMETHASONE SODIUM PHOSPHATE 10 MG/ML
INJECTION, SOLUTION INTRAMUSCULAR; INTRAVENOUS
Status: DISCONTINUED | OUTPATIENT
Start: 2021-06-11 | End: 2021-06-11

## 2021-06-11 RX ORDER — GLYCOPYRROLATE 0.2 MG/ML
INJECTION, SOLUTION INTRAMUSCULAR; INTRAVENOUS PRN
Status: DISCONTINUED | OUTPATIENT
Start: 2021-06-11 | End: 2021-06-11

## 2021-06-11 RX ORDER — ACETAMINOPHEN 325 MG/1
975 TABLET ORAL ONCE
Status: COMPLETED | OUTPATIENT
Start: 2021-06-11 | End: 2021-06-11

## 2021-06-11 RX ORDER — LORAZEPAM 0.5 MG/1
0.5 TABLET ORAL DAILY PRN
Status: DISCONTINUED | OUTPATIENT
Start: 2021-06-11 | End: 2021-06-12 | Stop reason: HOSPADM

## 2021-06-11 RX ORDER — ONDANSETRON 4 MG/1
4 TABLET, ORALLY DISINTEGRATING ORAL EVERY 30 MIN PRN
Status: DISCONTINUED | OUTPATIENT
Start: 2021-06-11 | End: 2021-06-11

## 2021-06-11 RX ORDER — NALOXONE HYDROCHLORIDE 0.4 MG/ML
0.4 INJECTION, SOLUTION INTRAMUSCULAR; INTRAVENOUS; SUBCUTANEOUS
Status: ACTIVE | OUTPATIENT
Start: 2021-06-11 | End: 2021-06-12

## 2021-06-11 RX ORDER — FENTANYL CITRATE 50 UG/ML
25-50 INJECTION, SOLUTION INTRAMUSCULAR; INTRAVENOUS
Status: DISCONTINUED | OUTPATIENT
Start: 2021-06-11 | End: 2021-06-11 | Stop reason: HOSPADM

## 2021-06-11 RX ORDER — PROPRANOLOL HYDROCHLORIDE 80 MG/1
80 CAPSULE, EXTENDED RELEASE ORAL DAILY
Status: DISCONTINUED | OUTPATIENT
Start: 2021-06-11 | End: 2021-06-12 | Stop reason: HOSPADM

## 2021-06-11 RX ORDER — ONDANSETRON 2 MG/ML
4 INJECTION INTRAMUSCULAR; INTRAVENOUS EVERY 6 HOURS PRN
Status: DISCONTINUED | OUTPATIENT
Start: 2021-06-11 | End: 2021-06-12 | Stop reason: HOSPADM

## 2021-06-11 RX ORDER — MAGNESIUM SULFATE HEPTAHYDRATE 40 MG/ML
4 INJECTION, SOLUTION INTRAVENOUS ONCE
Status: COMPLETED | OUTPATIENT
Start: 2021-06-11 | End: 2021-06-11

## 2021-06-11 RX ORDER — PROPOFOL 10 MG/ML
INJECTION, EMULSION INTRAVENOUS CONTINUOUS PRN
Status: DISCONTINUED | OUTPATIENT
Start: 2021-06-11 | End: 2021-06-11

## 2021-06-11 RX ORDER — METHYLPHENIDATE HYDROCHLORIDE 20 MG/1
1 CAPSULE, EXTENDED RELEASE ORAL DAILY
Status: DISCONTINUED | OUTPATIENT
Start: 2021-06-11 | End: 2021-06-12 | Stop reason: HOSPADM

## 2021-06-11 RX ORDER — KETOROLAC TROMETHAMINE 15 MG/ML
15 INJECTION, SOLUTION INTRAMUSCULAR; INTRAVENOUS EVERY 6 HOURS
Status: DISCONTINUED | OUTPATIENT
Start: 2021-06-11 | End: 2021-06-12 | Stop reason: HOSPADM

## 2021-06-11 RX ORDER — ACETAMINOPHEN 325 MG/1
TABLET ORAL
Status: COMPLETED
Start: 2021-06-11 | End: 2021-06-11

## 2021-06-11 RX ORDER — OXYCODONE HYDROCHLORIDE 5 MG/1
5 TABLET ORAL EVERY 4 HOURS PRN
Status: DISCONTINUED | OUTPATIENT
Start: 2021-06-11 | End: 2021-06-12 | Stop reason: HOSPADM

## 2021-06-11 RX ORDER — ACETAMINOPHEN 325 MG/1
975 TABLET ORAL EVERY 6 HOURS PRN
Qty: 50 TABLET | Refills: 0 | Status: ON HOLD | OUTPATIENT
Start: 2021-06-11 | End: 2021-09-23

## 2021-06-11 RX ORDER — NEOSTIGMINE METHYLSULFATE 1 MG/ML
VIAL (ML) INJECTION PRN
Status: DISCONTINUED | OUTPATIENT
Start: 2021-06-11 | End: 2021-06-11

## 2021-06-11 RX ORDER — DEXAMETHASONE SODIUM PHOSPHATE 10 MG/ML
INJECTION, SOLUTION INTRAMUSCULAR; INTRAVENOUS PRN
Status: DISCONTINUED | OUTPATIENT
Start: 2021-06-11 | End: 2021-06-11

## 2021-06-11 RX ORDER — LIDOCAINE HYDROCHLORIDE 20 MG/ML
JELLY TOPICAL
Status: DISCONTINUED
Start: 2021-06-11 | End: 2021-06-11 | Stop reason: WASHOUT

## 2021-06-11 RX ORDER — FENTANYL CITRATE 50 UG/ML
INJECTION, SOLUTION INTRAMUSCULAR; INTRAVENOUS PRN
Status: DISCONTINUED | OUTPATIENT
Start: 2021-06-11 | End: 2021-06-11

## 2021-06-11 RX ORDER — MEPERIDINE HYDROCHLORIDE 25 MG/ML
12.5 INJECTION INTRAMUSCULAR; INTRAVENOUS; SUBCUTANEOUS
Status: DISCONTINUED | OUTPATIENT
Start: 2021-06-11 | End: 2021-06-11

## 2021-06-11 RX ORDER — ACETAMINOPHEN 325 MG/1
650 TABLET ORAL EVERY 6 HOURS
Status: DISCONTINUED | OUTPATIENT
Start: 2021-06-14 | End: 2021-06-12 | Stop reason: HOSPADM

## 2021-06-11 RX ORDER — HYDROXYZINE HYDROCHLORIDE 25 MG/1
25 TABLET, FILM COATED ORAL EVERY 6 HOURS PRN
Status: DISCONTINUED | OUTPATIENT
Start: 2021-06-11 | End: 2021-06-12 | Stop reason: HOSPADM

## 2021-06-11 RX ORDER — PROCHLORPERAZINE MALEATE 5 MG
10 TABLET ORAL EVERY 6 HOURS PRN
Status: DISCONTINUED | OUTPATIENT
Start: 2021-06-11 | End: 2021-06-12 | Stop reason: HOSPADM

## 2021-06-11 RX ORDER — FAMOTIDINE 20 MG/1
20 TABLET, FILM COATED ORAL 2 TIMES DAILY
Status: DISCONTINUED | OUTPATIENT
Start: 2021-06-11 | End: 2021-06-12 | Stop reason: HOSPADM

## 2021-06-11 RX ORDER — AMOXICILLIN 250 MG
1-2 CAPSULE ORAL 2 TIMES DAILY
Qty: 30 TABLET | Refills: 0 | Status: SHIPPED | OUTPATIENT
Start: 2021-06-11 | End: 2021-06-24

## 2021-06-11 RX ORDER — NALOXONE HYDROCHLORIDE 0.4 MG/ML
0.2 INJECTION, SOLUTION INTRAMUSCULAR; INTRAVENOUS; SUBCUTANEOUS
Status: ACTIVE | OUTPATIENT
Start: 2021-06-11 | End: 2021-06-12

## 2021-06-11 RX ORDER — ROPIVACAINE HYDROCHLORIDE 2 MG/ML
INJECTION, SOLUTION EPIDURAL; INFILTRATION; PERINEURAL
Status: DISCONTINUED | OUTPATIENT
Start: 2021-06-11 | End: 2021-06-11

## 2021-06-11 RX ORDER — HYDROMORPHONE HYDROCHLORIDE 1 MG/ML
.3-.5 INJECTION, SOLUTION INTRAMUSCULAR; INTRAVENOUS; SUBCUTANEOUS EVERY 10 MIN PRN
Status: DISCONTINUED | OUTPATIENT
Start: 2021-06-11 | End: 2021-06-11

## 2021-06-11 RX ORDER — ACETAMINOPHEN 325 MG/1
975 TABLET ORAL ONCE
Status: DISCONTINUED | OUTPATIENT
Start: 2021-06-11 | End: 2021-06-11

## 2021-06-11 RX ORDER — BUPIVACAINE HYDROCHLORIDE 2.5 MG/ML
INJECTION, SOLUTION EPIDURAL; INFILTRATION; INTRACAUDAL
Status: DISCONTINUED
Start: 2021-06-11 | End: 2021-06-11 | Stop reason: WASHOUT

## 2021-06-11 RX ORDER — ONDANSETRON 2 MG/ML
4 INJECTION INTRAMUSCULAR; INTRAVENOUS EVERY 30 MIN PRN
Status: DISCONTINUED | OUTPATIENT
Start: 2021-06-11 | End: 2021-06-11

## 2021-06-11 RX ORDER — HYDROMORPHONE HCL IN WATER/PF 6 MG/30 ML
0.4 PATIENT CONTROLLED ANALGESIA SYRINGE INTRAVENOUS
Status: DISCONTINUED | OUTPATIENT
Start: 2021-06-11 | End: 2021-06-12 | Stop reason: HOSPADM

## 2021-06-11 RX ORDER — SODIUM CHLORIDE, SODIUM LACTATE, POTASSIUM CHLORIDE, CALCIUM CHLORIDE 600; 310; 30; 20 MG/100ML; MG/100ML; MG/100ML; MG/100ML
INJECTION, SOLUTION INTRAVENOUS CONTINUOUS
Status: DISCONTINUED | OUTPATIENT
Start: 2021-06-11 | End: 2021-06-12 | Stop reason: HOSPADM

## 2021-06-11 RX ORDER — SCOLOPAMINE TRANSDERMAL SYSTEM 1 MG/1
1 PATCH, EXTENDED RELEASE TRANSDERMAL ONCE
Status: COMPLETED | OUTPATIENT
Start: 2021-06-11 | End: 2021-06-12

## 2021-06-11 RX ORDER — CLINDAMYCIN PHOSPHATE 900 MG/50ML
900 INJECTION, SOLUTION INTRAVENOUS SEE ADMIN INSTRUCTIONS
Status: DISCONTINUED | OUTPATIENT
Start: 2021-06-11 | End: 2021-06-11 | Stop reason: HOSPADM

## 2021-06-11 RX ORDER — BISACODYL 10 MG
10 SUPPOSITORY, RECTAL RECTAL DAILY PRN
Status: DISCONTINUED | OUTPATIENT
Start: 2021-06-11 | End: 2021-06-12 | Stop reason: HOSPADM

## 2021-06-11 RX ORDER — ALBUTEROL SULFATE 0.83 MG/ML
2.5 SOLUTION RESPIRATORY (INHALATION) EVERY 4 HOURS PRN
Status: DISCONTINUED | OUTPATIENT
Start: 2021-06-11 | End: 2021-06-11 | Stop reason: HOSPADM

## 2021-06-11 RX ORDER — HYDROMORPHONE HCL IN WATER/PF 6 MG/30 ML
0.2 PATIENT CONTROLLED ANALGESIA SYRINGE INTRAVENOUS
Status: DISCONTINUED | OUTPATIENT
Start: 2021-06-11 | End: 2021-06-12 | Stop reason: HOSPADM

## 2021-06-11 RX ORDER — LIDOCAINE 40 MG/G
CREAM TOPICAL
Status: DISCONTINUED | OUTPATIENT
Start: 2021-06-11 | End: 2021-06-12 | Stop reason: HOSPADM

## 2021-06-11 RX ORDER — ALBUTEROL SULFATE 90 UG/1
2 AEROSOL, METERED RESPIRATORY (INHALATION) EVERY 6 HOURS
Status: DISCONTINUED | OUTPATIENT
Start: 2021-06-11 | End: 2021-06-11

## 2021-06-11 RX ORDER — SODIUM CHLORIDE 9 MG/ML
INJECTION, SOLUTION INTRAVENOUS CONTINUOUS
Status: DISCONTINUED | OUTPATIENT
Start: 2021-06-11 | End: 2021-06-12 | Stop reason: HOSPADM

## 2021-06-11 RX ORDER — SCOLOPAMINE TRANSDERMAL SYSTEM 1 MG/1
PATCH, EXTENDED RELEASE TRANSDERMAL
Status: DISCONTINUED
Start: 2021-06-11 | End: 2021-06-11 | Stop reason: HOSPADM

## 2021-06-11 RX ORDER — OXYCODONE HYDROCHLORIDE 5 MG/1
5-10 TABLET ORAL EVERY 4 HOURS PRN
Qty: 26 TABLET | Refills: 0 | Status: SHIPPED | OUTPATIENT
Start: 2021-06-11 | End: 2021-06-24

## 2021-06-11 RX ORDER — PROPOFOL 10 MG/ML
INJECTION, EMULSION INTRAVENOUS PRN
Status: DISCONTINUED | OUTPATIENT
Start: 2021-06-11 | End: 2021-06-11

## 2021-06-11 RX ORDER — ALBUTEROL SULFATE 90 UG/1
2 AEROSOL, METERED RESPIRATORY (INHALATION) EVERY 6 HOURS PRN
Status: DISCONTINUED | OUTPATIENT
Start: 2021-06-11 | End: 2021-06-12 | Stop reason: HOSPADM

## 2021-06-11 RX ORDER — BACITRACIN ZINC 500 [USP'U]/G
OINTMENT TOPICAL
Status: DISCONTINUED
Start: 2021-06-11 | End: 2021-06-11 | Stop reason: WASHOUT

## 2021-06-11 RX ORDER — ONDANSETRON 2 MG/ML
INJECTION INTRAMUSCULAR; INTRAVENOUS PRN
Status: DISCONTINUED | OUTPATIENT
Start: 2021-06-11 | End: 2021-06-11

## 2021-06-11 RX ORDER — LIDOCAINE HYDROCHLORIDE 20 MG/ML
INJECTION, SOLUTION INFILTRATION; PERINEURAL PRN
Status: DISCONTINUED | OUTPATIENT
Start: 2021-06-11 | End: 2021-06-11

## 2021-06-11 RX ORDER — SODIUM CHLORIDE, SODIUM LACTATE, POTASSIUM CHLORIDE, CALCIUM CHLORIDE 600; 310; 30; 20 MG/100ML; MG/100ML; MG/100ML; MG/100ML
INJECTION, SOLUTION INTRAVENOUS CONTINUOUS
Status: DISCONTINUED | OUTPATIENT
Start: 2021-06-11 | End: 2021-06-11 | Stop reason: HOSPADM

## 2021-06-11 RX ORDER — IBUPROFEN 800 MG/1
800 TABLET, FILM COATED ORAL EVERY 6 HOURS PRN
Qty: 30 TABLET | Refills: 0 | Status: ON HOLD | OUTPATIENT
Start: 2021-06-11 | End: 2021-09-23

## 2021-06-11 RX ORDER — DULOXETIN HYDROCHLORIDE 60 MG/1
60 CAPSULE, DELAYED RELEASE ORAL DAILY
Status: DISCONTINUED | OUTPATIENT
Start: 2021-06-12 | End: 2021-06-12 | Stop reason: HOSPADM

## 2021-06-11 RX ORDER — ONDANSETRON 4 MG/1
4 TABLET, ORALLY DISINTEGRATING ORAL EVERY 6 HOURS PRN
Status: DISCONTINUED | OUTPATIENT
Start: 2021-06-11 | End: 2021-06-12 | Stop reason: HOSPADM

## 2021-06-11 RX ORDER — AMOXICILLIN 250 MG
1 CAPSULE ORAL 2 TIMES DAILY
Status: DISCONTINUED | OUTPATIENT
Start: 2021-06-11 | End: 2021-06-12 | Stop reason: HOSPADM

## 2021-06-11 RX ORDER — SODIUM CHLORIDE, SODIUM LACTATE, POTASSIUM CHLORIDE, CALCIUM CHLORIDE 600; 310; 30; 20 MG/100ML; MG/100ML; MG/100ML; MG/100ML
INJECTION, SOLUTION INTRAVENOUS CONTINUOUS PRN
Status: DISCONTINUED | OUTPATIENT
Start: 2021-06-11 | End: 2021-06-11

## 2021-06-11 RX ORDER — CLINDAMYCIN PHOSPHATE 900 MG/50ML
900 INJECTION, SOLUTION INTRAVENOUS
Status: COMPLETED | OUTPATIENT
Start: 2021-06-11 | End: 2021-06-11

## 2021-06-11 RX ORDER — ACETAMINOPHEN 325 MG/1
975 TABLET ORAL EVERY 6 HOURS
Status: DISCONTINUED | OUTPATIENT
Start: 2021-06-11 | End: 2021-06-12 | Stop reason: HOSPADM

## 2021-06-11 RX ORDER — OXYCODONE HYDROCHLORIDE 5 MG/1
10 TABLET ORAL EVERY 4 HOURS PRN
Status: DISCONTINUED | OUTPATIENT
Start: 2021-06-11 | End: 2021-06-12 | Stop reason: HOSPADM

## 2021-06-11 RX ORDER — KETAMINE HYDROCHLORIDE 10 MG/ML
INJECTION INTRAMUSCULAR; INTRAVENOUS PRN
Status: DISCONTINUED | OUTPATIENT
Start: 2021-06-11 | End: 2021-06-11

## 2021-06-11 RX ORDER — TRANEXAMIC ACID 10 MG/ML
1 INJECTION, SOLUTION INTRAVENOUS ONCE
Status: COMPLETED | OUTPATIENT
Start: 2021-06-11 | End: 2021-06-11

## 2021-06-11 RX ADMIN — SODIUM CHLORIDE, POTASSIUM CHLORIDE, SODIUM LACTATE AND CALCIUM CHLORIDE: 600; 310; 30; 20 INJECTION, SOLUTION INTRAVENOUS at 07:30

## 2021-06-11 RX ADMIN — PROPOFOL 200 MG: 10 INJECTION, EMULSION INTRAVENOUS at 08:20

## 2021-06-11 RX ADMIN — LIDOCAINE HYDROCHLORIDE 40 MG: 20 INJECTION, SOLUTION INFILTRATION; PERINEURAL at 08:20

## 2021-06-11 RX ADMIN — HYDROMORPHONE HYDROCHLORIDE 0.4 MG: 0.2 INJECTION, SOLUTION INTRAMUSCULAR; INTRAVENOUS; SUBCUTANEOUS at 21:24

## 2021-06-11 RX ADMIN — KETOROLAC TROMETHAMINE 15 MG: 15 INJECTION, SOLUTION INTRAMUSCULAR; INTRAVENOUS at 21:33

## 2021-06-11 RX ADMIN — FENTANYL CITRATE 50 MCG: 50 INJECTION, SOLUTION INTRAMUSCULAR; INTRAVENOUS at 09:40

## 2021-06-11 RX ADMIN — DEXAMETHASONE SODIUM PHOSPHATE 10 MG: 10 INJECTION, SOLUTION INTRAMUSCULAR; INTRAVENOUS at 11:39

## 2021-06-11 RX ADMIN — FAMOTIDINE 20 MG: 20 TABLET, FILM COATED ORAL at 21:28

## 2021-06-11 RX ADMIN — SCOPALAMINE 1 PATCH: 1 PATCH, EXTENDED RELEASE TRANSDERMAL at 08:05

## 2021-06-11 RX ADMIN — ACETAMINOPHEN 975 MG: 325 TABLET, FILM COATED ORAL at 08:04

## 2021-06-11 RX ADMIN — FENTANYL CITRATE 50 MCG: 50 INJECTION, SOLUTION INTRAMUSCULAR; INTRAVENOUS at 10:01

## 2021-06-11 RX ADMIN — SCOLOPAMINE TRANSDERMAL SYSTEM 1 PATCH: 1 PATCH, EXTENDED RELEASE TRANSDERMAL at 08:05

## 2021-06-11 RX ADMIN — ACETAMINOPHEN 975 MG: 325 TABLET, FILM COATED ORAL at 13:40

## 2021-06-11 RX ADMIN — FAMOTIDINE 20 MG: 20 TABLET, FILM COATED ORAL at 13:42

## 2021-06-11 RX ADMIN — OXYCODONE HYDROCHLORIDE 10 MG: 5 TABLET ORAL at 18:01

## 2021-06-11 RX ADMIN — DEXAMETHASONE SODIUM PHOSPHATE 4 MG: 10 INJECTION, SOLUTION INTRAMUSCULAR; INTRAVENOUS at 09:55

## 2021-06-11 RX ADMIN — PROCHLORPERAZINE EDISYLATE 10 MG: 5 INJECTION INTRAMUSCULAR; INTRAVENOUS at 11:14

## 2021-06-11 RX ADMIN — GENTAMICIN SULFATE 260 MG: 40 INJECTION, SOLUTION INTRAMUSCULAR; INTRAVENOUS at 08:30

## 2021-06-11 RX ADMIN — ROPIVACAINE HYDROCHLORIDE 50 ML: 2 INJECTION, SOLUTION EPIDURAL; INFILTRATION at 11:39

## 2021-06-11 RX ADMIN — KETAMINE HYDROCHLORIDE 30 MG: 10 INJECTION, SOLUTION INTRAMUSCULAR; INTRAVENOUS at 08:22

## 2021-06-11 RX ADMIN — ACETAMINOPHEN 975 MG: 325 TABLET, FILM COATED ORAL at 21:28

## 2021-06-11 RX ADMIN — KETOROLAC TROMETHAMINE 15 MG: 15 INJECTION, SOLUTION INTRAMUSCULAR; INTRAVENOUS at 13:43

## 2021-06-11 RX ADMIN — Medication 100 MG: at 08:21

## 2021-06-11 RX ADMIN — DOCUSATE SODIUM AND SENNOSIDES 1 TABLET: 8.6; 5 TABLET, FILM COATED ORAL at 13:41

## 2021-06-11 RX ADMIN — FENTANYL CITRATE 100 MCG: 50 INJECTION, SOLUTION INTRAMUSCULAR; INTRAVENOUS at 08:14

## 2021-06-11 RX ADMIN — OXYCODONE HYDROCHLORIDE 10 MG: 5 TABLET ORAL at 23:38

## 2021-06-11 RX ADMIN — SODIUM CHLORIDE, POTASSIUM CHLORIDE, SODIUM LACTATE AND CALCIUM CHLORIDE: 600; 310; 30; 20 INJECTION, SOLUTION INTRAVENOUS at 12:57

## 2021-06-11 RX ADMIN — ACETAMINOPHEN 975 MG: 325 TABLET ORAL at 08:04

## 2021-06-11 RX ADMIN — Medication 2 MG: at 09:50

## 2021-06-11 RX ADMIN — ONDANSETRON 4 MG: 2 INJECTION INTRAMUSCULAR; INTRAVENOUS at 10:49

## 2021-06-11 RX ADMIN — TRANEXAMIC ACID 1 G: 10 INJECTION, SOLUTION INTRAVENOUS at 08:25

## 2021-06-11 RX ADMIN — MIDAZOLAM 2 MG: 1 INJECTION INTRAMUSCULAR; INTRAVENOUS at 08:12

## 2021-06-11 RX ADMIN — GLYCOPYRROLATE 0.2 MG: 0.2 INJECTION, SOLUTION INTRAMUSCULAR; INTRAVENOUS at 09:50

## 2021-06-11 RX ADMIN — ALBUTEROL SULFATE 2 PUFF: 90 AEROSOL, METERED RESPIRATORY (INHALATION) at 15:54

## 2021-06-11 RX ADMIN — DOCUSATE SODIUM AND SENNOSIDES 1 TABLET: 8.6; 5 TABLET, FILM COATED ORAL at 21:36

## 2021-06-11 RX ADMIN — FENTANYL CITRATE 50 MCG: 50 INJECTION, SOLUTION INTRAMUSCULAR; INTRAVENOUS at 08:43

## 2021-06-11 RX ADMIN — SODIUM CHLORIDE: 9 INJECTION, SOLUTION INTRAVENOUS at 14:55

## 2021-06-11 RX ADMIN — ONDANSETRON 4 MG: 2 INJECTION INTRAMUSCULAR; INTRAVENOUS at 10:01

## 2021-06-11 RX ADMIN — PROPOFOL 200 MCG/KG/MIN: 10 INJECTION, EMULSION INTRAVENOUS at 08:21

## 2021-06-11 RX ADMIN — FENTANYL CITRATE 50 MCG: 50 INJECTION, SOLUTION INTRAMUSCULAR; INTRAVENOUS at 10:22

## 2021-06-11 RX ADMIN — FENTANYL CITRATE 50 MCG: 50 INJECTION, SOLUTION INTRAMUSCULAR; INTRAVENOUS at 10:57

## 2021-06-11 RX ADMIN — MAGNESIUM SULFATE IN WATER 4 G: 40 INJECTION, SOLUTION INTRAVENOUS at 08:05

## 2021-06-11 RX ADMIN — LORAZEPAM 0.5 MG: 0.5 TABLET ORAL at 21:52

## 2021-06-11 RX ADMIN — CLINDAMYCIN PHOSPHATE 900 MG: 900 INJECTION, SOLUTION INTRAVENOUS at 08:03

## 2021-06-11 RX ADMIN — ROCURONIUM BROMIDE 30 MG: 10 INJECTION INTRAVENOUS at 08:35

## 2021-06-11 RX ADMIN — OXYCODONE HYDROCHLORIDE 5 MG: 5 TABLET ORAL at 13:40

## 2021-06-11 ASSESSMENT — MIFFLIN-ST. JEOR: SCORE: 1176.17

## 2021-06-11 NOTE — INTERVAL H&P NOTE
I have reviewed the surgical (or preoperative) H&P that is linked to this encounter, and examined the patient. There are no significant changes

## 2021-06-11 NOTE — ANESTHESIA POSTPROCEDURE EVALUATION
Patient: Nasra Lovelace    Procedure(s):  laparoscopic supracervical hysterectomy    Diagnosis:Dysmenorrhea [N94.6]  Menorrhagia with regular cycle [N92.0]  Diagnosis Additional Information: No value filed.    Anesthesia Type:  General    Note:  Disposition: Outpatient   Postop Pain Control: Uneventful            Sign Out: Well controlled pain   PONV: No   Neuro/Psych: Uneventful            Sign Out: Acceptable/Baseline neuro status   Airway/Respiratory: Uneventful            Sign Out: Acceptable/Baseline resp. status   CV/Hemodynamics: Uneventful            Sign Out: Acceptable CV status; No obvious hypovolemia; No obvious fluid overload   Other NRE: NONE   DID A NON-ROUTINE EVENT OCCUR? No           Last vitals:  Vitals:    06/11/21 1210 06/11/21 1239 06/11/21 1248   BP: 114/71 110/60 112/72   Pulse: 77 75 68   Resp: 15 18    Temp: 97.5  F (36.4  C) 97.1  F (36.2  C)    SpO2: 97% 98% 96%       Last vitals prior to Anesthesia Care Transfer:  CRNA VITALS  6/11/2021 0933 - 6/11/2021 1033      6/11/2021             Resp Rate (observed):  14          Electronically Signed By: CARLITO Barnhart CRNA  June 11, 2021  12:50 PM

## 2021-06-11 NOTE — OR NURSING
Pre op Meds  Mag tylenol and scopalomine not available to give. Pharmacy did not have in [facility]. Call pharmacy.

## 2021-06-11 NOTE — PLAN OF CARE
Prior to Admission Medication Reconciliation:     Medications added:   [x] None  [] As listed below:    Medications deleted:   [x] None  [] As listed below:    Medications marked for review/removal by attending:  [x] None  [] As listed below:    Changes made to existing medications:   [x] None  [] As listed below:    Last times/dates taken verified with patient:  [] Yes- completed myself  [] Prepared PTA medlist for review only. (will not be available to review personally)  [x] Did not review with patient. Rx verification only. Review completed by nursing.    [] Nurse completed no changes made (double checked entries)  [] Unable to review with patient at this time:  [] Nurse completed/changes made:     Allergies listed at another location:  []Not applicable   []See below:    Allergy review:    [x]Did not review: reviewed by nursing  []Did not review: pt unable at this time  []Patient/MAR verified NKDA  []Patient/MAR verified current existing allergies: no changes made    Medication reconciliation sources:   []Patient  []Patient family member/emergency contact: **  [x][facility] Report Review: none  []Epic Chart Review  [x]Care Everywhere review: too dated  []Pharmacy med list: **  []Pharmacy phone call  [x]Outside meds dispense report: see below  []Nursing home or Assisted Living MAR:  []Other: **    Pharmacy desired at discharge: [facility]    Is patient on coumadin?  [x]No    Requests for consultation by provider or pharmacist:   [] Patient understands why all of their meds were prescribed and how to take them. No questions.   [] Managing party has no questions.   [] Patient/ managing party has questions about the following:  [x] Did not review with patient. Cannot assess.     Fill dates and reported compliancy:  [x] Fill dates coincide with compliancy for all/most maintenance meds.   [] Fill dates do not coincide with compliancy with maintenance meds. See notes in PTA medlist and in comments.    [] Fill dates do  not coincide with the following medications but pt reports compliancy:  [x] Did not review with patient. Cannot assess.     Historian accuracy:  [] Excellent- alert and oriented, understands why meds were prescribed and how to take, able to answer specifics  [] Good- alert and oriented, understands why meds were prescribed and how to take, some confusion   [] Fair- alert and oriented, doesn't know medications without list, cannot answer specifics about medications, but has a decent process for which to take at home  [] Poor- does not know medications, may not have a process to take at home, may be cognitively unable to review at this time  []Medication management done by family member or facility, no concerns about historian accuracy.   [x] Did not review with patient. Cannot assess.     Medication Management:  [] Manages meds independently  [] Family member/ other party manages meds:  [] Meds managed by staff at facility  [] Meds set up by home care, family/other party helps administer  [] Meds set up by home care, self administers  [x] Did not review with patient. Cannot assess.     Other medications aside from PTA:  [] Denies taking any medications aside from those listed in PTA meds  [] Reports taking another medication(s) but cannot recall the name(s)  [] Refuses to say.  [x] Did not review with patient. Cannot assess.     Comments: no issues with PTA med review completed by nursing. Will not be speaking with patient unless otherwise requested.    Chanel Murray on 6/11/2021 at 2:11 PM       Discrepancies: [x] No []Not Applicable []Yes: listed below    Time spent on medication reconciliation:   [x]5-20 mins  []20-40 mins  []> 40 mins    Issues completing PTA medication reconciliation:  [] On hold for a long time  [] Waited for a call back  [] Fax didn't come through  [] Fax took a long time  [] Other:    Notifying appropriate party of changes/additions/discrepancies:  [x]No pertinent changes made, notification  not necessary.   [] Notified attending provider via text page/phone call  [] Notified attending provider in person  [] Notified pharmacy  [] Notified nurse  [] Attending provider not available, left detailed notes  [] Pt is not admitted to floor yet, PTA meds completed before admission.     Medications Prior to Admission   Medication Sig Dispense Refill Last Dose     albuterol (PROAIR HFA/PROVENTIL HFA/VENTOLIN HFA) 108 (90 Base) MCG/ACT inhaler Inhale 2 puffs into the lungs every 6 hours 18 g 1 6/10/2021 at 2200     co-enzyme Q-10 100 MG CAPS capsule Take 100 mg by mouth daily   6/10/2021 at 0900     DULoxetine (CYMBALTA) 60 MG capsule TAKE 1 CAPSULE (60 MG) BY MOUTH DAILY 30 capsule 11 6/11/2021 at 0600     LORazepam (ATIVAN) 0.5 MG tablet TAKE 1 TABLET (0.5 MG) BY MOUTH DAILY AS NEEDED FOR ANXIETY 30 tablet 1 6/9/2021 at 1600     magnesium oxide (MAG-OX) 400 MG tablet TAKE 2 TABLETS BY MOUTH IN THE EVENING. 60 tablet 0 6/10/2021 at 2200     omeprazole (PRILOSEC) 40 MG DR capsule TAKE 1 CAPSULE BY MOUTH ONCE DAILY 30-60 MINUTES BEFORE A MEAL. 30 capsule 3 6/11/2021 at 0600     progesterone (PROMETRIUM) 100 MG capsule Take 1 capsule (100 mg) by mouth 2 times daily (with meals) 60 capsule 11 6/10/2021 at 0600     propranolol ER (INDERAL LA) 80 MG 24 hr capsule TAKE 1 CAPSULE (80 MG) BY MOUTH DAILY 30 capsule 1 6/10/2021 at 0900     RITALIN LA 20 MG 24 hr capsule Take 1 capsule by mouth daily 30 capsule 0 6/10/2021 at 0900     temazepam (RESTORIL) 15 MG capsule Take 1 - 2 caps evening 60 capsule 3 6/10/2021 at 2200     naproxen (NAPROSYN) 500 MG tablet Take 1 tablet (500 mg) by mouth 2 times daily (with meals) Take with a full meal, begin with onset menstrual symptoms 20 tablet 4 Unknown at Unknown time       Medication Dispense History (from 6/11/2020 to 6/11/2021)  Expand All  Collapse All  Albuterol Sulfate   Dispensed Days Supply Quantity Provider Pharmacy   ALBUTEROL SULFATE  ( 06/08/2021 25 18 g  LEON FELICIANO M...         DULoxetine HCl   Dispensed Days Supply Quantity Provider Pharmacy   DULOXETINE HCL 60 MG CPEP 05/21/2021 30 30 Units TOM CAN M...   DULOXETINE HCL 60 MG CPEP 04/26/2021 30 30 Units TOM CAN, M...   DULoxetine HCl 60 MG Cpep 03/29/2021 30 30 Units TOM CAN, M...   DULoxetine HCl 60 MG Cpep 03/01/2021 30 30 Units TOM CAN M...   DULOXETINE HCL 60 MG CAP 01/29/2021 30 30 Units TOM CAN, M...   DULOXETINE HCL 60 MG CPEP 12/28/2020 30 30 Units TOM CAN, M...   DULOXETINE HCL 60 MG CAP 11/23/2020 30 30 Units TOM CAN, M...   DULOXETINE HCL 60 MG CAP 10/23/2020 30 30 Units TOM CAN, M...   DULOXETINE HCL 60 MG CPEP 09/14/2020 30 30 each TOM CAN, M...   DULOXETINE HCL 60 MG CPEP 08/20/2020 30 30 each TOM CAN M...   DULOXETINE HCL DR 60 MG CAP 07/24/2020 30 30 capsule TOM CAN M...   DULOXETINE HCL DR 60 MG CAP 06/25/2020 30 30 capsule TOM CAN M...         LORazepam   Dispensed Days Supply Quantity Provider Pharmacy   LORAZEPAM 0.5 MG TABLET 06/07/2021 30 30 Units TOM CAN M...   LORAZEPAM 0.5 MG TABLET 05/11/2021 30 30 Units TOM CAN M...   LORAZEPAM 0.5 MG TABLET 04/12/2021 30 30 Units TOM CAN M...         Magnesium Oxide   Dispensed Days Supply Quantity Provider Pharmacy   Magnesium Oxide 400 MG Tabs 03/29/2021 30 60 Units TOM CAN M...   Magnesium Oxide 400 MG Tabs 03/02/2021 30 60 Units TOM CAN M...   MAGNESIUM OXIDE 400 MG TAB 01/29/2021 30 60 Units LEON FELICIANO M...   MAGNESIUM OXIDE 400  MG TAB 11/09/2020 30 60 Units MIKE FERNANDEZ MD, M...   MAGNESIUM OXIDE 400 MG TAB 10/12/2020 30 60 Units MIKE FERNANDEZ MD, M...   MAGNESIUM OXIDE 400 MG TAB 08/20/2020 30 60 each MIKE FERNANDEZ MD, M...   MAGNESIUM OXIDE 400 MG TABLET 07/24/2020 30 60 tablet MIKE FERNANDEZ MD, M...   MAGNESIUM OXIDE 400 MG TABLET 06/25/2020 30 60 tablet MIKE FERNANDEZ MD, M...         Methylphenidate HCl   Dispensed Days Supply Quantity Provider Pharmacy   RITALIN LA 20 MG CAPSULE 05/21/2021 30 30 Units TOM CAN M...   Ritalin LA 20 MG Cp24 03/26/2021 30 30 Units TOM CAN M...   Ritalin LA 20 MG Cp24 03/02/2021 30 30 Units TOM CAN, CHRISTIANO...   RITALIN LA 20 MG CAPSULE 01/30/2021 30 30 Units TOM CAN M...   RITALIN LA 20 MG CAPSULE 12/29/2020 30 30 Units TOM CAN, M...   RITALIN LA 20 MG CAPSULE 11/18/2020 30 30 Units TOM CAN M...   RITALIN LA 20 MG CAPSULE 10/15/2020 30 30 Units TOM CAN M...   RITALIN LA 20 MG CAPSULE 09/14/2020 30 30 each TOM CAN M...   RITALIN LA 20 MG CAPSULE 06/30/2020 30 30 capsule TOM CAN M...         Mirtazapine   Dispensed Days Supply Quantity Provider Pharmacy   MIRTAZAPINE 30 MG TABS 11/23/2020 30 30 Units TOM CAN M...   MIRTAZAPINE 30 MG TABS 10/12/2020 30 30 Units TOM CAN M...   MIRTAZAPINE 30 MG TABS 09/14/2020 30 30 each TOM CAN M...   MIRTAZAPINE 30 MG TABS 08/20/2020 30 30 each TOM CAN M...   MIRTAZAPINE 30 MG TABLET 07/01/2020 30 30 tablet TOM CAN M...         Naproxen   Dispensed Days Supply Quantity Provider Pharmacy    NAPROXEN 500 MG TABS 04/27/2021 10 20 Units FROW,DAVID FRANKE [facility] [place] M..David         Omeprazole   Dispensed Days Supply Quantity Provider Pharmacy   OMEPRAZOLE DR 40 MG CAPSULE 05/21/2021 30 30 Units FLLEON WINTER M...   OMEPRAZOLE DR 40 MG CAPSULE 04/26/2021 30 30 Units JODIE,CHRISTIANO [place]...   Omeprazole 40 MG Cpdr 03/29/2021 30 30 Units FLLEON WINTER M...   Omeprazole 40 MG Cpdr 03/01/2021 30 30 Units LEON FELICIANO M...   OMEPRAZOLE DR 40 MG CAPSULE 01/29/2021 30 30 Units LEON FELICIANO M...   OMEPRAZOLE DR 40 MG CAPSULE 01/04/2021 30 30 Units LEON FELICIANO M...   OMEPRAZOLE DR 40 MG CAPSULE 12/07/2020 30 30 Units LEON FELICIANO M...   OMEPRAZOLE DR 40 MG CAPSULE 11/12/2020 30 30 Units LEON FELICIANO M...   OMEPRAZOLE DR 40 MG CAPSULE 10/12/2020 30 30 Units LEON FELICIANO M...   OMEPRAZOLE DR 40 MG CAPSULE 09/14/2020 30 30 each LEON FELICIANO M...   OMEPRAZOLE DR 40 MG CAPSULE 08/20/2020 30 30 each LEON FELICIANO M...   OMEPRAZOLE DR 40 MG CAPSULE 07/24/2020 30 30 capsule LEON FELICIANO M...   OMEPRAZOLE DR 40 MG CAPSULE 06/25/2020 30 30 capsule LEON FELICIANO M...         Progesterone   Dispensed Days Supply Quantity Provider Pharmacy   PROGESTERONE 100 MG CAPSULE 05/21/2021 30 60 Units FROW,DAVID FRANKE [facility] [place] M...   PROGESTERONE 100 MG CAPSULE 04/27/2021 30 60 Units FROW,DAVID FRANKE [facility] [place] M...         Propranolol HCl   Dispensed Days Supply Quantity Provider Pharmacy   PROPRANOLOL HCL ER 80 MG CP 05/21/2021 30 30 Units LEON FELICIANO M...   PROPRANOLOL HCL ER 80 MG CP 04/26/2021 30 30 Units LEON FELICIANO M...   Propranolol HCl ER 80 MG Cp 03/29/2021 30 30 Units LEON FELICIANO  CHRISTIANO Andre...   Propranolol HCl ER 80 MG Cp 03/01/2021 30 30 Units LEON FELICIANO - [place], M...   PROPRANOLOL HCL ER 80 MG CP 01/29/2021 30 30 Units LEON FELICIANO - [place], M...   PROPRANOLOL HCL ER 80 MG CP 12/28/2020 30 30 Units LEON FELICIANO - [place], M...   PROPRANOLOL HCL ER 80 MG CP 11/09/2020 30 30 Units LEON FELICIANO [facility] [place], M...   PROPRANOLOL HCL ER 80 MG CP 09/14/2020 30 30 each LEON FELICIANO - [place] M...   PROPRANOLOL ER 80 MG CAP 08/20/2020 30 30 each LEON FELICIANO - [place], M...   PROPRANOLOL ER 80 MG CAPSULE 07/17/2020 30 30 capsule LEON FELICIANO M...         Temazepam   Dispensed Days Supply Quantity Provider Pharmacy   TEMAZEPAM 15 MG CAPS 05/21/2021 30 60 Units TOM CAN [facility] [place], M...   TEMAZEPAM 15 MG CAPS 04/26/2021 30 60 Units TOM [facility] [place], M...   Temazepam 15 MG Caps 03/29/2021 30 60 Units TOM CAN - [place], M...   Temazepam 15 MG Caps 03/02/2021 30 60 Units TOM CAN - [place], M...   TEMAZEPAM 15 MG CAPS 01/29/2021 30 60 Units LEON FELICIANO - [place], M...   TEMAZEPAM 15 MG CAPS 01/04/2021 30 60 Units TOM CAN - [place], M...   TEMAZEPAM 15 MG CAPS 12/07/2020 30 60 Units TOM CAN - [place], M...   TEMAZEPAM 15 MG CAPS 11/09/2020 30 60 Units TOM CAN - [place], M...   TEMAZEPAM 15 MG CAPS 10/12/2020 30 60 Units TOM CAN M...   TEMAZEPAM 15 MG CAPS 09/14/2020 30 60 each TOM CAN M...   TEMAZEPAM 15 MG CAPS 08/20/2020 30 60 each TOM CAN M...   TEMAZEPAM 15 MG CAPSULE 07/24/2020 30 60 capsule TOM CAN M...   TEMAZEPAM 15 MG CAPSULE 06/25/2020 30 60 capsule TOM CAN M...         Topiramate   Dispensed Days Supply Quantity Provider Pharmacy    Topiramate 25 MG Tabs 03/01/2021 30 30 Units TOM CAN M...

## 2021-06-11 NOTE — PLAN OF CARE
[facility] Inpatient Admission Note:    Patient admitted to 3106/3106-1 at approximately 12:30 via bed accompanied by staff from surgery . Report received from April RN in SBAR format at 12:30 via face to face in room. Patient transferred to bed via self.. Patient is alert and oriented X 3, reports pain; rates at 7 on 0-10 scale.  Patient oriented to room, unit, hourly rounding, and plan of care. Explained admission packet and patient handbook with patient bill of rights brochure. Will continue to monitor and document as needed.     Inpatient Nursing criteria listed below was met:    Health care directives status obtained and documented: Yes    Care Everywhere authorization obtained N/A    MRSA swab completed for patient 65 years and older: N/A    Patient identifies a surrogate decision maker: Yes If yes, who: sig other Dame[place] Contact Information:see face sheet     If initial lactic acid >2.0, repeat lactic acid drawn within one hour of arrival to unit: NA. If no, state reason: N/A    Vaccination assessment and education completed: N/A   Vaccinations received prior to admission: Pneumovax N/A  Influenza(seasonal)  N/A   Vaccination(s) ordered: patient declines    Clergy visit ordered if patient requests: No    Skin issues/needs documented: N/A    Isolation Patient: no Education given, correct sign in place and documentation row added to PCS:  No    Fall Prevention Yes: Care plan updated, education given and documented, sticker and magnet in place: Yes    Care Plan initiated: Yes    Education Documented (including assessment): Yes    Patient has discharge needs : No If yes, please explain:N/A

## 2021-06-11 NOTE — OP NOTE
Name: Nasra Lovelace  Age: 41 year old  YOB: 1980   Medical Record #: [id number]     [facility] Operative Note    Date: 2021   Pre-operative Diagnosis: 1. 41 year old  female with menorrhagia.    2. dysmenorrhea.   Post-operative Diagnosis: Same.   Procedure: 1 Laparoscopic supracervical hysterectomy with extracorporeal morcellation   Surgeon: Rito Soler MD   Assistant(s): Dr. Chung (assistance required for retraction, exposure, instrument handling, and wound closure).   Anesthesia: General Endotracheal Anesthesia   Estimated Blood Loss: 50ml   Total IV Fluids: (See anesthesia record)   Blood Transfusion: No transfusion was given during surgery   Total Urine Output: (See anesthesia record)   Drains: None   Specimens: Uterus.   Findings: Normal appearing uterus and left ovary. Right ovary and tubes not present. Bladder and both ureters visualized. Normal appearing upper anatomy. No free fluid noted. The bowel appears normal.   Complications: None.   Disposition: Patient in stable condition, transferred to post-anesthesia recovery.   Times: Surgery Start: 838    Surgery Stop: 950       Procedure in Detail:  I reviewed the risks, benefits, alternatives, indications, and expected outcomes of laparoscopic supracervical hysterectomy with the patient. Consent form was signed. The site of surgery was properly noted and marked.     The patient was taken to the operating room and placed on the operating room table in supine position. A hard stop timeout was accomplished. Nasra Lovelace was verified and all members agreed on the procedure and the patient consent. After uneventful anesthesia placement, the patient was placed in the dorsal lithotomy position and then prepped and draped in the usual sterile fashion. Her bladder was drained. A bimanual exam was performed. The cervix was visualized with a weighted speculum and grasped anteriorly with a single tooth tenaculum. A  uterine manipulating device was placed. The speculum was removed. I changed gloves and proceeded to the abdominal portion of the case. A 5 mm subumbilical incision was made. The Veress needle was inserted at a 45 degree angle without difficulty while tenting the abdominal wall. Intraperitoneal placement was confirmed by water-filled syringe and hanging drop test. Pneumoperitoneum was created by instilling 3 liters of CO2 gas into the abdominal cavity. Opening pressure was 5 mmHg. The Veress needle was removed. A 5 mm trocar was advanced into the abdominal cavity at a 45  angle while tenting the abdominal wall. Intraperitoneal placement was confirmed by direct laparoscopy. A 5 mm incision was made in the left and right lower quadrants. A 5 mm trocar was advanced into the abdominal cavity in both the left and right lower quadrants under direct laparoscopic visualization.    A survey of the pelvis and abdomen revealed findings as mentioned above. The ureters were identified bilaterally. The uterus was elevated. Left ovary appeared normal and viable. The right ovary and tubes were absent. The LigaSure bipolar cutting cautery device was used to fulgurate and transect down through the right uteroovarian, and round ligaments adjacent to the uterus. A bladder flap was developed using both sharp and blunt dissection and the uterine artery was skeletonized within the cardinal ligament. The right uterine artery was then fulgurated and sharply transected with the LigaSure device just below the level of the internal cervical os. The same procedure was repeated on the patient's left side. The bladder flap was completed in the midline. A 3 cm incision was made 2 cm above the pubic symphysis through her prior incision and this was carried down to the fascia. The fascia was sharply incised and the incision was extended laterally. The rectus muscle was  in the midline and the peritoneum was entered sharply. An Scott ring  retractor Gel Port was placed. The Yadi loop was placed around the uterus and encircled the cervix at the level of the internal cervical os. The uterine manipulating device was removed. Using 120 recinos pure cutting current the uterus was amputated with the Yadi loop. Vigorous monopolar cautery was performed on the endocervical canal. The Gel Port was removed and the Scott containment bag was placed into the pelvis. The  laparoscope was reintroduced and the uterus was placed into the containment bag. The Gel Port was again removed and the specimen was brought up to the abdominal wall within the bag. A scalpel guard was placed in the umbilical incision. The uterus was grasped with Leahey clamps and using the C-incision technique with a scalpel the uterus was sequentially morcellated in an extracorporeal fashion until it was completely removed. The ring retractor and guard were removed as well as the Scott containment bag. The Gel Port was replaced and the abdomen was re-insufflated. The pelvis was irrigated. Hemostasis was visualized. Careful evaluation of both large and small bowel was completed and no signs of thermal injury or damage were present. At this point, there was excellent hemostasis so we proceeded to closure. Upon completion of the laparoscopic inspection, all instruments were removed from the abdominal cavity under laparoscopic visualization. The pneumoperitoneum was allowed to escape. The trocars were removed. The minilaparotomy abdominal fascia was closed with 0 PDS suture. The subcutaneous spaces were irrigated and checked for hemostasis. The abdominal incisions were closed with a subcuticular stitch of 3-0 Monocryl suture and surgical glue. Steri strips and bandages were placed over the incisions in the usual fashion. All sponge, lap, needle and instrument counts were correct x 2. Dr. Sujit Chung actively first assisted during this operation providing necessary retraction and exposure as well as  maintaining hemostasis and a clear operative field. This was helpful in allowing the operation to proceed in a safe and expeditious manner.  He was present for the entire duration of the operation.    There were no complications and the patient was transferred to the recovery room in excellent and stable condition.    Rito Soler MD  Obstetrics and Gynecology

## 2021-06-11 NOTE — INTERVAL H&P NOTE
The History and physical was reviewed on 6/11/2021.  The patient was examined. There is no interval change in her condition. The consent form was reviewed with the patient and signed. All of the patient's questions were answered. The patient desires to proceed.      Rito Soelr MD  Obstetrics and Gynecology  7:26 AM

## 2021-06-11 NOTE — ANESTHESIA PROCEDURE NOTES
Airway       Patient location during procedure: OR  Staff -        CRNA: Leti Barry APRN CRNA       Performed By: CRNA  Consent for Airway        Urgency: elective  Indications and Patient Condition       Indications for airway management: brandyn-procedural       Induction type:intravenous       Mask difficulty assessment: 1 - vent by mask    Final Airway Details       Final airway type: endotracheal airway       Successful airway: ETT - single  Endotracheal Airway Details        ETT size (mm): 7.0       Cuffed: yes       Successful intubation technique: direct laryngoscopy       DL Blade Type: Khanna 2       Grade View of Cords: 1       Adjucts: stylet       Position: Right       Measured from: lips       Secured at (cm): 20       Bite block used: None    Post intubation assessment        Placement verified by: capnometry, equal breath sounds and chest rise        Number of attempts at approach: 1       Number of other approaches attempted: 0       Secured with: commercial tube zambrano and plastic tape       Ease of procedure: easy       Dentition: Intact and Unchanged    Medication(s) Administered   Medication Administration Time: 6/11/2021 8:21 AM

## 2021-06-11 NOTE — OR NURSING
Patient restless upon arrival to PACU. Temp 94.8, placed on warming device, temp improved to 97.8.  Received zofran and compazine for nausea with relief. C/O pain, Dr Soler and Leti CRNA aware, patient received two doses of fentanyl IV per mar, at 1120 Edis CRNA in to doTAPP block. Patient now denies pain and nausea. Dr Soler in to see patient, no new orders at this time.  Will transfer to medical floor.

## 2021-06-11 NOTE — ANESTHESIA PROCEDURE NOTES
TAP Procedure Note  Pre-Procedure   Staff -        CRNA: Kevin Mistry APRN CRNA       Performed By: CRNA       Location: post-op       Procedure Start/Stop Times: 6/11/2021 11:35 AM and 6/11/2021 11:40 AM       Pre-Anesthestic Checklist: patient identified, IV checked, site marked, risks and benefits discussed, informed consent, monitors and equipment checked, pre-op evaluation, at physician/surgeon's request and post-op pain management  Timeout:       Correct Patient: Yes        Correct Procedure: Yes        Correct Site: Yes        Correct Position: Yes        Correct Laterality: Yes        Site Marked: Yes  Procedure Documentation  Procedure: TAP       Diagnosis: HYSTERECTOMY       Laterality: bilateral       Patient Position: supine       Patient Prep/Sterile Barriers: sterile gloves, mask       Skin prep: Chloraprep       Needle Type: insulated       Needle Gauge: 20.        Needle Length (Inches): 4        Ultrasound guided       1. Ultrasound was used to identify targeted nerve, plexus, vascular marker, or fascial plane and place a needle adjacent to it in real-time.       2. Ultrasound was used to visualize the spread of anesthetic in close proximity to the above referenced structure.       3. A permanent image is entered into the patient's record.       4. The visualized anatomic structures appeared normal.       5. There were no apparent abnormal pathologic findings.    Assessment/Narrative         The placement was negative for: blood aspirated, painful injection and site bleeding       Paresthesias: No.      Bolus given via needle. No blood aspirated via catheter.        Secured via.        Insertion/Infusion Method: Single Shot       Complications: none       Injection made incrementally with aspirations every 5 mL.    Medication(s) Administered   Ropivacaine 0.2% PF (Infiltration), 50 mL  Dexamethasone 10 mg/mL PF (Perineural), 10 mg  Medication Administration Time: 6/11/2021 11:39  AM

## 2021-06-11 NOTE — PLAN OF CARE
Face to face report given with opportunity to observe patient.    Report given to Leti Guy RN   6/11/2021  3:31 PM

## 2021-06-12 VITALS
OXYGEN SATURATION: 97 % | DIASTOLIC BLOOD PRESSURE: 84 MMHG | TEMPERATURE: 98.8 F | RESPIRATION RATE: 16 BRPM | BODY MASS INDEX: 20.81 KG/M2 | HEART RATE: 101 BPM | WEIGHT: 121.91 LBS | SYSTOLIC BLOOD PRESSURE: 142 MMHG | HEIGHT: 64 IN

## 2021-06-12 LAB
GLUCOSE SERPL-MCNC: 99 MG/DL (ref 70–99)
HGB BLD-MCNC: 10.5 G/DL (ref 11.7–15.7)

## 2021-06-12 PROCEDURE — 85018 HEMOGLOBIN: CPT | Performed by: OBSTETRICS & GYNECOLOGY

## 2021-06-12 PROCEDURE — 250N000011 HC RX IP 250 OP 636: Performed by: OBSTETRICS & GYNECOLOGY

## 2021-06-12 PROCEDURE — 82947 ASSAY GLUCOSE BLOOD QUANT: CPT | Performed by: OBSTETRICS & GYNECOLOGY

## 2021-06-12 PROCEDURE — 36415 COLL VENOUS BLD VENIPUNCTURE: CPT | Performed by: OBSTETRICS & GYNECOLOGY

## 2021-06-12 PROCEDURE — 250N000013 HC RX MED GY IP 250 OP 250 PS 637: Performed by: OBSTETRICS & GYNECOLOGY

## 2021-06-12 RX ADMIN — ALBUTEROL SULFATE 2 PUFF: 90 INHALANT RESPIRATORY (INHALATION) at 06:51

## 2021-06-12 RX ADMIN — OXYCODONE HYDROCHLORIDE 10 MG: 5 TABLET ORAL at 08:10

## 2021-06-12 RX ADMIN — DOCUSATE SODIUM AND SENNOSIDES 1 TABLET: 8.6; 5 TABLET, FILM COATED ORAL at 08:09

## 2021-06-12 RX ADMIN — ONDANSETRON 4 MG: 2 INJECTION INTRAMUSCULAR; INTRAVENOUS at 09:01

## 2021-06-12 RX ADMIN — FAMOTIDINE 20 MG: 20 TABLET, FILM COATED ORAL at 08:10

## 2021-06-12 RX ADMIN — ACETAMINOPHEN 975 MG: 325 TABLET, FILM COATED ORAL at 03:55

## 2021-06-12 RX ADMIN — DULOXETINE 60 MG: 60 CAPSULE, DELAYED RELEASE ORAL at 08:09

## 2021-06-12 RX ADMIN — PROPRANOLOL HYDROCHLORIDE 80 MG: 80 CAPSULE, EXTENDED RELEASE ORAL at 08:09

## 2021-06-12 RX ADMIN — KETOROLAC TROMETHAMINE 15 MG: 15 INJECTION, SOLUTION INTRAMUSCULAR; INTRAVENOUS at 09:48

## 2021-06-12 RX ADMIN — IBUPROFEN 800 MG: 600 TABLET ORAL at 03:55

## 2021-06-12 ASSESSMENT — MIFFLIN-ST. JEOR: SCORE: 1203

## 2021-06-12 NOTE — PLAN OF CARE
No falls or injuries this shift. Earl diet-advanced to regular. Trevino remains intact. Med with po pain meds 1X this shift and IV pain meds 1X this shift. Has many questions about discharge instructions. Has scope [atch behind L) ear.     Face to face report given with opportunity to observe patient.    Report given to Elle Block RN   6/11/2021  11:19 PM

## 2021-06-12 NOTE — PLAN OF CARE
Temp: 98.8  F (37.1  C) Temp src: Tympanic BP: 142/84 Pulse: 101   Resp: 16 SpO2: 97 % O2 Device: None (Room air)     Patient up ad shasta; A&O x 4, calls appropriately and makes needs known; afebrile and VSS. Reports pain 7/10 in abdomen and generalized referred to R shoulder. 10 Oxycodone given x 1 with minimal reported relief. Nausea and dry heaving reported - Zofran given with relief. Minimal urine output post hammer removal; MD aware with no concerns. IV SL. Call light in reach at all times.     *1035 patient continues to wait for her ride. Discharge instructions discussed with patient by MD as witnessed by this RN and reiterated by this RN. Patient has no questions. Medications returned to patient.     Face to face report given with opportunity to observe patient.    Report given to Leti Rolle RN   6/12/2021  10:40 AM

## 2021-06-12 NOTE — PROGRESS NOTES
"Name: Nasra Lovelace  Age: 41 year old  YOB: 1980   Medical Record #: [id number]     Postoperative Day 1: Gynecology    DATE: 2021  SUBJECTIVE:  Patient is doing well. Her pain is controlled with current medications. She has minimal vaginal spotting. She is tolerating a regular diet without nausea. She is ambulating and voiding since hammer removed this morning. She is passing flatus but no BM. The patient denies fevers of chills.    OBJECTIVE:  /84 (BP Location: Right arm)   Pulse 101   Temp 98.8  F (37.1  C) (Tympanic)   Resp 16   Ht 1.626 m (5' 4\")   Wt 55.3 kg (121 lb 14.6 oz)   LMP 05/15/2021 (Approximate)   SpO2 97%   BMI 20.93 kg/m      I/O last 3 completed shifts:  In: 2857 [P.O.:500; I.V.:2357]  Out: 3550 [Urine:3450; Blood:100]    Well developed and well nourished female in no apparent distress. Alert and oriented. Lungs are clear to auscultation bilaterally. Heart is regular rate and rhythm. Abdomen is nondistended with positive bowel sounds. Incisions are clean, dry, and intact without erythema or exudate. Perineum is clean, dry and intact. Extremities without clubbing, cyanosis or edema.    LABS :  Pathology: pending  2021 Hgb 10.5    IMPRESSION :  1. 41 year old  female doing well.  2. POD# 1 s/p Laparoscopic supracervical hysterectomy with extracorporeal morcellation.  3. Recurrent menorrhagia  4. Dysmenorrhea  5.Tobacco abuse  6. Mental health issues including ADHD, depression, anxiety, panic disorder, and insomnia    PLAN:  1. Continue routine post-operative care.  2. Advance diet as tolerated.  3. Discontinue hammer catheter, done.  4. Change to oral medications.  5. Encourage ambulation.  6. Reviewed some of the home instructions in anticipation of discharge; these include symptoms of infection, avoiding constipation, iron replacement, incision and wound care, bathing recommendations, and the need to seek medical evaluation for any questions or " concerns.  7. Anticipate discharge on POD# 1 in stable condition later this morning.    Rito Soler MD  Obstetrics and Gynecology

## 2021-06-12 NOTE — DISCHARGE SUMMARY
"GYNECOLOGY DISCHARGE SUMMARY    Name: Nasra Lovelace  Age: 41 year old  YOB: 1980   Medical Record #: [id number]     Date of Admission:  2021  Date of Discharge:  2021  Admitting Physician:  Rito Soler MD  Discharge Physician:  Rito Soler MD  Discharging Service:  Obstetrics and Gynecology     Primary Provider:   Adrienne Dailey         Admission Diagnoses:     Dysmenorrhea [N94.6]  Menorrhagia with regular cycle [N92.0]  Menorrhagia with irregular cycle [N92.1]          Discharge Diagnosis:     1. 41 year old  female doing well.  2. POD# 1 s/p Laparoscopic supracervical hysterectomy with extracorporeal morcellation.  3. Recurrent menorrhagia  4. Dysmenorrhea  5.Tobacco abuse  6. Mental health issues including ADHD, depression, anxiety, panic disorder, and insomnia          Discharge Disposition:     Discharged to home.           Condition on Discharge:     Discharge condition: Stable   Discharge vitals: /84 (BP Location: Right arm)   Pulse 101   Temp 98.8  F (37.1  C) (Tympanic)   Resp 16   Ht 1.626 m (5' 4\")   Wt 55.3 kg (121 lb 14.6 oz)   LMP 05/15/2021 (Approximate)   SpO2 97%   BMI 20.93 kg/m     Code status on discharge: Full Code          Brief History of Illness:     Nasra Lovelace is a 41 year old female who was admitted for planned surgery. Please see her admit H&P for full details of her PMH, PSH, Meds, Allergies and exam on admission.          Procedure Performed:     Laparoscopic supracervical hysterectomy with extracorporeal morcellation         Hospital Course:     Nasra Lovelace is a 41 year old female who was admitted to the hospital for the above listed indications. She underwent surgery. Please see her Operative Note for full details regarding her surgery. The patient's hospital course was unremarkable. The Trevino catheter was removed on POD# 1 and her diet was advanced. She recovered as anticipated and her postoperative course was uncomplicated. " On POD# 1, she was meeting all of her postoperative goals and deemed stable for discharge. She was ambulating well, voiding without difficulty, tolerating a regular diet without nausea and vomiting, and her pain was well controlled on oral pain medicines. No fever or significant wound drainage. She will be discharged home in good condition on POD# 1.          Postoperative Complications:     None.         Medications Prior to Admission:     Medications Prior to Admission   Medication Sig Dispense Refill Last Dose     albuterol (PROAIR HFA/PROVENTIL HFA/VENTOLIN HFA) 108 (90 Base) MCG/ACT inhaler Inhale 2 puffs into the lungs every 6 hours 18 g 1 6/10/2021 at 2200     co-enzyme Q-10 100 MG CAPS capsule Take 100 mg by mouth daily   6/10/2021 at 0900     DULoxetine (CYMBALTA) 60 MG capsule TAKE 1 CAPSULE (60 MG) BY MOUTH DAILY 30 capsule 11 6/11/2021 at 0600     LORazepam (ATIVAN) 0.5 MG tablet TAKE 1 TABLET (0.5 MG) BY MOUTH DAILY AS NEEDED FOR ANXIETY 30 tablet 1 6/9/2021 at 1600     magnesium oxide (MAG-OX) 400 MG tablet TAKE 2 TABLETS BY MOUTH IN THE EVENING. 60 tablet 0 6/10/2021 at 2200     omeprazole (PRILOSEC) 40 MG DR capsule TAKE 1 CAPSULE BY MOUTH ONCE DAILY 30-60 MINUTES BEFORE A MEAL. 30 capsule 3 6/11/2021 at 0600     progesterone (PROMETRIUM) 100 MG capsule Take 1 capsule (100 mg) by mouth 2 times daily (with meals) 60 capsule 11 6/10/2021 at 0600     propranolol ER (INDERAL LA) 80 MG 24 hr capsule TAKE 1 CAPSULE (80 MG) BY MOUTH DAILY 30 capsule 1 6/10/2021 at 0900     RITALIN LA 20 MG 24 hr capsule Take 1 capsule by mouth daily 30 capsule 0 6/10/2021 at 0900     temazepam (RESTORIL) 15 MG capsule Take 1 - 2 caps evening 60 capsule 3 6/10/2021 at 2200     naproxen (NAPROSYN) 500 MG tablet Take 1 tablet (500 mg) by mouth 2 times daily (with meals) Take with a full meal, begin with onset menstrual symptoms 20 tablet 4 Unknown at Unknown time             Discharge Medications:     Current Discharge  Medication List      START taking these medications    Details   acetaminophen (TYLENOL) 325 MG tablet Take 3 tablets (975 mg) by mouth every 6 hours as needed for mild pain  Qty: 50 tablet, Refills: 0    Associated Diagnoses: Menorrhagia with regular cycle      ibuprofen (ADVIL/MOTRIN) 800 MG tablet Take 1 tablet (800 mg) by mouth every 6 hours as needed for other (mild and/or inflammatory pain)  Qty: 30 tablet, Refills: 0    Associated Diagnoses: Menorrhagia with regular cycle      oxyCODONE (ROXICODONE) 5 MG tablet Take 1-2 tablets (5-10 mg) by mouth every 4 hours as needed for moderate to severe pain  Qty: 26 tablet, Refills: 0    Associated Diagnoses: Menorrhagia with regular cycle      senna-docusate (SENOKOT-S/PERICOLACE) 8.6-50 MG tablet Take 1-2 tablets by mouth 2 times daily  Qty: 30 tablet, Refills: 0    Associated Diagnoses: Menorrhagia with regular cycle         CONTINUE these medications which have NOT CHANGED    Details   albuterol (PROAIR HFA/PROVENTIL HFA/VENTOLIN HFA) 108 (90 Base) MCG/ACT inhaler Inhale 2 puffs into the lungs every 6 hours  Qty: 18 g, Refills: 1    Comments: Pharmacy may dispense brand covered by insurance (Proair, or proventil or ventolin or generic albuterol inhaler)  Associated Diagnoses: Clinical diagnosis of COVID-19; SOB (shortness of breath)      co-enzyme Q-10 100 MG CAPS capsule Take 100 mg by mouth daily      DULoxetine (CYMBALTA) 60 MG capsule TAKE 1 CAPSULE (60 MG) BY MOUTH DAILY  Qty: 30 capsule, Refills: 11    Associated Diagnoses: MAYKEL (generalized anxiety disorder)      LORazepam (ATIVAN) 0.5 MG tablet TAKE 1 TABLET (0.5 MG) BY MOUTH DAILY AS NEEDED FOR ANXIETY  Qty: 30 tablet, Refills: 1    Associated Diagnoses: MAYKEL (generalized anxiety disorder)      magnesium oxide (MAG-OX) 400 MG tablet TAKE 2 TABLETS BY MOUTH IN THE EVENING.  Qty: 60 tablet, Refills: 0    Associated Diagnoses: Low magnesium level      omeprazole (PRILOSEC) 40 MG DR capsule TAKE 1 CAPSULE BY  MOUTH ONCE DAILY 30-60 MINUTES BEFORE A MEAL.  Qty: 30 capsule, Refills: 3    Associated Diagnoses: Gastroesophageal reflux disease without esophagitis      progesterone (PROMETRIUM) 100 MG capsule Take 1 capsule (100 mg) by mouth 2 times daily (with meals)  Qty: 60 capsule, Refills: 11    Associated Diagnoses: Hot flashes; Menorrhagia with irregular cycle; Secondary dysmenorrhea      propranolol ER (INDERAL LA) 80 MG 24 hr capsule TAKE 1 CAPSULE (80 MG) BY MOUTH DAILY  Qty: 30 capsule, Refills: 1    Associated Diagnoses: Migraine without aura and without status migrainosus, not intractable      RITALIN LA 20 MG 24 hr capsule Take 1 capsule by mouth daily  Qty: 30 capsule, Refills: 0    Associated Diagnoses: ADHD (attention deficit hyperactivity disorder), combined type      temazepam (RESTORIL) 15 MG capsule Take 1 - 2 caps evening  Qty: 60 capsule, Refills: 3    Associated Diagnoses: Persistent insomnia      naproxen (NAPROSYN) 500 MG tablet Take 1 tablet (500 mg) by mouth 2 times daily (with meals) Take with a full meal, begin with onset menstrual symptoms  Qty: 20 tablet, Refills: 4    Associated Diagnoses: Menorrhagia with irregular cycle; Secondary dysmenorrhea                   Consultations:     No consultations were requested during this admission          Significant Results:     None.             Pending Results:     Pathology results are pending.           Discharge Instructions and Follow-Up:     Discharge Diet: Regular   Discharge Activity: Increase activity as tolerated. No vigorous activity or exercise for 4-6 weeks.    No swimming or bath for 7-10 days.    No driving or operating machinery for 2 weeks or while on narcotics.    Lifting restrictions up to 15 pounds for 4-6 weeks.    Pelvic rest for 6 weeks which includes intercourse, douching and tampons.   Discharge Instructions: The patient will call the OB/GYN Clinic Nurse at [phone] for problems such as fever (temperature >100.4), chills,  pain not controlled by usual oral pain medications, persistent nausea and vomiting, constipation, heavy odorous vaginal discharge, burning or pain associated with urination.  Call for excessive vaginal bleeding, saturating more than one pad an hour for more than three consecutive hours.  Call for any problems with the incision, drainage or redness from incision site or increasing pain.    Instructions on avoiding constipation and straining are discussed. The patient is advised in the appropriate use of stool softeners to avoid constipation.   Discharge Wound care: Routine incision care is discussed with the patient.     The patient is instructed to keep her incisions clean and dry by running soapy water over them and dabbing them dry. It is best to shower for the first week while the healing process is underway; after 7-10 days it is OK to take a bath.    The incision was closed with Steri-Strips which may fall off on their own. If they do not, then they may be removed after 10-14 days.   Discharge Follow-up: Follow up for postoperative exam in 2 weeks with Dr. Soler.    Follow up with Primary Care Provider as scheduled for management of active medical problems and for adult preventive services.    Call Dr. Soler at the OB/GYN Clinic at [phone] as necessary if you have problems in the interim.     Total time spent for discharge on date of discharge: 30 minutes  I saw the patient on the date of discharge.    Rito Soler MD  Obstetrics and Gynecology

## 2021-06-12 NOTE — PLAN OF CARE
"/80   Pulse 77   Temp 97.8  F (36.6  C) (Tympanic)   Resp 18   Ht 1.626 m (5' 4\")   Wt 52.6 kg (116 lb)   LMP 05/15/2021 (Approximate)   SpO2 93%   BMI 19.91 kg/m      A&O. Denies nausea. Pain 7/10 to lower abdomen. Managing pain with po pain medication, oxycodone given x1 & scheduled tylenol and ibuprofen. No bleeding or drainage from abd incision. Reports some minor spotting. Tolerating oral fluids well. IVs SL. Trevino removed, due to void. This morning reports Ambulating halls this morning; steady gait, denies dizziness or lightheadedness.     Face to face report given with opportunity to observe patient.    Report given to KATE Garcia RN   6/12/2021  7:32 AM      "

## 2021-06-12 NOTE — PLAN OF CARE
Patient discharged at 1050 AM via wheel chair accompanied by spouse and staff. Prescriptions sent to patients preferred pharmacy. All belongings sent with patient.     Discharge instructions reviewed with pt. Listed belongings gathered and returned to patient.     Patient discharged to home  Report called to n/a    Core Measures and Vaccines  Core Measures applicable during stay: No. If yes, state diagnosis:  Pneumonia and Influenza given prior to discharge, if indicated: N/A    Surgical Patient   Surgical Procedures during stay: lap hyst  Did patient receive discharge instruction on wound care and recognition of infection symptoms? Yes    MISC  Follow up appointment made:  Yes  Home and hospital aquired medications returned to patient: N/A  Patient reports pain was well managed at discharge: Yes

## 2021-06-15 LAB — COPATH REPORT: NORMAL

## 2021-06-21 DIAGNOSIS — G47.00 PERSISTENT INSOMNIA: ICD-10-CM

## 2021-06-21 DIAGNOSIS — F90.2 ADHD (ATTENTION DEFICIT HYPERACTIVITY DISORDER), COMBINED TYPE: ICD-10-CM

## 2021-06-21 DIAGNOSIS — G43.009 MIGRAINE WITHOUT AURA AND WITHOUT STATUS MIGRAINOSUS, NOT INTRACTABLE: ICD-10-CM

## 2021-06-22 RX ORDER — TEMAZEPAM 15 MG/1
CAPSULE ORAL
Qty: 60 CAPSULE | Refills: 3 | Status: ON HOLD | OUTPATIENT
Start: 2021-07-19 | End: 2021-09-28

## 2021-06-22 RX ORDER — METHYLPHENIDATE HCL 20 MG
CAPSULE,EXTENDED RELEASE BIPHASIC 50-50 ORAL
Qty: 30 CAPSULE | Refills: 0 | Status: SHIPPED | OUTPATIENT
Start: 2021-06-22 | End: 2021-08-02

## 2021-06-22 RX ORDER — PROPRANOLOL HYDROCHLORIDE 80 MG/1
80 CAPSULE, EXTENDED RELEASE ORAL DAILY
Qty: 30 CAPSULE | Refills: 1 | Status: SHIPPED | OUTPATIENT
Start: 2021-06-22 | End: 2021-09-01

## 2021-06-22 NOTE — TELEPHONE ENCOUNTER
RESTORIL      Last Written Prescription Date:  3-  Last Fill Quantity: 60,   # refills: 3  Last Office Visit: 5- VIRTUAL  Future Office visit:    Next 5 appointments (look out 90 days)    Jun 24, 2021  1:30 PM  (Arrive by 1:15 PM)  SHORT with Rito Soler MD  [facility] [place] ([facility] [place] ) [address]  [place] [address]  [phone]           Routing refill request to provider for review/approval because:  Drug not on the [facility], [facility] or [facility] refill protocol or controlled substance        RITALIN LA      Last Written Prescription Date:  4-  Last Fill Quantity: 30,   # refills: 0  Last Office Visit: 5- VIRTUAL  Future Office visit:    Next 5 appointments (look out 90 days)    Jun 24, 2021  1:30 PM  (Arrive by 1:15 PM)  SHORT with Rito Soler MD  [facility] [place] ([facility] [place] ) [address]  [place] [address]  [phone]           Routing refill request to provider for review/approval because:  Drug not on the [facility], [facility] or [facility] refill protocol or controlled substance

## 2021-06-22 NOTE — TELEPHONE ENCOUNTER
propranolol ER (INDERAL LA) 80 MG 24 hr capsule     Last Written Prescription Date:  4/26/21  Last Fill Quantity: 30,   # refills: 1  Last Office Visit: 6/1/21  Future Office visit:    Next 5 appointments (look out 90 days)    Jun 24, 2021  1:30 PM  (Arrive by 1:15 PM)  SHORT with Rito Soler MD  [facility] [place] ([facility] [place] ) [address]  [place] [address]  [phone]           Routing refill request to provider for review/approval

## 2021-06-24 ENCOUNTER — OFFICE VISIT (OUTPATIENT)
Dept: OBGYN | Facility: OTHER | Age: 41
End: 2021-06-24
Attending: OBSTETRICS & GYNECOLOGY
Payer: COMMERCIAL

## 2021-06-24 VITALS
HEIGHT: 64 IN | DIASTOLIC BLOOD PRESSURE: 62 MMHG | BODY MASS INDEX: 19.97 KG/M2 | OXYGEN SATURATION: 98 % | WEIGHT: 117 LBS | HEART RATE: 95 BPM | SYSTOLIC BLOOD PRESSURE: 114 MMHG

## 2021-06-24 DIAGNOSIS — Z09 POSTOPERATIVE EXAMINATION: Primary | ICD-10-CM

## 2021-06-24 PROBLEM — N92.0 MENORRHAGIA WITH REGULAR CYCLE: Status: RESOLVED | Noted: 2021-05-13 | Resolved: 2021-06-24

## 2021-06-24 PROBLEM — N94.6 DYSMENORRHEA: Status: RESOLVED | Noted: 2021-05-13 | Resolved: 2021-06-24

## 2021-06-24 PROBLEM — N92.1 MENORRHAGIA WITH IRREGULAR CYCLE: Status: RESOLVED | Noted: 2021-04-27 | Resolved: 2021-06-24

## 2021-06-24 PROCEDURE — G0463 HOSPITAL OUTPT CLINIC VISIT: HCPCS

## 2021-06-24 PROCEDURE — 99024 POSTOP FOLLOW-UP VISIT: CPT | Performed by: OBSTETRICS & GYNECOLOGY

## 2021-06-24 ASSESSMENT — MIFFLIN-ST. JEOR: SCORE: 1180.71

## 2021-06-24 ASSESSMENT — PAIN SCALES - GENERAL: PAINLEVEL: MODERATE PAIN (4)

## 2021-06-24 NOTE — PROGRESS NOTES
POST OPERATIVE GYN VISIT    REASON FOR VISIT / CHEIF COMPLAINT  Postoperative exam.    HISTORY OF PRESENT ILLNESS  Nasra Lovelace is a 41 year old  female who is status post laparoscopic supracervical hysterectomy performed on 2021 for recurrent menorrhagia after endometrial ablation. She presents for early postoperative visit and incision check today.    She is performing activities of daily living without difficulty. She denies difficulty voiding or problems with bowel movements.  She denies vaginal bleeding or discharge. She has not had intercourse since surgery. She denies dyspnea, chest pain, calf pain, fever or chills, headaches or vision changes. Her pain has resolved. She has no incision concerns at this time.    ALLERGIES     Allergies   Allergen Reactions     Dust Mite Extract      Hydrocodone Other (See Comments) and Itching     Skin felt like it was burning.     Penicillins Rash       MEDICATIONS    Current Outpatient Medications:      acetaminophen (TYLENOL) 325 MG tablet, Take 3 tablets (975 mg) by mouth every 6 hours as needed for mild pain, Disp: 50 tablet, Rfl: 0     albuterol (PROAIR HFA/PROVENTIL HFA/VENTOLIN HFA) 108 (90 Base) MCG/ACT inhaler, Inhale 2 puffs into the lungs every 6 hours, Disp: 18 g, Rfl: 1     co-enzyme Q-10 100 MG CAPS capsule, Take 100 mg by mouth daily, Disp: , Rfl:      DULoxetine (CYMBALTA) 60 MG capsule, TAKE 1 CAPSULE (60 MG) BY MOUTH DAILY, Disp: 30 capsule, Rfl: 11     ibuprofen (ADVIL/MOTRIN) 800 MG tablet, Take 1 tablet (800 mg) by mouth every 6 hours as needed for other (mild and/or inflammatory pain), Disp: 30 tablet, Rfl: 0     LORazepam (ATIVAN) 0.5 MG tablet, TAKE 1 TABLET (0.5 MG) BY MOUTH DAILY AS NEEDED FOR ANXIETY, Disp: 30 tablet, Rfl: 1     omeprazole (PRILOSEC) 40 MG DR capsule, TAKE 1 CAPSULE BY MOUTH ONCE DAILY 30-60 MINUTES BEFORE A MEAL., Disp: 30 capsule, Rfl: 3     propranolol ER (INDERAL LA) 80 MG 24 hr capsule, TAKE 1 CAPSULE (80 MG)  "BY MOUTH DAILY, Disp: 30 capsule, Rfl: 1     RITALIN LA 20 MG 24 hr capsule, TAKE 1 CAPSULE DAILY, Disp: 30 capsule, Rfl: 0     [START ON 2021] temazepam (RESTORIL) 15 MG capsule, TAKE 1-2 CAPSULES BY MOUTH EVERY EVENING., Disp: 60 capsule, Rfl: 3    REVIEW OF SYSTEMS  As per HPI, otherwise negative.    The patient's Medical Hx, Surgical Hx, Obstetrical Hx, Social Hx, and Family Hx have been reviewed and updated in the electronic medical record.    OBJECTIVE  /62 (BP Location: Left arm, Patient Position: Chair, Cuff Size: Adult Large)   Pulse 95   Ht 1.626 m (5' 4\")   Wt 53.1 kg (117 lb)   SpO2 98%   BMI 20.08 kg/m      General:  Well-developed, well-nourished female in no acute distress.  Neurological: Alert and oriented x3.  Lungs:  Clear to auscultation bilaterally with good inspiratory effort.  No wheezing rhonchi or rales noted. Breathing nonlabored.  Heart:  Regular rate and rhythm without murmur. No JVD.  No peripheral vascular disease.  Abdomen: Soft, nontender, nondistended, positive bowel sounds.  No organomegaly. No rebound, no guarding. No hernia. Incisions are clean, dry and intact x 4. No erythema, induration or signs of infection.  Pelvic exam: Deferred.  Extremities:  No clubbing, cyanosis or edema. Nontender bilaterally.    DIAGNOSTICS  2021 Pap: NIL, HPV negative  2021 Pathology: Uterus: Supracervical hysterectomy:   - Endometrial cavity with largely denuded endometrium and extensive  scarring, consistent with history of endometrial ablation   - Unremarkable myometrium     ASSESSMENT / PLAN  Nasra Lovelace is a 41 year old  female who is status post laparoscopic supracervical hysterectomy performed on 2021.    1 Normal postoperative exam  2 Tobacco abuse  3 Mental health issues including ADHD, depression, anxiety, panic disorder, and insomnia    - The patient is making excellent postoperative progress.  - Routine postoperative precautions, recommendations " and restrictions are reviewed with the patient.  - The pathology results are reviewed the patient.  - The patient will need to continue to have regular Pap smear examinations as her cervix is intact.  - Routine postop precautions, recommendations, and instructions are reviewed with the patient..  - I recommend that the patient refrain from intercourse for 6-8 weeks after the surgery.   - All of the patient's questions were answered.  - I recommend the patient return in 3 weeks for formal postop visit including pelvic exam.    - Problem list reviewed and updated.    Rito Soler MD  Obstetrics and Gynecology

## 2021-06-24 NOTE — NURSING NOTE
"Chief Complaint   Patient presents with     Post-op Visit     s/p- laparoscopic supracervical hysterectomy with extracorporeal morcellation 6/11/21       Initial /62 (BP Location: Left arm, Patient Position: Chair, Cuff Size: Adult Large)   Pulse 95   Ht 1.626 m (5' 4\")   Wt 53.1 kg (117 lb)   SpO2 98%   BMI 20.08 kg/m   Estimated body mass index is 20.08 kg/m  as calculated from the following:    Height as of this encounter: 1.626 m (5' 4\").    Weight as of this encounter: 53.1 kg (117 lb).  Medication Reconciliation: complete  PETER WISE LPN    "

## 2021-07-05 NOTE — PROGRESS NOTES
"Group Therapy Progress Notes     Client Initial Individualized Goals for Treatment:  Will learn and practice 1-2 coping skills to help improve symptoms, report daily in group and start to build confidence in using your skills and Will Improve Mindfulness / Stay in the Here and Now Intentionally bringing your attention to something beautiful, pleasant, or interesting that is occurring or is present in your immediate environment or experience on a regular basis.    Area of Treatment Focus:  Develop / Improve Independent Living / Socialization Skills and Cultural/ Racial / Health / Spiritual    Therapeutic Interventions/Treatment Strategies:  Facilitate increased self awareness  Provide education regarding learning style    Response to Treatment Strategies:  Accepted Feedback, Gave Feedback, Listened , Focused on Goals and Attentive    Name of Groups:  Coping skills group 12:30-1:30p    Description and Therapeutic Outcome:   In skills group, Ayesha was talkative and engaged.  She identified the \"channels\" she needed to change when she starts falling into self defeating thinking and codependent tendencies; she is able to look to working on shifting to more \"positive channels\" in her mind that include empowerment and support.  Ayesha shares that she will apply GAP, present moment, Little Wins, Know your truth and drop the rope as skills that help her interrupt both negative thoughts and perfectionistic tendencies.  Ayesha continues to be appropriate for PHP.    Is this a Weekly Review of the Progress on the Treatment Plan?  YES    Are Treatment Plan Goals being addressed?  YES      Are Treatment Plan Strategies to Address Goals Effective?  YES      Are there any current contracts in place?  YES           "

## 2021-07-06 ENCOUNTER — HOSPITAL ENCOUNTER (EMERGENCY)
Facility: HOSPITAL | Age: 41
Discharge: HOME | End: 2021-07-06
Attending: INTERNAL MEDICINE | Admitting: INTERNAL MEDICINE
Payer: COMMERCIAL

## 2021-07-06 ENCOUNTER — APPOINTMENT (OUTPATIENT)
Dept: ULTRASOUND IMAGING | Facility: HOSPITAL | Age: 41
End: 2021-07-06
Attending: INTERNAL MEDICINE
Payer: COMMERCIAL

## 2021-07-06 ENCOUNTER — APPOINTMENT (OUTPATIENT)
Dept: CT IMAGING | Facility: HOSPITAL | Age: 41
End: 2021-07-06
Attending: INTERNAL MEDICINE
Payer: COMMERCIAL

## 2021-07-06 ENCOUNTER — TELEPHONE (OUTPATIENT)
Dept: OBGYN | Facility: OTHER | Age: 41
End: 2021-07-06

## 2021-07-06 VITALS
SYSTOLIC BLOOD PRESSURE: 118 MMHG | TEMPERATURE: 97.8 F | HEART RATE: 83 BPM | DIASTOLIC BLOOD PRESSURE: 90 MMHG | OXYGEN SATURATION: 95 %

## 2021-07-06 DIAGNOSIS — R10.31 ABDOMINAL PAIN, RIGHT LOWER QUADRANT: Primary | ICD-10-CM

## 2021-07-06 DIAGNOSIS — R10.30 LOWER ABDOMINAL PAIN: ICD-10-CM

## 2021-07-06 LAB
ALBUMIN SERPL-MCNC: 3 G/DL (ref 3.4–5)
ALBUMIN UR-MCNC: NEGATIVE MG/DL
ALP SERPL-CCNC: 64 U/L (ref 40–150)
ALT SERPL W P-5'-P-CCNC: 17 U/L (ref 0–50)
ANION GAP SERPL CALCULATED.3IONS-SCNC: 4 MMOL/L (ref 3–14)
APPEARANCE UR: CLEAR
AST SERPL W P-5'-P-CCNC: 22 U/L (ref 0–45)
BACTERIA #/AREA URNS HPF: ABNORMAL /HPF
BASOPHILS # BLD AUTO: 0 10E9/L (ref 0–0.2)
BASOPHILS NFR BLD AUTO: 0.3 %
BILIRUB SERPL-MCNC: 0.3 MG/DL (ref 0.2–1.3)
BILIRUB UR QL STRIP: NEGATIVE
BUN SERPL-MCNC: 4 MG/DL (ref 7–30)
CALCIUM SERPL-MCNC: 7.9 MG/DL (ref 8.5–10.1)
CHLORIDE SERPL-SCNC: 108 MMOL/L (ref 94–109)
CO2 SERPL-SCNC: 28 MMOL/L (ref 20–32)
COLOR UR AUTO: ABNORMAL
CREAT SERPL-MCNC: 0.66 MG/DL (ref 0.52–1.04)
CRP SERPL-MCNC: <2.9 MG/L (ref 0–8)
DIFFERENTIAL METHOD BLD: NORMAL
EOSINOPHIL # BLD AUTO: 0.1 10E9/L (ref 0–0.7)
EOSINOPHIL NFR BLD AUTO: 0.7 %
ERYTHROCYTE [DISTWIDTH] IN BLOOD BY AUTOMATED COUNT: 13 % (ref 10–15)
GFR SERPL CREATININE-BSD FRML MDRD: >90 ML/MIN/{1.73_M2}
GLUCOSE SERPL-MCNC: 76 MG/DL (ref 70–99)
GLUCOSE UR STRIP-MCNC: NEGATIVE MG/DL
HCT VFR BLD AUTO: 38.1 % (ref 35–47)
HGB BLD-MCNC: 12.6 G/DL (ref 11.7–15.7)
HGB UR QL STRIP: NEGATIVE
IMM GRANULOCYTES # BLD: 0 10E9/L (ref 0–0.4)
IMM GRANULOCYTES NFR BLD: 0.2 %
KETONES UR STRIP-MCNC: NEGATIVE MG/DL
LABORATORY COMMENT REPORT: NORMAL
LEUKOCYTE ESTERASE UR QL STRIP: NEGATIVE
LYMPHOCYTES # BLD AUTO: 1.6 10E9/L (ref 0.8–5.3)
LYMPHOCYTES NFR BLD AUTO: 17.9 %
MCH RBC QN AUTO: 32.2 PG (ref 26.5–33)
MCHC RBC AUTO-ENTMCNC: 33.1 G/DL (ref 31.5–36.5)
MCV RBC AUTO: 97 FL (ref 78–100)
MONOCYTES # BLD AUTO: 0.3 10E9/L (ref 0–1.3)
MONOCYTES NFR BLD AUTO: 3.6 %
NEUTROPHILS # BLD AUTO: 6.7 10E9/L (ref 1.6–8.3)
NEUTROPHILS NFR BLD AUTO: 77.3 %
NITRATE UR QL: NEGATIVE
NRBC # BLD AUTO: 0 10*3/UL
NRBC BLD AUTO-RTO: 0 /100
PH UR STRIP: 6 PH (ref 4.7–8)
PLATELET # BLD AUTO: 347 10E9/L (ref 150–450)
POTASSIUM SERPL-SCNC: 3 MMOL/L (ref 3.4–5.3)
PROT SERPL-MCNC: 5.9 G/DL (ref 6.8–8.8)
RBC # BLD AUTO: 3.91 10E12/L (ref 3.8–5.2)
RBC #/AREA URNS AUTO: 0 /HPF (ref 0–2)
SARS-COV-2 RNA RESP QL NAA+PROBE: NEGATIVE
SODIUM SERPL-SCNC: 140 MMOL/L (ref 133–144)
SOURCE: ABNORMAL
SP GR UR STRIP: 1.01 (ref 1–1.03)
SPECIMEN SOURCE: NORMAL
SQUAMOUS #/AREA URNS AUTO: 1 /HPF (ref 0–1)
UROBILINOGEN UR STRIP-MCNC: NORMAL MG/DL (ref 0–2)
WBC # BLD AUTO: 8.7 10E9/L (ref 4–11)
WBC #/AREA URNS AUTO: <1 /HPF (ref 0–5)

## 2021-07-06 PROCEDURE — 99214 OFFICE O/P EST MOD 30 MIN: CPT | Performed by: OBSTETRICS & GYNECOLOGY

## 2021-07-06 PROCEDURE — 96376 TX/PRO/DX INJ SAME DRUG ADON: CPT

## 2021-07-06 PROCEDURE — 96374 THER/PROPH/DIAG INJ IV PUSH: CPT | Mod: XU

## 2021-07-06 PROCEDURE — U0003 INFECTIOUS AGENT DETECTION BY NUCLEIC ACID (DNA OR RNA); SEVERE ACUTE RESPIRATORY SYNDROME CORONAVIRUS 2 (SARS-COV-2) (CORONAVIRUS DISEASE [COVID-19]), AMPLIFIED PROBE TECHNIQUE, MAKING USE OF HIGH THROUGHPUT TECHNOLOGIES AS DESCRIBED BY CMS-2020-01-R: HCPCS | Performed by: EMERGENCY MEDICINE

## 2021-07-06 PROCEDURE — C9803 HOPD COVID-19 SPEC COLLECT: HCPCS

## 2021-07-06 PROCEDURE — 255N000002 HC RX 255 OP 636: Performed by: INTERNAL MEDICINE

## 2021-07-06 PROCEDURE — 99284 EMERGENCY DEPT VISIT MOD MDM: CPT | Performed by: INTERNAL MEDICINE

## 2021-07-06 PROCEDURE — 96375 TX/PRO/DX INJ NEW DRUG ADDON: CPT

## 2021-07-06 PROCEDURE — 86140 C-REACTIVE PROTEIN: CPT | Performed by: INTERNAL MEDICINE

## 2021-07-06 PROCEDURE — U0005 INFEC AGEN DETEC AMPLI PROBE: HCPCS | Performed by: EMERGENCY MEDICINE

## 2021-07-06 PROCEDURE — 85025 COMPLETE CBC W/AUTO DIFF WBC: CPT | Performed by: INTERNAL MEDICINE

## 2021-07-06 PROCEDURE — 74177 CT ABD & PELVIS W/CONTRAST: CPT

## 2021-07-06 PROCEDURE — 250N000013 HC RX MED GY IP 250 OP 250 PS 637: Performed by: OBSTETRICS & GYNECOLOGY

## 2021-07-06 PROCEDURE — 81001 URINALYSIS AUTO W/SCOPE: CPT | Performed by: INTERNAL MEDICINE

## 2021-07-06 PROCEDURE — 99285 EMERGENCY DEPT VISIT HI MDM: CPT | Mod: 25

## 2021-07-06 PROCEDURE — 250N000011 HC RX IP 250 OP 636: Performed by: INTERNAL MEDICINE

## 2021-07-06 PROCEDURE — 36415 COLL VENOUS BLD VENIPUNCTURE: CPT | Performed by: INTERNAL MEDICINE

## 2021-07-06 PROCEDURE — 80053 COMPREHEN METABOLIC PANEL: CPT | Performed by: INTERNAL MEDICINE

## 2021-07-06 PROCEDURE — 76830 TRANSVAGINAL US NON-OB: CPT

## 2021-07-06 RX ORDER — TRAMADOL HYDROCHLORIDE 50 MG/1
100 TABLET ORAL EVERY 6 HOURS PRN
Status: DISCONTINUED | OUTPATIENT
Start: 2021-07-06 | End: 2021-07-06 | Stop reason: HOSPADM

## 2021-07-06 RX ORDER — HYDROMORPHONE HYDROCHLORIDE 1 MG/ML
0.5 INJECTION, SOLUTION INTRAMUSCULAR; INTRAVENOUS; SUBCUTANEOUS ONCE
Status: COMPLETED | OUTPATIENT
Start: 2021-07-06 | End: 2021-07-06

## 2021-07-06 RX ORDER — KETOROLAC TROMETHAMINE 30 MG/ML
30 INJECTION, SOLUTION INTRAMUSCULAR; INTRAVENOUS ONCE
Status: COMPLETED | OUTPATIENT
Start: 2021-07-06 | End: 2021-07-06

## 2021-07-06 RX ORDER — FENTANYL CITRATE 50 UG/ML
50 INJECTION, SOLUTION INTRAMUSCULAR; INTRAVENOUS ONCE
Status: DISCONTINUED | OUTPATIENT
Start: 2021-07-06 | End: 2021-07-06

## 2021-07-06 RX ORDER — IOPAMIDOL 612 MG/ML
100 INJECTION, SOLUTION INTRAVASCULAR ONCE
Status: COMPLETED | OUTPATIENT
Start: 2021-07-06 | End: 2021-07-06

## 2021-07-06 RX ORDER — MULTIPLE VITAMINS W/ MINERALS TAB 9MG-400MCG
1 TAB ORAL DAILY
COMMUNITY
End: 2021-11-04

## 2021-07-06 RX ORDER — TRAMADOL HYDROCHLORIDE 100 MG/1
100 TABLET, COATED ORAL EVERY 6 HOURS PRN
Qty: 25 TABLET | Refills: 0 | Status: SHIPPED | OUTPATIENT
Start: 2021-07-06 | End: 2021-07-06

## 2021-07-06 RX ORDER — METOCLOPRAMIDE HYDROCHLORIDE 5 MG/ML
5 INJECTION INTRAMUSCULAR; INTRAVENOUS ONCE
Status: COMPLETED | OUTPATIENT
Start: 2021-07-06 | End: 2021-07-06

## 2021-07-06 RX ORDER — TRAMADOL HYDROCHLORIDE 50 MG/1
50 TABLET ORAL EVERY 6 HOURS PRN
Qty: 20 TABLET | Refills: 0 | Status: SHIPPED | OUTPATIENT
Start: 2021-07-06 | End: 2021-07-11

## 2021-07-06 RX ADMIN — IOPAMIDOL 100 ML: 612 INJECTION, SOLUTION INTRAVENOUS at 05:40

## 2021-07-06 RX ADMIN — KETOROLAC TROMETHAMINE 30 MG: 30 INJECTION, SOLUTION INTRAMUSCULAR; INTRAVENOUS at 03:53

## 2021-07-06 RX ADMIN — METOCLOPRAMIDE 5 MG: 5 INJECTION, SOLUTION INTRAMUSCULAR; INTRAVENOUS at 05:03

## 2021-07-06 RX ADMIN — HYDROMORPHONE HYDROCHLORIDE 0.5 MG: 1 INJECTION, SOLUTION INTRAMUSCULAR; INTRAVENOUS; SUBCUTANEOUS at 05:02

## 2021-07-06 RX ADMIN — HYDROMORPHONE HYDROCHLORIDE 1 MG: 1 INJECTION, SOLUTION INTRAMUSCULAR; INTRAVENOUS; SUBCUTANEOUS at 06:23

## 2021-07-06 RX ADMIN — TRAMADOL HYDROCHLORIDE 100 MG: 50 TABLET ORAL at 08:16

## 2021-07-06 ASSESSMENT — ENCOUNTER SYMPTOMS
SHORTNESS OF BREATH: 0
CHILLS: 0
VOMITING: 0
COUGH: 0
VOICE CHANGE: 0
ABDOMINAL PAIN: 1
PALPITATIONS: 0
ABDOMINAL DISTENTION: 0
ANAL BLEEDING: 0
DIZZINESS: 0
WHEEZING: 0
HEADACHES: 0
HEMATURIA: 0
MYALGIAS: 0
NAUSEA: 0
BLOOD IN STOOL: 0
BACK PAIN: 0
CONFUSION: 0
NUMBNESS: 0
COLOR CHANGE: 0
WOUND: 0
FLANK PAIN: 0
DIAPHORESIS: 0
FEVER: 0
LIGHT-HEADEDNESS: 0
NECK STIFFNESS: 0
CHEST TIGHTNESS: 0
ARTHRALGIAS: 0
DYSURIA: 0
NECK PAIN: 0

## 2021-07-06 NOTE — CONSULTS
Gynecology Consult    Name: Nasra Lovelace  Age: 41 year old  YOB: 1980   Medical Record #: [id number]     Date of Admission:  2021  Admitting Service:  Obstetrics and Gynecology  Admitting Provider:  Rito Soler MD  Primary Provider:   Adrienne Dailey    DATE: 2021         Identification:   Nasra Lovelace is a 41 year old  female.         Chief Complaint:   Abdominal pain.         History of Present Illness:   She presented to the ER complaining of severe right lower abdominal pain that started this morning about midnight. She had intercourse right before the pain began but denied dyspareunia or post coital bleeding. She recently had a laparoscopic supracervical hysterectomy on 2021. Denies vaginal itching, discharge or bleeding. No dysuria, flank pain or hematuria. No change in bowel habits. Some nausea after pain began but no emesis. No SOB, CP, fever or chills.    The ER doctor asked for a consult as the CT abdomen and pelvis showed right ovarian torsion. The patient has a history of right salpingo-oophorectomy, appendectomy, left salpingectomy, and recently a laparoscopic supracervical hysterectomy. The patient reports that her pain has improved since she has been in the ER.           Review of Systems:   As per the HPI. Other systems are reviewed and negative.         Allergies:     Allergies   Allergen Reactions     Dust Mite Extract      Hydrocodone Other (See Comments) and Itching     Skin felt like it was burning.     Penicillins Rash          Medication Prior to Admission:   (Not in a hospital admission)         Active Problem List:     Patient Active Problem List   Diagnosis     Migraine with aura and without status migrainosus, not intractable     Anxiety state     Primary insomnia     ACP (advance care planning)     Gastroesophageal reflux disease, esophagitis presence not specified     Vitamin deficiency     Recurrent major depressive disorder (H)     Tobacco abuse      Hydronephrosis     Panic disorder without agoraphobia     Attention deficit hyperactivity disorder (ADHD), combined type     Hot flashes---2021     History of COVID-19 - 21        Past Medical History:     Past Medical History:   Diagnosis Date     Acquired hypothyroidism 2014     Anxiety disorder 2012     Anxiety state 2014     Attention deficit hyperactivity disorder (ADHD), combined type 2019    Patient is followed by  for ongoing prescription of stimulants.  All refills should be approved by this provider, or covering partner.  Medication(s): Ritalin 10 mg.  Maximum quantity per month: 60 Clinic visit frequency required: Q 3 months   Controlled substance agreement on file: Yes      Date(s): 19 Neuropsych evaluation for ADD completed:  managed by   Last MNPMP webs     Gastroesophageal reflux disease, esophagitis presence not specified 2017     IMO Regulatory Load OCT 2020     History of COVID-19 - 2021     Hydronephrosis 2011     Menorrhagia with irregular cycle 2021    Recurrent menorrhagia after endometrial ablation      Migraine with aura and without status migrainosus, not intractable 2014     Panic disorder without agoraphobia 2006     Primary insomnia 2015     Recurrent major depressive disorder (H) 2018     Secondary dysmenorrhea 2021     Tobacco abuse 2018     Vitamin deficiency 2018        Past Surgical History:     Past Surgical History:   Procedure Laterality Date      SECTION N/A 2000      SECTION N/A 2012    postop hemm with ruptured R Ov vein, transfusion     COLONOSCOPY N/A 2019    Procedure: COLONOSCOPY DIAGNOSTIC WITH RANDOM COLON BIOPSIES ANOSCOPY; Surgeon: Igor Burgos MD; Location: [facility] OR       COLPOSCOPY CERVIX, BIOPSY CERVIX, ENDOCERVICAL CURETTAGE, COMBINED N/A 2008     DILATION AND CURETTAGE, ABLATE  ENDOMETRIUM NOVASURE, COMBINED N/A 2018    Novasure Endometrial ablation     ESOPHAGOSCOPY, GASTROSCOPY, DUODENOSCOPY (EGD), COMBINED  2019    Procedure: ESOPHAGOGASTRODUODENOSCOPY DIAGNOSTIC with biopsies; Surgeon: Igor Burgos MD; Location: [facility] OR       HYSTEROSCOPY DIAGNOSTIC N/A 2018    Hysteroscopy, D&C     LAPAROSCOPIC APPENDECTOMY N/A 2012     LAPAROSCOPIC HYSTERECTOMY SUPRACERVICAL N/A 2021    Procedure: laparoscopic supracervical hysterectomy;  Surgeon: Rito Soler MD;  Location: [facility] OR     OOPHORECTOMY Right 2012    post operative hemorrhage with ruptured ovarian vein,      SALPINGECTOMY Left 2019    Procedure: LAPAROSCOPY LEFT SALPINGECTOMY; Surgeon: Regina Sweeney MD; Location: [facility] OR            Obstetric History:     OB History    Para Term  AB Living   5 3 3 0 2 3   SAB TAB Ectopic Multiple Live Births   1 0 1 0 3      # Outcome Date GA Lbr Nikita/2nd Weight Sex Delivery Anes PTL Lv   5 Term 12   2.835 kg (6 lb 4 oz) F CS-Unspec  N CHIQUIS      Birth Comments: hemorrhage, ruptured right ovarian vein and right oophrectomy, transfusion      Complications: Hemorrhage   4 SAB 2011     SAB      3 Ectopic 2010     ECTOPIC      2 Term 00 40w0d  3.204 kg (7 lb 1 oz) M CS-Unspec  N CHIQUIS      Birth Comments: Non-reassuring heart rate pattern secondary to cord around the neck X3, around the body once and early abruptio placenta.      Complications: Fetal Intolerance, Abruptio Placenta   1 Term 1998   3.147 kg (6 lb 15 oz) M Vag-Spont  N CHIQUIS      Obstetric Comments   2012  section with post-op hemorrhage on 2012, ruptured right ovarian vein, right oophorectomy, blood transfusion         Family History:     Family History   Problem Relation Age of Onset     Hypertension Mother      Hyperlipidemia Mother      Cardiovascular Father      Hypertension Father      Hyperlipidemia Father      Heart Failure Father       Sleep Apnea Father      Kidney failure Father           Social History:     Social History     Tobacco Use     Smoking status: Current Every Day Smoker     Packs/day: 0.40     Years: 10.00     Pack years: 4.00     Types: Cigarettes     Last attempt to quit: 2020     Years since quittin.3     Smokeless tobacco: Never Used     Tobacco comment: tobacco cessation discuseed - tried to quit: no - passive smoke exposure quitting on own    Substance Use Topics     Alcohol use: No     History   Sexual Activity     Sexual activity: Yes     Partners: Male     Birth control/ protection: Female Surgical            Physical Exam:   Vital signs:  /90   Pulse 83   Temp 97.8  F (36.6  C) (Tympanic)   SpO2 95%   General: Well-developed, well-nourished female in no apparent distress.  Neurological: Alert and oriented x3.  Skin: No rashes or lesions.  Cardiovascular: Regular rate and rhythm. No significant murmurs, rubs, or gallops detected. No JVD. Peripheral pulses normal bilaterally in upper and lower extremities.  Lungs: Clear to auscultation bilaterally, no adventitious sounds, good air movement throughout. Breathing nonlabored.  Abdomen: Soft, mild tenderness in lower quadrants, non-distended, positive bowel sounds. No rebound. No guarding. No organomegaly. Well healed incisions. No acute abdomen.  Pelvic: Normal external female genitalia without lesions or abnormalities. Normal pubic hair distribution. Urethral meatus normal in appearance and without masses. Normal Bartholin, Urethral and Premont's glands. Vaginal mucosa is pink and moist with a small amount of physiologic discharge. No vaginal lesions.  Normal multiparous cervix without lesions or abnormalities. Mild cervical motion tenderness to palpation. The bladder and urethra are nontender to palpation. Uterus is absent. No adnexal tenderness.  Rectal: No external hemorrhoids noted. No rectovaginal nodularity noted. Examination confirms the vaginal  exam.  Back: No CVA tenderness bilaterally.  Extremities: No clubbing, cyanosis, or edema.          Diagnostics:   LAB:  CBC with platelets differential   Result Value Ref [place]     WBC 8.7 4.0 - 11.0 10e9/L     RBC Count 3.91 3.8 - 5.2 10e12/L     Hemoglobin 12.6 11.7 - 15.7 g/dL     Hematocrit 38.1 35.0 - 47.0 %     MCV 97 78 - 100 fl     MCH 32.2 26.5 - 33.0 pg     MCHC 33.1 31.5 - 36.5 g/dL     RDW 13.0 10.0 - 15.0 %     Platelet Count 347 150 - 450 10e9/L     Diff Method Automated Method       % Neutrophils 77.3 %     % Lymphocytes 17.9 %     % Monocytes 3.6 %     % Eosinophils 0.7 %     % Basophils 0.3 %     % Immature Granulocytes 0.2 %     Nucleated RBCs 0 0 /100     Absolute Neutrophil 6.7 1.6 - 8.3 10e9/L     Absolute Lymphocytes 1.6 0.8 - 5.3 10e9/L     Absolute Monocytes 0.3 0.0 - 1.3 10e9/L     Absolute Eosinophils 0.1 0.0 - 0.7 10e9/L     Absolute Basophils 0.0 0.0 - 0.2 10e9/L     Abs Immature Granulocytes 0.0 0 - 0.4 10e9/L     Absolute Nucleated RBC 0.0     Comprehensive metabolic panel   Result Value Ref [place]     Sodium 140 133 - 144 mmol/L     Potassium 3.0 (L) 3.4 - 5.3 mmol/L     Chloride 108 94 - 109 mmol/L     Carbon Dioxide 28 20 - 32 mmol/L     Anion Gap 4 3 - 14 mmol/L     Glucose 76 70 - 99 mg/dL     Urea Nitrogen 4 (L) 7 - 30 mg/dL     Creatinine 0.66 0.52 - 1.04 mg/dL     GFR Estimate >90 >60 mL/min/[1.73_m2]     GFR Estimate If Black >90 >60 mL/min/[1.73_m2]     Calcium 7.9 (L) 8.5 - 10.1 mg/dL     Bilirubin Total 0.3 0.2 - 1.3 mg/dL     Albumin 3.0 (L) 3.4 - 5.0 g/dL     Protein Total 5.9 (L) 6.8 - 8.8 g/dL     Alkaline Phosphatase 64 40 - 150 U/L     ALT 17 0 - 50 U/L     AST 22 0 - 45 U/L   CRP inflammation   Result Value Ref [place]     CRP Inflammation <2.9 0.0 - 8.0 mg/L   UA with Microscopic reflex to Culture     Specimen: Urine clean catch; Unspecified Urine   Result Value Ref [place]     Color Urine Light Yellow       Appearance Urine Clear       Glucose Urine Negative  NEG^Negative mg/dL     Bilirubin Urine Negative NEG^Negative     Ketones Urine Negative NEG^Negative mg/dL     Specific Gravity Urine 1.007 1.003 - 1.035     Blood Urine Negative NEG^Negative     pH Urine 6.0 4.7 - 8.0 pH     Protein Albumin Urine Negative NEG^Negative mg/dL     Urobilinogen mg/dL Normal 0.0 - 2.0 mg/dL     Nitrite Urine Negative NEG^Negative     Leukocyte Esterase Urine Negative NEG^Negative     Source Unspecified Urine       WBC Urine <1 0 - 5 /HPF     RBC Urine 0 0 - 2 /HPF     Bacteria Urine None (A) NEG^Negative /HPF     Squamous Epithelial /HPF Urine 1 0 - 1 /HPF      RADIOLOGY:  2021 Pelvic ultrasound: Pelvic hematoma, normal left ovary with blood flow, absent right ovary and uterus per Radiology phone report.        Assessment:   1. 41 year old  female with pelvic hematoma after intercourse  2. Recent laparoscopic supracervical hysterectomy on 2021'  3. No evidence of acute abdomen, infection, or active bleeding        Plan:   1. I recommend strict pelvic rest for 6 weeks.  2. Tramadol Rx for pain.  3. Follow up in clinic in 4-7 days or sooner as needed.      Rito Soler MD  Obstetrics and Gynecology

## 2021-07-06 NOTE — ED NOTES
AVS printed and discussed. Pt given post op care to follow from OBGYN. Pt calling for a ride at this time.

## 2021-07-06 NOTE — TELEPHONE ENCOUNTER
[facility] does not have the 100 mg Tramadol. They need a new RX for the 50 mg Tramadol. Patient saw you this morning in the ED. Thank you.    Rx for Tamadol 50 mg sent to pharmacy.  Rito Soler MD

## 2021-07-06 NOTE — DISCHARGE INSTRUCTIONS
What to expect when you have contrast    During your exam, we will inject  contrast  into your vein or artery. (Contrast is a clear liquid with iodine in it. It shows up on X-rays.)    You may feel warm or hot. You may have a metal taste in your mouth and a slight upset stomach. You may also feel pressure near the kidneys and bladder. These effects will last about 1 to 3 minutes.    Please tell us if you have:    Sneezing     Itching    Hives     Swelling in the face    A hoarse voice    Breathing problems    Other new symptoms    Serious problems are rare.  They may include:    Irregular heartbeat     Seizures    Kidney failure              Tissue damage    Shock      Death    If you have any problems during the exam, we  will treat them right away.    When you get home    Call your hospital if you have any new symptoms in the next 2 days, like hives or swelling. (Phone numbers are at the bottom of this page.) Or call your family doctor.     If you have wheezing or trouble breathing, call 911.    Self-care  -Drink at least 4 extra glasses of water today.   This reduces the stress on your kidneys.  -Keep taking your regular medicines.    The contrast will pass out of your body in your  Urine(pee). This will happen in the next 24 hours. You  will not feel this. Your urine will not  change color.    If you have kidney problems or take metformin    Drink 4 to 8 large glasses of water for the next  2 days, if you are not on a fluid restriction.    ?If you take metformin (Glucophage or Glucovance) for diabetes, keep taking it.      ?Your kidney function tests are abnormal.  If you take Metformin, do not take it for 48 hours. Please go to your clinic for a blood test within 3 days after your exam before the restarting this medicine.     (Note to provider:please give patient prescription for lab tests.)    ?Special instructions: Drink an extra 4 glasses of water to flush out the contrast.     I have read and understand the  above information.    Patient Sign Here:______________________________________Date:________Time:______    Staff Sign Here:________________________________________Date:_______Time:______      Radiology Departments:     ?[facility]: [phone] ?[place]: [phone]     ?[place]: [phone] ?[facility]:[phone]      ?[place]: [phone]  ?[place]: [phone]  ?[place]:[phone]    ?[facility] [place]:[phone]  ?[facility] [place]:[phone]

## 2021-07-06 NOTE — ED NOTES
Pt assisted to the bathroom. Pt reports that her pain is unchanged, but she is talking and sharing pictures on her phone. She reports that she feels like it is harder to breath at this time also. SaO2 97%.

## 2021-07-06 NOTE — ED PROVIDER NOTES
History     Chief Complaint   Patient presents with     Abdominal Pain     The history is provided by the patient.   Abdominal Pain  Pain location:  RLQ  Pain quality: stabbing    Pain radiates to:  R flank  Pain severity:  Severe  Onset quality:  Sudden  Timing:  Constant  Progression:  Worsening  Chronicity:  New  Associated symptoms: no chest pain, no chills, no cough, no dysuria, no fever, no hematuria, no nausea, no shortness of breath and no vomiting        Allergies:  Allergies   Allergen Reactions     Dust Mite Extract      Hydrocodone Other (See Comments) and Itching     Skin felt like it was burning.     Penicillins Rash       Problem List:    Patient Active Problem List    Diagnosis Date Noted     History of COVID-19 - 5/11/21 06/01/2021     Priority: Medium     Hot flashes---4/2021 04/24/2021     Priority: Medium     Attention deficit hyperactivity disorder (ADHD), combined type 08/29/2019     Priority: Medium     Patient is followed by  for ongoing prescription of stimulants.  All refills should be approved by this provider, or covering partner.    Medication(s): Ritalin 10 mg.   Maximum quantity per month: 60  Clinic visit frequency required: Q 3 months     Controlled substance agreement on file: Yes       Date(s): 8.27.19  Neuropsych evaluation for ADD completed:  managed by     [facility] website verification:  done on 8.27.19  [email or web address]           Recurrent major depressive disorder (H) 07/30/2018     Priority: Medium     Tobacco abuse 07/30/2018     Priority: Medium     Vitamin deficiency 03/16/2018     Priority: Medium     Gastroesophageal reflux disease, esophagitis presence not specified 04/17/2017     Priority: Medium     IMO Regulatory Load OCT 2020       ACP (advance care planning) 01/26/2017     Priority: Medium     Advance Care Planning 1/26/2017: ACP Review of Chart / Resources Provided:  Reviewed chart for advance care plan.  Nasra BARNES  Angel has been provided information and resources to begin or update their advance care plan.  Added by EMERALD THOMPSON             Primary insomnia 2015     Priority: Medium     Migraine with aura and without status migrainosus, not intractable 2014     Priority: Medium     Anxiety state 2014     Priority: Medium     Hydronephrosis 2011     Priority: Medium     Panic disorder without agoraphobia 2006     Priority: Medium        Past Medical History:    Past Medical History:   Diagnosis Date     Acquired hypothyroidism 2014     Anxiety disorder 2012     Anxiety state 2014     Attention deficit hyperactivity disorder (ADHD), combined type 2019     Gastroesophageal reflux disease, esophagitis presence not specified 2017     History of COVID-19 - 2021     Hydronephrosis 2011     Menorrhagia with irregular cycle 2021     Migraine with aura and without status migrainosus, not intractable 2014     Panic disorder without agoraphobia 2006     Primary insomnia 2015     Recurrent major depressive disorder (H) 2018     Secondary dysmenorrhea 2021     Tobacco abuse 2018     Vitamin deficiency 2018       Past Surgical History:    Past Surgical History:   Procedure Laterality Date      SECTION N/A 2000      SECTION N/A 2012    postop hemm with ruptured R Ov vein, transfusion     COLONOSCOPY N/A 2019    Procedure: COLONOSCOPY DIAGNOSTIC WITH RANDOM COLON BIOPSIES ANOSCOPY; Surgeon: Igor Burgos MD; Location: [facility] OR       COLPOSCOPY CERVIX, BIOPSY CERVIX, ENDOCERVICAL CURETTAGE, COMBINED N/A 2008     DILATION AND CURETTAGE, ABLATE ENDOMETRIUM NOVASURE, COMBINED N/A 2018    Novasure Endometrial ablation     ESOPHAGOSCOPY, GASTROSCOPY, DUODENOSCOPY (EGD), COMBINED  2019    Procedure: ESOPHAGOGASTRODUODENOSCOPY DIAGNOSTIC with biopsies; Surgeon: Aubrey  Igor BOWMAN MD; Location: [facility] OR       HYSTEROSCOPY DIAGNOSTIC N/A 2018    Hysteroscopy, D&C     LAPAROSCOPIC APPENDECTOMY N/A 2012     LAPAROSCOPIC HYSTERECTOMY SUPRACERVICAL N/A 2021    Procedure: laparoscopic supracervical hysterectomy;  Surgeon: Rito Soler MD;  Location: [facility] OR     OOPHORECTOMY Right 2012    post operative hemorrhage with ruptured ovarian vein,      SALPINGECTOMY Left 2019    Procedure: LAPAROSCOPY LEFT SALPINGECTOMY; Surgeon: Regina Sweeney MD; Location: [facility] OR         Family History:    Family History   Problem Relation Age of Onset     Hypertension Mother      Hyperlipidemia Mother      Cardiovascular Father      Hypertension Father      Hyperlipidemia Father      Heart Failure Father      Sleep Apnea Father      Kidney failure Father        Social History:  Marital Status:   [4]  Social History     Tobacco Use     Smoking status: Current Every Day Smoker     Packs/day: 0.40     Years: 10.00     Pack years: 4.00     Types: Cigarettes     Last attempt to quit: 2020     Years since quittin.3     Smokeless tobacco: Never Used     Tobacco comment: tobacco cessation discuseed - tried to quit: no - passive smoke exposure quitting on own    Substance Use Topics     Alcohol use: No     Drug use: No        Medications:    acetaminophen (TYLENOL) 325 MG tablet  albuterol (PROAIR HFA/PROVENTIL HFA/VENTOLIN HFA) 108 (90 Base) MCG/ACT inhaler  co-enzyme Q-10 100 MG CAPS capsule  DULoxetine (CYMBALTA) 60 MG capsule  multivitamin w/minerals (MULTI-VITAMIN) tablet  omeprazole (PRILOSEC) 40 MG DR capsule  propranolol ER (INDERAL LA) 80 MG 24 hr capsule  RITALIN LA 20 MG 24 hr capsule  [START ON 2021] temazepam (RESTORIL) 15 MG capsule  vitamin B complex with vitamin C (VITAMIN  B COMPLEX) tablet  ibuprofen (ADVIL/MOTRIN) 800 MG tablet          Review of Systems   Constitutional: Negative for chills, diaphoresis and fever.   HENT: Negative for voice  change.    Eyes: Negative for visual disturbance.   Respiratory: Negative for cough, chest tightness, shortness of breath and wheezing.    Cardiovascular: Negative for chest pain, palpitations and leg swelling.   Gastrointestinal: Positive for abdominal pain. Negative for abdominal distention, anal bleeding, blood in stool, nausea and vomiting.   Genitourinary: Negative for decreased urine volume, dysuria, flank pain and hematuria.   Musculoskeletal: Negative for arthralgias, back pain, gait problem, myalgias, neck pain and neck stiffness.   Skin: Negative for color change, pallor, rash and wound.   Neurological: Negative for dizziness, syncope, light-headedness, numbness and headaches.   Psychiatric/Behavioral: Negative for confusion and suicidal ideas.       Physical Exam   BP: 110/72  Pulse: 87  Temp: 97.8  F (36.6  C)  SpO2: 98 %      Physical Exam  Vitals signs and nursing note reviewed.   Constitutional:       Appearance: She is well-developed.   HENT:      Head: Normocephalic and atraumatic.      Mouth/Throat:      Pharynx: No oropharyngeal exudate.   Eyes:      Conjunctiva/sclera: Conjunctivae normal.      Pupils: Pupils are equal, round, and reactive to light.   Neck:      Musculoskeletal: Normal range of motion and neck supple.      Thyroid: No thyromegaly.      Vascular: No JVD.      Trachea: No tracheal deviation.   Cardiovascular:      Rate and Rhythm: Normal rate and regular rhythm.      Heart sounds: Normal heart sounds. No murmur. No friction rub. No gallop.    Pulmonary:      Effort: Pulmonary effort is normal. No respiratory distress.      Breath sounds: Normal breath sounds. No stridor. No wheezing or rales.   Chest:      Chest wall: No tenderness.   Abdominal:      General: Bowel sounds are normal. There is no distension.      Palpations: Abdomen is soft. There is no mass.      Tenderness: There is abdominal tenderness in the right lower quadrant and suprapubic area. There is no guarding or  rebound.   Musculoskeletal: Normal range of motion.         General: No tenderness.   Lymphadenopathy:      Cervical: No cervical adenopathy.   Skin:     General: Skin is warm and dry.      Coloration: Skin is not pale.      Findings: No erythema or rash.   Neurological:      Mental Status: She is alert and oriented to person, place, and time.   Psychiatric:         Behavior: Behavior normal.         ED Course        Procedures                   Results for orders placed or performed during the hospital encounter of 07/06/21 (from the past 24 hour(s))   CBC with platelets differential   Result Value Ref Range    WBC 8.7 4.0 - 11.0 10e9/L    RBC Count 3.91 3.8 - 5.2 10e12/L    Hemoglobin 12.6 11.7 - 15.7 g/dL    Hematocrit 38.1 35.0 - 47.0 %    MCV 97 78 - 100 fl    MCH 32.2 26.5 - 33.0 pg    MCHC 33.1 31.5 - 36.5 g/dL    RDW 13.0 10.0 - 15.0 %    Platelet Count 347 150 - 450 10e9/L    Diff Method Automated Method     % Neutrophils 77.3 %    % Lymphocytes 17.9 %    % Monocytes 3.6 %    % Eosinophils 0.7 %    % Basophils 0.3 %    % Immature Granulocytes 0.2 %    Nucleated RBCs 0 0 /100    Absolute Neutrophil 6.7 1.6 - 8.3 10e9/L    Absolute Lymphocytes 1.6 0.8 - 5.3 10e9/L    Absolute Monocytes 0.3 0.0 - 1.3 10e9/L    Absolute Eosinophils 0.1 0.0 - 0.7 10e9/L    Absolute Basophils 0.0 0.0 - 0.2 10e9/L    Abs Immature Granulocytes 0.0 0 - 0.4 10e9/L    Absolute Nucleated RBC 0.0    Comprehensive metabolic panel   Result Value Ref Range    Sodium 140 133 - 144 mmol/L    Potassium 3.0 (L) 3.4 - 5.3 mmol/L    Chloride 108 94 - 109 mmol/L    Carbon Dioxide 28 20 - 32 mmol/L    Anion Gap 4 3 - 14 mmol/L    Glucose 76 70 - 99 mg/dL    Urea Nitrogen 4 (L) 7 - 30 mg/dL    Creatinine 0.66 0.52 - 1.04 mg/dL    GFR Estimate >90 >60 mL/min/[1.73_m2]    GFR Estimate If Black >90 >60 mL/min/[1.73_m2]    Calcium 7.9 (L) 8.5 - 10.1 mg/dL    Bilirubin Total 0.3 0.2 - 1.3 mg/dL    Albumin 3.0 (L) 3.4 - 5.0 g/dL    Protein Total 5.9 (L)  6.8 - 8.8 g/dL    Alkaline Phosphatase 64 40 - 150 U/L    ALT 17 0 - 50 U/L    AST 22 0 - 45 U/L   CRP inflammation   Result Value Ref Range    CRP Inflammation <2.9 0.0 - 8.0 mg/L   UA with Microscopic reflex to Culture    Specimen: Urine clean catch; Unspecified Urine   Result Value Ref Range    Color Urine Light Yellow     Appearance Urine Clear     Glucose Urine Negative NEG^Negative mg/dL    Bilirubin Urine Negative NEG^Negative    Ketones Urine Negative NEG^Negative mg/dL    Specific Gravity Urine 1.007 1.003 - 1.035    Blood Urine Negative NEG^Negative    pH Urine 6.0 4.7 - 8.0 pH    Protein Albumin Urine Negative NEG^Negative mg/dL    Urobilinogen mg/dL Normal 0.0 - 2.0 mg/dL    Nitrite Urine Negative NEG^Negative    Leukocyte Esterase Urine Negative NEG^Negative    Source Unspecified Urine     WBC Urine <1 0 - 5 /HPF    RBC Urine 0 0 - 2 /HPF    Bacteria Urine None (A) NEG^Negative /HPF    Squamous Epithelial /HPF Urine 1 0 - 1 /HPF       Medications   ketorolac (TORADOL) injection 30 mg (30 mg Intravenous Given 7/6/21 0353)   HYDROmorphone (PF) (DILAUDID) injection 0.5 mg (0.5 mg Intravenous Given 7/6/21 0502)   metoclopramide (REGLAN) injection 5 mg (5 mg Intravenous Given 7/6/21 0503)   iopamidol (ISOVUE-300) IV solution 61% 100 mL (100 mLs Intravenous Given 7/6/21 0540)   sodium chloride (PF) 0.9% PF flush 60 mL (50 mLs Intravenous Given 7/6/21 0539)   HYDROmorphone (DILAUDID) injection 1 mg (1 mg Intravenous Given 7/6/21 0623)       Assessments & Plan (with Medical Decision Making)   Severe RLQ, pelvic pain after having intercourse   Hx of hysterectomy 1 mo ago  Labs reviewed  Pt needed multiple pain medication, CT abd showed Right ovarian torsion, Dr painter was called for emergent consult, US pelvic ordered  S/o to Dr Burton at the change of shift.   I have reviewed the nursing notes.    I have reviewed the findings, diagnosis, plan and need for follow up with the patient.      New Prescriptions    No  medications on file       Final diagnoses:   Ovarian torsion       7/6/2021   [facility]     Fortunato Love MD  07/06/21 0743

## 2021-07-07 ENCOUNTER — TELEPHONE (OUTPATIENT)
Dept: OBGYN | Facility: OTHER | Age: 41
End: 2021-07-07

## 2021-07-07 DIAGNOSIS — R10.31 ABDOMINAL PAIN, RIGHT LOWER QUADRANT: Primary | ICD-10-CM

## 2021-07-07 RX ORDER — ONDANSETRON 4 MG/1
4 TABLET, ORALLY DISINTEGRATING ORAL EVERY 8 HOURS PRN
Qty: 20 TABLET | Refills: 0 | Status: SHIPPED | OUTPATIENT
Start: 2021-07-07 | End: 2021-09-07

## 2021-07-07 RX ORDER — HYDROCODONE BITARTRATE AND ACETAMINOPHEN 5; 325 MG/1; MG/1
1 TABLET ORAL EVERY 6 HOURS PRN
Qty: 12 TABLET | Refills: 0 | Status: SHIPPED | OUTPATIENT
Start: 2021-07-07 | End: 2021-07-10

## 2021-07-07 NOTE — TELEPHONE ENCOUNTER
I am returning Ms. Lovelace's call.  She feels nauseated with the Tramadol and is requesting a prescription for nausea medication. She also doesn't thin that the Tramadol 50 mg is helping.  I sent a Rx for Zofran and [place] to her pharmacy.  If her sx do not improve or worsen then she should come to the clinic or be seen in the ER.  All of the patient's questions were answered.  Rito Soler MD

## 2021-07-26 DIAGNOSIS — F41.1 GAD (GENERALIZED ANXIETY DISORDER): ICD-10-CM

## 2021-07-26 DIAGNOSIS — K21.9 GASTROESOPHAGEAL REFLUX DISEASE WITHOUT ESOPHAGITIS: ICD-10-CM

## 2021-07-26 DIAGNOSIS — R79.0 LOW MAGNESIUM LEVEL: ICD-10-CM

## 2021-07-26 RX ORDER — OMEPRAZOLE 40 MG/1
CAPSULE, DELAYED RELEASE ORAL
Qty: 30 CAPSULE | Refills: 3 | Status: SHIPPED | OUTPATIENT
Start: 2021-07-26 | End: 2021-11-04

## 2021-07-27 RX ORDER — LORAZEPAM 0.5 MG/1
0.5 TABLET ORAL DAILY PRN
Qty: 30 TABLET | Refills: 1 | Status: SHIPPED | OUTPATIENT
Start: 2021-07-27 | End: 2021-08-31

## 2021-07-27 RX ORDER — MAGNESIUM OXIDE 400 MG/1
TABLET ORAL
Qty: 60 TABLET | Refills: 0 | Status: SHIPPED | OUTPATIENT
Start: 2021-07-27 | End: 2021-08-31

## 2021-07-30 DIAGNOSIS — F90.2 ADHD (ATTENTION DEFICIT HYPERACTIVITY DISORDER), COMBINED TYPE: ICD-10-CM

## 2021-07-30 NOTE — TELEPHONE ENCOUNTER
Ritalin LA 20 mg capsule      Take 1 capsule daily  Last Written Prescription Date:  6-  Last Fill Quantity: 30 capsue,   # refills: 0  Last Office Visit: 5-10- 2021  Future Office visit:   0

## 2021-08-02 RX ORDER — METHYLPHENIDATE HCL 20 MG
CAPSULE,EXTENDED RELEASE BIPHASIC 50-50 ORAL
Qty: 30 CAPSULE | Refills: 0 | Status: SHIPPED | OUTPATIENT
Start: 2021-08-02 | End: 2021-09-01

## 2021-08-02 NOTE — TELEPHONE ENCOUNTER
Last fill 7/6/21 per [facility] .     Sending to PCP as  is out all week.     Gina García, RN-BSN  Care Coordination, Behavioral Health

## 2021-08-30 DIAGNOSIS — G43.009 MIGRAINE WITHOUT AURA AND WITHOUT STATUS MIGRAINOSUS, NOT INTRACTABLE: ICD-10-CM

## 2021-08-30 DIAGNOSIS — F90.2 ADHD (ATTENTION DEFICIT HYPERACTIVITY DISORDER), COMBINED TYPE: ICD-10-CM

## 2021-08-30 DIAGNOSIS — F41.1 GAD (GENERALIZED ANXIETY DISORDER): ICD-10-CM

## 2021-08-30 DIAGNOSIS — R79.0 LOW MAGNESIUM LEVEL: ICD-10-CM

## 2021-08-31 DIAGNOSIS — R79.0 LOW MAGNESIUM LEVEL: ICD-10-CM

## 2021-08-31 RX ORDER — MAGNESIUM OXIDE 400 MG/1
TABLET ORAL
Qty: 60 TABLET | Refills: 0 | Status: SHIPPED | OUTPATIENT
Start: 2021-08-31 | End: 2021-11-30

## 2021-08-31 RX ORDER — LORAZEPAM 0.5 MG/1
0.5 TABLET ORAL DAILY PRN
Qty: 30 TABLET | Refills: 1 | OUTPATIENT
Start: 2021-08-31

## 2021-08-31 RX ORDER — LORAZEPAM 0.5 MG/1
TABLET ORAL
Qty: 30 TABLET | Refills: 0 | COMMUNITY
Start: 2021-08-31 | End: 2021-09-07

## 2021-08-31 RX ORDER — MAGNESIUM OXIDE 400 MG/1
TABLET ORAL
Qty: 60 TABLET | Refills: 0 | OUTPATIENT
Start: 2021-08-31

## 2021-08-31 NOTE — TELEPHONE ENCOUNTER
Per : patient can take 1 tablet daily for 15 days, then 0.5 tablets until Rx is completed. Notified patient. Encouraged patient to call with any questions or concerns.     Gina García RN-BSN  Care Coordination, Behavioral Health

## 2021-08-31 NOTE — TELEPHONE ENCOUNTER
Received call back from patient. Advised patient that  is no longer able to fill the Ativan and Temazepam and patient will have to choose which medication she would like. Patient verbalized understanding and has discussed this with  previously.   Patient would like to keep the Temazepam.     Patient states she has recently been taking Ativan 0.5 mg twice daily for the last couple months. Advised patient CC would route to  to advise on dosing and contact patient. Patient verbalized understanding.     Patient last filled Ativan yesterday 8/30/21 #30 per [facility].     Gina García, RN-BSN  Care Coordination, Behavioral Health

## 2021-08-31 NOTE — TELEPHONE ENCOUNTER
Attempted to contact patient. Left message with CC contact information for patient to return call.     Gina García, RN-BSN  Care Coordination, Behavioral Health

## 2021-09-01 ENCOUNTER — OFFICE VISIT (OUTPATIENT)
Dept: PSYCHOLOGY | Facility: OTHER | Age: 41
End: 2021-09-01
Payer: COMMERCIAL

## 2021-09-01 DIAGNOSIS — F33.9 RECURRENT MAJOR DEPRESSIVE DISORDER (H): Primary | ICD-10-CM

## 2021-09-01 PROCEDURE — 90837 PSYTX W PT 60 MINUTES: CPT | Performed by: SOCIAL WORKER

## 2021-09-01 RX ORDER — METHYLPHENIDATE HCL 20 MG
CAPSULE,EXTENDED RELEASE BIPHASIC 50-50 ORAL
Qty: 30 CAPSULE | Refills: 0 | Status: ON HOLD | OUTPATIENT
Start: 2021-09-01 | End: 2021-09-28

## 2021-09-01 RX ORDER — PROPRANOLOL HYDROCHLORIDE 80 MG/1
80 CAPSULE, EXTENDED RELEASE ORAL DAILY
Qty: 30 CAPSULE | Refills: 1 | Status: SHIPPED | OUTPATIENT
Start: 2021-09-01 | End: 2021-11-04

## 2021-09-07 ENCOUNTER — HOSPITAL ENCOUNTER (EMERGENCY)
Facility: HOSPITAL | Age: 41
Discharge: HOME | End: 2021-09-07
Attending: PHYSICIAN ASSISTANT | Admitting: PHYSICIAN ASSISTANT
Payer: COMMERCIAL

## 2021-09-07 ENCOUNTER — OFFICE VISIT (OUTPATIENT)
Dept: BEHAVIORAL HEALTH | Facility: OTHER | Age: 41
End: 2021-09-07
Payer: COMMERCIAL

## 2021-09-07 VITALS
OXYGEN SATURATION: 99 % | RESPIRATION RATE: 18 BRPM | DIASTOLIC BLOOD PRESSURE: 90 MMHG | SYSTOLIC BLOOD PRESSURE: 128 MMHG | TEMPERATURE: 98.2 F | HEART RATE: 78 BPM

## 2021-09-07 DIAGNOSIS — F33.2 SEVERE EPISODE OF RECURRENT MAJOR DEPRESSIVE DISORDER, WITHOUT PSYCHOTIC FEATURES (H): Primary | ICD-10-CM

## 2021-09-07 DIAGNOSIS — F10.10 ALCOHOL ABUSE: ICD-10-CM

## 2021-09-07 DIAGNOSIS — F41.9 ANXIETY: ICD-10-CM

## 2021-09-07 LAB
ALBUMIN SERPL-MCNC: 3.2 G/DL (ref 3.4–5)
ALP SERPL-CCNC: 68 U/L (ref 40–150)
ALT SERPL W P-5'-P-CCNC: 26 U/L (ref 0–50)
ANION GAP SERPL CALCULATED.3IONS-SCNC: 2 MMOL/L (ref 3–14)
AST SERPL W P-5'-P-CCNC: 23 U/L (ref 0–45)
BASOPHILS # BLD AUTO: 0 10E3/UL (ref 0–0.2)
BASOPHILS NFR BLD AUTO: 0 %
BILIRUB SERPL-MCNC: 0.3 MG/DL (ref 0.2–1.3)
BUN SERPL-MCNC: 9 MG/DL (ref 7–30)
CALCIUM SERPL-MCNC: 9 MG/DL (ref 8.5–10.1)
CHLORIDE BLD-SCNC: 105 MMOL/L (ref 94–109)
CO2 SERPL-SCNC: 31 MMOL/L (ref 20–32)
CREAT SERPL-MCNC: 0.65 MG/DL (ref 0.52–1.04)
EOSINOPHIL # BLD AUTO: 0 10E3/UL (ref 0–0.7)
EOSINOPHIL NFR BLD AUTO: 0 %
ERYTHROCYTE [DISTWIDTH] IN BLOOD BY AUTOMATED COUNT: 13.2 % (ref 10–15)
GFR SERPL CREATININE-BSD FRML MDRD: >90 ML/MIN/1.73M2
GLUCOSE BLD-MCNC: 138 MG/DL (ref 70–99)
HCT VFR BLD AUTO: 43.7 % (ref 35–47)
HGB BLD-MCNC: 14.6 G/DL (ref 11.7–15.7)
IMM GRANULOCYTES # BLD: 0 10E3/UL
IMM GRANULOCYTES NFR BLD: 0 %
LYMPHOCYTES # BLD AUTO: 2 10E3/UL (ref 0.8–5.3)
LYMPHOCYTES NFR BLD AUTO: 20 %
MAGNESIUM SERPL-MCNC: 1.9 MG/DL (ref 1.6–2.3)
MCH RBC QN AUTO: 31.5 PG (ref 26.5–33)
MCHC RBC AUTO-ENTMCNC: 33.4 G/DL (ref 31.5–36.5)
MCV RBC AUTO: 94 FL (ref 78–100)
MONOCYTES # BLD AUTO: 0.5 10E3/UL (ref 0–1.3)
MONOCYTES NFR BLD AUTO: 5 %
NEUTROPHILS # BLD AUTO: 7.4 10E3/UL (ref 1.6–8.3)
NEUTROPHILS NFR BLD AUTO: 75 %
NRBC # BLD AUTO: 0 10E3/UL
NRBC BLD AUTO-RTO: 0 /100
PLATELET # BLD AUTO: 319 10E3/UL (ref 150–450)
POTASSIUM BLD-SCNC: 4 MMOL/L (ref 3.4–5.3)
PROT SERPL-MCNC: 6.7 G/DL (ref 6.8–8.8)
RBC # BLD AUTO: 4.64 10E6/UL (ref 3.8–5.2)
SODIUM SERPL-SCNC: 138 MMOL/L (ref 133–144)
WBC # BLD AUTO: 9.9 10E3/UL (ref 4–11)

## 2021-09-07 PROCEDURE — 80053 COMPREHEN METABOLIC PANEL: CPT | Performed by: PHYSICIAN ASSISTANT

## 2021-09-07 PROCEDURE — 99283 EMERGENCY DEPT VISIT LOW MDM: CPT | Performed by: PHYSICIAN ASSISTANT

## 2021-09-07 PROCEDURE — 83735 ASSAY OF MAGNESIUM: CPT | Performed by: PHYSICIAN ASSISTANT

## 2021-09-07 PROCEDURE — 90837 PSYTX W PT 60 MINUTES: CPT | Performed by: SOCIAL WORKER

## 2021-09-07 PROCEDURE — 250N000013 HC RX MED GY IP 250 OP 250 PS 637: Performed by: PHYSICIAN ASSISTANT

## 2021-09-07 PROCEDURE — 36415 COLL VENOUS BLD VENIPUNCTURE: CPT | Performed by: PHYSICIAN ASSISTANT

## 2021-09-07 PROCEDURE — 85025 COMPLETE CBC W/AUTO DIFF WBC: CPT | Performed by: PHYSICIAN ASSISTANT

## 2021-09-07 PROCEDURE — 99285 EMERGENCY DEPT VISIT HI MDM: CPT

## 2021-09-07 PROCEDURE — 250N000011 HC RX IP 250 OP 636: Performed by: PHYSICIAN ASSISTANT

## 2021-09-07 RX ORDER — LORAZEPAM 1 MG/1
1 TABLET ORAL ONCE
Status: COMPLETED | OUTPATIENT
Start: 2021-09-07 | End: 2021-09-07

## 2021-09-07 RX ORDER — LORAZEPAM 1 MG/1
1 TABLET ORAL EVERY 6 HOURS PRN
Qty: 12 TABLET | Refills: 0 | Status: ON HOLD | OUTPATIENT
Start: 2021-09-07 | End: 2021-09-28

## 2021-09-07 RX ORDER — ONDANSETRON 4 MG/1
4 TABLET, ORALLY DISINTEGRATING ORAL ONCE
Status: COMPLETED | OUTPATIENT
Start: 2021-09-07 | End: 2021-09-07

## 2021-09-07 RX ADMIN — ONDANSETRON 4 MG: 4 TABLET, ORALLY DISINTEGRATING ORAL at 13:59

## 2021-09-07 RX ADMIN — LORAZEPAM 1 MG: 1 TABLET ORAL at 13:59

## 2021-09-07 RX ADMIN — LORAZEPAM 1 MG: 1 TABLET ORAL at 15:13

## 2021-09-07 ASSESSMENT — ENCOUNTER SYMPTOMS
SLEEP DISTURBANCE: 1
FATIGUE: 1
DIZZINESS: 1
VOMITING: 0
NERVOUS/ANXIOUS: 1
ABDOMINAL PAIN: 1
SHORTNESS OF BREATH: 0
BLOOD IN STOOL: 0
ABDOMINAL DISTENTION: 0
ACTIVITY CHANGE: 1
APPETITE CHANGE: 1
NAUSEA: 1
COUGH: 0
NECK PAIN: 0
LIGHT-HEADEDNESS: 1

## 2021-09-07 NOTE — ED NOTES
Charge RN spoke with [facility] and they potentially will have  around 1615. Pt currently speaking with nurse from [facility] via telephone.

## 2021-09-07 NOTE — ED NOTES
Care Transitions focused note:      Chart reviewed, discussed with provider and nsg.    Pt is a 41 year old female presenting with anxiety, eating disorder and substance use disorder.    Ayesha stated that she has been having issues since May.  She attended [facility] here at [place] for 3 weeks and graduated.  She then had COVID and in June a hysterectomy with some complications and pain.  Her 9 year old daughter was in an accident in July (ATV) and now has anxiety and will not get into a car, so they are missing their vacation in [place] with her sig other and this is also stressing her out.  She has not been eating and has been isolating and drinking to cope with her anxiety.    [facility] assessment is pending.  I have provided her with my card and information regarding a rule 25 assessment was also provided.    Will await [facility] assessment rec.    MARITZA Ibarra

## 2021-09-07 NOTE — ED PROVIDER NOTES
History     Chief Complaint   Patient presents with     Anxiety     pt wanting to get in for treatment and having racing thoughts     The history is provided by the patient.     Nasra Lovelace is a 41 year old female who presented to the emergency department ambulatory for evaluation of intermittent alcohol abuse that seems to be worsening a, low and poor oral intake, as well as anxiety.  She denies any suicidal homicidal ideation.  She has a local psychiatrist.  Some racing thoughts.    Allergies:  Allergies   Allergen Reactions     Dust Mite Extract      Hydrocodone Other (See Comments) and Itching     Skin felt like it was burning.     Penicillins Rash       Problem List:    Patient Active Problem List    Diagnosis Date Noted     History of COVID-19 - 5/11/21 06/01/2021     Priority: Medium     Hot flashes---4/2021 04/24/2021     Priority: Medium     Attention deficit hyperactivity disorder (ADHD), combined type 08/29/2019     Priority: Medium     Patient is followed by  for ongoing prescription of stimulants.  All refills should be approved by this provider, or covering partner.    Medication(s): Ritalin 10 mg.   Maximum quantity per month: 60  Clinic visit frequency required: Q 3 months     Controlled substance agreement on file: Yes       Date(s): 8.27.19  Neuropsych evaluation for ADD completed:  managed by     [facility] website verification:  done on 8.27.19  [email or web address]           Recurrent major depressive disorder (H) 07/30/2018     Priority: Medium     Tobacco abuse 07/30/2018     Priority: Medium     Vitamin deficiency 03/16/2018     Priority: Medium     Gastroesophageal reflux disease, esophagitis presence not specified 04/17/2017     Priority: Medium     IMO Regulatory Load OCT 2020       ACP (advance care planning) 01/26/2017     Priority: Medium     Advance Care Planning 1/26/2017: ACP Review of Chart / Resources Provided:  Reviewed chart for advance  care plan.  Nasra Ortiz has been provided information and resources to begin or update their advance care plan.  Added by EMERALD THOMPSON             Primary insomnia 2015     Priority: Medium     Migraine with aura and without status migrainosus, not intractable 2014     Priority: Medium     Anxiety state 2014     Priority: Medium     Hydronephrosis 2011     Priority: Medium     Panic disorder without agoraphobia 2006     Priority: Medium        Past Medical History:    Past Medical History:   Diagnosis Date     Acquired hypothyroidism 2014     Anxiety disorder 2012     Anxiety state 2014     Attention deficit hyperactivity disorder (ADHD), combined type 2019     Gastroesophageal reflux disease, esophagitis presence not specified 2017     History of COVID-19 - 2021     Hydronephrosis 2011     Menorrhagia with irregular cycle 2021     Migraine with aura and without status migrainosus, not intractable 2014     Panic disorder without agoraphobia 2006     Primary insomnia 2015     Recurrent major depressive disorder (H) 2018     Secondary dysmenorrhea 2021     Tobacco abuse 2018     Vitamin deficiency 2018       Past Surgical History:    Past Surgical History:   Procedure Laterality Date      SECTION N/A 2000      SECTION N/A 2012    postop hemm with ruptured R Ov vein, transfusion     COLONOSCOPY N/A 2019    Procedure: COLONOSCOPY DIAGNOSTIC WITH RANDOM COLON BIOPSIES ANOSCOPY; Surgeon: Igor Burgos MD; Location: [facility] OR       COLPOSCOPY CERVIX, BIOPSY CERVIX, ENDOCERVICAL CURETTAGE, COMBINED N/A 2008     DILATION AND CURETTAGE, ABLATE ENDOMETRIUM NOVASURE, COMBINED N/A 2018    Novasure Endometrial ablation     ESOPHAGOSCOPY, GASTROSCOPY, DUODENOSCOPY (EGD), COMBINED  2019    Procedure: ESOPHAGOGASTRODUODENOSCOPY DIAGNOSTIC with  biopsies; Surgeon: Igor Burgos MD; Location: [facility] OR       HYSTEROSCOPY DIAGNOSTIC N/A 2018    Hysteroscopy, D&C     LAPAROSCOPIC APPENDECTOMY N/A 2012     LAPAROSCOPIC HYSTERECTOMY SUPRACERVICAL N/A 2021    Procedure: laparoscopic supracervical hysterectomy;  Surgeon: Rito Soler MD;  Location: [facility] OR     OOPHORECTOMY Right 2012    post operative hemorrhage with ruptured ovarian vein,      SALPINGECTOMY Left 2019    Procedure: LAPAROSCOPY LEFT SALPINGECTOMY; Surgeon: Regina Sweeney MD; Location: [facility] OR         Family History:    Family History   Problem Relation Age of Onset     Hypertension Mother      Hyperlipidemia Mother      Cardiovascular Father      Hypertension Father      Hyperlipidemia Father      Heart Failure Father      Sleep Apnea Father      Kidney failure Father        Social History:  Marital Status:   [4]  Social History     Tobacco Use     Smoking status: Current Every Day Smoker     Packs/day: 0.40     Years: 10.00     Pack years: 4.00     Types: Cigarettes     Last attempt to quit: 2020     Years since quittin.5     Smokeless tobacco: Never Used     Tobacco comment: tobacco cessation discuseed - tried to quit: no - passive smoke exposure quitting on own    Substance Use Topics     Alcohol use: No     Drug use: No        Medications:    albuterol (PROAIR HFA/PROVENTIL HFA/VENTOLIN HFA) 108 (90 Base) MCG/ACT inhaler  co-enzyme Q-10 100 MG CAPS capsule  DULoxetine (CYMBALTA) 60 MG capsule  ibuprofen (ADVIL/MOTRIN) 800 MG tablet  LORazepam (ATIVAN) 1 MG tablet  magnesium oxide (MAG-OX) 400 MG tablet  multivitamin w/minerals (MULTI-VITAMIN) tablet  omeprazole (PRILOSEC) 40 MG DR capsule  propranolol ER (INDERAL LA) 80 MG 24 hr capsule  RITALIN LA 20 MG 24 hr capsule  temazepam (RESTORIL) 15 MG capsule  vitamin B complex with vitamin C (VITAMIN  B COMPLEX) tablet  acetaminophen (TYLENOL) 325 MG tablet          Review of Systems    Constitutional: Positive for activity change, appetite change and fatigue.   Respiratory: Negative for cough and shortness of breath.    Cardiovascular: Negative for chest pain.   Gastrointestinal: Positive for abdominal pain and nausea. Negative for abdominal distention, blood in stool and vomiting.   Genitourinary: Negative.    Musculoskeletal: Negative for neck pain.   Skin: Negative.    Neurological: Positive for dizziness and light-headedness.   Psychiatric/Behavioral: Positive for sleep disturbance. Negative for self-injury and suicidal ideas. The patient is nervous/anxious.        Physical Exam   BP: 128/90  Pulse: 78  Temp: 98.2  F (36.8  C)  Resp: 18  SpO2: 99 %      Physical Exam  Vitals and nursing note reviewed.   Constitutional:       General: She is not in acute distress.     Appearance: Normal appearance. She is normal weight. She is not ill-appearing, toxic-appearing or diaphoretic.   Cardiovascular:      Rate and Rhythm: Normal rate and regular rhythm.   Pulmonary:      Effort: Pulmonary effort is normal.   Skin:     General: Skin is warm and dry.      Capillary Refill: Capillary refill takes less than 2 seconds.   Neurological:      General: No focal deficit present.      Mental Status: She is alert and oriented to person, place, and time.   Psychiatric:         Mood and Affect: Mood normal.         Behavior: Behavior normal.         Thought Content: Thought content normal.         Judgment: Judgment normal.      Comments: Seems a little anxious but has no evidence of emergent psychiatric concerns.         ED Course     ED Course as of Sep 07 1734   Tue Sep 07, 2021   1632 Patient would like to be discharged home with follow-up with her primary care.  She would also to see her psychiatrist as an outpatient.  This is reasonable.  She has no suicidal or homicidal ideation.  She has set herself up for rule 25 in 2 days.      1723 [facility] assessment and they also recommend discharge.  Multiple follow-ups  are scheduled.        Procedures              Critical Care time:  none               Results for orders placed or performed during the hospital encounter of 09/07/21 (from the past 24 hour(s))   CBC with platelets differential    Narrative    The following orders were created for panel order CBC with platelets differential.  Procedure                               Abnormality         Status                     ---------                               -----------         ------                     CBC with platelets and d...[[id number]]                      Final result                 Please view results for these tests on the individual orders.   Comprehensive metabolic panel   Result Value Ref Range    Sodium 138 133 - 144 mmol/L    Potassium 4.0 3.4 - 5.3 mmol/L    Chloride 105 94 - 109 mmol/L    Carbon Dioxide (CO2) 31 20 - 32 mmol/L    Anion Gap 2 (L) 3 - 14 mmol/L    Urea Nitrogen 9 7 - 30 mg/dL    Creatinine 0.65 0.52 - 1.04 mg/dL    Calcium 9.0 8.5 - 10.1 mg/dL    Glucose 138 (H) 70 - 99 mg/dL    Alkaline Phosphatase 68 40 - 150 U/L    AST 23 0 - 45 U/L    ALT 26 0 - 50 U/L    Protein Total 6.7 (L) 6.8 - 8.8 g/dL    Albumin 3.2 (L) 3.4 - 5.0 g/dL    Bilirubin Total 0.3 0.2 - 1.3 mg/dL    GFR Estimate >90 >60 mL/min/1.73m2   Magnesium   Result Value Ref Range    Magnesium 1.9 1.6 - 2.3 mg/dL   CBC with platelets and differential   Result Value Ref Range    WBC Count 9.9 4.0 - 11.0 10e3/uL    RBC Count 4.64 3.80 - 5.20 10e6/uL    Hemoglobin 14.6 11.7 - 15.7 g/dL    Hematocrit 43.7 35.0 - 47.0 %    MCV 94 78 - 100 fL    MCH 31.5 26.5 - 33.0 pg    MCHC 33.4 31.5 - 36.5 g/dL    RDW 13.2 10.0 - 15.0 %    Platelet Count 319 150 - 450 10e3/uL    % Neutrophils 75 %    % Lymphocytes 20 %    % Monocytes 5 %    % Eosinophils 0 %    % Basophils 0 %    % Immature Granulocytes 0 %    NRBCs per 100 WBC 0 <1 /100    Absolute Neutrophils 7.4 1.6 - 8.3 10e3/uL    Absolute Lymphocytes 2.0 0.8 - 5.3 10e3/uL    Absolute Monocytes  0.5 0.0 - 1.3 10e3/uL    Absolute Eosinophils 0.0 0.0 - 0.7 10e3/uL    Absolute Basophils 0.0 0.0 - 0.2 10e3/uL    Absolute Immature Granulocytes 0.0 <=0.0 10e3/uL    Absolute NRBCs 0.0 10e3/uL       Medications   ondansetron (ZOFRAN-ODT) ODT tab 4 mg (4 mg Oral Given 9/7/21 1359)   LORazepam (ATIVAN) tablet 1 mg (1 mg Oral Given 9/7/21 1359)   LORazepam (ATIVAN) tablet 1 mg (1 mg Oral Given 9/7/21 1513)       Assessments & Plan (with Medical Decision Making)   Patient has no evidence of acute psychiatric emergent concerns.  She has no psychosis, delusions, or flight of ideas.  She has the capacity to make her own decisions.  She looks well.  Significant provement after oral Ativan.  Seen by . [facility] assessment and they recommend discharge.  She was set up with a rule 25 as well as urgent therapy.  She is very happy with the plan.  She has no suicidal or homicidal ideation.  Patient return here for any new symptoms, worsening symptoms, or other concerns.  At this time there is no legal or ethical indication for acute hold.     This document was prepared using a combination of typing and voice generated software.  While every attempt was made for accuracy, spelling and grammatical errors may exist.    I have reviewed the nursing notes.    I have reviewed the findings, diagnosis, plan and need for follow up with the patient.       New Prescriptions    LORAZEPAM (ATIVAN) 1 MG TABLET    Take 1 tablet (1 mg) by mouth every 6 hours as needed for anxiety       Final diagnoses:   Anxiety   Alcohol abuse       9/7/2021   [facility]     Ian Arana PA-C  09/07/21 0330

## 2021-09-07 NOTE — ED TRIAGE NOTES
Pt in with her , states she has been having racing thoughts and not eating and wants to get into treatment for drugs and etoh

## 2021-09-07 NOTE — ED NOTES
Pt assisted with ordering dinner, AVS printed with information from [facility]. She has ride coming to pick her up.

## 2021-09-07 NOTE — DISCHARGE INSTRUCTIONS
Atqasim cerda.    Follow-up as scheduled and recommended.    Return here for any new symptoms, worsening symptoms, or other concerns.      If I am feeling unsafe or I am in a crisis, I will:  Talk to mother, father, or boyfriend  Contact my established care providers   Call the National Suicide Prevention Lifeline: [phone]   Go to the nearest emergency room   Call 911       Warning signs that I or other people might notice when a crisis is developing for me:  Persistent worsening of depression, thoughts of suicide with plan, continued use of alcohol    Things I am able to do on my own to cope or help me feel better:  Go for bike rides or take walks    Things that I am able to do with others to cope or help me better:  Go camping, boating, and fishing    Things I can use or do for distraction:  Physical activities     Changes I can make to support my mental health and wellness:  Patient would like to participate in treatment for anxiety, alcohol abuse, and possible eating disorder. Patient in need of developing new coping skills.     People in my life that I can ask for help:  Mother, father, boyfriend    Your [facility] has a mental health crisis team you can call 24/7:  [facility] EMERGENCY 24-HOUR CRISIS LINE:  [phone]    MENTAL HEALTH CRISIS HELP  [facility]  [phone]    Other things that are important when I m in crisis:  Participate in self care and reconnecting with family.    Additional resources and information:  [facility] ([facility]) recommends patient follow up with chemical health assessment, individual therapy, and continue with medication management with Brandy Abebe. [facility] has scheduled this patient with services of individual therapy. Please call the provider to confirm services. If patient has scheduling questions or additional need of services can contact [facility] at [phone].     Scheduled for Individual Therapy   1) Liane Forrester MS  LMFT    [facility]   [address] [place] [address], [address]     [phone]     9/8/2021    2:30 pm

## 2021-09-07 NOTE — ED NOTES
9/7/2021  Nasra Lovelace 1980       [facility] Crisis Assessment:    Started at: 4:21pm  Completed at: 5:15pm   Patient was assessed via virtually ([employer] cart or other teleconferencing device).    Chief Complaint and History of Presenting Problem:    Patient is a 41 year old  female who presented to the ED by Family/Friends related to concerns for increased depression, anxiety, and excessive use of alcohol. The patient was seen today by the [facility] ([facility]), through virtual crisis assessment. The patient presents to be calm, cooperative, alert, and oriented x4. She is dressed and groomed within normal limits. Affect and eye contact are appropriate. Thought processes are organized. She denies suicidal or homicidal ideation. Patient reports no symptoms of vick and no symptoms of psychosis. She endorses recent use of alcohol and occasional use of marijuana. She denies having had a drink today.  Patient reports it has been a difficult year and Summer. She mentions she had COVID and then needed a hysterectomy due to complications. She mentions her daughter was in a bad accident on a side by side. She reports she has not been eating much and at times has been restricting food and is concerned about possible eating disorder. She denies purging. She spoke about having already spoken to [facility] [employer] for eating disorders and indicates she is scheduled for a rule 25 assessment tomorrow.     Assessment and intervention involved meeting with pt, obtaining collateral information from [facility] and [facility] Everywhere records, employing crisis psychotherapy including: Establishing rapport, Active listening, Assess dimensions of crisis and Safety planning. DEC  contacted the patient's mother for collateral information ([facility] [phone].       Biopsychosocial Background and Demographic Information    Mental Health History and Current Symptoms     Mental Health History (prior psychiatric  hospitalizations, civil commitments, programmatic care, etc):  Background  Patient reports to have a history of depression, anxiety, excessive use of alcohol, and possible eating disorder. She endorses previous psychiatric hospitalization that occurred on the 5th floor at [place]. This  was unable to locate the encounter. Patient reports about 4 years ago she was seen at the [facility]. She denies previous suicide attempts and denies self injurious behaviors. She denies history of previous treatments for substance abuse and eating disorder. She is not currently seeing a therapist and is being followed by Brandy Abebe at [place]. Patient mentions she is prescribed Cymbalta, Ritalin, Ativan, Propranol, and Temazapine.    Family Mental and Chemical Health History: She reports a distant family history of anxiety and depression.    Medication Adherent: Yes     Recent medication changes? Yes    Collateral information main concerns are regarding excessive use of alcohol. According to patient's mother she has been dealing with abuse of alcohol for a long duration of time. There are no concerns regarding suicidality. She mentions that she will received contradictive messages from her daughter and the patient's boyfriend who she lives with. Patient did not provide DEC with contact information for her boyfriend. According to patient's mother the boyfriend also has mental health issues and that her daughter tells her that their water heater doesn't work and they don't have food to eat..     Relevant Medical Concerns  Patient identifies concerns with completing ADLs? Yes and No  Patient can ambulate independently? Yes  Other medical health concerns? No  History of concussion or TBI? No     Trauma History   Physical, Emotional, or Sexual abuse: No  Loss of a friend or family member to suicide: No  Other identified traumatic event or significant stressor: No    Substance Use History and Treatments  Denies previous  treatments for substance abuse or eating disorders.    Drug screen/BAL/Breathalyzer Completed?  No was not tested    History of Suicidal Ideation, Suicide Attempts, Non-Suicidal Self Injury, and Risk Formulation:   Details of Current Ideation, Attempt(s), Plan(s): Patient denies suicidal ideation. Verbalizes to have ability to follow with safety plan.     Risk factors:  helplessness/hopelessness and history of or current substance use.     Protective factors:   strong bond to family/friends, responsibilities to others (spouse, pets, children, etc.), positive working relationship with existing medical/mental health providers and future oriented towards goals, hopes, dreams.    History of Suicide Attempts or SIB: Denies history of SIB or suicides.    ESS-6  1.a. Over the past 2 weeks, have you had thoughts of killing yourself? No   1.b. Have you ever attempted to kill yourself and, if yes, when did this last happen? No  2. Recent or current suicide plan? No  3. Recent or current intent to act on ideation? No  4. Lifetime psychiatric hospitalization? No  5. Pattern of excessive substance use? Yes  6. Current irritability, agitation, or aggression? No  ESS-6 Score: Low Risk      Other Risk Areas  Aggressive/assumptive/homicidal risk factors: No   Sexually inappropriate behavior? No      Vulnerability to sexual exploitation? No     Clinical Presentation and Current Symptoms   The patient presents to be calm, cooperative, alert, and oriented x4. She is dressed and groomed within normal limits. Affect and eye contact are appropriate. Thought processes are organized. She denies suicidal or homicidal ideation. Patient reports no symptoms of vick and no symptoms of psychosis. She endorses recent use of alcohol and occasional use of marijuana. She denies having had a drink today. She endorses concerns regarding an eating disorder and mentions she has been restricting her food intake. She denies purging. Reports consuming  between 4 to 8 drinks daily. Endorses insomnia, at times feelings of hopelessness, and overwhelming anxiety.    Attention, Hyperactivity, and Impulsivity: No   Anxiety:Yes: Generalized Symptoms: Cognitive anxiety - feelings of doom, racing thoughts, difficulty concentrating , Excessive worry and Physiological anxiety - sweating, flushing, shaking, shortness of breath, or racing heart    Behavioral Difficulties: No   Mood Symptoms: Yes: Appetite change/weight change , Feelings of helplessness , Feelings of worthlessness , Impaired decision making , Increased irritability/agitation, Loss of interest / Anhedonia , Low self esteem , Sad, depressed mood  and Sleep disturbance    Appetite: Yes: Loss of Appetite, Other Eating Problems and Recent Weight Loss   Feeding and Eating: Yes: Restricting  Interpersonal Functioning: No  Learning Disabilities/Cognitive/Developmental Disorders: No   General Cognitive Impairments: No  If yes, see completed Mini-Cog Assessment below.  Sleep: Yes: Difficulty falling asleep  and Difficulty staying sleep    Psychosis: No    Trauma: No       Mental Status Exam:  Affect: Appropriate  Appearance: Appropriate   Attention Span/Concentration: Attentive    Eye Contact: Engaged  Fund of Knowledge: Appropriate   Language /Speech Content: Fluent  Language /Speech Volume: Normal   Language /Speech Rate/Productions: Normal   Recent Memory: Intact  Remote Memory: Intact  Mood: Anxious   Orientation:   Person: Yes   Place: Yes  Time of Day: Yes   Date: Yes   Situation (Do they understand why they are here?): Yes   Psychomotor Behavior: Normal   Thought Content: Clear  Thought Form: Goal Directed    Current Providers and Contact Information   Patient is her own legal guardian.     Primary Care Provider: Yes, Adrienne Dailey CNP  Psychiatrist: Yes, Brandy Abebe MD  Therapist: No  : No  CTSS or ARMHS: No  ACT Team: No  Other: No    Has an GIRISH been signed? Yes ; For Nasra Brooke  By: the  patient    Clinical Summary and Recommendations    Clinical summary of assessment (strengths, vulnerabilities, resources, utilization of coping skills):  The patient is not considered to be an imminent risk of danger to self or others. She denies suicidal or homicidal ideation. Patient resides with her daughter and boyfriend and indicates this to be a supportive safe environment. She reports to have many reasons to continue living and identifies activities that used to give her purpose and meaning.They haven't been able to go out much this Summer due to the accident but they enjoy boating, fishing, and camping. juanis is receptive to mental health services. She has not worked in the past year since she had COVID and then the surgery.    Patient is recommended to follow up with chemical health assessment and individual therapy. She demonstrates excessive use of alcohol and increased and worsening symptoms of anxiety and depression. She verbalizes to have ability and desire to follow up with safety planning. She denies wanting to go inpatient today. DEC discussed the disposition plan with the ED provider and provider is in agreement with plan. Patient is scheduled by [facility] for individual therapy.       DEC discussed the Safe [address] with the patient. The safety plan was created with the patient and entered it into the AVS.  I reviewed the safety plan with the patient prior to discharge.      She has meaningful daily activities, strong support system from family and friends. She demonstrates to have adequate coping skills she utilizes through family, exercise, walking, and supportive friendships.       Diagnosis with F Codes:  F33.1 Major Depressive Disorder, recurrent moderate  F41.1 Generalized Anxiety Disorder  F10.20 Alcohol Abuse Disorder, R/O    Disposition  Attending provider, Denilson Fitzpatrick consulted and does  agree with recommended disposition which includes Individual Therapy and Rule 25/NANDA Assessment. Patient  agrees with recommended level of care.     Details of final disposition include:  Continue with outpatient follow up.    If Discharging, what are follow up needs?  Continue with scheduled rule 25 assessment tomorrow morning with [facility]. Continue with the scheduled appointment for individual therapy.     Safety/after care plan provided to Patient by RN    Duration of assessment time: 1.0 hrs    CPT code(s) utilized: 13891, up to 74 minutes      Javy Singh, SANDIPSW

## 2021-09-07 NOTE — PROGRESS NOTES
"[facility]  September 7, 2021    Behavioral Health Clinician Progress Note    Patient Name: Nasra Lovelace         Service Type: Individual           Service Location:  Face to Face in Clinic      Session Start Time:  12:30pm  Session End Time: 1:30pm      Session Length: 53 - 60      Attendees: Client    The author of this note documented a reason for not sharing it with the patient.    Treatment Objective(s) Addressed in This Session:  Target Behavior(s):  Depressed Mood: Decrease frequency and intensity of feeling down, depressed, hopeless  Identify negative self-talk and behaviors: challenge core beliefs, myths, and actions  Anxiety: will experience a reduction in anxiety and will develop healthy cognitive patterns and beliefs        Current Stressors / Issues:  Ayesha requested an appointment and reports she is not doing well.  She states that she has not eaten in 2 full days and her appearance is as exhausted and disheveled.  She states that she had a \"terrible weekend\" and was crying daily and did not get more than 2 hours of sleep in total a night.  Ayesha states that she called different treatment programs and that she needs to have a Rule 25 before they will accept her and she is fearful that her inability to eat will force her body to shut down.      Progress on Treatment Objective(s) / Homework:    Ayesha reports that she has had racing thoughts and intense fears since last Friday.  She states that she feels \"constantly scared\" with panic and irrational thoughts, describes experiencing heart palipatations, shaking/trembling, difficulty breathing and increased irritability.  Ayesha does present as irritable and anxious as we are talking.  She cried throughout this visit and states that she has not intaked any food in 2 days but has been drinking beer and that the alcohol has been helping her with focus.  Ayesha states that she consumed 8 cans of beer on Sunday, 4 cans yesterday and likely drank on " Friday and Saturday but does not remember.  Ayesha does appear to have lost weight since last week, she presents as frail, shaky and looks very withdrawn.  We discussed her mental state and safety, Ayesha started crying and states that she does not think she is safe by herself at home. She admits that she gets very sick when she even thinks about eating and is concerned about what she is doing to her body , but does not feel that she is in control of her ability to eat at this point.  I asked her if she feels that she should go to the ED and consider the inpatient behavioral health unit for her safety and she was agreeable to this. She states that she has her bags packed and in the car of her friend who brought her here as she was not sure where she would go after our visit.      This writer walked her down to the ED for the admission process to the inpatient behavioral health unit.       Medication Review:  No changes to current psychiatric medication(s)    Medication Compliance:  Yes    Changes in Health Issues:   See medical chart    Chemical Use Review:   Substance Use: increase in alcohol .  Patient reports frequency of use daily.  Referred client for formal CD evaluation        Tobacco Use: No change in amount of tobacco use since last session.  Patient declined discussion at this time    Assessment: Current Emotional / Mental Status (status of significant symptoms):    Risk status (Self / Other harm or suicidal ideation)  Patient denies current fears or concerns for personal safety.  Patient denies current or recent suicidal ideation or behaviors.  Patient denies current or recent homicidal ideation or behaviors.  Patient reports current or recent self injurious behavior or ideation including not eating/restricting food.  Patient denies other safety concerns.  Safety Plan:  Walked with Patient to the ED and assisted with being admitted to ED for eval to inpatient behavioral health  unit    Appearance:   Appropriate  Disheveled   Eye Contact:   Fair   Psychomotor Behavior: Agitated   Attitude:   Cooperative   Orientation:   Person Place Time Situation All  Speech   Rate / Production: Pressured  Emotional   Volume:  Normal   Mood:    Anxious  Irritable  Sad  Fearful Agitated  Affect:    Labile   Thought Content:  Referential Thinking  Rumination  Poverty of thought  Thought Form:  Coherent  Flight of Ideas  Circumstantial  Insight:    Fair     Diagnoses:    1. Severe episode of recurrent major depressive disorder, without psychotic features (H)        Collateral Reports Completed:  Not Applicable    Plan: (Homework, other):  Brought patient to ED for evaluation for inpatient behavioral health unit      SANDIP PowellSW

## 2021-09-08 NOTE — ED NOTES
Patient called in regards to the Ativan that was ordered for her last night.  Pt stated that the pharmacy didn't have it. I contacted the pharmacy and its under a different last name.  I contacted the patient back and updated her   REJI VENTURA RN  5:23 PM  September 8, 2021

## 2021-09-22 ENCOUNTER — HOSPITAL ENCOUNTER (INPATIENT)
Facility: CLINIC | Age: 41
LOS: 7 days | Discharge: OTHER INSTITUTION | End: 2021-09-29
Attending: EMERGENCY MEDICINE | Admitting: PSYCHIATRY & NEUROLOGY
Payer: COMMERCIAL

## 2021-09-22 ENCOUNTER — TELEPHONE (OUTPATIENT)
Dept: BEHAVIORAL HEALTH | Facility: CLINIC | Age: 41
End: 2021-09-22

## 2021-09-22 DIAGNOSIS — F50.9 EATING DISORDER, UNSPECIFIED TYPE: ICD-10-CM

## 2021-09-22 DIAGNOSIS — F10.920 ALCOHOLIC INTOXICATION WITHOUT COMPLICATION (H): ICD-10-CM

## 2021-09-22 DIAGNOSIS — F43.10 POSTTRAUMATIC STRESS DISORDER: ICD-10-CM

## 2021-09-22 DIAGNOSIS — F10.10 ALCOHOL ABUSE: ICD-10-CM

## 2021-09-22 DIAGNOSIS — Z20.822 CONTACT WITH AND (SUSPECTED) EXPOSURE TO COVID-19: ICD-10-CM

## 2021-09-22 DIAGNOSIS — F33.1 MAJOR DEPRESSIVE DISORDER, RECURRENT EPISODE, MODERATE (H): ICD-10-CM

## 2021-09-22 DIAGNOSIS — F41.9 ANXIETY DISORDER, UNSPECIFIED TYPE: ICD-10-CM

## 2021-09-22 DIAGNOSIS — F32.A DEPRESSION, UNSPECIFIED DEPRESSION TYPE: ICD-10-CM

## 2021-09-22 DIAGNOSIS — F41.9 ANXIETY: ICD-10-CM

## 2021-09-22 LAB
ALBUMIN SERPL-MCNC: 3.6 G/DL (ref 3.4–5)
ALP SERPL-CCNC: 81 U/L (ref 40–150)
ALT SERPL W P-5'-P-CCNC: 35 U/L (ref 0–50)
AMPHETAMINES UR QL SCN: ABNORMAL
ANION GAP SERPL CALCULATED.3IONS-SCNC: 8 MMOL/L (ref 3–14)
AST SERPL W P-5'-P-CCNC: 36 U/L (ref 0–45)
BARBITURATES UR QL: ABNORMAL
BASOPHILS # BLD AUTO: 0.1 10E3/UL (ref 0–0.2)
BASOPHILS NFR BLD AUTO: 1 %
BENZODIAZ UR QL: ABNORMAL
BILIRUB SERPL-MCNC: 0.2 MG/DL (ref 0.2–1.3)
BUN SERPL-MCNC: 4 MG/DL (ref 7–30)
CALCIUM SERPL-MCNC: 8.9 MG/DL (ref 8.5–10.1)
CANNABINOIDS UR QL SCN: ABNORMAL
CHLORIDE BLD-SCNC: 105 MMOL/L (ref 94–109)
CO2 SERPL-SCNC: 24 MMOL/L (ref 20–32)
COCAINE UR QL: ABNORMAL
CREAT SERPL-MCNC: 0.7 MG/DL (ref 0.52–1.04)
EOSINOPHIL # BLD AUTO: 0.1 10E3/UL (ref 0–0.7)
EOSINOPHIL NFR BLD AUTO: 1 %
ERYTHROCYTE [DISTWIDTH] IN BLOOD BY AUTOMATED COUNT: 13.8 % (ref 10–15)
GFR SERPL CREATININE-BSD FRML MDRD: >90 ML/MIN/1.73M2
GLUCOSE BLD-MCNC: 83 MG/DL (ref 70–99)
HCG UR QL: NEGATIVE
HCT VFR BLD AUTO: 46.1 % (ref 35–47)
HGB BLD-MCNC: 15.1 G/DL (ref 11.7–15.7)
IMM GRANULOCYTES # BLD: 0 10E3/UL
IMM GRANULOCYTES NFR BLD: 0 %
LIPASE SERPL-CCNC: 436 U/L (ref 73–393)
LYMPHOCYTES # BLD AUTO: 3.8 10E3/UL (ref 0.8–5.3)
LYMPHOCYTES NFR BLD AUTO: 43 %
MCH RBC QN AUTO: 31.3 PG (ref 26.5–33)
MCHC RBC AUTO-ENTMCNC: 32.8 G/DL (ref 31.5–36.5)
MCV RBC AUTO: 95 FL (ref 78–100)
MONOCYTES # BLD AUTO: 0.5 10E3/UL (ref 0–1.3)
MONOCYTES NFR BLD AUTO: 6 %
NEUTROPHILS # BLD AUTO: 4.5 10E3/UL (ref 1.6–8.3)
NEUTROPHILS NFR BLD AUTO: 49 %
NRBC # BLD AUTO: 0 10E3/UL
NRBC BLD AUTO-RTO: 0 /100
OPIATES UR QL SCN: ABNORMAL
PLATELET # BLD AUTO: 359 10E3/UL (ref 150–450)
POTASSIUM BLD-SCNC: 3.6 MMOL/L (ref 3.4–5.3)
PROT SERPL-MCNC: 7.4 G/DL (ref 6.8–8.8)
RBC # BLD AUTO: 4.83 10E6/UL (ref 3.8–5.2)
SARS-COV-2 RNA RESP QL NAA+PROBE: NEGATIVE
SODIUM SERPL-SCNC: 137 MMOL/L (ref 133–144)
WBC # BLD AUTO: 8.9 10E3/UL (ref 4–11)

## 2021-09-22 PROCEDURE — 81025 URINE PREGNANCY TEST: CPT | Performed by: EMERGENCY MEDICINE

## 2021-09-22 PROCEDURE — 83690 ASSAY OF LIPASE: CPT | Performed by: EMERGENCY MEDICINE

## 2021-09-22 PROCEDURE — 99285 EMERGENCY DEPT VISIT HI MDM: CPT | Performed by: EMERGENCY MEDICINE

## 2021-09-22 PROCEDURE — 80307 DRUG TEST PRSMV CHEM ANLYZR: CPT | Performed by: EMERGENCY MEDICINE

## 2021-09-22 PROCEDURE — 250N000013 HC RX MED GY IP 250 OP 250 PS 637: Performed by: PSYCHIATRY & NEUROLOGY

## 2021-09-22 PROCEDURE — 36415 COLL VENOUS BLD VENIPUNCTURE: CPT | Performed by: EMERGENCY MEDICINE

## 2021-09-22 PROCEDURE — C9803 HOPD COVID-19 SPEC COLLECT: HCPCS | Performed by: EMERGENCY MEDICINE

## 2021-09-22 PROCEDURE — 250N000013 HC RX MED GY IP 250 OP 250 PS 637: Performed by: EMERGENCY MEDICINE

## 2021-09-22 PROCEDURE — 90791 PSYCH DIAGNOSTIC EVALUATION: CPT

## 2021-09-22 PROCEDURE — HZ2ZZZZ DETOXIFICATION SERVICES FOR SUBSTANCE ABUSE TREATMENT: ICD-10-PCS | Performed by: PSYCHIATRY & NEUROLOGY

## 2021-09-22 PROCEDURE — 128N000001 HC R&B CD/MH ADULT

## 2021-09-22 PROCEDURE — 99285 EMERGENCY DEPT VISIT HI MDM: CPT | Mod: 25 | Performed by: EMERGENCY MEDICINE

## 2021-09-22 PROCEDURE — 80053 COMPREHEN METABOLIC PANEL: CPT | Performed by: EMERGENCY MEDICINE

## 2021-09-22 PROCEDURE — 85025 COMPLETE CBC W/AUTO DIFF WBC: CPT | Performed by: EMERGENCY MEDICINE

## 2021-09-22 PROCEDURE — 250N000011 HC RX IP 250 OP 636: Performed by: EMERGENCY MEDICINE

## 2021-09-22 PROCEDURE — U0003 INFECTIOUS AGENT DETECTION BY NUCLEIC ACID (DNA OR RNA); SEVERE ACUTE RESPIRATORY SYNDROME CORONAVIRUS 2 (SARS-COV-2) (CORONAVIRUS DISEASE [COVID-19]), AMPLIFIED PROBE TECHNIQUE, MAKING USE OF HIGH THROUGHPUT TECHNOLOGIES AS DESCRIBED BY CMS-2020-01-R: HCPCS | Performed by: EMERGENCY MEDICINE

## 2021-09-22 RX ORDER — ONDANSETRON 4 MG/1
4 TABLET, ORALLY DISINTEGRATING ORAL ONCE
Status: COMPLETED | OUTPATIENT
Start: 2021-09-22 | End: 2021-09-22

## 2021-09-22 RX ORDER — ATENOLOL 50 MG/1
50 TABLET ORAL DAILY PRN
Status: DISCONTINUED | OUTPATIENT
Start: 2021-09-22 | End: 2021-09-29 | Stop reason: HOSPADM

## 2021-09-22 RX ORDER — LOPERAMIDE HCL 2 MG
2 CAPSULE ORAL 4 TIMES DAILY PRN
Status: DISCONTINUED | OUTPATIENT
Start: 2021-09-22 | End: 2021-09-29 | Stop reason: HOSPADM

## 2021-09-22 RX ORDER — FOLIC ACID 1 MG/1
1 TABLET ORAL DAILY
Status: DISCONTINUED | OUTPATIENT
Start: 2021-09-22 | End: 2021-09-29 | Stop reason: HOSPADM

## 2021-09-22 RX ORDER — PROPRANOLOL HYDROCHLORIDE 80 MG/1
80 CAPSULE, EXTENDED RELEASE ORAL DAILY
Status: DISCONTINUED | OUTPATIENT
Start: 2021-09-23 | End: 2021-09-29 | Stop reason: HOSPADM

## 2021-09-22 RX ORDER — DIAZEPAM 5 MG
5 TABLET ORAL ONCE
Status: COMPLETED | OUTPATIENT
Start: 2021-09-22 | End: 2021-09-22

## 2021-09-22 RX ORDER — DULOXETIN HYDROCHLORIDE 60 MG/1
60 CAPSULE, DELAYED RELEASE ORAL DAILY
Status: COMPLETED | OUTPATIENT
Start: 2021-09-22 | End: 2021-09-22

## 2021-09-22 RX ORDER — IBUPROFEN 600 MG/1
600 TABLET, FILM COATED ORAL ONCE
Status: COMPLETED | OUTPATIENT
Start: 2021-09-22 | End: 2021-09-22

## 2021-09-22 RX ORDER — MULTIPLE VITAMINS W/ MINERALS TAB 9MG-400MCG
1 TAB ORAL DAILY
Status: DISCONTINUED | OUTPATIENT
Start: 2021-09-22 | End: 2021-09-29 | Stop reason: HOSPADM

## 2021-09-22 RX ORDER — METHYLPHENIDATE HYDROCHLORIDE 10 MG/1
20 TABLET ORAL 2 TIMES DAILY
Status: COMPLETED | OUTPATIENT
Start: 2021-09-22 | End: 2021-09-22

## 2021-09-22 RX ORDER — MAGNESIUM OXIDE 400 MG/1
400 TABLET ORAL DAILY
Status: DISCONTINUED | OUTPATIENT
Start: 2021-09-23 | End: 2021-09-29 | Stop reason: HOSPADM

## 2021-09-22 RX ORDER — MAGNESIUM HYDROXIDE/ALUMINUM HYDROXICE/SIMETHICONE 120; 1200; 1200 MG/30ML; MG/30ML; MG/30ML
30 SUSPENSION ORAL EVERY 4 HOURS PRN
Status: DISCONTINUED | OUTPATIENT
Start: 2021-09-22 | End: 2021-09-29 | Stop reason: HOSPADM

## 2021-09-22 RX ORDER — AMOXICILLIN 250 MG
1 CAPSULE ORAL 2 TIMES DAILY PRN
Status: DISCONTINUED | OUTPATIENT
Start: 2021-09-22 | End: 2021-09-29 | Stop reason: HOSPADM

## 2021-09-22 RX ORDER — PANTOPRAZOLE SODIUM 40 MG/1
40 TABLET, DELAYED RELEASE ORAL ONCE
Status: COMPLETED | OUTPATIENT
Start: 2021-09-22 | End: 2021-09-22

## 2021-09-22 RX ORDER — DULOXETIN HYDROCHLORIDE 60 MG/1
60 CAPSULE, DELAYED RELEASE ORAL DAILY
Status: DISCONTINUED | OUTPATIENT
Start: 2021-09-23 | End: 2021-09-29 | Stop reason: HOSPADM

## 2021-09-22 RX ORDER — PROPRANOLOL HYDROCHLORIDE 20 MG/1
80 TABLET ORAL DAILY
Status: COMPLETED | OUTPATIENT
Start: 2021-09-22 | End: 2021-09-22

## 2021-09-22 RX ORDER — ALBUTEROL SULFATE 90 UG/1
2 AEROSOL, METERED RESPIRATORY (INHALATION) EVERY 6 HOURS
Status: DISCONTINUED | OUTPATIENT
Start: 2021-09-22 | End: 2021-09-22

## 2021-09-22 RX ORDER — ONDANSETRON 4 MG/1
4 TABLET, ORALLY DISINTEGRATING ORAL EVERY 6 HOURS PRN
Status: DISCONTINUED | OUTPATIENT
Start: 2021-09-22 | End: 2021-09-29 | Stop reason: HOSPADM

## 2021-09-22 RX ORDER — ALBUTEROL SULFATE 90 UG/1
2 AEROSOL, METERED RESPIRATORY (INHALATION) EVERY 6 HOURS PRN
Status: DISCONTINUED | OUTPATIENT
Start: 2021-09-22 | End: 2021-09-29 | Stop reason: HOSPADM

## 2021-09-22 RX ORDER — HYDROXYZINE HYDROCHLORIDE 25 MG/1
25 TABLET, FILM COATED ORAL EVERY 4 HOURS PRN
Status: DISCONTINUED | OUTPATIENT
Start: 2021-09-22 | End: 2021-09-29 | Stop reason: HOSPADM

## 2021-09-22 RX ORDER — LANOLIN ALCOHOL/MO/W.PET/CERES
100 CREAM (GRAM) TOPICAL DAILY
Status: DISCONTINUED | OUTPATIENT
Start: 2021-09-22 | End: 2021-09-29 | Stop reason: HOSPADM

## 2021-09-22 RX ORDER — ONDANSETRON 2 MG/ML
4 INJECTION INTRAMUSCULAR; INTRAVENOUS EVERY 30 MIN PRN
Status: DISCONTINUED | OUTPATIENT
Start: 2021-09-22 | End: 2021-09-22 | Stop reason: CLARIF

## 2021-09-22 RX ORDER — DIAZEPAM 5 MG
5-20 TABLET ORAL EVERY 30 MIN PRN
Status: DISCONTINUED | OUTPATIENT
Start: 2021-09-22 | End: 2021-09-28

## 2021-09-22 RX ORDER — PANTOPRAZOLE SODIUM 40 MG/1
40 TABLET, DELAYED RELEASE ORAL
Status: DISCONTINUED | OUTPATIENT
Start: 2021-09-23 | End: 2021-09-29 | Stop reason: HOSPADM

## 2021-09-22 RX ORDER — ACETAMINOPHEN 325 MG/1
975 TABLET ORAL ONCE
Status: COMPLETED | OUTPATIENT
Start: 2021-09-22 | End: 2021-09-22

## 2021-09-22 RX ORDER — TRAZODONE HYDROCHLORIDE 50 MG/1
50 TABLET, FILM COATED ORAL
Status: DISCONTINUED | OUTPATIENT
Start: 2021-09-22 | End: 2021-09-29 | Stop reason: HOSPADM

## 2021-09-22 RX ORDER — NICOTINE 21 MG/24HR
1 PATCH, TRANSDERMAL 24 HOURS TRANSDERMAL DAILY
Status: DISCONTINUED | OUTPATIENT
Start: 2021-09-22 | End: 2021-09-29 | Stop reason: HOSPADM

## 2021-09-22 RX ORDER — ACETAMINOPHEN 325 MG/1
650 TABLET ORAL EVERY 4 HOURS PRN
Status: DISCONTINUED | OUTPATIENT
Start: 2021-09-22 | End: 2021-09-29 | Stop reason: HOSPADM

## 2021-09-22 RX ADMIN — FOLIC ACID 1 MG: 1 TABLET ORAL at 19:49

## 2021-09-22 RX ADMIN — THIAMINE HCL TAB 100 MG 100 MG: 100 TAB at 19:49

## 2021-09-22 RX ADMIN — METHYLPHENIDATE HYDROCHLORIDE 20 MG: 10 TABLET ORAL at 13:52

## 2021-09-22 RX ADMIN — IBUPROFEN 600 MG: 600 TABLET, FILM COATED ORAL at 08:45

## 2021-09-22 RX ADMIN — DIAZEPAM 5 MG: 5 TABLET ORAL at 12:34

## 2021-09-22 RX ADMIN — HYDROXYZINE HYDROCHLORIDE 25 MG: 25 TABLET, FILM COATED ORAL at 21:44

## 2021-09-22 RX ADMIN — Medication 5 MG: at 21:44

## 2021-09-22 RX ADMIN — DIAZEPAM 10 MG: 5 TABLET ORAL at 19:49

## 2021-09-22 RX ADMIN — DULOXETINE HYDROCHLORIDE 60 MG: 60 CAPSULE, DELAYED RELEASE ORAL at 13:51

## 2021-09-22 RX ADMIN — DIAZEPAM 10 MG: 5 TABLET ORAL at 22:53

## 2021-09-22 RX ADMIN — TRAZODONE HYDROCHLORIDE 50 MG: 50 TABLET ORAL at 21:44

## 2021-09-22 RX ADMIN — DIAZEPAM 5 MG: 5 TABLET ORAL at 15:51

## 2021-09-22 RX ADMIN — PROPRANOLOL HYDROCHLORIDE 80 MG: 20 TABLET ORAL at 13:51

## 2021-09-22 RX ADMIN — ONDANSETRON 4 MG: 4 TABLET, ORALLY DISINTEGRATING ORAL at 08:45

## 2021-09-22 RX ADMIN — ONDANSETRON 4 MG: 4 TABLET, ORALLY DISINTEGRATING ORAL at 14:29

## 2021-09-22 RX ADMIN — PANTOPRAZOLE SODIUM 40 MG: 40 TABLET, DELAYED RELEASE ORAL at 13:52

## 2021-09-22 RX ADMIN — ACETAMINOPHEN 975 MG: 325 TABLET, FILM COATED ORAL at 08:45

## 2021-09-22 RX ADMIN — MULTIPLE VITAMINS W/ MINERALS TAB 1 TABLET: TAB at 19:49

## 2021-09-22 ASSESSMENT — ACTIVITIES OF DAILY LIVING (ADL)
DRESS: INDEPENDENT
ORAL_HYGIENE: INDEPENDENT
LAUNDRY: WITH SUPERVISION
HYGIENE/GROOMING: INDEPENDENT

## 2021-09-22 ASSESSMENT — ENCOUNTER SYMPTOMS
NERVOUS/ANXIOUS: 1
COUGH: 0
BACK PAIN: 0
HALLUCINATIONS: 0
DYSURIA: 0
CHILLS: 0
ABDOMINAL PAIN: 0
WEAKNESS: 0
SHORTNESS OF BREATH: 0
VOMITING: 0
NAUSEA: 0
BRUISES/BLEEDS EASILY: 0
FEVER: 0
CONFUSION: 0

## 2021-09-22 ASSESSMENT — MIFFLIN-ST. JEOR
SCORE: 1148.96
SCORE: 1139.88

## 2021-09-22 NOTE — ED NOTES
"Pt assessed. Pt denies being suicidal, having suicidal thoughts or a plan to her self. Pt states \"I kind of freaked out on the nurse in the beginning because I was in [place] last week and they sent me home. I just want to be admitted.\" pt also states \"I can't believe I am still alive.\" When asked for further explanation, pt states \"I can't believe I am still alive physically, I have nothing in my body.\" pt requesting home medications, MD notified. CIWA score 9, MD notified. Requested diet order for patient.   "

## 2021-09-22 NOTE — ED PROVIDER NOTES
ED Provider Note  [facility]      History     Chief Complaint   Patient presents with     Alcohol Intoxication     Suicidal     HPI  Nasra Lovelace is a 41 year old female who has a past medical history of depression, anxiety, alcohol abuse, eating disorder who presents the emergency department for mental health evaluation.  Patient reports that she has been trying to get into treatment for alcohol abuse and her eating disorder however been unsuccessful.  Patient states that she performed to rule 25 on 9/15 but is unable to get into a program until December.  Patient reports longstanding history of intermittent alcohol abuse, reports that typically drinks approximately 4 days a week however recently has been on a binge drinking episode the past few days and has been drinking 8-10 beers/seltzers a night.  Patient does report a history of alcohol withdrawals and possible seizures back when she was in her 20s.  Also here regarding her eating disorder states that initially she had a history of bulimia and now has been restricting.  Patient states that she has not been able to eat anything for the past 2 days.  Patient states that occasionally she will drink because sometimes when she drinks enough she will eat.  Patient also reports multiple stressors in her life, and needing to be there for her family members to take care of them.  Patient reports worsening depression, anxiety, and panic attacks.  Patient states that she does have some passive suicidal ideation but no specific plan to end her life and states she does not want to die because she would like to be there to support and care for her family members.  Patient reports tobacco use, occasional marijuana use, denies any other substance abuse.  Patient does have a psychiatrist.  No specific medical complaints.    Past Medical History  Past Medical History:   Diagnosis Date     Acquired hypothyroidism 08/11/2014     Anxiety disorder  2012     Anxiety state 2014     Attention deficit hyperactivity disorder (ADHD), combined type 2019    Patient is followed by  for ongoing prescription of stimulants.  All refills should be approved by this provider, or covering partner.  Medication(s): Ritalin 10 mg.  Maximum quantity per month: 60 Clinic visit frequency required: Q 3 months   Controlled substance agreement on file: Yes      Date(s): 19 Neuropsych evaluation for ADD completed:  managed by   Last MNPMP webs     Gastroesophageal reflux disease, esophagitis presence not specified 2017     IMO Regulatory Load OCT 2020     History of COVID-19 - 2021     Hydronephrosis 2011     Menorrhagia with irregular cycle 2021    Recurrent menorrhagia after endometrial ablation      Migraine with aura and without status migrainosus, not intractable 2014     Panic disorder without agoraphobia 2006     Primary insomnia 2015     Recurrent major depressive disorder (H) 2018     Secondary dysmenorrhea 2021     Tobacco abuse 2018     Vitamin deficiency 2018     Past Surgical History:   Procedure Laterality Date      SECTION N/A 2000      SECTION N/A 2012    postop hemm with ruptured R Ov vein, transfusion     COLONOSCOPY N/A 2019    Procedure: COLONOSCOPY DIAGNOSTIC WITH RANDOM COLON BIOPSIES ANOSCOPY; Surgeon: Igor Burgos MD; Location: [facility] OR       COLPOSCOPY CERVIX, BIOPSY CERVIX, ENDOCERVICAL CURETTAGE, COMBINED N/A 2008     DILATION AND CURETTAGE, ABLATE ENDOMETRIUM NOVASURE, COMBINED N/A 2018    Novasure Endometrial ablation     ESOPHAGOSCOPY, GASTROSCOPY, DUODENOSCOPY (EGD), COMBINED  2019    Procedure: ESOPHAGOGASTRODUODENOSCOPY DIAGNOSTIC with biopsies; Surgeon: Igor Burgos MD; Location: [facility] OR       HYSTEROSCOPY DIAGNOSTIC N/A 2018    Hysteroscopy, D&C     LAPAROSCOPIC  APPENDECTOMY N/A 2012     LAPAROSCOPIC HYSTERECTOMY SUPRACERVICAL N/A 2021    Procedure: laparoscopic supracervical hysterectomy;  Surgeon: Rito Soler MD;  Location: [facility] OR     OOPHORECTOMY Right 2012    post operative hemorrhage with ruptured ovarian vein,      SALPINGECTOMY Left 2019    Procedure: LAPAROSCOPY LEFT SALPINGECTOMY; Surgeon: Regina Sweeney MD; Location: [facility] OR       acetaminophen (TYLENOL) 325 MG tablet  albuterol (PROAIR HFA/PROVENTIL HFA/VENTOLIN HFA) 108 (90 Base) MCG/ACT inhaler  co-enzyme Q-10 100 MG CAPS capsule  DULoxetine (CYMBALTA) 60 MG capsule  ibuprofen (ADVIL/MOTRIN) 800 MG tablet  LORazepam (ATIVAN) 1 MG tablet  magnesium oxide (MAG-OX) 400 MG tablet  multivitamin w/minerals (MULTI-VITAMIN) tablet  omeprazole (PRILOSEC) 40 MG DR capsule  propranolol ER (INDERAL LA) 80 MG 24 hr capsule  RITALIN LA 20 MG 24 hr capsule  temazepam (RESTORIL) 15 MG capsule  vitamin B complex with vitamin C (VITAMIN  B COMPLEX) tablet      Allergies   Allergen Reactions     Dust Mite Extract      Hydrocodone Other (See Comments) and Itching     Skin felt like it was burning.     Penicillins Rash     Family History  Family History   Problem Relation Age of Onset     Hypertension Mother      Hyperlipidemia Mother      Cardiovascular Father      Hypertension Father      Hyperlipidemia Father      Heart Failure Father      Sleep Apnea Father      Kidney failure Father      Social History   Social History     Tobacco Use     Smoking status: Current Every Day Smoker     Packs/day: 0.40     Years: 10.00     Pack years: 4.00     Types: Cigarettes     Last attempt to quit: 2020     Years since quittin.6     Smokeless tobacco: Never Used     Tobacco comment: tobacco cessation discuseed - tried to quit: no - passive smoke exposure quitting on own    Substance Use Topics     Alcohol use: No     Drug use: No      Past medical history, past surgical history, medications, allergies,  "family history, and social history were reviewed with the patient. No additional pertinent items.       Review of Systems   Constitutional: Negative for chills and fever.   HENT: Negative for congestion.    Eyes: Negative for visual disturbance.   Respiratory: Negative for cough and shortness of breath.    Cardiovascular: Negative for chest pain.   Gastrointestinal: Negative for abdominal pain, nausea and vomiting.   Endocrine: Negative for polyuria.   Genitourinary: Negative for dysuria.   Musculoskeletal: Negative for back pain.   Skin: Negative for rash.   Allergic/Immunologic: Negative for immunocompromised state.   Neurological: Negative for weakness.   Hematological: Does not bruise/bleed easily.   Psychiatric/Behavioral: Positive for suicidal ideas (passive, no plans). Negative for confusion and hallucinations. The patient is nervous/anxious.      Physical Exam   BP: 111/69  Pulse: 87  Temp: 98.5  F (36.9  C)  Resp: 16  Height: 162.6 cm (5' 4\")  Weight: 49.9 kg (110 lb)  SpO2: 98 %  Physical Exam  General: Afebrile, no acute distress   HEENT: Normocephalic, atraumatic, conjunctivae normal. MMM  Neck: non-tender, supple  Cardio: regular rate. regular rhythm   Resp: Normal work of breathing, no respiratory distress, lungs clear bilaterally, no wheezing, rhonchi, rales  Chest/Back: no visual signs of trauma, no CVA tenderness   Abdomen: soft, non distension, no tenderness, no peritoneal signs   Neuro: alert and fully oriented. CN II-XII grossly intact. Grossly normal strength and sensation in all extremities.   MSK: no deformities. Normal range of motion  Integumentary/Skin: no rash visualized, normal color  Psych: anxious, passive suicidal ideation with no specific plan, no self-injurious behavior, no homicidal ideation, thought content is not paranoid or delusional    ED Course      Procedures       Results for orders placed or performed during the hospital encounter of 09/22/21   Comprehensive metabolic panel "     Status: Abnormal   Result Value Ref Range    Sodium 137 133 - 144 mmol/L    Potassium 3.6 3.4 - 5.3 mmol/L    Chloride 105 94 - 109 mmol/L    Carbon Dioxide (CO2) 24 20 - 32 mmol/L    Anion Gap 8 3 - 14 mmol/L    Urea Nitrogen 4 (L) 7 - 30 mg/dL    Creatinine 0.70 0.52 - 1.04 mg/dL    Calcium 8.9 8.5 - 10.1 mg/dL    Glucose 83 70 - 99 mg/dL    Alkaline Phosphatase 81 40 - 150 U/L    AST 36 0 - 45 U/L    ALT 35 0 - 50 U/L    Protein Total 7.4 6.8 - 8.8 g/dL    Albumin 3.6 3.4 - 5.0 g/dL    Bilirubin Total 0.2 0.2 - 1.3 mg/dL    GFR Estimate >90 >60 mL/min/1.73m2   Lipase     Status: Abnormal   Result Value Ref Range    Lipase 436 (H) 73 - 393 U/L   CBC with platelets and differential     Status: None   Result Value Ref Range    WBC Count 8.9 4.0 - 11.0 10e3/uL    RBC Count 4.83 3.80 - 5.20 10e6/uL    Hemoglobin 15.1 11.7 - 15.7 g/dL    Hematocrit 46.1 35.0 - 47.0 %    MCV 95 78 - 100 fL    MCH 31.3 26.5 - 33.0 pg    MCHC 32.8 31.5 - 36.5 g/dL    RDW 13.8 10.0 - 15.0 %    Platelet Count 359 150 - 450 10e3/uL    % Neutrophils 49 %    % Lymphocytes 43 %    % Monocytes 6 %    % Eosinophils 1 %    % Basophils 1 %    % Immature Granulocytes 0 %    NRBCs per 100 WBC 0 <1 /100    Absolute Neutrophils 4.5 1.6 - 8.3 10e3/uL    Absolute Lymphocytes 3.8 0.8 - 5.3 10e3/uL    Absolute Monocytes 0.5 0.0 - 1.3 10e3/uL    Absolute Eosinophils 0.1 0.0 - 0.7 10e3/uL    Absolute Basophils 0.1 0.0 - 0.2 10e3/uL    Absolute Immature Granulocytes 0.0 <=0.0 10e3/uL    Absolute NRBCs 0.0 10e3/uL   CBC with platelets differential     Status: None    Narrative    The following orders were created for panel order CBC with platelets differential.  Procedure                               Abnormality         Status                     ---------                               -----------         ------                     CBC with platelets and d...[[id number]]                      Final result                 Please view results for these tests  on the individual orders.     Medications - No data to display     Assessments & Plan (with Medical Decision Making)   Nasra Lovelace is a 41 year old female who has a past medical history of depression, anxiety, alcohol abuse, eating disorder who presents the emergency department for mental health evaluation.  Upon arrival patient is well-appearing, afebrile, anxious but otherwise no distress.  Patient intoxicated with initial alcohol breath test 0.263.  Patient here with multiple complaints regarding alcohol abuse, eating disorder, and overall mental health with worsening depression and anxiety.  Patient with passive suicide ideation but no self-injurious behaviors and no specific plan to end her life.  Patient wants help.  At this time will obtain comprehensive labs and Covid testing for possible detox.  Will monitor patient overnight and have behavioral health  evaluate the patient in the morning once patient is clinically sober.  Patient understands and agrees with the plan.    Patient signed out to morning provider pending behavioral health assessment and disposition. Likely MICD admission.     I have reviewed the nursing notes. I have reviewed the findings, diagnosis, plan and need for follow up with the patient.    New Prescriptions    No medications on file       Final diagnoses:   Alcoholic intoxication without complication (H)   Alcohol abuse   Eating disorder, unspecified type   Depression, unspecified depression type   Anxiety       --  Hillary Lee MD  [facility] EMERGENCY DEPARTMENT  9/22/2021     Hillary Lee MD  09/22/21 [id number]

## 2021-09-22 NOTE — TELEPHONE ENCOUNTER
Pt presents in  ed SI and intoxicated  B: Pt drinking 8-10 drinks daily past few weeks and 4x week the prior months. Etoh use is increasing. Pt presented 0.28 etoh level. Pt stated upon arrival SI with no plan or intent. Pt states heavy withdrawals w/o use and a seizure when she was in her 20's.  Pt is under withdrawal protocol in ED.  Currently pt denies SI, plan or intent. Pt states she freaked out and just wants help with her drinking. Wants treatment after detox  A: etoh detox. Cooperative, vol  R: velandi/3a MICD  Patient cleared and ready for behavioral bed placement: Yes

## 2021-09-22 NOTE — PROGRESS NOTES
"   09/22/21 1744   Patient Belongings   Did you bring any home meds/supplements to the hospital?  Yes   Disposition of meds  Sent to security/pharmacy per site process   Patient Belongings sent to security per site process;locker   Patient Belongings Put in Hospital Secure Location (Security or Locker, etc.) cash/credit card;cell phone/electronics;clothing;contacts;medication(s);money (see comment);purse/wallet;shoes;wallet;other (see comments)   Belongings Search Yes   Clothing Search Yes     Security  Medications, Check book, $4 cash, Social Security card [id number], Social Security card [id number], Drivers License x2, Drivers License Papers, are Health Insurance Card, [place] EBT 5800, [place] P-EBT 3723    Locker  Jacket, Shoes, Belt, Hat, Purse,  cord, Toothbrush x2, Toothbrush case, Tissues, Deoderant, Pens x3, Cleansing cloths, Gum, Hand , Chapstick, Toothpaste, Nail Polish, Shaving razor, Tweezers, Shampoo, Wallet, grey \"PINK\" bag, blue bag with spiral notebooks and files, belt  Vitals Room - big blue \"Jaguar\" Suitcase,    Med Room Bin  Phone, Albuterol inhaler, Contact lenses   x23, Contact lens case    A               Admission:  I am responsible for any personal items that are not sent to the safe or pharmacy.  [facility] is not responsible for loss, theft or damage of any property in my possession.    Signature:  _________________________________ Date: _______  Time: _____                                              Staff Signature:  ____________________________ Date: ________  Time: _____      2nd Staff person, if patient is unable/unwilling to sign:    Signature: ________________________________ Date: ________  Time: _____     Discharge:  [facility] has returned all of my personal belongings:    Signature: _________________________________ Date: ________  Time: _____                                          Staff Signature:  ____________________________ Date: ________  Time: _____          "
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
## 2021-09-23 LAB — GGT SERPL-CCNC: 53 U/L (ref 0–40)

## 2021-09-23 PROCEDURE — 250N000013 HC RX MED GY IP 250 OP 250 PS 637: Performed by: PSYCHIATRY & NEUROLOGY

## 2021-09-23 PROCEDURE — 36415 COLL VENOUS BLD VENIPUNCTURE: CPT | Performed by: PSYCHIATRY & NEUROLOGY

## 2021-09-23 PROCEDURE — 99223 1ST HOSP IP/OBS HIGH 75: CPT | Mod: AI | Performed by: PSYCHIATRY & NEUROLOGY

## 2021-09-23 PROCEDURE — 82977 ASSAY OF GGT: CPT | Performed by: PSYCHIATRY & NEUROLOGY

## 2021-09-23 PROCEDURE — 128N000001 HC R&B CD/MH ADULT

## 2021-09-23 PROCEDURE — 99231 SBSQ HOSP IP/OBS SF/LOW 25: CPT | Performed by: PHYSICIAN ASSISTANT

## 2021-09-23 PROCEDURE — 99207 PR CONSULT E&M CHANGED TO SUBSEQUENT LEVEL: CPT | Performed by: PHYSICIAN ASSISTANT

## 2021-09-23 PROCEDURE — 250N000011 HC RX IP 250 OP 636: Performed by: PSYCHIATRY & NEUROLOGY

## 2021-09-23 RX ORDER — PHENOBARBITAL 64.8 MG/1
64.8 TABLET ORAL AT BEDTIME
Status: DISCONTINUED | OUTPATIENT
Start: 2021-09-23 | End: 2021-09-28

## 2021-09-23 RX ADMIN — B-COMPLEX W/ C & FOLIC ACID TAB 1 TABLET: TAB at 09:03

## 2021-09-23 RX ADMIN — PANTOPRAZOLE SODIUM 40 MG: 40 TABLET, DELAYED RELEASE ORAL at 16:39

## 2021-09-23 RX ADMIN — DIAZEPAM 10 MG: 5 TABLET ORAL at 12:32

## 2021-09-23 RX ADMIN — ONDANSETRON 4 MG: 4 TABLET, ORALLY DISINTEGRATING ORAL at 09:02

## 2021-09-23 RX ADMIN — THIAMINE HCL TAB 100 MG 100 MG: 100 TAB at 09:02

## 2021-09-23 RX ADMIN — Medication 5 MG: at 20:35

## 2021-09-23 RX ADMIN — LOPERAMIDE HYDROCHLORIDE 2 MG: 2 CAPSULE ORAL at 12:32

## 2021-09-23 RX ADMIN — MAGNESIUM OXIDE TAB 400 MG (241.3 MG ELEMENTAL MG) 400 MG: 400 (241.3 MG) TAB at 09:03

## 2021-09-23 RX ADMIN — DIAZEPAM 10 MG: 5 TABLET ORAL at 05:01

## 2021-09-23 RX ADMIN — DULOXETINE HYDROCHLORIDE 60 MG: 60 CAPSULE, DELAYED RELEASE ORAL at 09:02

## 2021-09-23 RX ADMIN — PHENOBARBITAL 64.8 MG: 64.8 TABLET ORAL at 20:35

## 2021-09-23 RX ADMIN — HYDROXYZINE HYDROCHLORIDE 25 MG: 25 TABLET, FILM COATED ORAL at 20:36

## 2021-09-23 RX ADMIN — DIAZEPAM 10 MG: 5 TABLET ORAL at 09:02

## 2021-09-23 RX ADMIN — DIAZEPAM 5 MG: 5 TABLET ORAL at 20:35

## 2021-09-23 RX ADMIN — FOLIC ACID 1 MG: 1 TABLET ORAL at 09:03

## 2021-09-23 RX ADMIN — PROPRANOLOL HYDROCHLORIDE 80 MG: 80 CAPSULE, EXTENDED RELEASE ORAL at 09:13

## 2021-09-23 RX ADMIN — HYDROXYZINE HYDROCHLORIDE 25 MG: 25 TABLET, FILM COATED ORAL at 16:39

## 2021-09-23 RX ADMIN — ALBUTEROL SULFATE 2 PUFF: 90 AEROSOL, METERED RESPIRATORY (INHALATION) at 09:31

## 2021-09-23 RX ADMIN — PANTOPRAZOLE SODIUM 40 MG: 40 TABLET, DELAYED RELEASE ORAL at 09:02

## 2021-09-23 RX ADMIN — MULTIPLE VITAMINS W/ MINERALS TAB 1 TABLET: TAB at 09:03

## 2021-09-23 ASSESSMENT — ACTIVITIES OF DAILY LIVING (ADL)
HYGIENE/GROOMING: INDEPENDENT
DRESS: INDEPENDENT
LAUNDRY: WITH SUPERVISION
HYGIENE/GROOMING: INDEPENDENT
ORAL_HYGIENE: INDEPENDENT
ORAL_HYGIENE: INDEPENDENT
LAUNDRY: WITH SUPERVISION
DRESS: INDEPENDENT

## 2021-09-23 NOTE — PLAN OF CARE
"ODESSA    Nasra Lovelace is a 41 year old year old female with a chief complaint of Alcohol Intoxication and Suicidal    S = Situation:   Pt is a voluntary admission for alcohol withdrawal and detox.    B  = Background:   Pt admitted from the ED seeking detox for alcohol use. She reports drinking 7-9 beers or hard seltzers daily since June 2021. Breathalyzer in ED was 0.28, urine drug screen was positive for cannabinoids. Pt reports a possible history of withdrawal seizures in her 20s. She reports many previous treatment episodes including mental health hospitalizations, detox admissions, outpatient and residential CD treatments. She has a history of bulimia and anorexia; she describes most recent symptoms include restricting. She has lost 10 pounds over the past few months. She reports recent assessment for tx referral, was trying to get into [facility] or [facility]; she reports the wait lists for ED programs are very long. She would like door to door CD treatment at this time. She reported SI upon arrival to the ED however, later told ED nurse that she was saying what she needed to say in order to get admitted. Pt has been prescribed ritalin 20 mg, temazepam 15-30 mg for sleep, and ativan 1-4 mg \"for years\" but understands these meds will be held during detox admission.    A  =  Assessment:   Patient currently denies SI/SIB/HI. She denies hallucinations. MSSA scores were 12 and 8; 10 mg valium administered x2.    R =   Request or Recommendation:   Pt is on MSSA protocol with valium for alcohol withdrawal. She is on withdrawal and seizure precautions. Pt to be assessed by psychiatrist, internal medicine, and case management in the am. Pt would like to meet with a dietician and is interested in COVID vaccine.  "
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
## 2021-09-23 NOTE — PHARMACY-ADMISSION MEDICATION HISTORY
Admission Medication History Completed by Pharmacy    See [employer] Admission Navigator for allergy information, preferred outpatient pharmacy and prior to admission medications.     Medication History Sources:     Prescription fill history ([facility] in [place], [place]) via [employer] data    Patient (via phone on unit 3A)    Additional Information:    Patient's report is consistent with fill records. All refills up to date.     Propranolol and magnesium are for migraine prophylaxis    Lorazepam: prescribed intermittently to help with appetite (h/o anorexia)    [place] :  1) 9/1: methylphenidate LA 20mg capsules, qty #30 for 30 days  2) 9/7: temazepam 15mg capsules, qty #60 for 30 days  3) 9/8: lorazepam 1mg tablets, qty #12 for 3 days    Prior to Admission medications    Medication Sig Last Dose     albuterol (PROAIR HFA/PROVENTIL HFA/VENTOLIN HFA) 108 (90 Base) MCG/ACT inhaler     Inhale 2 puffs into the lungs every 6 hours Past Week     co-enzyme Q-10 100 MG CAPS capsule Take 100 mg by mouth daily     More than a month     DULoxetine (CYMBALTA) 60 MG capsule TAKE 1 CAPSULE (60 MG) BY MOUTH DAILY     9/21/2021     LORazepam (ATIVAN) 1 MG tablet Take 1 tablet (1 mg) by mouth every 6 hours as needed for anxiety     Past Week     magnesium oxide (MAG-OX) 400 MG tablet TAKE 2 TABLETS BY MOUTH IN THE EVENING.     9/21/2021     multivitamin w/minerals (MULTI-VITAMIN) tablet     Take 1 tablet by mouth daily Past Week     omeprazole (PRILOSEC) 40 MG DR capsule TAKE 1 CAPSULE BY MOUTH ONCE DAILY 30-60 MINUTES BEFORE A MEAL.     9/21/2021     propranolol ER (INDERAL LA) 80 MG 24 hr capsule     TAKE 1 CAPSULE (80 MG) BY MOUTH DAILY 9/21/2021     RITALIN LA 20 MG 24 hr capsule TAKE 1 CAPSULE DAILY     9/21/2021     temazepam (RESTORIL) 15 MG capsule TAKE 1-2 CAPSULES BY MOUTH EVERY EVENING.     9/21/2021     vitamin B complex with vitamin C (VITAMIN  B COMPLEX) tablet     Take 1 tablet by mouth daily 9/21/2021       Date  completed: 09/23/21    Medication history completed by:   Annette Platt, Pharm.D., [facility]  Behavioral Health Inpatient Pharmacist  [facility] ([facility])  Phone: *[phone] ([employer]) or [phone]

## 2021-09-23 NOTE — PROGRESS NOTES
Ayesha continues to be monitored for alcohol withdrawal using MSSA. She scored 6 & 8. Administered 10mg prn valium once for a score of 8. No safety or behavioral concerns observed or reported. Will continue to monitor.

## 2021-09-23 NOTE — PROGRESS NOTES
"SPIRITUAL HEALTH SERVICES   Spiritual Assessment Progress Note (Behavioral Health/CD Focus)  [facility] [place] [place]    REFERRAL SOURCE: Consult placed for pt asking for spiritual/emotional support.    On this visit, I met with \"Abhay\" in the lounge for 20 minutes.  Shared introduction to spiritual care on the unit; provided emotional support around illness narrative, facilitated exploration of spiritual needs/resources. Shared prayer at the end of the visit.    EXPERIENCE OF ILLNESS/HOSPITALIZATION:  Abhay shared at length about her struggle with alcohol and eating disorder, identified stress and anxiety as they \"key driving factor\" that causes her to drink. A number of life stressors that culminated in her \"being bowled over\" this summer and going back to drinking.  Abhay has taken significant steps in making arrangements to come here and spoke of her strong motivation to \"be there\" for her daughter, her dad, and her boyfriend.    MEANING-MAKING:  Abhay stated that she often chooses to \"keep my focus\" outside by caring for and helping others, and recognized the value of building habits of caring for herself.    SPIRITUALITY/VALUES/Baptist:  Rastafarian  Abhay identified caring for others (now her dad and her boyfriend), and her daughter as goldman motivators towards sobriety    COPING/SPIRITUAL PRACTICES: Abhay initially identified past/harmful resources ([facility] group bad experience). We wondered together what resources she may build in her own spiritual practice that are restorative, that bring her ann marie.  Positive coping:  - the \"hands on\" help that the \"[facility] [employer]\" ministry has brought in text/phone support and people that have driven distance to be there for her.  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 \"[facility] recovery ministry &  Ke. Her parents/family \"are all on my side\"    PLAN: I will follow up with Abhay 1-2x weekly as pt remains on the unit.       "                                                                                                                                       Grecia Hinson MDiv  Associate   Pager [phone]  Office [phone]  [facility] remains available 24/7 for emergent requests/referrals, either by having the switchboard page the on-call  or by entering an ASAP/STAT consult in Epic (this will also page the on-call ). Routine Epic consults receive an initial response within 24 hours.

## 2021-09-23 NOTE — PROGRESS NOTES
Case Management Note  9/23/2021    Met with pt to discuss discharge planning. Pt reports she is trying to get into eating disorder treatment. Pt reported an assessment scheduled with [facility] on 10/6, but no confirmation on whether she would be admitted into residential or outpatient until she completed assessment. Pt signed GIRISH which was faxed to [facility]. Based on pt's reported active eating disorder she would likely not be approved for a typical residential CD treatment program.     Spoke with Shena @ [facility] (called main # and was transferred to admissions) who reported pt was scheduled for an assessment but now that she is hospitalizaed the prcocess can be expedited, however their residential program has a 6-8 week wait regardless of assessment. They do have immediate availability in their in-person IOP ([place]) and their virtual IOP (based in [place]). Pt lives in [facility] so would likely need virtual IOP if approved. Shena planned to call back to schedule a sooner assessment.     Spoke with Shena again in afternoon, who scheduled pt for phone assessment which will be 30min-1 hour, they will call unit phone, unit can get pt onto a portable phone so she can complete assessment in her room.     Assessment date/time: Friday, 9/24/21 @ 11:30 AM  : [facility]

## 2021-09-23 NOTE — PLAN OF CARE
Behavioral Team Discussion: (9/23/2021)    Continued Stay Criteria/Rationale: Patient admitted for alcohol withdrawal, complicated by suicidal ideation, eating disorder.  Plan: The following services will be provided to the patient; psychiatric assessment, medication management, therapeutic milieu, individual and group support, and skills groups.   Participants: 3A Provider: Dr. Ros Urbano MD; 3A RN's: Delmi Lin RN; 3A CM's: Rowena [facility]  Summary/Recommendation: Providers will assess today for treatment recommendations, discharge planning, and aftercare plans. CM will meet with pt for discharge planning.   Medical/Physical: Internal medicine consult to be completed 9/23/2021.  Precautions:   Behavioral Orders   Procedures     Code 1 - Restrict to Unit     Routine Programming     As clinically indicated     Seizure precautions     Status 15     Every 15 minutes.     Withdrawal precautions     Rationale for change in precautions or plan: N/A  Progress: No Change.   PT SEEN   AGREE WITH ASSESSMENT AND PLAN

## 2021-09-23 NOTE — PROGRESS NOTES
PDMP as of 9/23/2021:     [facility] [place]  Risk Indicators  NARX SCORES  Narcotic  300  Sedative  411  Stimulant  221  Explanation and GuidanceOVERDOSE RISK SCORE 280  (Range 000-999)  Explanation and Guidance  ADDITIONAL RISK INDICATORS (0)  Explanation and GuidanceThis NarxCare report is based on search criteria supplied and the data entered by the dispensing pharmacy. For more information about any prescription, please contact the dispensing pharmacy or the prescriber. NarxCare scores and reports are intended to aid, not replace, medical decision making. None of the information presented should be used as sole justification for providing or refusing to provide medications. The information on this report is not warranted as accurate or complete.  Graphs  RX GRAPH   Narcotic  Buprenorphine  Sedative  Stimulant  Other  All Prescribers  Prescribers  4 - Adrienne Dailey  3 - Brandy Abebe  2 - Ian Arana  1 - Rito Soler MD  Timeline  09/23  2m  6m  1y  2y  Buprenorphine mg  16  4  0  28  Timeline  09/23  2m  6m  1y  2y  Morphine MgEq (MME)  200  80  0  320  Timeline  09/23  2m  6m  1y  2y  Lorazepam MgEq (LME)  10  2  0  18  Timeline  09/23  2m  6m  1y  2y  *Per CDC guidance, the MME conversion factors prescribed or provided as part of the medication-assisted treatment for opioid use disorder should not be used to benchmark against dosage thresholds meant for opioids prescribed for pain. Buprenorphine products have no agreed upon morphine equivalency, and as partial opioid agonists, are not expected to be associated with overdose risk in the same dose-dependent manner as doses for full agonist opioids. MME = morphine milligram equivalents. LME = Lorazepam milligram equivalents. mg = dose in milligrams.  Summary  Summary  Total Prescriptions:  30  Total Prescribers:  4  Total Pharmacies:  2  Narcotics* (excluding Buprenorphine)  Current Qty:  0  Current MME/day:  0.00  30 Day Avg  MME/day:  0.00  Sedatives*  Current Qty:  5  Current LME/day:  0.50  30 Day Avg LME/day:  1.80  Buprenorphine*  Current Qty:  0  Current mg/day:  0.00  30 Day Avg mg/day:  0.00  Rx Data  PRESCRIPTIONS  Total Prescriptions: 30  Total Private Pay: 0  Fill Date ID Written Sold Drug Qty Days Prescriber Rx # Pharmacy Refill Daily Dose * Pymt Type   09/08/2021 4 09/07/2021 09/09/2021 Lorazepam 1 Mg Tablet  12.00 3 [place] [facility] [id number] [facility] ([id number]) 0/0 4.00 [place]  09/01/2021 3 09/01/2021 09/01/2021 Ritalin La 20 Mg Capsule  30.00 30 An Fla [id number] [facility] ([id number]) 0/0  [place]  08/30/2021 3 07/27/2021 08/30/2021 Lorazepam 0.5 Mg Tablet  30.00 30 Mi Bre [id number] [facility] (2925) 1/1 0.50 [place]  08/13/2021 3 06/22/2021 08/14/2021 Temazepam 15 Mg Capsule  60.00 30 Mi Bre [id number] [facility] (2925) 1/3 1.50 [place]  08/02/2021 3 08/02/2021 08/02/2021 Ritalin La 20 Mg Capsule  30.00 30 An Fla [id number] [facility] (2925[place]  07/28/2021 3 07/27/2021 07/28/2021 Lorazepam 0.5 Mg Tablet  30.00 30 Mi Bre [id number] [facility] (2925) 0/1 0.[place]  07/13/2021 3 06/22/2021 07/14/2021 Temazepam 15 Mg Capsule  60.00 30 Mi Bre [id number] [facility] (2925) 0/3 1.50 [place]  07/07/2021 3 07/07/2021 07/07/2021 Hydrocodone-Acetamin 5-325 Mg  12.00 3 Syeda Smi [id number] [facility] (2925) 0/0 20.[place]  07/06/2021 3 07/06/2021 07/06/2021 Tramadol Hcl 50 Mg Tablet  20.00 5 Syeda Smi [id number] [facility] (5505) 0/0 20.00 [place]  06/23/2021 3 06/22/2021 07/06/2021 Ritalin La 20 Mg Capsule  30.00 30 Mi Bre [id number] [facility] (5595) [place]  06/21/2021 3 03/23/2021 06/21/2021 Temazepam 15 Mg Capsule  60.00 30 Mi Bre [id number] [facility] (2925) 3/3 1.50 [place]  06/11/2021 3 06/11/2021 06/12/2021 Oxycodone Hcl 5 Mg Tablet  26.00 5 Syeda Smi [id number] [facility] (2925) 0/0 39.00 [place]  06/07/2021 3 05/11/2021 06/07/2021 Lorazepam 0.5 Mg Tablet  30.00 30 Mi Bre [id number] [facility] (2925) 0/1 0.50 [place]  05/21/2021 3 05/21/2021 05/21/2021 Ritalin La 20 Mg  Capsule  30.00 30 Mi Bre [id number] [facility] (2925[place]  05/21/2021 3 03/23/2021 05/21/2021 Temazepam 15 Mg Capsule  60.00 30 Mi Bre [id number] [facility] (2925) 2/3 1.50 [place]  05/11/2021 3 04/12/2021 05/11/2021 Lorazepam 0.5 Mg Tablet  30.00 30 Mi Bre [id number] [facility] (2925) 1/1 0.50 [place]  04/12/2021 3 04/12/2021 04/12/2021 Lorazepam 0.5 Mg Tablet  30.00 30 Mi Bre [id number] [facility] (2925) 0/1 0.50 [place]  03/29/2021 2 03/23/2021 03/29/2021 Temazepam 15 Mg Capsule  60.00 30 Mi Bre [id number] [facility] (2925) 0/3 1.50 [place]  03/26/2021 2 03/23/2021 03/26/2021 Ritalin La 20 Mg Capsule  30.00 30 Mi Bre [id number] [facility] (2925) [place]  [place]  03/02/2021 2 03/01/2021 03/02/2021 Temazepam 15 Mg Capsule  60.00 30 Mi Bre [id number] [facility] (2925) 0/0 1.50 [place]  03/02/2021 2 03/01/2021 03/02/2021 Ritalin La 20 Mg Capsule  30.00 30 Mi Bre [id number] [facility] (2925) [place]  01/30/2021 2 01/30/2021 01/30/2021 Ritalin La 20 Mg Capsule  30.00 30 Mi Bre [id number] [facility] (2925[place]  01/29/2021 2 01/04/2021 01/29/2021 Temazepam 15 Mg Capsule  60.00 30 An Fla [id number] [facility] (2925) 0/0 1.50 [place]  01/04/2021 1 11/10/2020 01/04/2021 Temazepam 15 Mg Capsule  60.00 30 Mi Bre [id number] [facility] (2925) 1/1 1.50 [place]  12/29/2020 1 11/18/2020 12/29/2020 Ritalin La 20 Mg Capsule  30.00 30 Mi Bre [id number] [facility] (2925) [place]  12/07/2020 1 11/10/2020 12/07/2020 Temazepam 15 Mg Capsule  60.00 30 Mi Bre [id number] [facility] (2925) 0/1 1.50 [place]  11/18/2020 1 11/18/2020 11/18/2020 Ritalin La 20 Mg Capsule  30.00 30 Mi Bre [id number] [facility] (2925) [place]  11/09/2020 1 09/16/2020 11/09/2020 Temazepam 15 Mg Capsule  60.00 30 Mi Bre [id number] [facility] (2925) 1/1 1.50 [place]  10/15/2020 1 10/14/2020 10/15/2020 Ritalin La 20 Mg Capsule  30.00 30 Mi Bre [id number] [facility] (2925) 0/0  [place]  10/12/2020 1 09/16/2020 10/12/2020 Temazepam 15 Mg Capsule  60.00 30 Mi Bre [id number] [facility] (2925) 0/1 1.50 [place]  *Per CDC  guidance, the MME conversion factors prescribed or provided as part of the medication-assisted treatment for opioid use disorder should not be used to benchmark against dosage thresholds meant for opioids prescribed for pain. Buprenorphine products have no agreed upon morphine equivalency, and as partial opioid agonists, are not expected to be associated with overdose risk in the same dose-dependent manner as doses for full agonist opioids. MME = morphine milligram equivalents. LME = Lorazepam milligram equivalents. mg = dose in milligrams.  Providers  Total Providers: 4  Name Address [facility] Zipcode Phone  Rito Soler  [address] [place] [address] [phone]  Brandy Mis [address] [place] [address] [phone]  Adrienne Dailey Np [address] [place] [address] [phone]  Ian CROOK [place] [address] [place] [address] [phone]  Pharmacies  Total Pharmacies: 2  Name Address [facility] Zipcode Phone  [facility] [phone] [place] [address] [phone]  [facility] [phone]) [phone] [place] [address] [phone]  The report provided is based upon the search criteria entered and the corresponding data as it has been reported by dispenser(s). If erroneous information is identified or additional information is needed, please contact the dispenser or the prescriber provided on the report. Date Sold signifies the date the prescription was sold (left the pharmacy). The absence of Date Sold does not necessarily indicate the prescription was not dispensed. Fill Date represents the date the medication was filled or prepared by the pharmacy. Note, federal regulation (CFR Title 42: Part 2) requires patient consent prior to releasing certain patient data from federally funded opioid treatment programs (OTPs). As such, controlled substances dispensed from OTPs for medication-assisted treatment may not appear in the [facility] report. Morphine milligram equivalent (MME) conversion  factors published by the CDC are used in the MME calculation. Per the CDC, the MME conversion factor is intended only for analytic purposes where prescription data are used to retrospectively calculate daily MME to inform analyses of risks associated with opioid prescribing. This value does not constitute clinical guidance or recommendations for converting patients from one form of opioid analgesic to another. Per the CDC, the conversion factors for drugs prescribed or provided, as part of medication-assisted treatment for opioid use disorder should not be used to benchmark against MME dosage thresholds meant for opioids prescribed for pain. Buprenorphine products listed in the CDC s MME file do not have an associated conversion factor. Lastly, the CDC notes, in clinical practice, calculating MME for methadone often involves a sliding-scale approach, whereby the conversion factor increases with increasing dose. The conversion factor of 3 for methadone presented in this file could underestimate MME for a given patient. This report contains confidential information, including patient identifiers, and is not a public record. The information on this report must be treated as protected health information and is only to be disclosed to others as authorized by applicable state and Federal regulations.

## 2021-09-23 NOTE — CONSULTS
Consult Date: 2021    INTERNAL MEDICINE CONSULTATION    HISTORY OF PRESENT ILLNESS:  The patient is a 41-year-old female admitted to station 3A for alcohol abuse and detoxification, drinking on average 8-10 drinks for the past couple of months.  Blood alcohol level on admission is 0.28.  Internal Medicine consultation was requested by Dr. Urbano to assess medical problems including GERD.  At this time, Ayesha denies acute physical concerns including fever, chills, chest pain, shortness of breath, abdominal pain, nausea, bowel or bladder concerns.    PAST MEDICAL HISTORY:     1.  Alcohol use disorder and other psychiatric history per Dr. Urbano.  2.  GERD.  3.  Mclaughlin's esophagus without dysplasia.  4.  History of esophagitis.  5.  History of recurrent diarrhea.  6.  History of possible biliary dyskinesia based on past HIDA scan revealing estimated ejection fraction of gallbladder at 9%, .  7.  Status post left salpingectomy, 2019.  8.  Status post D and C, .  9.  Status post endometrial ablation, .  10.  Status post , .  11.  Status post appendectomy, .  12.  Status post right oophorectomy, .  13.  Status post recent hysterectomy, 2021.  14.  Denies history of other chronic medical problems and surgeries.    ADMISSION MEDICATIONS:  Reviewed and listed in the medication reconciliation list.    ALLERGIES/ADVERSE EFFECTS: INCLUDES HYDROCODONE, PENICILLIN AND DUST.    Social History     Socioeconomic History     Marital status:      Spouse name: Not on file     Number of children: Not on file     Years of education: Not on file     Highest education level: Not on file   Occupational History     Not on file   Tobacco Use     Smoking status: Current Every Day Smoker     Packs/day: 0.40     Years: 10.00     Pack years: 4.00     Types: Cigarettes     Last attempt to quit: 2020     Years since quittin.6     Smokeless tobacco: Never Used     Tobacco comment:  tobacco cessation discuseed - tried to quit: no - passive smoke exposure quitting on own    Substance and Sexual Activity     Alcohol use: No     Drug use: No     Sexual activity: Yes     Partners: Male     Birth control/protection: Female Surgical   Other Topics Concern      Service Not Asked     Blood Transfusions Yes     Caffeine Concern Yes     Comment: 2 cups coffee daily     Occupational Exposure Not Asked     Hobby Hazards Not Asked     Sleep Concern Not Asked     Stress Concern Not Asked     Weight Concern Not Asked     Special Diet Not Asked     Back Care Not Asked     Exercise Yes     Comment: walking, weights, housework daily     Bike Helmet Not Asked     Seat Belt Not Asked     Self-Exams Not Asked     Parent/sibling w/ CABG, MI or angioplasty before 65F 55M? No   Social History Narrative     Not on file     Social Determinants of Health     Financial Resource Strain:      Difficulty of Paying Living Expenses:    Food Insecurity:      Worried About Running Out of Food in the Last Year:      Ran Out of Food in the Last Year:    Transportation Needs:      Lack of Transportation (Medical):      Lack of Transportation (Non-Medical):    Physical Activity:      Days of Exercise per Week:      Minutes of Exercise per Session:    Stress:      Feeling of Stress :    Social Connections:      Frequency of Communication with Friends and Family:      Frequency of Social Gatherings with Friends and Family:      Attends Christian Services:      Active Member of Clubs or Organizations:      Attends Club or Organization Meetings:      Marital Status:    Intimate Partner Violence:      Fear of Current or Ex-Partner:      Emotionally Abused:      Physically Abused:      Sexually Abused:         Family History   Problem Relation Age of Onset     Hypertension Mother      Hyperlipidemia Mother      Cardiovascular Father      Hypertension Father      Hyperlipidemia Father      Heart Failure Father      Sleep Apnea  Father      Kidney failure Father         REVIEW OF SYSTEMS:  A 10-point review of systems is negative, except as stated above in history of present illness.    PHYSICAL EXAMINATION:    GENERAL:  Pleasant lady, in no acute distress.  VITAL SIGNS:  Stable, temperature afebrile, pulse in the 80s, blood pressure 107/75.  LUNGS:  Clear.  CARDIOVASCULAR:  Regular rate and rhythm.  ABDOMEN:  Soft, mildly tender in the right upper quadrant, which patient states is chronic.  EXTREMITIES:  No edema.  SKIN:  No rash on exposed areas.  NEUROLOGIC:  She is awake, alert and oriented x 3.  Cranial nerves grossly intact.  Motor strength is symmetric.  She is not tremulous.    LABORATORY DATA:  Lipase 436, GGT 53, otherwise CBC and comprehensive metabolic panel normal.  Urine pregnancy test negative.  COVID testing negative.  Urine drug screen positive for cannabinoids.    IMPRESSION:     1.  Alcohol abuse and withdrawal per Dr. Urbano.  2.  GERD with history of Mclaughlin's esophagus, stable on prior to admission twice daily PPI.  3.  Abnormal lipase, however, not elevated enough diagnostic of acute pancreatitis and the patient denies nausea, abdominal pain and is eating fine.    PLAN:  No medical intervention indicated at this time.  The patient is medically stable and Medicine is signing off.  Please feel free to call with questions.      Victorino Mix PA-C        D: 2021   T: 2021   MT: PONCHO/TANVIA    Name:     MELLO BENNETT  MRN:      [id number]        Account:      [id number]   :      1980           Consult Date: 2021     Document: [id number]    cc:  Ros Urbano MD

## 2021-09-23 NOTE — PLAN OF CARE
Pt continues to be monitored for alcohol withdrawal. MSSA scores this shift of 11 and 10. Pt endorses anxiety 8/10, depression 5/10. States her depression has improved. Pt continues to have poor appetite, states it is due to nausea and abdominal pain and not due to restricting from her eating disorder, PRN nausea administered for nausea. Pt is interested in attending residential treatment on discharge at the [facility] or [facility]. Pt active and social in milieu.

## 2021-09-24 LAB
AMYLASE SERPL-CCNC: 91 U/L (ref 30–110)
ANION GAP SERPL CALCULATED.3IONS-SCNC: 4 MMOL/L (ref 3–14)
BUN SERPL-MCNC: 12 MG/DL (ref 7–30)
CALCIUM SERPL-MCNC: 9.4 MG/DL (ref 8.5–10.1)
CHLORIDE BLD-SCNC: 101 MMOL/L (ref 94–109)
CO2 SERPL-SCNC: 31 MMOL/L (ref 20–32)
CREAT SERPL-MCNC: 0.99 MG/DL (ref 0.52–1.04)
GFR SERPL CREATININE-BSD FRML MDRD: 71 ML/MIN/1.73M2
GLUCOSE BLD-MCNC: 100 MG/DL (ref 70–99)
LIPASE SERPL-CCNC: 416 U/L (ref 73–393)
POTASSIUM BLD-SCNC: 4.7 MMOL/L (ref 3.4–5.3)
SODIUM SERPL-SCNC: 136 MMOL/L (ref 133–144)

## 2021-09-24 PROCEDURE — 250N000013 HC RX MED GY IP 250 OP 250 PS 637: Performed by: PSYCHIATRY & NEUROLOGY

## 2021-09-24 PROCEDURE — 90853 GROUP PSYCHOTHERAPY: CPT

## 2021-09-24 PROCEDURE — 80048 BASIC METABOLIC PNL TOTAL CA: CPT | Performed by: PSYCHIATRY & NEUROLOGY

## 2021-09-24 PROCEDURE — 128N000001 HC R&B CD/MH ADULT

## 2021-09-24 PROCEDURE — 99233 SBSQ HOSP IP/OBS HIGH 50: CPT | Performed by: PSYCHIATRY & NEUROLOGY

## 2021-09-24 PROCEDURE — 250N000011 HC RX IP 250 OP 636: Performed by: PSYCHIATRY & NEUROLOGY

## 2021-09-24 PROCEDURE — 83690 ASSAY OF LIPASE: CPT | Performed by: PSYCHIATRY & NEUROLOGY

## 2021-09-24 PROCEDURE — 82150 ASSAY OF AMYLASE: CPT | Performed by: PSYCHIATRY & NEUROLOGY

## 2021-09-24 PROCEDURE — 36415 COLL VENOUS BLD VENIPUNCTURE: CPT | Performed by: PSYCHIATRY & NEUROLOGY

## 2021-09-24 RX ADMIN — MULTIPLE VITAMINS W/ MINERALS TAB 1 TABLET: TAB at 09:04

## 2021-09-24 RX ADMIN — HYDROXYZINE HYDROCHLORIDE 25 MG: 25 TABLET, FILM COATED ORAL at 12:23

## 2021-09-24 RX ADMIN — B-COMPLEX W/ C & FOLIC ACID TAB 1 TABLET: TAB at 09:04

## 2021-09-24 RX ADMIN — TRAZODONE HYDROCHLORIDE 50 MG: 50 TABLET ORAL at 20:17

## 2021-09-24 RX ADMIN — PANTOPRAZOLE SODIUM 40 MG: 40 TABLET, DELAYED RELEASE ORAL at 16:11

## 2021-09-24 RX ADMIN — DULOXETINE HYDROCHLORIDE 60 MG: 60 CAPSULE, DELAYED RELEASE ORAL at 09:04

## 2021-09-24 RX ADMIN — MAGNESIUM OXIDE TAB 400 MG (241.3 MG ELEMENTAL MG) 400 MG: 400 (241.3 MG) TAB at 09:05

## 2021-09-24 RX ADMIN — LOPERAMIDE HYDROCHLORIDE 2 MG: 2 CAPSULE ORAL at 09:05

## 2021-09-24 RX ADMIN — Medication 5 MG: at 20:17

## 2021-09-24 RX ADMIN — ONDANSETRON 4 MG: 4 TABLET, ORALLY DISINTEGRATING ORAL at 09:07

## 2021-09-24 RX ADMIN — FOLIC ACID 1 MG: 1 TABLET ORAL at 09:05

## 2021-09-24 RX ADMIN — PHENOBARBITAL 64.8 MG: 64.8 TABLET ORAL at 20:17

## 2021-09-24 RX ADMIN — NICOTINE 1 PATCH: 21 PATCH, EXTENDED RELEASE TRANSDERMAL at 09:06

## 2021-09-24 RX ADMIN — HYDROXYZINE HYDROCHLORIDE 25 MG: 25 TABLET, FILM COATED ORAL at 16:11

## 2021-09-24 RX ADMIN — PROPRANOLOL HYDROCHLORIDE 80 MG: 80 CAPSULE, EXTENDED RELEASE ORAL at 09:03

## 2021-09-24 RX ADMIN — PANTOPRAZOLE SODIUM 40 MG: 40 TABLET, DELAYED RELEASE ORAL at 09:06

## 2021-09-24 RX ADMIN — THIAMINE HCL TAB 100 MG 100 MG: 100 TAB at 09:03

## 2021-09-24 ASSESSMENT — ACTIVITIES OF DAILY LIVING (ADL)
HYGIENE/GROOMING: INDEPENDENT
ORAL_HYGIENE: INDEPENDENT
LAUNDRY: WITH SUPERVISION
DRESS: INDEPENDENT

## 2021-09-24 NOTE — PROGRESS NOTES
Writer met with patient who is frustrated that the [facility] residential program will not start for another 5-6 weeks. Reports she came here for CD help and does not feel she can stay sober during that time. Writer also spoke with Kristin at the [facility] ([phone]), who agrees that it would be beneficial for the patient to complete a CD residential program prior to admitting. Kristin expressed concerns that if patient were to start drinking again that they would need detox again before admitting in 5-6 weeks. Writer and patient agreed to print off her most recent Rule 25 (it is in patient's email) and discuss sending referrals on Sunday.

## 2021-09-24 NOTE — PLAN OF CARE
Pt continues to be monitored for alcohol withdrawal. MSSA scores of 6 and 5 this shift. Pt continues to have poor appetite, denies that it is due to engaging in restricting behaviors related to eating disorder. Pt received PRN zofran and imodium which was partially effective. Pt endorses significant anxiety 7/10, PRN hydroxyzine x1. Pt rates depression at 5/10. Pt completed phone assessment for [facility] today, pt states that it went well. Pt states that her mood has improved today and that she is feeling better.

## 2021-09-24 NOTE — PROGRESS NOTES
[facility], [place]   Psychiatric Progress Note        Interim history   This is a 41 year old female with Major depressive disorder recurrent severe without psychosis  Axis ideation emergency room  Alcohol use disorder severe  Alcohol withdrawal severe  Nicotine use disorder moderate  Benzodiazepine withdrawal  Eating disorder NOS  Rule out anorexia  History PTSD  History of ADHD  Generalized anxiety disorder.Pt seen in rounds.   The patient's care was discussed with the treatment team during the daily team meeting and/or staff's chart notes were reviewed.  Staff report patient has been visible in the milieu,  no acute eventsovernight.     Patient's mood is better high anxiety mind racing less depression more hopeful patient complains of abdominal discomfort after she eats.  She has had a bout of diarrhea    Energy Level:fatigued   Sleep:No concerns, sleeps well through night  Appetite:fair  Improving  motivation interest   Denied any Suicidal/homicidal ideation/plan intent.  Denied any psychosis  No prior suicde attempts  No access to gun  Pt is in alcohol withdrawal still being monitered every 4 hrs for it,   Pt mssa score are monitered  Tolerating meds and has no side effects.              Medications:     Current Facility-Administered Medications   Medication     acetaminophen (TYLENOL) tablet 650 mg     albuterol (PROAIR HFA/PROVENTIL HFA/VENTOLIN HFA) 108 (90 Base) MCG/ACT inhaler 2 puff     alum & mag hydroxide-simethicone (MAALOX) suspension 30 mL     atenolol (TENORMIN) tablet 50 mg     diazepam (VALIUM) tablet 5-20 mg     DULoxetine (CYMBALTA) DR capsule 60 mg     folic acid (FOLVITE) tablet 1 mg     hydrOXYzine (ATARAX) tablet 25 mg     loperamide (IMODIUM) capsule 2 mg     magnesium oxide (MAG-OX) tablet 400 mg     melatonin tablet 5 mg     multivitamin w/minerals (THERA-VIT-M) tablet 1 tablet     nicotine (NICODERM CQ) 21 MG/24HR 24 hr patch 1 patch     nicotine Patch in  "Place     ondansetron (ZOFRAN-ODT) ODT tab 4 mg     pantoprazole (PROTONIX) EC tablet 40 mg     PHENobarbital (LUMINAL) tablet 64.8 mg     propranolol ER (INDERAL LA) 24 hr capsule 80 mg     senna-docusate (SENOKOT-S/PERICOLACE) 8.6-50 MG per tablet 1 tablet     thiamine (B-1) tablet 100 mg     traZODone (DESYREL) tablet 50 mg     vitamin B complex with vitamin C (STRESS TAB) tablet 1 tablet             Allergies:     Allergies   Allergen Reactions     Dust Mite Extract      Hydrocodone Other (See Comments) and Itching     Skin felt like it was burning.     Penicillins Rash            Psychiatric Examination:   Blood pressure 98/74, pulse 77, temperature 97.6  F (36.4  C), temperature source Temporal, resp. rate 16, height 1.626 m (5' 4\"), weight 49 kg (108 lb), SpO2 99 %.  Weight is 108 lbs 0 oz  Body mass index is 18.54 kg/m .    Appearance:  awake, alert and adequately groomed  Attitude:  cooperative  Eye Contact:  good  Mood:  anxious  Affect:  appropriate and in normal range and mood congruent  Speech:  clear, coherent rate /rhythm are good  Psychomotor Behavior:  no evidence of tardive dyskinesia, dystonia, or tics and intact station, gait and muscle tone  Throught Process:  logical  Associations:  no loose associations  Thought Content:  no evidence of suicidal ideation or homicidal ideation, no evidence of psychotic thought, no auditory hallucinations present and no visual hallucinations present  Insight:  good  Judgement:  intact  Oriented to:  time, person, and place  Attention Span and Concentration:  intact  Recent and Remote Memory:  intact  Language fund of knowledge are adequate         Labs:     No results found for: NTBNPI, NTBNP  Lab Results   Component Value Date    TSH 0.42 04/27/2021     Lab Results   Component Value Date    WBC 8.9 09/22/2021         DX   Major depressive disorder recurrent severe without psychosis  Axis ideation emergency room  Alcohol use disorder severe  Alcohol withdrawal " severe  Nicotine use disorder moderate  Benzodiazepine withdrawal  Eating disorder NOS  Rule out anorexia  History PTSD  History of ADHD  Generalized anxiety disorder   PLAN   Alcohol intoxication/withdrawal, presently is on MSSA protocol with Valium. Continue the same MSSA protocol as ordered. Continue thiamine 100 mg p.o. daily, M.V.I. one p.o. daily and folate 1 mg p.o. Daily  Will continue mssa protocal to detox off alcohol on valium,  Pt is c/o of termor , agitation poor sleep and poor appetite, he has sweats, feels shakey  On mssa client scored scored 6 today and  needed needed 0 mg po as of yet , total dose since admission was 65    MSSA    Eating Disturbances: 1  Tremor: 2  Sleep Disturbance: patient gets up once  Clouding of Sensorium: no evidence  Hallucinations: 0 - none  Quality of Contact: 0 - awareness of examiner and people around him/her  Agitation: 0 - normal activity  Paroxysmal Sweats: 0 - no observed sweating  Temperature: 99.5 or below  Pulse: 1 - 70 to 79  Total MSSA Score: 6  She will continue Cymbalta for anxiety and depression will increase it once patient's anxiety continues after detox is completed  We will continue phenobarbital to taper her off of Ativan and temazepam 60 mg at bedtime    We will order lipase because patient is complaining of abdominal pain after she ate  Patient has an eating disorder we will order bmp    Nutrition consult still pending  Patient reports he is not restricting on the unit and eating  Patient is doing an interview with the eating disorder program today at 1030.    Laboratory/Imaging: reviewed with patient   Consults: internal medicine consult reviewed  Patient will be treated in therapeutic milieu with appropriate individual and group therapies as described.  PDMP CHECKED     Supportive psychotheraoy provided, roger talked about recovery enviroment, relapse prevention, triggers to use.  Discussed with patient many issues of addiction,triggers, relapse, and  establishing a solid recovery program.  Asked pt to be med complinat   Medical diagnoses to be addressed this admission:    Plan:      LABORATORY DATA:  Lipase 436, GGT 53, otherwise CBC and comprehensive metabolic panel normal.  Urine pregnancy test negative.  COVID testing negative.  Urine drug screen positive for cannabinoids.     IMPRESSION:     1.  Alcohol abuse and withdrawal per Dr. Urbano.  2.  GERD with history of Mclaughlin's esophagus, stable on prior to admission twice daily PPI.  3.  Abnormal lipase, however, not elevated enough diagnostic of acute pancreatitis and the patient denies nausea, abdominal pain and is eating fine.     PLAN:  No medical intervention indicated at this time.  The patient is medically stable and Medicine is signing off.  Please feel free to call with questions.  Legal Status: voluntary    Safety Assessment:   Checks:  15 min  Precautions: withdrawal precautions  Pt has not required locked seclusion or restraints in the past 24 hours to maintain safety, please refer to RN documentation for further details.  Discussed with patient many issues of addiction,triggers, relapse, and establishing a solid recovery program.  Able to give informed consent:  YES   Discussed Risks/Benefits/Side Effects/Alternatives: YES    After discussion of the indications, risks, benefits, alternatives and consequences of no treatment, the patient elects to complete detox and to treatment

## 2021-09-24 NOTE — PLAN OF CARE
"S: Ayesha has been visible in the milieu this evening. She participated in group and socialized with peers. She reported eating about 75% of her dinner. She reports drinking plenty of fluids. She denies SI/SIB/HI, denies hallucinations. She denies engaging in any eating disorder symptoms. She described feeling anxious and worried about discharge plans. She wants to go to residential treatment but is worried her eating disorder will be a barrier. She has a phone assessment with [facility] tomorrow however, the only option would be virtual outpatient from home, which she does not believe will be \"enough\". Pt encouraged to discuss with  tomorrow.  B: Pt admitted for alcohol withdrawal and detoxification. Phenobarbital taper started this evening, as pt has been prescribed temazepam and ativan for years. She denies hx of abusing benzodiazepines.  A: MSSA scores 6 & 9. Administered 5 mg valium, hydroxyzine 25 mg x2, pantoprazole 40 mg, melatonin 5 mg, phenobarbital 64.8 mg.  R: Continue to monitor and medicate as ordered and indicated.   "

## 2021-09-24 NOTE — PROGRESS NOTES
Pt reiterated frustration to RN about wait time for [facility]. See note from Case Management on 9/24 at 3:18pm for more details. Pt states she wants to discharge Monday and that her ministry can probably get her into treatment faster.

## 2021-09-24 NOTE — PROGRESS NOTES
Writer spoke with [facility]. She is recommending that patient admit to their residential program, however the wait list is currently 5-6 weeks long. Expressed concerns over clients ability to stay sober during that time, and requested an update on her mental health. Plans to call back and speak with patient about her recommendation for residential programming.

## 2021-09-25 ENCOUNTER — APPOINTMENT (OUTPATIENT)
Dept: GENERAL RADIOLOGY | Facility: CLINIC | Age: 41
End: 2021-09-25
Attending: PHYSICIAN ASSISTANT
Payer: COMMERCIAL

## 2021-09-25 PROCEDURE — 250N000013 HC RX MED GY IP 250 OP 250 PS 637: Performed by: PSYCHIATRY & NEUROLOGY

## 2021-09-25 PROCEDURE — H2032 ACTIVITY THERAPY, PER 15 MIN: HCPCS

## 2021-09-25 PROCEDURE — 128N000001 HC R&B CD/MH ADULT

## 2021-09-25 PROCEDURE — 73140 X-RAY EXAM OF FINGER(S): CPT | Mod: LT

## 2021-09-25 RX ORDER — IBUPROFEN 600 MG/1
600 TABLET, FILM COATED ORAL EVERY 6 HOURS PRN
Status: DISCONTINUED | OUTPATIENT
Start: 2021-09-25 | End: 2021-09-29 | Stop reason: HOSPADM

## 2021-09-25 RX ADMIN — HYDROXYZINE HYDROCHLORIDE 25 MG: 25 TABLET, FILM COATED ORAL at 20:11

## 2021-09-25 RX ADMIN — PANTOPRAZOLE SODIUM 40 MG: 40 TABLET, DELAYED RELEASE ORAL at 08:14

## 2021-09-25 RX ADMIN — MULTIPLE VITAMINS W/ MINERALS TAB 1 TABLET: TAB at 08:14

## 2021-09-25 RX ADMIN — PHENOBARBITAL 64.8 MG: 64.8 TABLET ORAL at 20:11

## 2021-09-25 RX ADMIN — PROPRANOLOL HYDROCHLORIDE 80 MG: 80 CAPSULE, EXTENDED RELEASE ORAL at 08:14

## 2021-09-25 RX ADMIN — NICOTINE 1 PATCH: 21 PATCH, EXTENDED RELEASE TRANSDERMAL at 08:14

## 2021-09-25 RX ADMIN — PANTOPRAZOLE SODIUM 40 MG: 40 TABLET, DELAYED RELEASE ORAL at 16:35

## 2021-09-25 RX ADMIN — Medication 5 MG: at 20:11

## 2021-09-25 RX ADMIN — IBUPROFEN 600 MG: 600 TABLET ORAL at 19:32

## 2021-09-25 RX ADMIN — FOLIC ACID 1 MG: 1 TABLET ORAL at 08:14

## 2021-09-25 RX ADMIN — MAGNESIUM OXIDE TAB 400 MG (241.3 MG ELEMENTAL MG) 400 MG: 400 (241.3 MG) TAB at 08:14

## 2021-09-25 RX ADMIN — TRAZODONE HYDROCHLORIDE 50 MG: 50 TABLET ORAL at 20:11

## 2021-09-25 RX ADMIN — HYDROXYZINE HYDROCHLORIDE 25 MG: 25 TABLET, FILM COATED ORAL at 12:34

## 2021-09-25 RX ADMIN — THIAMINE HCL TAB 100 MG 100 MG: 100 TAB at 08:14

## 2021-09-25 RX ADMIN — DULOXETINE HYDROCHLORIDE 60 MG: 60 CAPSULE, DELAYED RELEASE ORAL at 08:14

## 2021-09-25 RX ADMIN — HYDROXYZINE HYDROCHLORIDE 25 MG: 25 TABLET, FILM COATED ORAL at 08:14

## 2021-09-25 RX ADMIN — HYDROXYZINE HYDROCHLORIDE 25 MG: 25 TABLET, FILM COATED ORAL at 16:35

## 2021-09-25 RX ADMIN — ACETAMINOPHEN 650 MG: 325 TABLET, FILM COATED ORAL at 08:17

## 2021-09-25 RX ADMIN — B-COMPLEX W/ C & FOLIC ACID TAB 1 TABLET: TAB at 08:14

## 2021-09-25 ASSESSMENT — ACTIVITIES OF DAILY LIVING (ADL)
HYGIENE/GROOMING: INDEPENDENT
LAUNDRY: WITH SUPERVISION
ORAL_HYGIENE: INDEPENDENT
DRESS: INDEPENDENT
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
## 2021-09-25 NOTE — PLAN OF CARE
Number of patients attending the group:  8  Group Length:  1 Hour    Group Therapy     Summary of Group / Topics Discussed:    The psychotherapy group goal is to promote insight to positive choice and change. Group processing is within a supportive and safe environment. Patients processed emotions using verbal group and expressive psychotherapy interventions.    Group focused on identifying strategies for addressing negative thoughts that could lead to relapse. Each patient took time to brainstorm on possible negative thoughts that may confront the patient and ways to confront the thought(s). Group members offered feedback to peers.      Assessment: Pt stayed for the entirety of the group.       Patient Response: Reported willingness to practice the skills reviewed. Provided positive feedback to group members.

## 2021-09-25 NOTE — PLAN OF CARE
Problem: Alcohol Withdrawal  Goal: Alcohol Withdrawal Symptom Control  Outcome: Completed       Pt has not been medicated for withdrawals for 24 hours. Pt is now placed in Out Of Detox status.     See Progress Note from  on 9/24 regarding Pt frustration over getting into treatment

## 2021-09-25 NOTE — PLAN OF CARE
Problem: General Rehab Plan of Care  Goal: Therapeutic Recreation/Music Therapy Goal  Description: The patient and/or their representative will achieve their patient-specific goals related to the plan of care.  The patient-specific goals include:  Outcome: No Change     Ayesha attended art therapy group for 1 hour (10 patients total). Ayesha engaged in group discussion about positive relationships. She participated in the art activity to make collaborative art about relationships. She interacted appropriately with peers, coming up with a cohesive idea and working together to create the artwork. Her affect appeared bright when sharing the art with the rest of the group.

## 2021-09-26 PROCEDURE — H2032 ACTIVITY THERAPY, PER 15 MIN: HCPCS

## 2021-09-26 PROCEDURE — 250N000013 HC RX MED GY IP 250 OP 250 PS 637: Performed by: PSYCHIATRY & NEUROLOGY

## 2021-09-26 PROCEDURE — 128N000001 HC R&B CD/MH ADULT

## 2021-09-26 RX ADMIN — B-COMPLEX W/ C & FOLIC ACID TAB 1 TABLET: TAB at 09:03

## 2021-09-26 RX ADMIN — IBUPROFEN 600 MG: 600 TABLET ORAL at 16:14

## 2021-09-26 RX ADMIN — MULTIPLE VITAMINS W/ MINERALS TAB 1 TABLET: TAB at 09:04

## 2021-09-26 RX ADMIN — Medication 5 MG: at 20:34

## 2021-09-26 RX ADMIN — PANTOPRAZOLE SODIUM 40 MG: 40 TABLET, DELAYED RELEASE ORAL at 09:03

## 2021-09-26 RX ADMIN — TRAZODONE HYDROCHLORIDE 50 MG: 50 TABLET ORAL at 20:34

## 2021-09-26 RX ADMIN — DOCUSATE SODIUM AND SENNOSIDES 1 TABLET: 8.6; 5 TABLET ORAL at 16:14

## 2021-09-26 RX ADMIN — FOLIC ACID 1 MG: 1 TABLET ORAL at 09:04

## 2021-09-26 RX ADMIN — DULOXETINE HYDROCHLORIDE 60 MG: 60 CAPSULE, DELAYED RELEASE ORAL at 09:03

## 2021-09-26 RX ADMIN — PROPRANOLOL HYDROCHLORIDE 80 MG: 80 CAPSULE, EXTENDED RELEASE ORAL at 09:04

## 2021-09-26 RX ADMIN — PHENOBARBITAL 64.8 MG: 64.8 TABLET ORAL at 20:34

## 2021-09-26 RX ADMIN — MAGNESIUM OXIDE TAB 400 MG (241.3 MG ELEMENTAL MG) 400 MG: 400 (241.3 MG) TAB at 09:04

## 2021-09-26 RX ADMIN — THIAMINE HCL TAB 100 MG 100 MG: 100 TAB at 09:04

## 2021-09-26 RX ADMIN — HYDROXYZINE HYDROCHLORIDE 25 MG: 25 TABLET, FILM COATED ORAL at 09:06

## 2021-09-26 RX ADMIN — IBUPROFEN 600 MG: 600 TABLET ORAL at 09:06

## 2021-09-26 RX ADMIN — PANTOPRAZOLE SODIUM 40 MG: 40 TABLET, DELAYED RELEASE ORAL at 16:14

## 2021-09-26 RX ADMIN — NICOTINE 1 PATCH: 21 PATCH, EXTENDED RELEASE TRANSDERMAL at 09:04

## 2021-09-26 RX ADMIN — HYDROXYZINE HYDROCHLORIDE 25 MG: 25 TABLET, FILM COATED ORAL at 18:27

## 2021-09-26 RX ADMIN — HYDROXYZINE HYDROCHLORIDE 25 MG: 25 TABLET, FILM COATED ORAL at 14:02

## 2021-09-26 ASSESSMENT — ACTIVITIES OF DAILY LIVING (ADL)
LAUNDRY: WITH SUPERVISION
HYGIENE/GROOMING: INDEPENDENT
DRESS: INDEPENDENT
ORAL_HYGIENE: INDEPENDENT
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
## 2021-09-26 NOTE — PLAN OF CARE
Pt has completed detox. Pt treatment plans pending.    Pt had Xray on left hand approximately 15:00 this afternoon. Order for ibuprofen obtained for swelling and pain of affected digit. Pt is appreciative.    Pt visible in milieu and social with peers. Pt attended afternoon therapy group. Denied SI

## 2021-09-26 NOTE — PROGRESS NOTES
Due to patient being placed on a 6-8 week wait list for the [facility] program, and being told the earliest she could admit to [facility] was December 2021, writer and patient met to complete Comp Ass. Update and sent referrals to [facility] ([facility] and [facility] residential [facility]. Patient expressed relief at having options, became tearful over external stressors in the community.

## 2021-09-26 NOTE — PLAN OF CARE
Problem: Acute Neurologic Deterioration (Alcohol Withdrawal)  Goal: Optimal Neurologic Function  Outcome: Improving     Patient out of detox. She slept for about 6.75 hours. No problems identified or reported.

## 2021-09-26 NOTE — PROGRESS NOTES
Writer and patient were able to access her recent Rule 25 Assessment via her email. Will complete NANDA Update Assessment.

## 2021-09-26 NOTE — PROGRESS NOTES
Contacted by Psychiatry for treatment recommendations.    In summary, patient is a 41 year old female who was admitted 9/23/21 for EtOH detox. She also caught her left ring finger in a door a few weeks ago. She did not seek medical attention for her finger, but has had ongoing pain and x-rays were therefore obtained yesterday, demonstrating a minimally displaced fracture of the left ring finger distal phalanx. Recommend alumafoam splint, follow up in hand clinic (message to  sent).     The patient was not seen by myself on this date.    Magali Fishman MD  Orthopaedic Surgery PGY-4

## 2021-09-26 NOTE — PLAN OF CARE
"Pt awaiting placement at residential CD treatment program. Pt endorses anxiety 6/10, depression 2/10, pt received PRN hydroxyzine for anxiety which patient states is helpful. PRN ibuprofen for pain 6/10 in left 4th digit. Pt's finger is fracture, patient informed and informed of need to follow up with orthopedic hand clinic outpatient, splint ordered and applied per ortho recommendation. Pt has a poor appetite, did not eat anything for breakfast and half of lunch, states that she is not a \"big eater\", that she will drink the ensure clear provided.   "

## 2021-09-27 ENCOUNTER — TELEPHONE (OUTPATIENT)
Dept: ORTHOPEDICS | Facility: CLINIC | Age: 41
End: 2021-09-27

## 2021-09-27 PROCEDURE — 250N000013 HC RX MED GY IP 250 OP 250 PS 637: Performed by: PSYCHIATRY & NEUROLOGY

## 2021-09-27 PROCEDURE — H2032 ACTIVITY THERAPY, PER 15 MIN: HCPCS

## 2021-09-27 PROCEDURE — H0001 ALCOHOL AND/OR DRUG ASSESS: HCPCS | Performed by: COUNSELOR

## 2021-09-27 PROCEDURE — 99233 SBSQ HOSP IP/OBS HIGH 50: CPT | Performed by: PSYCHIATRY & NEUROLOGY

## 2021-09-27 PROCEDURE — 128N000001 HC R&B CD/MH ADULT

## 2021-09-27 RX ORDER — DULOXETIN HYDROCHLORIDE 30 MG/1
30 CAPSULE, DELAYED RELEASE ORAL AT BEDTIME
Status: DISCONTINUED | OUTPATIENT
Start: 2021-09-27 | End: 2021-09-29 | Stop reason: HOSPADM

## 2021-09-27 RX ADMIN — FOLIC ACID 1 MG: 1 TABLET ORAL at 09:15

## 2021-09-27 RX ADMIN — IBUPROFEN 600 MG: 600 TABLET ORAL at 20:34

## 2021-09-27 RX ADMIN — PANTOPRAZOLE SODIUM 40 MG: 40 TABLET, DELAYED RELEASE ORAL at 09:15

## 2021-09-27 RX ADMIN — NICOTINE 1 PATCH: 21 PATCH, EXTENDED RELEASE TRANSDERMAL at 09:14

## 2021-09-27 RX ADMIN — B-COMPLEX W/ C & FOLIC ACID TAB 1 TABLET: TAB at 09:15

## 2021-09-27 RX ADMIN — PHENOBARBITAL 64.8 MG: 64.8 TABLET ORAL at 20:34

## 2021-09-27 RX ADMIN — MULTIPLE VITAMINS W/ MINERALS TAB 1 TABLET: TAB at 09:15

## 2021-09-27 RX ADMIN — TRAZODONE HYDROCHLORIDE 50 MG: 50 TABLET ORAL at 20:34

## 2021-09-27 RX ADMIN — HYDROXYZINE HYDROCHLORIDE 25 MG: 25 TABLET, FILM COATED ORAL at 09:15

## 2021-09-27 RX ADMIN — DOCUSATE SODIUM AND SENNOSIDES 1 TABLET: 8.6; 5 TABLET ORAL at 09:15

## 2021-09-27 RX ADMIN — PROPRANOLOL HYDROCHLORIDE 80 MG: 80 CAPSULE, EXTENDED RELEASE ORAL at 09:15

## 2021-09-27 RX ADMIN — MAGNESIUM OXIDE TAB 400 MG (241.3 MG ELEMENTAL MG) 400 MG: 400 (241.3 MG) TAB at 09:15

## 2021-09-27 RX ADMIN — Medication 5 MG: at 20:34

## 2021-09-27 RX ADMIN — DULOXETINE HYDROCHLORIDE 60 MG: 60 CAPSULE, DELAYED RELEASE ORAL at 09:15

## 2021-09-27 RX ADMIN — DULOXETINE HYDROCHLORIDE 30 MG: 30 CAPSULE, DELAYED RELEASE ORAL at 20:34

## 2021-09-27 RX ADMIN — PANTOPRAZOLE SODIUM 40 MG: 40 TABLET, DELAYED RELEASE ORAL at 16:23

## 2021-09-27 RX ADMIN — THIAMINE HCL TAB 100 MG 100 MG: 100 TAB at 09:15

## 2021-09-27 RX ADMIN — HYDROXYZINE HYDROCHLORIDE 25 MG: 25 TABLET, FILM COATED ORAL at 16:23

## 2021-09-27 ASSESSMENT — ACTIVITIES OF DAILY LIVING (ADL)
DRESS: INDEPENDENT
DRESS: INDEPENDENT
ORAL_HYGIENE: INDEPENDENT
HYGIENE/GROOMING: INDEPENDENT
LAUNDRY: WITH SUPERVISION
HYGIENE/GROOMING: INDEPENDENT
ORAL_HYGIENE: INDEPENDENT
LAUNDRY: UNABLE TO COMPLETE

## 2021-09-27 NOTE — PROGRESS NOTES
Placed calls to [facility] and [facility]. Left voicemail messages with both programs requesting return call with update on potential admission. Received call from Kristin Donnelly at [facility] requesting copy of hospital records be faxed to her at [phone]. Forms printed and faxed to Kristin at above number.    Update: Received return call from [facility] that pt's assessment is currently under review. Programs will contact writer when pt is approved for program.

## 2021-09-27 NOTE — TELEPHONE ENCOUNTER
LVM attempting to schedule pt. Provided call center number and encouraged pt to call. Fracture Triaged by Dr Lopez and ruled as non surgical. Can be scheduled with any sports medicine/non surgical provider.    Hilaria Jaimes ATC

## 2021-09-27 NOTE — PLAN OF CARE
Music Therapy Group note    Clinical Hours in session: 1.0    Number of patients in group: 4    Scope of service: psychodynamic     Patient progress: initial encounter    Intervention: Songwriting     Goal of group: insight     Patient response/reaction to treatment intervention(s): Ayesha was an enthusiastic participant, jumping in without inhibition the group songwriting process today.  The following song was created by the group:    I asked for help from those who came through;  Told myself the truth.  My friends and family are there for me,   Even after the things I've done.  When I broke down and told the truth  They said they already knew,  And were just waiting   For the follow-through.    Now I know I can find  Inner strength,   With support  From you.      Ayesha requested a copy of the song, that MT will drop off at the unit.  She was very invested in group process, showing good insight.    Tiffany Boyd, MT-BC  Board-Certified Music Therapist
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
## 2021-09-27 NOTE — PROGRESS NOTES
"SPIRITUAL HEALTH SERVICES   Spiritual Assessment Progress Note (Behavioral Health/CD Focus)  [facility] ([facility]) 3AW    REFERRAL SOURCE: follow up    On this visit, I met with Ayesha in the lounge for 45 minutes.  Provided emotional support around personal/family stressors; facilitated exploration of spiritual needs and resources. Shared reading from [facility] 139 and words of blessing at the end of the visit.    EXPERIENCE OF ILLNESS/HOSPITALIZATION:  Ayesha updated me on plans in process for treatment, shared of family events this past weekend that cause her a great deal of anxiety, the challenge it is for her to remain committed to seeking treatment while \"events at home are out of my control.\"    MEANING-MAKING:  Ayesha stated \"I can feel God's presence, strength with me. I know this is overwhelming but somehow I am doing ok.\"    SPIRITUALITY/VALUES/Yazdanism:  non-Restorationism Nondenominational.  Values: having meaning/purpose in life, caring for her family (specifically now her 9-year old daughter, dad, yrn'), saying the Lord's prayer and also \"less formal\" prayers.    COPING/SPIRITUAL PRACTICES: Ayesha would like to be able to 'factime' with her daughter here or while in treatment, she also asked about a \"simple\" Bible that may be easier for her to read due to her ADD and also one she can read with her daughter, and welcomed the suggestion of reading scripture with her daily.    SUPPORT SYSTEMS: Ayesha spoke again of her parents, some friends, and the \"[employer]\" ministry as strong support. \"Noone in my support network deals with addiction\" she stated.    PLAN: I will return with Bibles for Ayesha to choose from, and offer daily follow up to read scripture with her as able.    Addendum: I returned with a selection of children's Bibles, Ayesha chose two to keep. Also provided a wooden holding cross.                                                                                                                                   "           Grecia Hinson MDiv  Associate   Pager [phone]  Office [phone]  [facility] remains available 24/7 for emergent requests/referrals, either by having the switchboard page the on-call  or by entering an ASAP/STAT consult in Epic (this will also page the on-call ). Routine Epic consults receive an initial response within 24 hours.

## 2021-09-27 NOTE — PROGRESS NOTES
[facility] called stating they received the referral for patient, by case management. They will follow up tomorrow.
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
## 2021-09-27 NOTE — PROGRESS NOTES
[facility], [place]   Psychiatric Progress Note        Interim history   This is a 41 year old female with Major depressive disorder recurrent severe without psychosis  Axis ideation emergency room  Alcohol use disorder severe  Alcohol withdrawal severe  Nicotine use disorder moderate  Benzodiazepine withdrawal  Eating disorder NOS  Rule out anorexia  History PTSD  History of ADHD  Generalized anxiety disorder.Pt seen in rounds.   The patient's care was discussed with the treatment team during the daily team meeting and/or staff's chart notes were reviewed.  Staff report patient has been visible in the milieu,  no acute eventsovernight.     Patient's mood is better    anxiety is down   Still restricting but less         Energy Level:fatigued   Sleep:No concerns, sleeps well through night  Appetite:fair  Improving  motivation interest   Denied any Suicidal/homicidal ideation/plan intent.  Denied any psychosis  No prior suicde attempts  No access to gun    Tolerating meds and has no side effects.              Medications:     Current Facility-Administered Medications   Medication     acetaminophen (TYLENOL) tablet 650 mg     albuterol (PROAIR HFA/PROVENTIL HFA/VENTOLIN HFA) 108 (90 Base) MCG/ACT inhaler 2 puff     alum & mag hydroxide-simethicone (MAALOX) suspension 30 mL     atenolol (TENORMIN) tablet 50 mg     diazepam (VALIUM) tablet 5-20 mg     DULoxetine (CYMBALTA) DR capsule 60 mg     folic acid (FOLVITE) tablet 1 mg     hydrOXYzine (ATARAX) tablet 25 mg     ibuprofen (ADVIL/MOTRIN) tablet 600 mg     loperamide (IMODIUM) capsule 2 mg     magnesium oxide (MAG-OX) tablet 400 mg     melatonin tablet 5 mg     multivitamin w/minerals (THERA-VIT-M) tablet 1 tablet     nicotine (NICODERM CQ) 21 MG/24HR 24 hr patch 1 patch     nicotine Patch in Place     ondansetron (ZOFRAN-ODT) ODT tab 4 mg     pantoprazole (PROTONIX) EC tablet 40 mg     PHENobarbital (LUMINAL) tablet 64.8 mg      "propranolol ER (INDERAL LA) 24 hr capsule 80 mg     senna-docusate (SENOKOT-S/PERICOLACE) 8.6-50 MG per tablet 1 tablet     thiamine (B-1) tablet 100 mg     traZODone (DESYREL) tablet 50 mg     vitamin B complex with vitamin C (STRESS TAB) tablet 1 tablet             Allergies:     Allergies   Allergen Reactions     Dust Mite Extract      Hydrocodone Other (See Comments) and Itching     Skin felt like it was burning.     Penicillins Rash            Psychiatric Examination:   Blood pressure 110/76, pulse 94, temperature 97.8  F (36.6  C), temperature source Temporal, resp. rate 16, height 1.626 m (5' 4\"), weight 49 kg (108 lb), SpO2 99 %.  Weight is 108 lbs 0 oz  Body mass index is 18.54 kg/m .    Appearance:  awake, alert and adequately groomed  Attitude:  cooperative  Eye Contact:  good  Mood:  Hopeful   Affect:  appropriate and in normal range and mood congruent  Speech:  clear, coherent rate /rhythm are good  Psychomotor Behavior:  no evidence of tardive dyskinesia, dystonia, or tics and intact station, gait and muscle tone  Throught Process:  logical  Associations:  no loose associations  Thought Content:  no evidence of suicidal ideation or homicidal ideation, no evidence of psychotic thought, no auditory hallucinations present and no visual hallucinations present  Insight:  good  Judgement:  intact  Oriented to:  time, person, and place  Attention Span and Concentration:  intact  Recent and Remote Memory:  intact  Language fund of knowledge are adequate         Labs:     No results found for: NTBNPI, NTBNP  Lab Results   Component Value Date    TSH 0.42 04/27/2021     Lab Results   Component Value Date    WBC 8.9 09/22/2021         DX   Major depressive disorder recurrent severe without psychosis  Axis ideation emergency room  Alcohol use disorder severe  Alcohol withdrawal severe  Nicotine use disorder moderate  Benzodiazepine withdrawal  Eating disorder NOS  Rule out anorexia  History PTSD  History of " ADHD  Generalized anxiety disorder   PLAN  Pt is out of detox    Will increase cymbalta to 90 mg poqd   We will continue phenobarbital to taper her off of Ativan and temazepam 60 mg at bedtime        Nutrition consult still pending  Patient reports he is not restricting on the unit and eating  Patient is doing an interview with the eating disorder program today at 1030.    Laboratory/Imaging: reviewed with patient   Consults: internal medicine consult reviewed  Patient will be treated in therapeutic milieu with appropriate individual and group therapies as described.  PDMP CHECKED     Supportive psychotheraoy provided, roger talked about recovery enviroment, relapse prevention, triggers to use.  Discussed with patient many issues of addiction,triggers, relapse, and establishing a solid recovery program.  Asked pt to be med complinat   Medical diagnoses to be addressed this admission:    Plan:      LABORATORY DATA:  Lipase 436, GGT 53, otherwise CBC and comprehensive metabolic panel normal.  Urine pregnancy test negative.  COVID testing negative.  Urine drug screen positive for cannabinoids.     IMPRESSION:     1.  Alcohol abuse and withdrawal per Dr. Urbano.  2.  GERD with history of Mclaughlin's esophagus, stable on prior to admission twice daily PPI.  3.  Abnormal lipase, however, not elevated enough diagnostic of acute pancreatitis and the patient denies nausea, abdominal pain and is eating fine.     PLAN:  No medical intervention indicated at this time.  The patient is medically stable and Medicine is signing off.  Please feel free to call with questions.  Legal Status: voluntary    Safety Assessment:   Checks:  15 min  Precautions: withdrawal precautions  Pt has not required locked seclusion or restraints in the past 24 hours to maintain safety, please refer to RN documentation for further details.  Discussed with patient many issues of addiction,triggers, relapse, and establishing a solid recovery program.  Able  to give informed consent:  YES   Discussed Risks/Benefits/Side Effects/Alternatives: YES    After discussion of the indications, risks, benefits, alternatives and consequences of no treatment, the patient elects to complete detox and to treatment

## 2021-09-27 NOTE — PLAN OF CARE
Pt attended the structured Therapeutic Recreation group, participating in a group activity. Pt participated in group discussion and leisure participation as a healthy outlet to gain self-esteem, manage behaviors, improve social skills, and decrease isolation.  Pt remained focused and engaged throughout group activity.  Pt mood was sociable and was appropriate with interactions, contributing to the clues and descriptions throughout the activity. Pt was a full participant for the duration of the group. Pt was sociable throughout group and often was laughing with the others in group.

## 2021-09-27 NOTE — TELEPHONE ENCOUNTER
----- Message from Yamileth Fishman MD sent at 9/26/2021  9:32 AM CDT -----  Regarding: Follow Up  Hello!    Could you please schedule follow up for this patient in [facility] (first available appointment) with one right ring finger xrays out of splint?    Thank you!  Magali

## 2021-09-28 ENCOUNTER — TELEPHONE (OUTPATIENT)
Dept: BEHAVIORAL HEALTH | Facility: CLINIC | Age: 41
End: 2021-09-28

## 2021-09-28 PROCEDURE — 99232 SBSQ HOSP IP/OBS MODERATE 35: CPT | Performed by: PSYCHIATRY & NEUROLOGY

## 2021-09-28 PROCEDURE — 250N000013 HC RX MED GY IP 250 OP 250 PS 637: Performed by: PSYCHIATRY & NEUROLOGY

## 2021-09-28 PROCEDURE — 128N000001 HC R&B CD/MH ADULT

## 2021-09-28 PROCEDURE — H2032 ACTIVITY THERAPY, PER 15 MIN: HCPCS

## 2021-09-28 RX ORDER — ATOMOXETINE 10 MG/1
10 CAPSULE ORAL DAILY
Qty: 30 CAPSULE | Refills: 0 | Status: SHIPPED | OUTPATIENT
Start: 2021-09-28 | End: 2021-11-04

## 2021-09-28 RX ORDER — PHENOBARBITAL 32.4 MG/1
32.4 TABLET ORAL AT BEDTIME
Status: DISCONTINUED | OUTPATIENT
Start: 2021-09-28 | End: 2021-09-29 | Stop reason: HOSPADM

## 2021-09-28 RX ORDER — FOLIC ACID 1 MG/1
1 TABLET ORAL DAILY
Qty: 30 TABLET | Refills: 0 | Status: SHIPPED | OUTPATIENT
Start: 2021-09-29 | End: 2021-11-04

## 2021-09-28 RX ORDER — LANOLIN ALCOHOL/MO/W.PET/CERES
100 CREAM (GRAM) TOPICAL DAILY
Qty: 30 TABLET | Refills: 0 | Status: SHIPPED | OUTPATIENT
Start: 2021-09-29 | End: 2021-11-04

## 2021-09-28 RX ORDER — DULOXETIN HYDROCHLORIDE 30 MG/1
30 CAPSULE, DELAYED RELEASE ORAL AT BEDTIME
Qty: 30 CAPSULE | Refills: 0 | Status: SHIPPED | OUTPATIENT
Start: 2021-09-28 | End: 2022-11-16 | Stop reason: DRUGHIGH

## 2021-09-28 RX ORDER — ATOMOXETINE 10 MG/1
10 CAPSULE ORAL DAILY
Status: DISCONTINUED | OUTPATIENT
Start: 2021-09-28 | End: 2021-09-29 | Stop reason: HOSPADM

## 2021-09-28 RX ADMIN — DULOXETINE HYDROCHLORIDE 30 MG: 30 CAPSULE, DELAYED RELEASE ORAL at 21:33

## 2021-09-28 RX ADMIN — FOLIC ACID 1 MG: 1 TABLET ORAL at 09:05

## 2021-09-28 RX ADMIN — Medication 5 MG: at 21:33

## 2021-09-28 RX ADMIN — MULTIPLE VITAMINS W/ MINERALS TAB 1 TABLET: TAB at 09:04

## 2021-09-28 RX ADMIN — NICOTINE 1 PATCH: 21 PATCH, EXTENDED RELEASE TRANSDERMAL at 09:05

## 2021-09-28 RX ADMIN — NICOTINE POLACRILEX 2 MG: 2 GUM, CHEWING BUCCAL at 12:53

## 2021-09-28 RX ADMIN — TRAZODONE HYDROCHLORIDE 50 MG: 50 TABLET ORAL at 21:33

## 2021-09-28 RX ADMIN — PANTOPRAZOLE SODIUM 40 MG: 40 TABLET, DELAYED RELEASE ORAL at 09:04

## 2021-09-28 RX ADMIN — PROPRANOLOL HYDROCHLORIDE 80 MG: 80 CAPSULE, EXTENDED RELEASE ORAL at 09:04

## 2021-09-28 RX ADMIN — PANTOPRAZOLE SODIUM 40 MG: 40 TABLET, DELAYED RELEASE ORAL at 16:19

## 2021-09-28 RX ADMIN — ATOMOXETINE 10 MG: 10 CAPSULE ORAL at 13:01

## 2021-09-28 RX ADMIN — THIAMINE HCL TAB 100 MG 100 MG: 100 TAB at 09:04

## 2021-09-28 RX ADMIN — DULOXETINE HYDROCHLORIDE 60 MG: 60 CAPSULE, DELAYED RELEASE ORAL at 09:05

## 2021-09-28 RX ADMIN — HYDROXYZINE HYDROCHLORIDE 25 MG: 25 TABLET, FILM COATED ORAL at 18:40

## 2021-09-28 RX ADMIN — MAGNESIUM OXIDE TAB 400 MG (241.3 MG ELEMENTAL MG) 400 MG: 400 (241.3 MG) TAB at 09:05

## 2021-09-28 RX ADMIN — NICOTINE POLACRILEX 2 MG: 2 GUM, CHEWING BUCCAL at 18:40

## 2021-09-28 RX ADMIN — PHENOBARBITAL 32.4 MG: 32.4 TABLET ORAL at 21:33

## 2021-09-28 RX ADMIN — B-COMPLEX W/ C & FOLIC ACID TAB 1 TABLET: TAB at 09:04

## 2021-09-28 RX ADMIN — NICOTINE POLACRILEX 2 MG: 2 GUM, CHEWING BUCCAL at 16:19

## 2021-09-28 ASSESSMENT — ACTIVITIES OF DAILY LIVING (ADL)
DRESS: INDEPENDENT
HYGIENE/GROOMING: INDEPENDENT
DRESS: INDEPENDENT
LAUNDRY: UNABLE TO COMPLETE
ORAL_HYGIENE: INDEPENDENT
HYGIENE/GROOMING: INDEPENDENT
LAUNDRY: UNABLE TO COMPLETE
ORAL_HYGIENE: INDEPENDENT

## 2021-09-28 NOTE — PLAN OF CARE
Problem: Adult Inpatient Plan of Care  Goal: Plan of Care Review  Outcome: No Change     Pt was visible in the milieu. She was social with peers and staff. Pt ate about 25% of her dinner. She ate a few bites of her broccoli.  She also ate about 20% of her chicken salad. Pt also had her lemonade with her dinner. She took all scheduled medications without issue. Complained of feeling extremely cold the entire evening. She was provide a second sweatshirt and still felt col. Pt initial declined any pain management, but later took  Ibuprofen for  Fractured left ring finger (pain 6 out of 10). She stated that the pain medication was helpful. Will continue to monitor

## 2021-09-28 NOTE — TELEPHONE ENCOUNTER
Date: 9/28/2021    To: ROGER RN    Please call this patient at 3A: [phone] to complete a medical screening to clarify her medications and medical condition and to complete a COVID-19 screening.      The patient has a history of:  Severe eating disorder, ritalin prescription    INSIDE: This patient had a substance abuse assessment with [facility], so no paperwork will be sent up to the 6th floor because all of the clinical documentation is in the patient's electronic medical record in [facility].      Thanks,  DANAY SinghC

## 2021-09-28 NOTE — PLAN OF CARE
"Music Therapy Group note    Clinical Hours in session: 1.0    Number of patients in group: 4    Scope of service: psychodynamic     Patient progress: improving     Intervention: \"Hold On\"     Goal of group: to determine what to hold on and what to let go of    Patient response/reaction to treatment intervention(s): MT shared a live, original song \"Hold On\" with the group.  Ayesha attentively completed a written reflection on the topic and shared at length.  One of the things she shared was how she wishes to get back into her [employer] community and how that will help her recovery.  MT prompted her to ask if she is able to make a contact with that [employer].  She stated she has a number in her phone, and they are in the area, but she wants to wait until her next placement to make the call.  Although she presented outwardly as very willing, MT did sense some reticence to possibly fully enact changed behaviors, such as contacting what she stated would be an important support for her.  Her affect was pleasant and open.      Tiffany Boyd, MT-BC  Board-Certified Music Therapist           "

## 2021-09-28 NOTE — PROGRESS NOTES
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   The patient's care was discussed with the treatment team during the daily team meeting and/or staff's chart notes were reviewed.  Staff report patient has been visible in the milieu,  no acute eventsovernight.     Patient's mood is GOOD    anxiety is down   Still restricting but less         Energy Level:fatigued   Sleep:No concerns, sleeps well through night  Appetite:fair  Improving  motivation interest   Denied any Suicidal/homicidal ideation/plan intent.  Denied any psychosis  No prior suicde attempts  No access to gun    Tolerating meds and has no side effects.              Medications:     Current Facility-Administered Medications   Medication     acetaminophen (TYLENOL) tablet 650 mg     albuterol (PROAIR HFA/PROVENTIL HFA/VENTOLIN HFA) 108 (90 Base) MCG/ACT inhaler 2 puff     alum & mag hydroxide-simethicone (MAALOX) suspension 30 mL     atenolol (TENORMIN) tablet 50 mg     diazepam (VALIUM) tablet 5-20 mg     DULoxetine (CYMBALTA) DR capsule 30 mg     DULoxetine (CYMBALTA) DR capsule 60 mg     folic acid (FOLVITE) tablet 1 mg     hydrOXYzine (ATARAX) tablet 25 mg     ibuprofen (ADVIL/MOTRIN) tablet 600 mg     loperamide (IMODIUM) capsule 2 mg     magnesium oxide (MAG-OX) tablet 400 mg     melatonin tablet 5 mg     multivitamin w/minerals (THERA-VIT-M) tablet 1 tablet     nicotine (NICODERM CQ) 21 MG/24HR 24 hr patch 1 patch     nicotine Patch in Place     ondansetron (ZOFRAN-ODT) ODT tab 4 mg     pantoprazole (PROTONIX) EC tablet 40 mg      "PHENobarbital (LUMINAL) tablet 64.8 mg     propranolol ER (INDERAL LA) 24 hr capsule 80 mg     senna-docusate (SENOKOT-S/PERICOLACE) 8.6-50 MG per tablet 1 tablet     thiamine (B-1) tablet 100 mg     traZODone (DESYREL) tablet 50 mg     vitamin B complex with vitamin C (STRESS TAB) tablet 1 tablet             Allergies:     Allergies   Allergen Reactions     Dust Mite Extract      Hydrocodone Other (See Comments) and Itching     Skin felt like it was burning.     Penicillins Rash            Psychiatric Examination:   Blood pressure 102/68, pulse 82, temperature 97.3  F (36.3  C), temperature source Temporal, resp. rate 16, height 1.626 m (5' 4\"), weight 49 kg (108 lb), SpO2 99 %.  Weight is 108 lbs 0 oz  Body mass index is 18.54 kg/m .    Appearance:  awake, alert and adequately groomed  Attitude:  cooperative  Eye Contact:  good  Mood:  Hopeful   Affect:  appropriate and in normal range and mood congruent  Speech:  clear, coherent rate /rhythm are good  Psychomotor Behavior:  no evidence of tardive dyskinesia, dystonia, or tics and intact station, gait and muscle tone  Throught Process:  logical  Associations:  no loose associations  Thought Content:  no evidence of suicidal ideation or homicidal ideation, no evidence of psychotic thought, no auditory hallucinations present and no visual hallucinations present  Insight:  good  Judgement:  intact  Oriented to:  time, person, and place  Attention Span and Concentration:  intact  Recent and Remote Memory:  intact  Language fund of knowledge are adequate         Labs:     No results found for: NTBNPI, NTBNP  Lab Results   Component Value Date    TSH 0.42 04/27/2021     Lab Results   Component Value Date    WBC 8.9 09/22/2021         DX   Major depressive disorder recurrent severe without psychosis  Axis ideation emergency room  Alcohol use disorder severe  Alcohol withdrawal severe  Nicotine use disorder moderate  Benzodiazepine withdrawal  Eating disorder NOS  Rule " out anorexia  History PTSD  History of ADHD  Generalized anxiety disorder   PLAN  Pt is out of detox    CONTINUE  cymbalta to 90 mg poqd   WILL ORDER STATTERA 10 MG PO every day   We will continue phenobarbital to taper her off of Ativan and temazepam WILL START TAPER 30 mg PHENOBARBITAL  at bedtime            Laboratory/Imaging: reviewed with patient   Consults: internal medicine consult reviewed  Patient will be treated in therapeutic milieu with appropriate individual and group therapies as described.  PDMP CHECKED     Supportive psychotheraoy provided, roger talked about recovery enviroment, relapse prevention, triggers to use.  Discussed with patient many issues of addiction,triggers, relapse, and establishing a solid recovery program.  Asked pt to be med complinat   Medical diagnoses to be addressed this admission:    Plan:      LABORATORY DATA:  Lipase 436, GGT 53, otherwise CBC and comprehensive metabolic panel normal.  Urine pregnancy test negative.  COVID testing negative.  Urine drug screen positive for cannabinoids.     IMPRESSION:     1.  Alcohol abuse and withdrawal per Dr. Urbano.  2.  GERD with history of Mclaughlin's esophagus, stable on prior to admission twice daily PPI.  3.  Abnormal lipase, however, not elevated enough diagnostic of acute pancreatitis and the patient denies nausea, abdominal pain and is eating fine.     PLAN:  No medical intervention indicated at this time.  The patient is medically stable and Medicine is signing off.  Please feel free to call with questions.  Legal Status: voluntary    Safety Assessment:   Checks:  15 min  Precautions: withdrawal precautions  Pt has not required locked seclusion or restraints in the past 24 hours to maintain safety, please refer to RN documentation for further details.  Discussed with patient many issues of addiction,triggers, relapse, and establishing a solid recovery program.  Able to give informed consent:  YES   Discussed Risks/Benefits/Side  Effects/Alternatives: YES    After discussion of the indications, risks, benefits, alternatives and consequences of no treatment, the patient elects to complete detox and to treatment

## 2021-09-28 NOTE — PROGRESS NOTES
Placed calls to [facility] and [facility]. Left voicemail with [facility] requesting return call regarding admission. Spoke with assessment team at [facility], pt's assessment is currently under review. Report they should have an answer within the hour.     Update: Placed return call to [facility], pt's assessment still under review. Received return call from [facility], pt's assessment will be reviewed shortly, wait list for program is 2-3 weeks. Pt updated.     Update II: Due to cancellation in [facility] women's bed is available for pt on 9/29. Pt approved for [facility] program. Writer completed are placement form and sent to intake. Pt and team notified of admission to [facility] on 9/29. AVS updated.

## 2021-09-28 NOTE — PROGRESS NOTES
"SPIRITUAL HEALTH SERVICES   Spiritual Assessment Progress Note (Behavioral Health/CD Focus)  [facility] ([facility]) 3AW    REFERRAL SOURCE: follow up    On this visit, I met with Ayesha in the lounge for 15 minutes.  Provided emotional support, reading of Psalm 23 and prayer.    EXPERIENCE OF ILLNESS/HOSPITALIZATION:  Ayesha spoke of feeling more at peace today, reflected on an \"embarrassing moment\" on the unit for her last evening and her appreciation for how she and staff worked through it, concerns for her daughter, and process of placement for treatment.    MEANING-MAKING:      SPIRITUALITY/VALUES/Lutheran:      COPING/SPIRITUAL PRACTICES: Ayesha and her daughter have plans to write to each other while she is in treatment, Ayesha is also making drawings that she can \"snail mail\" during that time.    SUPPORT SYSTEMS: Ayesha spoke of her parents specifically today.    PLAN: I will follow up tomorrow as able.                                                                                                                                             Grecia Hinson MDiv  Associate   Pager [phone]  Office [phone]  [facility] remains available 24/7 for emergent requests/referrals, either by having the switchboard page the on-call  or by entering an ASAP/STAT consult in Epic (this will also page the on-call ). Routine Epic consults receive an initial response within 24 hours.    "

## 2021-09-28 NOTE — PLAN OF CARE
Problem: Adult Inpatient Plan of Care  Goal: Plan of Care Review  Outcome: Improving   Pt slept well. Breathing pattern WNL. The 15 mins safety checks cont. The Pt is OOD: no concerns noted during the night.

## 2021-09-28 NOTE — PLAN OF CARE
"  Problem: Acute Neurologic Deterioration (Alcohol Withdrawal)  Goal: Optimal Neurologic Function  Outcome: Adequate for Discharge     Problem: Substance Misuse (Alcohol Withdrawal)  Goal: Readiness for Change Identified  Outcome: Adequate for Discharge     RN Assessment:  SI/Self harm:  pt denies  Aggression/agitation/HI:  none reported or observed  AVH:  none reported or observed  Sleep: no reported concerns  PRN Med: Nicotine gum  Medication AE: none reported or observed  Physical Complaints/Issues: pt denies  I & O: eating and drinking well, about 50% of each meal today. Pt reports her appetite is improving and she is eating quite well.  ADLs: independent  Vitals:  WNL  COVID 19 Assessment:  negative  Milieu Participation: visible, active, social  Behavior: pleasant and cooperative, no behavioral concerns.  Nursing Discharge Planning: Pt has been accepted to [facility], will go tomorrow.     Pt reports she struggles to determine her biggest issue, between eating, using and anxiety. Pt states \"when I don't eat, I drink, and I get anxious\". Pt stated to writer she believes her biggest concern is her drinking. \"If I can get that under control, I can figure out the other stuff\". Pt shows fair insight and is bright and engaging in interactions.   No other concerns at this time. Nursing will continue to monitor and assess.   "

## 2021-09-29 ENCOUNTER — HOSPITAL ENCOUNTER (OUTPATIENT)
Dept: BEHAVIORAL HEALTH | Facility: CLINIC | Age: 41
End: 2021-09-29
Attending: FAMILY MEDICINE
Payer: COMMERCIAL

## 2021-09-29 VITALS
BODY MASS INDEX: 18.44 KG/M2 | WEIGHT: 108 LBS | HEIGHT: 64 IN | DIASTOLIC BLOOD PRESSURE: 75 MMHG | OXYGEN SATURATION: 99 % | HEART RATE: 71 BPM | TEMPERATURE: 97.4 F | SYSTOLIC BLOOD PRESSURE: 123 MMHG | RESPIRATION RATE: 16 BRPM

## 2021-09-29 VITALS — TEMPERATURE: 98.7 F | OXYGEN SATURATION: 100 % | WEIGHT: 108 LBS | BODY MASS INDEX: 18.44 KG/M2 | HEIGHT: 64 IN

## 2021-09-29 LAB — SARS-COV-2 RNA RESP QL NAA+PROBE: NEGATIVE

## 2021-09-29 PROCEDURE — 99239 HOSP IP/OBS DSCHRG MGMT >30: CPT | Performed by: PSYCHIATRY & NEUROLOGY

## 2021-09-29 PROCEDURE — U0005 INFEC AGEN DETEC AMPLI PROBE: HCPCS | Performed by: PSYCHIATRY & NEUROLOGY

## 2021-09-29 PROCEDURE — 1002N00001 HC LODGING PLUS FACILITY CHARGE ADULT

## 2021-09-29 PROCEDURE — 250N000013 HC RX MED GY IP 250 OP 250 PS 637: Performed by: PSYCHIATRY & NEUROLOGY

## 2021-09-29 RX ORDER — ACETAMINOPHEN 325 MG/1
325-650 TABLET ORAL EVERY 4 HOURS PRN
COMMUNITY
End: 2021-10-22

## 2021-09-29 RX ORDER — LORATADINE 10 MG/1
10 TABLET ORAL DAILY PRN
COMMUNITY
End: 2021-11-04

## 2021-09-29 RX ORDER — IBUPROFEN 200 MG
400 TABLET ORAL EVERY 6 HOURS PRN
COMMUNITY
End: 2021-11-04

## 2021-09-29 RX ORDER — AMOXICILLIN 250 MG
2 CAPSULE ORAL DAILY PRN
COMMUNITY
End: 2021-11-04

## 2021-09-29 RX ORDER — MAGNESIUM HYDROXIDE/ALUMINUM HYDROXICE/SIMETHICONE 120; 1200; 1200 MG/30ML; MG/30ML; MG/30ML
30 SUSPENSION ORAL EVERY 6 HOURS PRN
COMMUNITY
End: 2021-11-04

## 2021-09-29 RX ADMIN — MULTIPLE VITAMINS W/ MINERALS TAB 1 TABLET: TAB at 08:48

## 2021-09-29 RX ADMIN — THIAMINE HCL TAB 100 MG 100 MG: 100 TAB at 08:48

## 2021-09-29 RX ADMIN — B-COMPLEX W/ C & FOLIC ACID TAB 1 TABLET: TAB at 08:48

## 2021-09-29 RX ADMIN — NICOTINE POLACRILEX 2 MG: 2 GUM, CHEWING BUCCAL at 08:48

## 2021-09-29 RX ADMIN — FOLIC ACID 1 MG: 1 TABLET ORAL at 08:48

## 2021-09-29 RX ADMIN — MAGNESIUM OXIDE TAB 400 MG (241.3 MG ELEMENTAL MG) 400 MG: 400 (241.3 MG) TAB at 08:48

## 2021-09-29 RX ADMIN — PROPRANOLOL HYDROCHLORIDE 80 MG: 80 CAPSULE, EXTENDED RELEASE ORAL at 08:48

## 2021-09-29 RX ADMIN — PANTOPRAZOLE SODIUM 40 MG: 40 TABLET, DELAYED RELEASE ORAL at 08:48

## 2021-09-29 RX ADMIN — ATOMOXETINE 10 MG: 10 CAPSULE ORAL at 08:48

## 2021-09-29 RX ADMIN — NICOTINE 1 PATCH: 21 PATCH, EXTENDED RELEASE TRANSDERMAL at 08:47

## 2021-09-29 RX ADMIN — HYDROXYZINE HYDROCHLORIDE 25 MG: 25 TABLET, FILM COATED ORAL at 08:48

## 2021-09-29 RX ADMIN — DULOXETINE HYDROCHLORIDE 60 MG: 60 CAPSULE, DELAYED RELEASE ORAL at 08:48

## 2021-09-29 ASSESSMENT — ANXIETY QUESTIONNAIRES
1. FEELING NERVOUS, ANXIOUS, OR ON EDGE: MORE THAN HALF THE DAYS
IF YOU CHECKED OFF ANY PROBLEMS ON THIS QUESTIONNAIRE, HOW DIFFICULT HAVE THESE PROBLEMS MADE IT FOR YOU TO DO YOUR WORK, TAKE CARE OF THINGS AT HOME, OR GET ALONG WITH OTHER PEOPLE: VERY DIFFICULT
3. WORRYING TOO MUCH ABOUT DIFFERENT THINGS: MORE THAN HALF THE DAYS
2. NOT BEING ABLE TO STOP OR CONTROL WORRYING: MORE THAN HALF THE DAYS
7. FEELING AFRAID AS IF SOMETHING AWFUL MIGHT HAPPEN: MORE THAN HALF THE DAYS
6. BECOMING EASILY ANNOYED OR IRRITABLE: NOT AT ALL
5. BEING SO RESTLESS THAT IT IS HARD TO SIT STILL: MORE THAN HALF THE DAYS
GAD7 TOTAL SCORE: 13
4. TROUBLE RELAXING: NEARLY EVERY DAY

## 2021-09-29 ASSESSMENT — MIFFLIN-ST. JEOR: SCORE: 1139.88

## 2021-09-29 ASSESSMENT — PATIENT HEALTH QUESTIONNAIRE - PHQ9: SUM OF ALL RESPONSES TO PHQ QUESTIONS 1-9: 13

## 2021-09-29 NOTE — PROGRESS NOTES
Name: Nasra Lovelace  Date: 9/29/2021  Medical Record: [id number]    Envelope Number: [id number]    List of Contents (List each item separately in new row):   Pill case w/ assorted pills/ D3 50 mcg/ Acetaminophen 325 mg/ Allergy relief  Admission:  I am responsible for any personal items that are not sent to the safe or pharmacy.  [facility] is not responsible for loss, theft or damage of any property in my possession.    Patient Signature:  ___________________________________________       Date/Time:__________________________    Staff Signature: __________________________________       Date/Time:__________________________    2nd Staff person, if patient is unable/unwilling to sign:      __________________________________________________________       Date/Time: __________________________    Discharge:  [facility] has returned all of my personal belongings:    Patient Signature: ________________________________________     Date/Time: ____________________________________    Staff Signature: ______________________________________     Date/Time:_____________________________________

## 2021-09-29 NOTE — DISCHARGE INSTRUCTIONS
Behavioral Discharge Planning and Instructions  THANK YOU FOR CHOOSING [facility]  3AW  [phone]    Summary: You were admitted to [facility] on 9/22/21 for detoxification from alcohol.  A medical exam was performed that included lab work. You have met with a  and opted to attend treatment at [facility].  Please take care and make your recovery a daily priority, Nasra!  It was a pleasure working with you and the entire treatment team here wishes you the very best in your recovery!     Recommendation:  28 days treatment in [facility]    Main Diagnoses:  Per Dr. Ros Urbano MD;  303.90 (F10.20) Alcohol Use Disorder Severe    Major Treatments, Procedures and Findings:  You were treated for alcohol detoxification using [facility] protocol. You had a chemical dependency assessment. You had labs drawn and those results were reviewed with you. Please take a copy of your lab work with you to your next primary care provider appointment.    Symptoms to Report:  If you experience more anxiety, confusion, sleeplessness, deep sadness or thoughts of suicide, notify your treatment team or notify your primary care provider. IF ANY OF THE SYMPTOMS YOU ARE EXPERIENCING ARE A MEDICAL EMERGENCY CALL 911 IMMEDIATELY.     Lifestyle Adjustment: Adjust your lifestyle to get enough sleep, relaxation, exercise and good nutrition. Continue to develop healthy coping skills to decrease stress and promote a sober living environment. Do not use mood altering substances including alcohol, illegal drugs or addictive medications other than what is currently prescribed.     Disposition: [facility]    Facts about COVID19 at www.cdc.gov/COVID19 and [place].gov/covid19    Keeping hands clean is one of the most important steps we can take to avoid getting sick and spreading germs to others.  Please wash your hands frequently and lather with soap for at least 20 seconds!    Medical  Follow-Up:  ***    Treatment Follow-Up:  [facility]  Admission: September 29th at 12:45 pm  [address] [place] [address]  [phone]    Recovery apps for your phone to locate current in person and zoom recovery meetings  [place] - meeting kulwant  AA  - meeting kulwant  Meeting guide - meeting kulwant  Quick NA meeting - meeting kulwant  Terrell- has various apps    Resources:  *due to covid-19 most AA/NA meetings are being held online*  AA meetings online search for them at: https://aa-intergroup.org (worldwide meeting listings)  AA meetings for [place] area can be found online at: https://a[place].org (click local online meetings listings)  NA meetings for [place] area can be found online at: https://www.nam[place].org  (click find a meeting)  AA and NA Sponsors are excellent resources for support and you can find one at any support group meeting.   Alcoholics Anonymous (https://aa.org/): for information 24 hours/day  AA Intergroup service office in [place] (http://www.aastpaul.org/) 342.728.9013  AA Intergroup service office in [facility]: [phone]. (http://www.aaminneaGridtential Energyis.org/)  Narcotics Anonymous (www.nam[place].org) (452) 205-3547  https://aafairviewriverside.org/meetings  SMART Recovery - self management for addiction recovery:  www.smartrecovery.org  Pathways ~ A Health Crisis Resource & Support Center:  778.661.1896.  https://prescribetoprevent.org/patient-education/videos/  http://www.harmreduction.org  [facility] [phone]  Support Group:  AA/NA and Sponsor/support.  National [place] on Mental Illness (www.[place].deo.org): 525.482.4389 or 883-973-2222.  Alcoholics Anonymous (www.alcoholics-anonymous.org): Check your phone book for your local chapter.  Suicide Awareness Voices of Education (SAVE) (www.save.org): 303-377-MJWB (7283)  National Suicide Prevention Line (www.mentalhealthmn.org): 282-459-PDGD (1581)  Mental  Health Consumer/Survivor Network of [place] (www.mhcsn.net): 021-565-0386 or 125-729-1710  Mental Health Association of [place] (www.mentalhealth.org): 869.971.4120 or 518-368-9243   Substance Abuse and Mental Health Services (www.samhsa.gov)  [place] Opioid Prevention Coalition: www.opioidcoalition.org    [place] Recovery Connection (MRC)  [facility] connects people seeking recovery to resources that help foster and sustain long-term recovery.  Whether you are seeking resources for treatment, transportation, housing, job training, education, health care or other pathways to recovery, [facility] is a great place to start.  146.226.7680.  www.[place]arecovery.org    Great Pod casts for nutrition and wellness  Listen on Apple Podcasts  Dishing Up Nutrition   [employer]   Nutrition       Understand the connection between what you eat and how you feel. Hosted by licensed nutritionists and dietitians from [employer] we share practical, real-life solutions for healthier living through nutrition.     General Medication Instructions:   See your medication sheet(s) for instructions.   Take all medications as prescribed.  Make no changes unless your primary care provider suggests them.   Go to all your primary care provider visits.  Be sure to have all your required lab tests. This way, your medicines can be refilled on time.  Do not use any forms of alcohol.    Please Note:  If you have any questions at anytime after you are discharged please call [facility] detox unit 3AW at [phone].  [facility], Behavioral Intake [phone]  Medical Records call [phone]  Outpatient Behavioral Intake call [phone]  [facility] Wait List/Bed Availability call [phone]    Please remember to take all of your behavioral discharge planning and lab paperwork to any follow up appointments, it contains your lab results, diagnosis, medication list and discharge recommendations.      THANK YOU FOR  [facility]
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
## 2021-09-29 NOTE — DISCHARGE SUMMARY
Narsa Lovelace MRN# [id number]   Age: 41 year old YOB: 1980     Date of Admission:  9/22/2021  Date of Discharge:  9/29/2021  Admitting Physician:  Ros Urbano MD  Discharge Physician:  Ros Urbano MD      DISCHARGE  DX    Major depressive disorder recurrent severe without psychosis  Axis ideation emergency room  Alcohol use disorder severe    Nicotine use disorder moderate  Benzodiazepine withdrawal  Eating disorder NOS  Rule out anorexia  History PTSD  History of ADHD  Generalized anxiety disorder         Event Leading to Hospitalization:     See Admission note by admitting provider for patient encounter. for additional details.          Hospital Course:   PATIENT was admitted to [facility]with attending  under DR urbano, please review the detailed admit note on 9/23/2021   The patient was placed under status 15 (15 minute checks) to ensure patient safety.   MSSA protocol was initiated due to the patient's history of alcohol abuse and concern for withdrawal symptoms.  CBC, BMP and utox obtained.    All outpatient medications were continued  Patient was detox of alcohol using MSSA protocol on Valium  She was detoxed off benzodiazepines using phenobarbital she completed the taper  Her Cymbalta was increased to 90 mg    PATIENTdid participate in groups and was visible in the milieu.     The patient's symptoms of alcohol withdrawal improved.     Patients energy motivation , sleep appetite improved.  Pt completed detox . It was un eventful.      Discussed with patient medications for craving.  Spoke with patient about triggers coping skills relapse prevention.    CONSULTS DONE DURING PATIENTS HOSPITALIZATION.  Patient was seen by medicine on date 9/23/2021    This as per their medical consult  LABORATORY DATA:  Lipase 436, GGT 53, otherwise CBC and comprehensive metabolic panel normal.  Urine pregnancy test negative.  COVID testing negative.  Urine drug screen positive for  cannabinoids.     IMPRESSION:     1.  Alcohol abuse and withdrawal per Dr. Urbano.  2.  GERD with history of Mclaughlin's esophagus, stable on prior to admission twice daily PPI.  3.  Abnormal lipase, however, not elevated enough diagnostic of acute pancreatitis and the patient denies nausea, abdominal pain and is eating fine.     PLAN:  No medical intervention indicated at this time.  The patient is medically stable and Medicine is signing off.  Please feel free to call with questions.             Pt was seen by cm  As per recommendations from cm  Placed calls to [facility] and [facility]. Left voicemail with [facility] requesting return call regarding admission. Spoke with assessment team at [facility], pt's assessment is currently under review. Report they should have an answer within the hour.      Update: Placed return call to [facility], pt's assessment still under review. Received return call from [facility], pt's assessment will be reviewed shortly, wait list for program is 2-3 weeks. Pt updated.      Update II: Due to cancellation in [facility] women's bed is available for pt on 9/29. Pt approved for [facility] program. Writer completed [facility] placement form and sent to intake. Pt and team notified of admission to [facility] on 9/29. AVS updated.            Labs:reviewed with patient     No results found for this or any previous visit (from the past 48 hour(s)).      Recent Results (from the past 240 hour(s))   Comprehensive metabolic panel    Collection Time: 09/22/21  3:28 AM   Result Value Ref Range    Sodium 137 133 - 144 mmol/L    Potassium 3.6 3.4 - 5.3 mmol/L    Chloride 105 94 - 109 mmol/L    Carbon Dioxide (CO2) 24 20 - 32 mmol/L    Anion Gap 8 3 - 14 mmol/L    Urea Nitrogen 4 (L) 7 - 30 mg/dL    Creatinine 0.70 0.52 - 1.04 mg/dL    Calcium 8.9 8.5 - 10.1 mg/dL    Glucose 83 70 - 99 mg/dL    Alkaline Phosphatase 81 40 - 150 U/L    AST 36 0 - 45 U/L    ALT 35 0 - 50 U/L    Protein Total 7.4 6.8 - 8.8 g/dL    Albumin 3.6 3.4  - 5.0 g/dL    Bilirubin Total 0.2 0.2 - 1.3 mg/dL    GFR Estimate >90 >60 mL/min/1.73m2   Lipase    Collection Time: 09/22/21  3:28 AM   Result Value Ref Range    Lipase 436 (H) 73 - 393 U/L   Asymptomatic COVID-19 Virus (Coronavirus) by PCR Nasopharyngeal    Collection Time: 09/22/21  3:28 AM    Specimen: Nasopharyngeal; Swab   Result Value Ref Range    SARS CoV2 PCR Negative Negative   CBC with platelets and differential    Collection Time: 09/22/21  3:28 AM   Result Value Ref Range    WBC Count 8.9 4.0 - 11.0 10e3/uL    RBC Count 4.83 3.80 - 5.20 10e6/uL    Hemoglobin 15.1 11.7 - 15.7 g/dL    Hematocrit 46.1 35.0 - 47.0 %    MCV 95 78 - 100 fL    MCH 31.3 26.5 - 33.0 pg    MCHC 32.8 31.5 - 36.5 g/dL    RDW 13.8 10.0 - 15.0 %    Platelet Count 359 150 - 450 10e3/uL    % Neutrophils 49 %    % Lymphocytes 43 %    % Monocytes 6 %    % Eosinophils 1 %    % Basophils 1 %    % Immature Granulocytes 0 %    NRBCs per 100 WBC 0 <1 /100    Absolute Neutrophils 4.5 1.6 - 8.3 10e3/uL    Absolute Lymphocytes 3.8 0.8 - 5.3 10e3/uL    Absolute Monocytes 0.5 0.0 - 1.3 10e3/uL    Absolute Eosinophils 0.1 0.0 - 0.7 10e3/uL    Absolute Basophils 0.1 0.0 - 0.2 10e3/uL    Absolute Immature Granulocytes 0.0 <=0.0 10e3/uL    Absolute NRBCs 0.0 10e3/uL   HCG qualitative urine    Collection Time: 09/22/21 12:00 PM   Result Value Ref Range    hCG Urine Qualitative Negative Negative   Drug abuse screen 1 urine (ED)    Collection Time: 09/22/21 12:00 PM   Result Value Ref Range    Amphetamines Urine Screen Negative Screen Negative    Barbiturates Urine Screen Negative Screen Negative    Benzodiazepines Urine Screen Negative Screen Negative    Cannabinoids Urine Screen Positive (A) Screen Negative    Cocaine Urine Screen Negative Screen Negative    Opiates Urine Screen Negative Screen Negative   GGT    Collection Time: 09/23/21  7:38 AM   Result Value Ref Range    GGT 53 (H) 0 - 40 U/L   Basic metabolic panel    Collection Time: 09/24/21  12:11 PM   Result Value Ref Range    Sodium 136 133 - 144 mmol/L    Potassium 4.7 3.4 - 5.3 mmol/L    Chloride 101 94 - 109 mmol/L    Carbon Dioxide (CO2) 31 20 - 32 mmol/L    Anion Gap 4 3 - 14 mmol/L    Urea Nitrogen 12 7 - 30 mg/dL    Creatinine 0.99 0.52 - 1.04 mg/dL    Calcium 9.4 8.5 - 10.1 mg/dL    Glucose 100 (H) 70 - 99 mg/dL    GFR Estimate 71 >60 mL/min/1.73m2   Lipase    Collection Time: 09/24/21 12:11 PM   Result Value Ref Range    Lipase 416 (H) 73 - 393 U/L   Amylase    Collection Time: 09/24/21 12:11 PM   Result Value Ref Range    Amylase 91 30 - 110 U/L            Because this patient meets criteria for an Alcohol Use Disorder, I performed the following brief intervention on the date of this note:              1) Expressed concern that the patient is drinking at unhealthy levels known to increase their risk of alcohol related problems              2) Gave feedback linking alcohol use and health, including personalized feedback explaining how alcohol use can interact with their medical and/or psychiatric problems, and with prescribed medications.              3) Advised patient to abstain.    PT counseled on nicotine cessation and nicotine replacement provided    Discussed with patient many issues of addiction,triggers, relapse, and establishing a solid recovery program.    DISCHARGE MENTAL STATUS EXAMINATION:  The patient is alert, oriented x3.  Good fund of knowledge.  Good use of language.  Recent and remote memory, language, fund of knowledge are all adequate.  Euthymic mood congruent affect  Speech normal rate/rhythm linear tp no loose asso,The patient does not have any active suicidal or homicidal ideation.  Does not have any auditory or visual hallucination.  Fair insight/judgment At this time, the patient was stable to be discharged.        Pt was not determined to not be a danger to himself or others. At the current time of discharge, the patient does not meet criteria for involuntary  hospitalization. On the day of discharge, the patient reports that they do not have suicidal or homicidal ideation and would never hurt themselves or others. Steps taken to minimize risk include: assessing patient s behavior and thought process daily during hospital stay, discharging patient with adequate plan for follow up for mental and physical health and discussing safety plan of returning to the hospital should the patient ever have thoughts of harming themselves or others. Therefore, based on all available evidence including the factors cited above, the patient does not appear to be at imminent risk for self-harm, and is appropriate for outpatient level of care.     Educated about side effects/risk vs benefits /alternative including non treatment.Pt consented to be on medication.     .Total time spent on discharge summary more than 35 min  More than  20 min  planning, coordination of care, medication reconciliation and performance of physical exam on day of discharge.Care was coordinated with unit RN and unit therapist     Ayesha Lovelace   Home Medication Instructions BRISA:[id number]    Printed on:09/29/21 0903   Medication Information                      albuterol (PROAIR HFA/PROVENTIL HFA/VENTOLIN HFA) 108 (90 Base) MCG/ACT inhaler  Inhale 2 puffs into the lungs every 6 hours             atomoxetine (STRATTERA) 10 MG capsule  Take 1 capsule (10 mg) by mouth daily             co-enzyme Q-10 100 MG CAPS capsule  Take 100 mg by mouth daily             DULoxetine (CYMBALTA) 30 MG capsule  Take 1 capsule (30 mg) by mouth At Bedtime             DULoxetine (CYMBALTA) 60 MG capsule  TAKE 1 CAPSULE (60 MG) BY MOUTH DAILY             folic acid (FOLVITE) 1 MG tablet  Take 1 tablet (1 mg) by mouth daily             magnesium oxide (MAG-OX) 400 MG tablet  TAKE 2 TABLETS BY MOUTH IN THE EVENING.             melatonin 5 MG tablet  Take 1 tablet (5 mg) by mouth nightly as needed for sleep             multivitamin w/minerals  "(MULTI-VITAMIN) tablet  Take 1 tablet by mouth daily             nicotine (NICORETTE) 2 MG gum  Place 1 each (2 mg) inside cheek every hour as needed for smoking cessation             omeprazole (PRILOSEC) 40 MG DR capsule  TAKE 1 CAPSULE BY MOUTH ONCE DAILY 30-60 MINUTES BEFORE A MEAL.             propranolol ER (INDERAL LA) 80 MG 24 hr capsule  TAKE 1 CAPSULE (80 MG) BY MOUTH DAILY             thiamine (B-1) 100 MG tablet  Take 1 tablet (100 mg) by mouth daily             vitamin B complex with vitamin C (VITAMIN  B COMPLEX) tablet  Take 1 tablet by mouth daily               Disposition: lp     Facts about COVID19 at www.cdc.gov/COVID19 and www.[place].gov/covid19     Keeping hands clean is one of the most important steps we can take to avoid getting sick and spreading germs to others.  Please wash your hands frequently and lather with soap for at least 20 seconds!       .        \"Much or all of the text in this note was generated through the use of Dragon Dictate voice to text software. Errors in spelling or words which appear to be out of contact are unintentional, may be present due having escaped editing\"     "
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
## 2021-09-29 NOTE — PROGRESS NOTES
Initial Services Plan        Service Initiation Date: 9/29/2021    Immediate health and/or safety concerns: No    Identify health and safety concern(s) below and include plan to address:    Report all current medical issues to RNs or LP staff to assess for urgency.     Treatment suggestions for client during the time between intake (admit date) and completion of the individual treatment plan:     Look for a sober support network, i.e. 12 step, [facility] Recovery, [facility], etc  Tour the treatment center or outpatient clinic  Introduce yourself to your treatment group. Spend time getting to know your peers  Review your patient or client handbook  Begin working on your treatment goal list    Completed by: MANNY Singh  Date completed: 9/29/2021 at 1:17 PM
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
## 2021-09-29 NOTE — PROGRESS NOTES
Progress Note    This patient had a Comprehensive Substance Abuse assessment on 9/26/21 completed by Hilaria BRYANT.  This patient was seen for a face to face update of the Comprehensive Substance Abuse assessment on 9/29/2021 by MANNY Singh.  INSIDE: The patient's Comprehensive Substance Abuse assessment completed on 9/26/2021 is in the patient's electronic medical record in Epic in the Chart Review section under the Notes/Trans Tab.    Alcohol/Drug use since the last CD evaluation (include date of last use):     No additional substances use since the last CD evaluation     Please note any other clinical changes since the last CD evaluation (such as medication changes, additional legal charges, detoxification admissions, overdoses, etc.)     No significant changes since the last CD evaluation       ASAM Dimensions Original scores Current Scores   I.) Intoxication and Withdrawal: 0 0   II.) Biomedical:  1 1   III.) Emotional and Behavioral:  2 2   IV.) Readiness to Change:  1 1   V.) Relapse Potential: 4 4   VI.) Recovery Environmental: 4 4     Please list clinical justifications for the above ASAM score changes since the original comprehensive assessment:     None of the ASAM scores on the six dimensions had changed since the Comprehensive Substance Abuse assessment was completed on 9/26/21.       Current ORION: Current UA:     No ORION as the patient was a direct transfer from 3 A IP detoxification unit at [facility] in [place], [place].     No UA screen as the patient was a direct transfer from 3 A IP detoxification unit at [facility] in [place], [place].        PHQ-9, MAYKEL-7   PHQ-9 on 9/29/2021 MAYKEL-7 on 9/29/2021   The patient's PHQ-9 score was 13 out of 27, indicating moderate depression.   The patient's MAYKEL-7 score was 13 out of 21, indicating moderate anxiety.       [place]-Suicide Severity Rating Scale Reassessment   Have you ever wished you were dead or that you could go to sleep  and not wake up?  Past Month:  No, in the past when intoxicated     Have you actually had any thoughts of killing yourself?  Past Month:  No     Have you been thinking about how you might do this?     Past Month:  No   Lifetime:  No   Have you had these thoughts and had some intention of acting on them?     Past Month:  No   Lifetime:  No   Have you started to work out the details of how to kill yourself?   Past Month:  No   Lifetime:  No   Do you intend to carry out this plan?   No     When you have the thoughts how long do they last?  The patient denied having any suicidal thoughts within the past month.     Are there things - anyone or anything (i.e. family, [employer], pain of death) that stopped you from wanting to die or acting on thoughts of suicide?  Does not apply       2008  [employer]  Used with permission by Fidelia Loving, PhD.       Guide to C-SSRS Risk Ratings   NO IDEATION:  with no active thoughts IDEATION: with a wish to die. IDEATION: with active thoughts. Risk Ratings   If Yes No No 0 - Very Low Risk   If NA Yes No 1 - Low Risk   If NA Yes Yes 2 - Low/moderate risk   IDEATION: associated thoughts of methods without intent or plan INTENT: Intent to follow through on suicide PLAN: Plan to follow through on suicide Risk Ratings cont...   If Yes No No 3 - Moderate Risk   If Yes Yes No 4 - High Risk   If Yes Yes Yes 5 - High Risk   The patient's ADDITIONAL RISK FACTORS and lack of PROTECTIVE FACTORS may increase their overall suicide risk ratings.     Additional Risk Factors:    Significant history of physical illness or chronic medical problems     Significant history of untreated or poorly treated chronic pain issues     Tendency to be socially isolated and/or cut off from the support of others     A recent death of someone close to the patient and/or unresolved grief and loss issues     Significant history of trauma and/or abuse issues   Protective Factors:     "Having people in his/her life that would prevent the patient from considering a suicide attempt (i.e. young children, spouse, parents, etc.)     Having pet(s) that give companionship and/or would prevent the patient from considering a suicide attempt     An absence of mental health issues or stable and well treated mental health issues     A positive relationship with his/her clinical medical and/or mental health providers     Having easy access to supportive family members     Having a good community support network     Having restricted access to highly lethal means of suicide     Risk Status   0. - Very Low Risk:  Evaluation Counselors:  Document in Epic / SBAR to counselor \"Very Low Risk\".      Treatment Counselors:  Reassess upon admission as applicable, assess weekly in progress notes under Dimension 3 and summarize in Discharge / Treatment summary under Dimension 3.     Additional information to support suicide risk rating: There was no additional information to provide at this time.       "

## 2021-09-29 NOTE — PROGRESS NOTES
[facility]      Vital signs:     Ht 1.626 m (5' 4\")   Wt 49 kg (108 lb)   BMI 18.54 kg/m        Transfer from     Counselor: Flaco   Drug of Choice: Alcohol  Last use: 9/20/21    Home clinic/MD:   Osiel Provider  [facility]  Adrienne Dailey C-FNP  [address], [place] [address] [phone]    Psychiatrist/therapist: yes    Medical history/current conditions:    Major depressive disorder recurrent severe without psychosis  Axis ideation emergency room  Alcohol use disorder severe     Nicotine use disorder moderate  Benzodiazepine withdrawal  Eating disorder NOS  Rule out anorexia  History PTSD  History of ADHD  Generalized anxiety disorder    H&P Screen:  H&P within the last 90 days: Yes.  Date: 9/29/2021 Location: 3A/[facility]      Mental Health diagnosis:   Major depressive disorder recurrent severe without psychosis  Axis ideation emergency room  Alcohol use disorder severe     Nicotine use disorder moderate  Benzodiazepine withdrawal  Eating disorder NOS  Rule out anorexia  History PTSD  History of ADHD  Generalized anxiety disorder  Medication compliant?: yes  Recent sucidal thoughts? no     When? na  Current thought of self-harm? no    Plan? na    Pain assessment:   Pt. Experiencing pain at this time?  No     LP [facility] Screen for COVID-19     Are you interested in receiving the COVID vaccine or flu vaccine while you are at [facility]?  Yes, pt is interested in COVID vaccination.  No for flu vaccination    LPRN, if YES,  let patient know Vaccination clinics will occur monthly on every 3rd Tuesday at 3 pm in room F560 (right next to cafeteria). Give pt appropriate vaccine form to complete day of the clinic and hand in to LPRN prior to noon.      Do you have any of the following NEW or worsening symptoms NOT attributed to pre-existing conditions?    No,     o Fever of 100.0  F (37.8 C) or " over  o Chills  o Cough  o Shortness of Breath  o Loss of taste or smell  o Generalized body aches  o Persistent headache  o Sore throat (or trouble eating or drinking in young children?)  o Nausea, Vomiting, or diarrhea (loose stools)    Did you test positive for COVID-19 in the last 14  days or are you waiting on the test results due to an exposure or symptoms?  No,     Has anyone told you to self-quarantine due to exposure to someone with COVID-19?  No,     Does the above COVID-19 screen need to be reviewed by Infection Prevention? No,     COVID-19 Test completed by LPRN ? No, . Negative on 9/29/21     IF PATIENTS ARE FULLY VACCINATED, OMIT QUESTIONS BELOW    In the last 2 weeks have you been in an large group gatherings (more than 10 people)? No,     Have you been covering your nose and mouth while out in the community? Yes       COVID-19 - Pt informed of the following while at :    1)Staff will take temperature and O2Sats once daily    2) Practice good hand washing hygiene and avoid touching face    3) If pt has any of the symptoms below, notify staff immediately.      Fever     Cough     Shortness of breath or difficulty breathing     Chills     Repeated shaking with chills    Muscle pain     4) COVID-19 testing may be initiated more than once during your stay.  Patient will be required to stay in room until lab results confirm negative. If COVID results are positive, You will have to exit the program, quarantine as recommended per CDC and then may return for CD treatment after symptoms have resolved    5) Per COVID protocol, during your stay at , social distancing is required AND mouth and nose must be covered at all times with facial mask while out in milieu.      6) Patients will not be allowed to go to any outside appointments, all outside appointments will need to be virtual or by phone    Pt has no respiratory dx.  She has Albuterol inhaler because when she was positive for COVID in 5/2021, she was  prescribed the inhaler for lung symptoms    Integrative Therapies: Essential Oils    Patient requesting essential oil inhaler to manage (Mood/Mental Health/Physical/Spiritual symptoms).     Discussed appropriate use of essential oil inhalers and instructed patient not to leave labeled product out on unit.     Patient was screened for kidney disease, asthma/reactive airway disease and rashes and wounds or 1st trimester of pregnancy    List Essential Oils requested by pt Calm, sweet Dana    Patient verbalized and demonstrated understanding of how to use essential oil inhaler correctly and will notify LP RN with any concerns or side effects. Patient agrees not to share their essential oil inhaler with other clients.  Continue to support the patient in safely utilizing integrative therapies as able to manage symptoms during treatment.     Patient tobacco use:    Do you use tobacco? yes   Type? cigarettes  How often? daily  How much? 1 pack   Are you interested in quitting? no    NRT (Nicotine Replacement therapy) ordered? yes   Pt is aware of the dangers of tobacco cessation and in contemplation.    Pt given written education.    Nutritional Assessment:    Have you ever purged, binged or restricted yourself as away to control your weight?      Yes, explain:   NOTE from LEILANI FOWLER RN from 9/28/2021 =- Pt reports eating disorder, has not ever received treatment for it but is on the waitlist for initial assessment at [facility] in [place]. Pt's BMI is WNL, VSS. Pt reports previous purging after eating in her teens and early twenties. More recently pt has been restricting her intake. Her current restriction was triggered by GI symptoms, and life stressors. Pt agreed to alert staff immediately if she felt her eating disorder symptoms were worsening. Pt understands there is no dietary support on LP+ and she will need to receive ensure orders from IP RD or MD before discharging to LP+. (LP did receive these orders. RC/RN)  Pt wants assistance with est with OP RD in the [place] area for discharge- writer will provide assistance with this closer to discharge. Pt is aware that her intake would not be monitored on LP+ and feels confident she can eat an appropriate amount of food to maintain her weight.      Are you on a special diet?   No     Do you have any concerns regarding your nutritional status?   No     Have you had any appetite changes in the last 3 months?   No   Have you had weight loss or weight gain of more than 10 lbs in the last 3 months?   If patient gained or lost more than 10 lbs, then refer to program RN / attending Physician for assessment.   No   Was the patient informed of BMI?    Normal, No Intervention   Yes   Have you engaged in any risk-taking behavior that would put you at risk for exposure to blood-borne or sexually transmitted diseases?   No   Do you have any dental problems?   No     Nursing Assessment Summary:  Pt requesting to destroy Ritalin - done - destroyed in  6mg of Melatonin approved by Dr Gonzalez     On-going nursing intervention required?   No    Acute care visit recommended: no

## 2021-09-29 NOTE — PROGRESS NOTES
"SPIRITUAL HEALTH SERVICES  SPIRITUAL ASSESSMENT Progress Note  [facility] ([facility]) 3AW     REFERRAL SOURCE: follow up    Follow up visit with Ayesha in the lounge for 10 minutes. Discussed referral for spiritual care on [facility], brief exploration of spiritual needs/resources as she transitions to treatment. We chose to end the visit as lunch arrived.    Themes of our conversation:  - boyfriend Dameon is currently in the hospital. Ayesha reflected on her sense of progress stating \"It used to be when I got a call like that, i'd drop everything and run to take care of him. I was able to pause this time, take a deep breath and pray. God has got this and I can focus on myself right now.\"  - Ayesha shared a worksheet completed in a group last evening with steps laid out for what she wants to work on in recovery. \"I have steps in place to focus on me and know that I am worth it.\"  Ayesha shared a list of things she is grateful for during her time on this unit.    Plan: I have made the referral to   for ongoing spiritual support.    Grecia Hinson MDiv  Associate   Pager [phone]  Office [phone]  [facility] remains available 24/7 for emergent requests/referrals, either by having the switchboard page the on-call  or by entering an ASAP/STAT consult in Epic (this will also page the on-call ). Routine Epic consults receive an initial response within 24 hours.    "

## 2021-09-29 NOTE — TELEPHONE ENCOUNTER
----- Message from Norma Bauer sent at 9/29/2021  7:22 AM CDT -----  Regarding: NEW BRISA NEEDED  This pt will transfer from Detox to lodging plus today. Thanks

## 2021-09-29 NOTE — PROGRESS NOTES
Name: Nasra Lovelace  Date: 9/29/2021  Medical Record: [id number]  Envelope Number: [id number]  List of Contents (List each item separately in new row):   -1 cell phone  -3 chargers    Admission:  I am responsible for any personal items that are not sent to the safe or pharmacy.  [facility] is not responsible for loss, theft or damage of any property in my possession.    Patient Signature:  ___________________________________________       Date/Time:__________________________    Staff Signature: __________________________________       Date/Time:__________________________    2nd Staff person, if patient is unable/unwilling to sign:      __________________________________________________________       Date/Time: __________________________    Discharge:  [facility] has returned all of my personal belongings:    Patient Signature: ________________________________________     Date/Time: ____________________________________    Staff Signature: ______________________________________     Date/Time:_____________________________________

## 2021-09-30 ENCOUNTER — HOSPITAL ENCOUNTER (OUTPATIENT)
Dept: BEHAVIORAL HEALTH | Facility: CLINIC | Age: 41
End: 2021-09-30
Attending: FAMILY MEDICINE
Payer: COMMERCIAL

## 2021-09-30 VITALS — TEMPERATURE: 97.6 F | OXYGEN SATURATION: 100 %

## 2021-09-30 PROCEDURE — 1002N00001 HC LODGING PLUS FACILITY CHARGE ADULT

## 2021-09-30 PROCEDURE — H2035 A/D TX PROGRAM, PER HOUR: HCPCS | Mod: HQ

## 2021-09-30 ASSESSMENT — ANXIETY QUESTIONNAIRES: GAD7 TOTAL SCORE: 13

## 2021-09-30 NOTE — GROUP NOTE
Group Therapy Documentation    PATIENT'S NAME: Nasra Lovelace  MRN:   [id number]  :   1980  ACCT. NUMBER: [id number]  DATE OF SERVICE: 21  START TIME:  3:00 PM  END TIME:  4:00 PM  FACILITATOR(S): Aditya Ramon LADC  TOPIC: BEH Group Therapy  Number of patients attending the group:  7  Group Length:  1 Hours    Group Therapy Type: Recovery strategies    Summary of Group / Topics Discussed:    Nutrition and balanced lifestyle.      Group Attendance:  Attended group session    Patient's response to the group topic/interactions:  cooperative with task    Patient appeared to be Actively participating.        Client specific details:  Ayesha participated and interacted appropriately with peers and staff in skills group. No triggers to use noted or discussed.

## 2021-09-30 NOTE — GROUP NOTE
Group Therapy Documentation    PATIENT'S NAME: Nasra Lovelace  MRN:   [id number]  :   1980  ACCT. NUMBER: [id number]  DATE OF SERVICE: 21  START TIME: 12:30 PM  END TIME:  2:30 PM  FACILITATOR(S): Adrienne Restrepo LADC  TOPIC: BEH Group Therapy  Number of patients attending the group: 8  Group Length:  2 Hours    Group Therapy Type: Recovery strategies    Summary of Group / Topics Discussed:    Recovery Principles and Disease of addiction    Group Attendance: Attended group session.     Patients response to the group topic/interactions: cooperative with task and discussed personal experience with topic.     Patient appeared to be: Actively participated, Attentive, and Engaged.      Client specific details: Patient participated in group activity, where she developed a personal collage, about what her life would look like in 5 years sober VS 5 years of continued use. Patient recognized the difference between what her life might look like.

## 2021-09-30 NOTE — GROUP NOTE
Group Therapy Documentation    PATIENT'S NAME: Nasra Lovelace  MRN:   [id number]  :   1980  ACCT. NUMBER: [id number]  DATE OF SERVICE: 21  START TIME:  9:00 AM  END TIME: 11:00 AM  FACILITATOR(S): Magali Sneed ADC-T  TOPIC: BEH Group Therapy  Number of patients attending the group:  7  Group Length:  2 Hours    Group Therapy Type: Recovery strategies and Emotion processing    Summary of Group / Topics Discussed:    Recovery Principles, Sober coping skills, and Emotions/expression    Group Attendance:  Excused from group session    Patient's response to the group topic/interactions:  cooperative with task    Patient appeared to be Actively participating, Attentive and Engaged.        Client specific details: During check-in pt reported feeling fearful, and shared that if she could have any super power it would be to heal others so she could cure her father's and boyfriend's illnesses. She participated in a group discussion of tx/recovery community norms and expectations, the benefits of sobriety, distress tolerance/navigating difficult interpersonal situations, and fomenting change.

## 2021-10-01 ENCOUNTER — HOSPITAL ENCOUNTER (OUTPATIENT)
Dept: BEHAVIORAL HEALTH | Facility: CLINIC | Age: 41
End: 2021-10-01
Attending: FAMILY MEDICINE
Payer: COMMERCIAL

## 2021-10-01 PROCEDURE — H2035 A/D TX PROGRAM, PER HOUR: HCPCS | Mod: HQ

## 2021-10-01 PROCEDURE — H2035 A/D TX PROGRAM, PER HOUR: HCPCS
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
## 2021-10-01 NOTE — PROGRESS NOTES
Patient came to primary counselors Bonnie and Adrienne, reporting she is leaving her boyfriend Dameon just got out of the hospital, is very sick and he needs me. He is driving 2.5 hours to take me home. She is tearful, verbalizes others should be able to step up to take care of him, my daughter and dad, but he has no one in the state. Talked about being ambivalent re:sobriety shares in group thinking about leaving program, not to drink but to seek a emilia based program and she shared with another counselor if she left, she would go back to doing as she was and drink. Patient appears to put others in front of her needs and struggles to express feelings. Offered patient female and emilia based program information to  before leaving. It appears she left without informations.

## 2021-10-01 NOTE — ADDENDUM NOTE
Encounter addended by: Quinn Moore on: 10/1/2021 7:28 AM   Actions taken: Order Reconciliation Section accessed

## 2021-10-01 NOTE — GROUP NOTE
Group Therapy Documentation    PATIENT'S NAME: Nasra Lovelace  MRN:   [id number]  :   1980  ACCT. NUMBER: [id number]  DATE OF SERVICE: 10/01/21  START TIME:  9:00 AM  END TIME: 11:00 AM  FACILITATOR(S): Adrienne Restrepo LADC; Ghulam Glez  TOPIC: BEH Group Therapy  Number of patients attending the group: 8  Group Length:  2 Hours    Group Therapy Type: Emotion processing    Summary of Group / Topics Discussed:    Mindfulness/Relaxation, Coping/DBT informed care, Disease of addiction, Emotions/expression, and values.    Group Attendance: Attended group session, and Excused from group session for 1:1      Patients response to the group topic/interactions: cooperative with task, discussed personal experience with topic, and gave appropriate feedback to peer.     Patient appeared to be: Actively participated, Attentive, and Engaged.     Client specific details: Patient participated in mindful breathing exercise and was coached to focus on breathing if distracted. Practiced DBT skill of distraction using imagery creating a safe, sober place to access as needed and for emotional regulation. Patient shared if she could have a great talent or ability it would be time warp because it would her her get through this time. Patient reports feeling confused and does not feel like she fits in this program.  She is interested in a emilia based program, will assist her to accomplish as requested. Coached to give to her HP her anxiety and fear and to focus on the similarities she has with her female peers. Patient gave supportive feedback to peers who presented assignments.

## 2021-10-01 NOTE — PROGRESS NOTES
Comprehensive Summary Update and Review  Counselor met with patient on 10/1/2021 and reviewed the Comprehensive Assessment. Safety plan complete sent for scanning to pt's EHR. There were no changes/updates identified by patient or in chart entries.

## 2021-10-03 ENCOUNTER — HEALTH MAINTENANCE LETTER (OUTPATIENT)
Age: 41
End: 2021-10-03
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
## 2021-10-04 NOTE — ADDENDUM NOTE
Encounter addended by: Aditya Ramon LADC on: 10/4/2021 1:53 PM   Actions taken: Clinical Note Signed

## 2021-10-05 NOTE — TELEPHONE ENCOUNTER
I filled #30, no refills  Further refills need to  go to her psychiatrist- Dr Mis Dailey [facility]  [phone]  
Pt will keep the Restoril and get rid of the Ativan,  Per Brandy this is ok for her to decide.  Ativan DC'ed .  
Yes

## 2021-10-12 ENCOUNTER — TELEPHONE (OUTPATIENT)
Dept: FAMILY MEDICINE | Facility: OTHER | Age: 41
End: 2021-10-12

## 2021-10-12 NOTE — TELEPHONE ENCOUNTER
Xavier from [facility] called stated they are her primary pharmacy, requesting to review allergy list. Done.

## 2021-10-22 ENCOUNTER — LAB (OUTPATIENT)
Dept: LAB | Facility: CLINIC | Age: 41
End: 2021-10-22

## 2021-10-22 ENCOUNTER — OFFICE VISIT (OUTPATIENT)
Dept: FAMILY MEDICINE | Facility: CLINIC | Age: 41
End: 2021-10-22
Payer: COMMERCIAL

## 2021-10-22 VITALS
SYSTOLIC BLOOD PRESSURE: 126 MMHG | BODY MASS INDEX: 18.85 KG/M2 | RESPIRATION RATE: 16 BRPM | DIASTOLIC BLOOD PRESSURE: 79 MMHG | WEIGHT: 110.4 LBS | OXYGEN SATURATION: 99 % | TEMPERATURE: 98 F | HEIGHT: 64 IN | HEART RATE: 79 BPM

## 2021-10-22 DIAGNOSIS — F41.1 GAD (GENERALIZED ANXIETY DISORDER): ICD-10-CM

## 2021-10-22 DIAGNOSIS — F10.920 ALCOHOLIC INTOXICATION WITHOUT COMPLICATION (H): ICD-10-CM

## 2021-10-22 DIAGNOSIS — F10.10 ALCOHOL ABUSE: ICD-10-CM

## 2021-10-22 DIAGNOSIS — R79.0 LOW MAGNESIUM LEVEL: ICD-10-CM

## 2021-10-22 DIAGNOSIS — F51.01 PRIMARY INSOMNIA: ICD-10-CM

## 2021-10-22 DIAGNOSIS — F50.9 EATING DISORDER, UNSPECIFIED TYPE: ICD-10-CM

## 2021-10-22 DIAGNOSIS — R53.82 CHRONIC FATIGUE: ICD-10-CM

## 2021-10-22 DIAGNOSIS — F41.9 ANXIETY: Primary | ICD-10-CM

## 2021-10-22 DIAGNOSIS — K82.8 DYSFUNCTIONAL GALLBLADDER: ICD-10-CM

## 2021-10-22 DIAGNOSIS — K21.9 GASTROESOPHAGEAL REFLUX DISEASE WITHOUT ESOPHAGITIS: ICD-10-CM

## 2021-10-22 DIAGNOSIS — G43.009 MIGRAINE WITHOUT AURA AND WITHOUT STATUS MIGRAINOSUS, NOT INTRACTABLE: ICD-10-CM

## 2021-10-22 LAB
ALBUMIN SERPL-MCNC: 3.8 G/DL (ref 3.4–5)
ALP SERPL-CCNC: 52 U/L (ref 40–150)
ALT SERPL W P-5'-P-CCNC: 19 U/L (ref 0–50)
ANION GAP SERPL CALCULATED.3IONS-SCNC: 4 MMOL/L (ref 3–14)
AST SERPL W P-5'-P-CCNC: 20 U/L (ref 0–45)
BILIRUB SERPL-MCNC: 0.2 MG/DL (ref 0.2–1.3)
BUN SERPL-MCNC: 8 MG/DL (ref 7–30)
CALCIUM SERPL-MCNC: 8.7 MG/DL (ref 8.5–10.1)
CHLORIDE BLD-SCNC: 104 MMOL/L (ref 94–109)
CO2 SERPL-SCNC: 34 MMOL/L (ref 20–32)
CREAT SERPL-MCNC: 0.75 MG/DL (ref 0.52–1.04)
ERYTHROCYTE [DISTWIDTH] IN BLOOD BY AUTOMATED COUNT: 12.6 % (ref 10–15)
GFR SERPL CREATININE-BSD FRML MDRD: >90 ML/MIN/1.73M2
GLUCOSE BLD-MCNC: 95 MG/DL (ref 70–99)
HCT VFR BLD AUTO: 41.5 %
HGB BLD-MCNC: 13.3 G/DL (ref 11.7–15.7)
MAGNESIUM SERPL-MCNC: 1.9 MG/DL (ref 1.6–2.3)
MCH RBC QN AUTO: 30.6 PG (ref 26.5–33)
MCHC RBC AUTO-ENTMCNC: 32 G/DL (ref 31.5–36.5)
MCV RBC AUTO: 95 FL (ref 78–100)
PHOSPHATE SERPL-MCNC: 1.7 MG/DL (ref 2.5–4.5)
PLATELET # BLD AUTO: 334 10E3/UL (ref 150–450)
POTASSIUM BLD-SCNC: 2.8 MMOL/L (ref 3.4–5.3)
PROT SERPL-MCNC: 6.8 G/DL (ref 6.8–8.8)
RBC # BLD AUTO: 4.35 10E6/UL (ref 3.8–5.2)
SODIUM SERPL-SCNC: 142 MMOL/L (ref 133–144)
WBC # BLD AUTO: 8.2 10E3/UL (ref 4–11)

## 2021-10-22 PROCEDURE — 80053 COMPREHEN METABOLIC PANEL: CPT

## 2021-10-22 PROCEDURE — 85027 COMPLETE CBC AUTOMATED: CPT

## 2021-10-22 PROCEDURE — 99214 OFFICE O/P EST MOD 30 MIN: CPT | Mod: GC | Performed by: STUDENT IN AN ORGANIZED HEALTH CARE EDUCATION/TRAINING PROGRAM

## 2021-10-22 PROCEDURE — 36415 COLL VENOUS BLD VENIPUNCTURE: CPT

## 2021-10-22 PROCEDURE — 84100 ASSAY OF PHOSPHORUS: CPT

## 2021-10-22 PROCEDURE — 83735 ASSAY OF MAGNESIUM: CPT

## 2021-10-22 ASSESSMENT — PATIENT HEALTH QUESTIONNAIRE - PHQ9: SUM OF ALL RESPONSES TO PHQ QUESTIONS 1-9: 15

## 2021-10-22 ASSESSMENT — MIFFLIN-ST. JEOR: SCORE: 1150.77

## 2021-10-22 NOTE — PROGRESS NOTES
Assessment & Plan       MAYKEL (generalized anxiety disorder)  Primary insomnia  ADHD  Patient with history of generalized anxiety disorder, was previously on temazepam and Ativan with her previous provider however was successfully able to taper off of this with phenobarbital.  She is not interested in restarting benzodiazepine as they was very difficult to get off of and made her feel like crap.  We discussed limited benefit of prolonged benzodiazepine use.  We now need to find something else that can be used to manage anxiety.  She is on propranolol for migraine prophylaxis which I will refill, we discussed use of Atarax as a trial.  I will send this once I have the name of her pharmacy confirmed which she will call me and provide.    Regarding her insomnia, patient reports longstanding insomnia since being a teenager.  Does note that she snores if she has been drinking but not when she has not been however she does jolts awake multiple times in the middle of the night, have a father with DOYLE, is constantly fatigued during the day out of proportion to the days activities.  We will trial moving her Strattera, Atarax as needed, however given her family history and her reported symptoms even in the absence of obesity or neck circumference I would strongly consider sending her to a sleep study.    For the patient's ADHD, we discussed her use of Ritalin in the past.  She feels she is not getting a lot of benefit from Strattera but is willing to keep taking it.  She is taking it at night, we discussed her switching to taking that in the morning.  She does not have any history of upper abuse, while I understand that her current facility has policies in place that make it impossible for her to have Ritalin we did discuss that based on what she is telling me I think that it may be very reasonable for her once her sobriety is stable her to restart Ritalin with her previous psychiatrist.    Low magnesium level  Alcohol  abuse  Eating disorder, unspecified type  Chronic fatigue  Patient with history of disordered eating restrictive type, alcohol abuse currently in treatment.  She has been sober since 5 October (congratulations), however she is having some difficulty with being repulsed by food due to stomach upset and diarrhea.  She has limited things that she can eat that will make her sick available at her current facility.  Given her history I have ordered electrolytes, in addition to magnesium and phosphorus.  No palpitations, no lightheadedness, no dizziness reported.  She does note an increase in fatigue however admits this may be multifactorial.  We will also check CBC to look at hemoglobin level, depending on hemoglobin level may follow-up with iron studies.  - Phosphorus; Future  - Magnesium; Future  - Comprehensive metabolic panel; Future  - CBC    Diarrhea  Dysfunctional gallbladder  Patient has been having diarrhea for the past month, she has not been able to take her enzymes as she does not have access to them at her current treatment facility.  Patient with history of dysfunctional gallbladder per GI doctor in [facility].  She is in town for at least 9 months for treatment program.  She is not able to follow her regular diet (low-fat high-protein) that she was placed on by her GI doctor at her treatment facility.  I provided a letter saying that this should be facilitated, however she is also requesting that she be referred to GI so that she has some when she can see while she is in the [facility].  I have sent that referral today, I am checking LFTs today and electrolytes.  Encourage p.o. fluids and restarting medications.  - Comprehensive metabolic panel; Future  - Adult Gastro Ref - Consult Only; Future    Tobacco Cessation:   reports that she has been smoking cigarettes. She has a 4.00 pack-year smoking history. She has never used smokeless tobacco.    Refilled Nicorette lozenges today    Patient return this afternoon  for lab tests, I will reach out to her with the results.  I have sent her to GI, she should see them as she is able.  I would like to see her replaced hear from her in about 4 weeks to find out how the Atarax is working with her anxiety and how she is feeling once we have restarted all of her medications.    No follow-ups on file.    Aidee Cruz MD  [facility] SHAWN Hodge is a 41 year old who presents for the following health issues     HPI     One month straight diarrhea, never have regular BM  Sometimes very constipated, no pattern    -Previous GI doc, low fat, proteins   - Doesn't tolerate milk    Abdominal discomfort   -In her 20s history of pancreatitis, alcohol, bulimea and restrictive eating patterns   Since dx with Mclaughlin's esophagitis, gallbladder dysfunction through Dr. Burgos in [facility].  Symptoms previously controlled with omeprazole, enzymes, diet control however this is difficult to arrange and treatment.   -Has been having diarrhea daily since coming to this current treatment facility   -She has difficulty gaining weight or maintaining weight    Insomnia  -Patient has longstanding history of insomnia, both difficulty falling asleep and staying asleep.  Does note that she snores when she is drinking however has long periods of sobriety where she is been told she does not snore.  Does have a family history of obstructive sleep apnea and CHF.  She does not have a large neck circumference however she does wake in the middle of the night suddenly panicked heart racing, having to catch her breath, she is disproportionately tired during the day.  She has never had a sleep study as she usually lives in [facility] which is low resource access.    Anxiety  Patient with longstanding history of anxiety, her psychiatrist diana Wawarsing was giving her Ativan and temazepam.  As part of her treatment she tapered off with benzodiazepines, it was a very  "unpleasant experience and difficult to do.  She is not interested in restarting them given how difficult it was to get off of them.  She does though have uncontrolled anxiety at this time with no as needed's or baseline medications available.    Patient does not have access to any of her medications at this time as she is currently under treatment facility, would like refills of all medications as discussed at this visit.  She does not have the name of the delivery pharmacy that is used by her facility and will call and give that name.    ADHD  Patient with history of ADHD since high school, has been on Ritalin and felt that was very helpful for getting her day-to-day tasks done.  She is not allowed to have access to Ritalin her current treatment program.  She is trying Strattera though she does not feel it is very helpful.  She never used methamphetamines or any uppers.  She states she is never used Ritalin outside of how it was prescribed, only feels more calm and more focused on it does not get any upper effects.    Review of Systems   See HPI      Objective    /79   Pulse 79   Temp 98  F (36.7  C) (Oral)   Resp 16   Ht 1.626 m (5' 4\")   Wt 50.1 kg (110 lb 6.4 oz)   SpO2 99%   BMI 18.95 kg/m    Body mass index is 18.95 kg/m .  Physical Exam   General alert and well-groomed woman slightly thin appears stated age.  Wearing multiple layers in the clinic.  Lungs speaking in full sentences on room air without respiratory distress      No results found for this or any previous visit (from the past 24 hour(s)).        "

## 2021-10-22 NOTE — LETTER
October 22, 2021      Nasra Lovelace  [address]   [place] [address]              To whom it may concern,      Ms Lovelace has a long standing GI complication that means she is best served having access to a low-fat, high protein diet per reviewed gastroenterologist recommendations. Please facilitate her access to low-fat, high protein meals to best support her health.    Sincerely,      Aidee Cruz MD

## 2021-10-22 NOTE — PATIENT INSTRUCTIONS
Call me with the name and phone number of the pharmacy and I will get your meds sent out to you.     I've sent a GI referral, they should call you.     I will check labs today, call you with results.     I'm sending atarax, you can take up to 6 tabs a day (at absolute max). Take one, if not helpful within 30 minutes can take a second.  Useful for anxiety spikes, can also try right before bed.

## 2021-10-24 NOTE — PROGRESS NOTES
Preceptor Attestation:   Patient seen, evaluated and discussed with the resident. I have verified the content of the note, which accurately reflects my assessment of the patient and the plan of care.   Supervising Physician:  Giancarlo Farias MD

## 2021-10-26 NOTE — RESULT ENCOUNTER NOTE
I have called patient and left a message. Please call patient 10/27 and see if she has gone to hospital or if she will go to ED. Plan B is that she be seen in a clinic and have Phos, BMP checked to see if her electrolytes are improved, stable, or worse. If stable or worse, or if she were bradycardic, or if she were hypotensive or orthostatic, she would need to go to a hospital.ERM

## 2021-10-27 ENCOUNTER — HOSPITAL ENCOUNTER (EMERGENCY)
Facility: HOSPITAL | Age: 41
Discharge: HOME | End: 2021-10-27
Attending: NURSE PRACTITIONER | Admitting: NURSE PRACTITIONER
Payer: COMMERCIAL

## 2021-10-27 ENCOUNTER — TELEPHONE (OUTPATIENT)
Dept: FAMILY MEDICINE | Facility: CLINIC | Age: 41
End: 2021-10-27

## 2021-10-27 VITALS
OXYGEN SATURATION: 98 % | HEART RATE: 102 BPM | SYSTOLIC BLOOD PRESSURE: 117 MMHG | DIASTOLIC BLOOD PRESSURE: 76 MMHG | RESPIRATION RATE: 16 BRPM | TEMPERATURE: 98.7 F

## 2021-10-27 DIAGNOSIS — F41.1 GAD (GENERALIZED ANXIETY DISORDER): ICD-10-CM

## 2021-10-27 DIAGNOSIS — Z01.89 ENCOUNTER FOR LABORATORY TEST: Primary | ICD-10-CM

## 2021-10-27 DIAGNOSIS — K82.8 DYSFUNCTIONAL GALLBLADDER: Primary | ICD-10-CM

## 2021-10-27 DIAGNOSIS — K21.9 GASTROESOPHAGEAL REFLUX DISEASE WITHOUT ESOPHAGITIS: ICD-10-CM

## 2021-10-27 DIAGNOSIS — F10.920 ALCOHOLIC INTOXICATION WITHOUT COMPLICATION (H): ICD-10-CM

## 2021-10-27 DIAGNOSIS — F10.10 ALCOHOL ABUSE: ICD-10-CM

## 2021-10-27 DIAGNOSIS — G43.009 MIGRAINE WITHOUT AURA AND WITHOUT STATUS MIGRAINOSUS, NOT INTRACTABLE: ICD-10-CM

## 2021-10-27 DIAGNOSIS — F50.9 EATING DISORDER, UNSPECIFIED TYPE: ICD-10-CM

## 2021-10-27 LAB
ANION GAP SERPL CALCULATED.3IONS-SCNC: 2 MMOL/L (ref 3–14)
BUN SERPL-MCNC: 6 MG/DL (ref 7–30)
CALCIUM SERPL-MCNC: 8.1 MG/DL (ref 8.5–10.1)
CHLORIDE BLD-SCNC: 110 MMOL/L (ref 94–109)
CO2 SERPL-SCNC: 29 MMOL/L (ref 20–32)
CREAT SERPL-MCNC: 0.76 MG/DL (ref 0.52–1.04)
GFR SERPL CREATININE-BSD FRML MDRD: >90 ML/MIN/1.73M2
GLUCOSE BLD-MCNC: 106 MG/DL (ref 70–99)
PHOSPHATE SERPL-MCNC: 2.7 MG/DL (ref 2.5–4.5)
POTASSIUM BLD-SCNC: 4 MMOL/L (ref 3.4–5.3)
SODIUM SERPL-SCNC: 141 MMOL/L (ref 133–144)

## 2021-10-27 PROCEDURE — 84100 ASSAY OF PHOSPHORUS: CPT | Performed by: NURSE PRACTITIONER

## 2021-10-27 PROCEDURE — G0463 HOSPITAL OUTPT CLINIC VISIT: HCPCS

## 2021-10-27 PROCEDURE — 99213 OFFICE O/P EST LOW 20 MIN: CPT | Performed by: NURSE PRACTITIONER

## 2021-10-27 PROCEDURE — 80048 BASIC METABOLIC PNL TOTAL CA: CPT | Performed by: NURSE PRACTITIONER

## 2021-10-27 PROCEDURE — 36415 COLL VENOUS BLD VENIPUNCTURE: CPT | Performed by: NURSE PRACTITIONER

## 2021-10-27 ASSESSMENT — ENCOUNTER SYMPTOMS
PALPITATIONS: 0
VOMITING: 0
PSYCHIATRIC NEGATIVE: 1
FEVER: 0
NAUSEA: 0
DIARRHEA: 0
CHILLS: 0
SHORTNESS OF BREATH: 0

## 2021-10-27 NOTE — TELEPHONE ENCOUNTER
"Patient returned my call- states she did get Dr. Maguire's message about her lab work. States she had to leave [facility] last night and go up north to [place], [place] for her daughter so she is currently up there. Patient states she is still feeling \"not well\" fatigued and heart palpitations. Patient [place] and wondering if she could have an appointment in clinic to recheck levels at that time. Unfortunately, no appointments available today- RN reiterated Dr. Maguire's concern that her potassium and phosphorus are dangerously low and because she is symptomatic our recommendation would be to present to ED right away for recheck of her levels and appropriate IV supplementation. She verbalized understanding and states she will go to ED soon.     Patient also provided updated pharmacy information of [facility] in [facility]- requesting all medications Dr. Maguire was going to send to [facility] be transferred there instead. Routing to provider to send if appropriate.   Rianna Chatterjee RN       Medications transferred.     -ERM  "

## 2021-10-27 NOTE — TELEPHONE ENCOUNTER
RN attempted to reach patient-LM with call back number. Please transfer to any available RN when she calls back.   Rianna Chatterjee, RN        I have called patient and left a message. Please call patient 10/27 and see if she has gone to hospital or if she will go to ED. Plan B is that she be seen in a clinic and have Phos, BMP checked to see if her electrolytes are improved, stable, or worse. If stable or worse, or if she were bradycardic, or if she were hypotensive or orthostatic, she would need to go to a hospital.    ERM

## 2021-10-28 ASSESSMENT — ENCOUNTER SYMPTOMS
FATIGUE: 1
WEAKNESS: 1

## 2021-10-28 NOTE — ED TRIAGE NOTES
Patient presents stating she needs to have labs rechecked as she had seen someone last Friday to establish primary and was told she had low potasium and phosphorus and needed to have labs rechecked.

## 2021-10-28 NOTE — DISCHARGE INSTRUCTIONS
Follow up with primary care provider or return to urgent care/ED with any worsening in condition or additional concerns.

## 2021-10-28 NOTE — ED PROVIDER NOTES
History     Chief Complaint   Patient presents with     Abnormal Labs     HPI  Nasra Lovelace is a 41 year old female who presents to urgent care today (ambulatory) for a recheck of her potassium and phosphorus per her PCP who called her due to them both being decreased at her previous appointment on 10/22/2021.  Patient states she experiencing 5 days of straight diarrhea which has since resolved and she is feeling much better.   Patient only here for lab work today as she plans to follow up with GI regarding other issues.  No other concerns.     Allergies:  Allergies   Allergen Reactions     Dust Mite Extract      Hydrocodone Other (See Comments) and Itching     Skin felt like it was burning.     Penicillins Rash       Problem List:    Patient Active Problem List    Diagnosis Date Noted     Alcohol abuse 09/22/2021     Priority: Medium     Anxiety 09/22/2021     Priority: Medium     Alcoholic intoxication without complication (H) 09/22/2021     Priority: Medium     Depression, unspecified depression type 09/22/2021     Priority: Medium     Eating disorder, unspecified type 09/22/2021     Priority: Medium     History of COVID-19 - 5/11/21 06/01/2021     Priority: Medium     Hot flashes---4/2021 04/24/2021     Priority: Medium     Attention deficit hyperactivity disorder (ADHD), combined type 08/29/2019     Priority: Medium     Patient is followed by  for ongoing prescription of stimulants.  All refills should be approved by this provider, or covering partner.    Medication(s): Ritalin 10 mg.   Maximum quantity per month: 60  Clinic visit frequency required: Q 3 months     Controlled substance agreement on file: Yes       Date(s): 8.27.19  Neuropsych evaluation for ADD completed:  managed by     [facility] website verification:  done on 8.27.19  [email or web address]           Recurrent major depressive disorder (H) 07/30/2018     Priority: Medium     Tobacco abuse 07/30/2018      Priority: Medium     Vitamin deficiency 2018     Priority: Medium     Gastroesophageal reflux disease, esophagitis presence not specified 2017     Priority: Medium     IMO Regulatory Load OCT 2020       ACP (advance care planning) 2017     Priority: Medium     Advance Care Planning 2017: ACP Review of Chart / Resources Provided:  Reviewed chart for advance care plan.  Nasra Ortiz has been provided information and resources to begin or update their advance care plan.  Added by EMERALD THOMPSON             Primary insomnia 2015     Priority: Medium     Migraine with aura and without status migrainosus, not intractable 2014     Priority: Medium     Anxiety state 2014     Priority: Medium     Hydronephrosis 2011     Priority: Medium     Panic disorder without agoraphobia 2006     Priority: Medium        Past Medical History:    Past Medical History:   Diagnosis Date     Acquired hypothyroidism 2014     Anxiety disorder 2012     Anxiety state 2014     Attention deficit hyperactivity disorder (ADHD), combined type 2019     Gastroesophageal reflux disease, esophagitis presence not specified 2017     History of COVID-19 - 2021     Hydronephrosis 2011     Menorrhagia with irregular cycle 2021     Migraine with aura and without status migrainosus, not intractable 2014     Panic disorder without agoraphobia 2006     Primary insomnia 2015     Recurrent major depressive disorder (H) 2018     Secondary dysmenorrhea 2021     Tobacco abuse 2018     Vitamin deficiency 2018       Past Surgical History:    Past Surgical History:   Procedure Laterality Date      SECTION N/A 2000      SECTION N/A 2012    postop hemm with ruptured R Ov vein, transfusion     CHOLECYSTECTOMY       COLONOSCOPY N/A 2019    Procedure: COLONOSCOPY DIAGNOSTIC WITH RANDOM  COLON BIOPSIES ANOSCOPY; Surgeon: Igor Burgos MD; Location: [facility] OR       COLPOSCOPY CERVIX, BIOPSY CERVIX, ENDOCERVICAL CURETTAGE, COMBINED N/A 2008     DILATION AND CURETTAGE, ABLATE ENDOMETRIUM NOVASURE, COMBINED N/A 2018    Novasure Endometrial ablation     ESOPHAGOSCOPY, GASTROSCOPY, DUODENOSCOPY (EGD), COMBINED  2019    Procedure: ESOPHAGOGASTRODUODENOSCOPY DIAGNOSTIC with biopsies; Surgeon: Igor Burgos MD; Location: [facility] OR       HYSTEROSCOPY DIAGNOSTIC N/A 2018    Hysteroscopy, D&C     LAPAROSCOPIC APPENDECTOMY N/A 2012     LAPAROSCOPIC HYSTERECTOMY SUPRACERVICAL N/A 2021    Procedure: laparoscopic supracervical hysterectomy;  Surgeon: Rito Soler MD;  Location: [facility] OR     OOPHORECTOMY Right 2012    post operative hemorrhage with ruptured ovarian vein,      SALPINGECTOMY Left 2019    Procedure: LAPAROSCOPY LEFT SALPINGECTOMY; Surgeon: Regina Sweeney MD; Location: [facility] OR         Family History:    Family History   Problem Relation Age of Onset     Hypertension Mother      Hyperlipidemia Mother      Cardiovascular Father      Hypertension Father      Hyperlipidemia Father      Heart Failure Father      Sleep Apnea Father      Kidney failure Father        Social History:  Marital Status:   [4]  Social History     Tobacco Use     Smoking status: Current Every Day Smoker     Packs/day: 0.40     Years: 10.00     Pack years: 4.00     Types: Cigarettes     Last attempt to quit: 2020     Years since quittin.7     Smokeless tobacco: Never Used     Tobacco comment: tobacco cessation discuseed - tried to quit: no - passive smoke exposure quitting on own    Substance Use Topics     Alcohol use: No     Drug use: No        Medications:    alum & mag hydroxide-simethicone (MAALOX) 200-200-20 MG/5ML SUSP suspension  atomoxetine (STRATTERA) 10 MG capsule  co-enzyme Q-10 100 MG CAPS capsule  DULoxetine (CYMBALTA) 30 MG capsule  DULoxetine  (CYMBALTA) 60 MG capsule  folic acid (FOLVITE) 1 MG tablet  guaiFENesin (ROBITUSSIN) 20 mg/mL SOLN solution  ibuprofen (ADVIL/MOTRIN) 200 MG tablet  loratadine (CLARITIN) 10 MG tablet  magnesium oxide (MAG-OX) 400 MG tablet  melatonin 5 MG tablet  multivitamin w/minerals (MULTI-VITAMIN) tablet  nicotine (NICORETTE) 2 MG gum  omeprazole (PRILOSEC) 40 MG DR capsule  phenol-menthol (CEPASTAT) 14.5 MG lozenge  propranolol ER (INDERAL LA) 80 MG 24 hr capsule  senna-docusate (SENOKOT-S/PERICOLACE) 8.6-50 MG tablet  thiamine (B-1) 100 MG tablet  vitamin B complex with vitamin C (VITAMIN  B COMPLEX) tablet      Review of Systems   Constitutional: Positive for fatigue (improving). Negative for chills and fever.   Respiratory: Negative for shortness of breath.    Cardiovascular: Negative for chest pain and palpitations.   Gastrointestinal: Negative for diarrhea, nausea and vomiting.   Neurological: Positive for weakness (improving).   Psychiatric/Behavioral: Negative.      Physical Exam   BP: 117/76  Pulse: 102  Temp: 98.7  F (37.1  C)  Resp: 16  SpO2: 98 %  AP: 90    Physical Exam  Vitals and nursing note reviewed.   Constitutional:       General: She is not in acute distress.     Appearance: She is not ill-appearing or toxic-appearing.   Cardiovascular:      Rate and Rhythm: Normal rate and regular rhythm.      Pulses: Normal pulses.      Heart sounds: Normal heart sounds.   Pulmonary:      Effort: Pulmonary effort is normal.      Breath sounds: Normal breath sounds.   Neurological:      Mental Status: She is alert.   Psychiatric:         Mood and Affect: Mood normal.       ED Course     Results for orders placed or performed during the hospital encounter of 10/27/21 (from the past 24 hour(s))   Phosphorus   Result Value Ref Range    Phosphorus 2.7 2.5 - 4.5 mg/dL   Basic metabolic panel   Result Value Ref Range    Sodium 141 133 - 144 mmol/L    Potassium 4.0 3.4 - 5.3 mmol/L    Chloride 110 (H) 94 - 109 mmol/L    Carbon  Dioxide (CO2) 29 20 - 32 mmol/L    Anion Gap 2 (L) 3 - 14 mmol/L    Urea Nitrogen 6 (L) 7 - 30 mg/dL    Creatinine 0.76 0.52 - 1.04 mg/dL    Calcium 8.1 (L) 8.5 - 10.1 mg/dL    Glucose 106 (H) 70 - 99 mg/dL    GFR Estimate >90 >60 mL/min/1.73m2       Medications - No data to display    Assessments & Plan (with Medical Decision Making)     I have reviewed the nursing notes.    I have reviewed the findings, diagnosis, plan and need for follow up with the patient.  (Z01.89) Encounter for laboratory test  (primary encounter diagnosis)  Plan:   Potassium and phosphorus WNL.  Patient has no other questions or concerns today.  Patient states she plans to follow up with GI.  Declines any work-up today.  Patient in agreement with treatment plan and will follow up as needed.    Discharge Medication List as of 10/27/2021  9:39 PM        Final diagnoses:   Encounter for laboratory test     10/27/2021   [facility]     Sierra Chan, LINN  10/28/21 1051

## 2021-11-04 RX ORDER — ATOMOXETINE 10 MG/1
10 CAPSULE ORAL DAILY
Qty: 30 CAPSULE | Refills: 0 | Status: SHIPPED | OUTPATIENT
Start: 2021-11-04 | End: 2022-11-16 | Stop reason: DRUGHIGH

## 2021-11-04 RX ORDER — LANOLIN ALCOHOL/MO/W.PET/CERES
100 CREAM (GRAM) TOPICAL DAILY
Qty: 30 TABLET | Refills: 0 | Status: SHIPPED | OUTPATIENT
Start: 2021-11-04 | End: 2022-04-15

## 2021-11-04 RX ORDER — PROPRANOLOL HYDROCHLORIDE 80 MG/1
80 CAPSULE, EXTENDED RELEASE ORAL DAILY
Qty: 30 CAPSULE | Refills: 1 | Status: SHIPPED | OUTPATIENT
Start: 2021-11-04 | End: 2021-12-06

## 2021-11-04 RX ORDER — FOLIC ACID 1 MG/1
1 TABLET ORAL DAILY
Qty: 30 TABLET | Refills: 0 | Status: SHIPPED | OUTPATIENT
Start: 2021-11-04 | End: 2022-01-07

## 2021-11-04 RX ORDER — OMEPRAZOLE 40 MG/1
CAPSULE, DELAYED RELEASE ORAL
Qty: 30 CAPSULE | Refills: 3 | Status: SHIPPED | OUTPATIENT
Start: 2021-11-04 | End: 2021-12-06

## 2021-11-04 RX ORDER — MAGNESIUM HYDROXIDE/ALUMINUM HYDROXICE/SIMETHICONE 120; 1200; 1200 MG/30ML; MG/30ML; MG/30ML
30 SUSPENSION ORAL EVERY 6 HOURS PRN
Qty: 710 ML | Refills: 3 | Status: SHIPPED | OUTPATIENT
Start: 2021-11-04 | End: 2022-04-15

## 2021-11-04 RX ORDER — DULOXETIN HYDROCHLORIDE 60 MG/1
60 CAPSULE, DELAYED RELEASE ORAL DAILY
Qty: 30 CAPSULE | Refills: 11 | Status: SHIPPED | OUTPATIENT
Start: 2021-11-04 | End: 2022-11-07

## 2021-11-04 RX ORDER — UBIDECARENONE 100 MG
100 CAPSULE ORAL DAILY
Qty: 90 CAPSULE | Refills: 3 | Status: SHIPPED | OUTPATIENT
Start: 2021-11-04 | End: 2022-04-15

## 2021-11-04 RX ORDER — MULTIPLE VITAMINS W/ MINERALS TAB 9MG-400MCG
1 TAB ORAL DAILY
Qty: 90 TABLET | Refills: 3 | Status: SHIPPED | OUTPATIENT
Start: 2021-11-04 | End: 2023-09-14

## 2021-11-29 DIAGNOSIS — R79.0 LOW MAGNESIUM LEVEL: ICD-10-CM

## 2021-11-30 RX ORDER — MAGNESIUM OXIDE 400 MG/1
TABLET ORAL
Qty: 60 TABLET | Refills: 0 | Status: SHIPPED | OUTPATIENT
Start: 2021-11-30 | End: 2022-01-07

## 2021-12-01 PROBLEM — F10.10 ALCOHOL ABUSE: Status: RESOLVED | Noted: 2021-09-22 | Resolved: 2021-12-01

## 2021-12-01 PROBLEM — E56.9 VITAMIN DEFICIENCY: Status: RESOLVED | Noted: 2018-03-16 | Resolved: 2021-12-01

## 2021-12-06 DIAGNOSIS — R06.2 WHEEZING: Primary | ICD-10-CM

## 2021-12-06 DIAGNOSIS — G43.009 MIGRAINE WITHOUT AURA AND WITHOUT STATUS MIGRAINOSUS, NOT INTRACTABLE: ICD-10-CM

## 2021-12-06 DIAGNOSIS — K21.9 GASTROESOPHAGEAL REFLUX DISEASE WITHOUT ESOPHAGITIS: ICD-10-CM

## 2021-12-06 RX ORDER — OMEPRAZOLE 40 MG/1
CAPSULE, DELAYED RELEASE ORAL
Qty: 31 CAPSULE | Refills: 11 | Status: SHIPPED | OUTPATIENT
Start: 2021-12-06 | End: 2022-05-11

## 2021-12-06 RX ORDER — ALBUTEROL SULFATE 90 UG/1
AEROSOL, METERED RESPIRATORY (INHALATION)
Qty: 18 G | Refills: 11 | Status: SHIPPED | OUTPATIENT
Start: 2021-12-06 | End: 2022-04-15

## 2021-12-06 RX ORDER — PROPRANOLOL HYDROCHLORIDE 80 MG/1
CAPSULE, EXTENDED RELEASE ORAL
Qty: 31 CAPSULE | Refills: 11 | Status: SHIPPED | OUTPATIENT
Start: 2021-12-06 | End: 2022-04-01

## 2022-01-03 NOTE — PROGRESS NOTES
[facility]  September 1, 2021    Behavioral Health Clinician Progress Note    Patient Name: Nasra Lovelace         Service Type: Individual           Service Location:  Face to Face in Clinic      Session Start Time:  2:30p Session End Time: 3:30p      Session Length: 53 - 60      Attendees: Patient        Treatment Objective(s) Addressed in This Session:  Target Behavior(s):  Depressed Mood: Increase interest, engagement, and pleasure in doing things  Decrease frequency and intensity of feeling down, depressed, hopeless  Anxiety: will experience a reduction in anxiety and will develop healthy cognitive patterns and beliefs        Current Stressors / Issues:  Abhay presents as anxious and tearful, she states that she has been drinking alcohol excessively and not eating.  Her appearance is frail and disheveled.  Abhay admits that she has missed medical and psychiatry appointments in the last month.    Progress on Treatment Objective(s) / Homework:    Abhay requests help with referral to inpatient substance abuse programs that also address mental health.  Discussed that she needs to follow through with her medical and psychiatry appts as those are the providers who would assist with this type of referral.  Abhay states that she would like to go to the psychiatric inpatient unit at [facility], she shares does not feel that she can be alone and is concerned about her alcohol consumption.  Discussed the intake process with Abhay, she plans to discuss this with her .  Abhay denies actual suicidal thoughts or ideation, she states that she does not want to self harm and loves her family.  Abhay shares that she does not think she will be admitted to the inpatient unit because she is not suicidal but is hopeful they will consider her substance use as a risk factor.      Abhay will discuss inpatient stay with  and will go to the ED if she decides to go this route.  This writer will send inbasket  messages to Ayesha's PCP and psychiatrist and will follow up with Ayesha this evening.    Medication Review:  See medical chart    Medication Compliance:  No Ayesha admits that she has not been taking her medications    Changes in Health Issues:   See medical chart    Chemical Use Review:   Substance Use: yes, consuming several bottles of beer daily to substitute for food     Tobacco Use: No change in amount of tobacco use since last session.  Patient declined discussion at this time    Assessment: Current Emotional / Mental Status (status of significant symptoms):    Risk status (Self / Other harm or suicidal ideation)  Patient denies current fears or concerns for personal safety.  Patient denies current or recent suicidal ideation or behaviors.  Patient denies current or recent homicidal ideation or behaviors.  Patient denies current or recent self injurious behavior or ideation.  Patient denies other safety concerns.  A safety and risk management plan has not been developed at this time, however patient was encouraged to call [place] [phone] should there be a change in any of these risk factors.    Appearance:   Appropriate  Disheveled   Eye Contact:   Fair   Psychomotor Behavior: Agitated  Restless   Attitude:   Cooperative   Orientation:   Person Place Time Situation All  Speech   Rate / Production: Hyperverbal  Emotional Normal    Volume:  Normal   Mood:    Anxious  Elevated  Irritable   Affect:    Labile   Thought Content:  Referential Thinking  Rumination   Thought Form:  Flight of Ideas  Obsessive  Circumstantial  Insight:    Impaired    Diagnoses:    1. Recurrent major depressive disorder (H)        Collateral Reports Completed:  Not Applicable    Plan: (Homework, other): Ayesha plans to call her psychiatrist after this session and talk to her  about inpatient programs. She will follow up with me next week.    The author of this note documented a reason for not sharing it with the patient.     Zuleyka  Marcelo Kirk, [facility]

## 2022-01-07 DIAGNOSIS — R79.0 LOW MAGNESIUM LEVEL: ICD-10-CM

## 2022-01-07 DIAGNOSIS — F10.920 ALCOHOLIC INTOXICATION WITHOUT COMPLICATION (H): ICD-10-CM

## 2022-01-07 RX ORDER — MAGNESIUM OXIDE 400 MG/1
TABLET ORAL
Qty: 60 TABLET | Refills: 0 | Status: SHIPPED | OUTPATIENT
Start: 2022-01-07 | End: 2022-03-15

## 2022-01-07 RX ORDER — FOLIC ACID 1 MG/1
1 TABLET ORAL DAILY
Qty: 30 TABLET | Refills: 0 | Status: SHIPPED | OUTPATIENT
Start: 2022-01-07 | End: 2022-03-15

## 2022-02-17 PROBLEM — K21.9 GASTROESOPHAGEAL REFLUX DISEASE: Status: ACTIVE | Noted: 2017-04-17

## 2022-03-03 ENCOUNTER — TRANSFERRED RECORDS (OUTPATIENT)
Dept: HEALTH INFORMATION MANAGEMENT | Facility: CLINIC | Age: 42
End: 2022-03-03

## 2022-03-15 DIAGNOSIS — F10.920 ALCOHOLIC INTOXICATION WITHOUT COMPLICATION (H): ICD-10-CM

## 2022-03-15 DIAGNOSIS — R79.0 LOW MAGNESIUM LEVEL: ICD-10-CM

## 2022-03-15 RX ORDER — FOLIC ACID 1 MG/1
1 TABLET ORAL DAILY
Qty: 30 TABLET | Refills: 0 | Status: SHIPPED | OUTPATIENT
Start: 2022-03-15 | End: 2022-04-22

## 2022-03-15 RX ORDER — MAGNESIUM OXIDE 400 MG/1
TABLET ORAL
Qty: 60 TABLET | Refills: 0 | Status: SHIPPED | OUTPATIENT
Start: 2022-03-15 | End: 2022-04-22

## 2022-04-15 ENCOUNTER — VIRTUAL VISIT (OUTPATIENT)
Dept: PSYCHIATRY | Facility: OTHER | Age: 42
End: 2022-04-15
Attending: PSYCHIATRY & NEUROLOGY
Payer: COMMERCIAL

## 2022-04-15 VITALS — WEIGHT: 125 LBS | HEIGHT: 64 IN | BODY MASS INDEX: 21.34 KG/M2

## 2022-04-15 DIAGNOSIS — G47.00 PERSISTENT INSOMNIA: ICD-10-CM

## 2022-04-15 DIAGNOSIS — F90.2 ADHD (ATTENTION DEFICIT HYPERACTIVITY DISORDER), COMBINED TYPE: Primary | ICD-10-CM

## 2022-04-15 PROCEDURE — 99214 OFFICE O/P EST MOD 30 MIN: CPT | Mod: 95 | Performed by: PSYCHIATRY & NEUROLOGY

## 2022-04-15 RX ORDER — ATOMOXETINE 25 MG/1
25 CAPSULE ORAL DAILY
Qty: 30 CAPSULE | Refills: 3 | Status: SHIPPED | OUTPATIENT
Start: 2022-04-15 | End: 2022-08-10

## 2022-04-15 RX ORDER — TEMAZEPAM 7.5 MG/1
7.5 CAPSULE ORAL
Qty: 30 CAPSULE | Refills: 3 | Status: SHIPPED | OUTPATIENT
Start: 2022-04-15 | End: 2022-07-20

## 2022-04-15 ASSESSMENT — ANXIETY QUESTIONNAIRES
IF YOU CHECKED OFF ANY PROBLEMS ON THIS QUESTIONNAIRE, HOW DIFFICULT HAVE THESE PROBLEMS MADE IT FOR YOU TO DO YOUR WORK, TAKE CARE OF THINGS AT HOME, OR GET ALONG WITH OTHER PEOPLE: SOMEWHAT DIFFICULT
7. FEELING AFRAID AS IF SOMETHING AWFUL MIGHT HAPPEN: NOT AT ALL
4. TROUBLE RELAXING: NEARLY EVERY DAY
5. BEING SO RESTLESS THAT IT IS HARD TO SIT STILL: NEARLY EVERY DAY
3. WORRYING TOO MUCH ABOUT DIFFERENT THINGS: NEARLY EVERY DAY
1. FEELING NERVOUS, ANXIOUS, OR ON EDGE: NOT AT ALL
6. BECOMING EASILY ANNOYED OR IRRITABLE: NOT AT ALL
2. NOT BEING ABLE TO STOP OR CONTROL WORRYING: NEARLY EVERY DAY
GAD7 TOTAL SCORE: 12

## 2022-04-15 ASSESSMENT — PATIENT HEALTH QUESTIONNAIRE - PHQ9: SUM OF ALL RESPONSES TO PHQ QUESTIONS 1-9: 11

## 2022-04-15 ASSESSMENT — PAIN SCALES - GENERAL: PAINLEVEL: NO PAIN (0)
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
## 2022-04-22 DIAGNOSIS — F10.920 ALCOHOLIC INTOXICATION WITHOUT COMPLICATION (H): ICD-10-CM

## 2022-04-22 DIAGNOSIS — R79.0 LOW MAGNESIUM LEVEL: ICD-10-CM

## 2022-04-22 RX ORDER — FOLIC ACID 1 MG/1
1 TABLET ORAL DAILY
Qty: 30 TABLET | Refills: 6 | Status: SHIPPED | OUTPATIENT
Start: 2022-04-22 | End: 2022-11-07

## 2022-04-22 RX ORDER — MAGNESIUM OXIDE 400 MG/1
TABLET ORAL
Qty: 60 TABLET | Refills: 0 | Status: SHIPPED | OUTPATIENT
Start: 2022-04-22 | End: 2022-06-13

## 2022-04-22 NOTE — TELEPHONE ENCOUNTER
magnesium oxide (MAG-OX) 400 MG tablet        Last Written Prescription Date:  3/15/22  Last Fill Quantity: 60,   # refills: 0  Last Office Visit: 12/1/21  Future Office visit:       Routing refill request to provider for review/approval because:    No protocol provided

## 2022-05-10 DIAGNOSIS — K21.9 GASTROESOPHAGEAL REFLUX DISEASE WITHOUT ESOPHAGITIS: ICD-10-CM

## 2022-05-11 RX ORDER — OMEPRAZOLE 40 MG/1
CAPSULE, DELAYED RELEASE ORAL
Qty: 30 CAPSULE | Refills: 3 | Status: SHIPPED | OUTPATIENT
Start: 2022-05-11 | End: 2022-08-12

## 2022-05-11 NOTE — TELEPHONE ENCOUNTER
"Request for medication refill: Omeprazole    Providers if patient needs an appointment and you are willing to give a one month supply please refill for one month and  send a letter/MyChart using \".SMILLIMITEDREFILL\" .smillimited and route chart to \"P SMI \" (Giving one month refill in non controlled medications is strongly recommended before denial)    If refill has been denied, meaning absolutely no refills without visit, please complete the smart phrase \".smirxrefuse\" and route it to the \"P SMI MED REFILLS\"  pool to inform the patient and the pharmacy.    Jewels Hawk        "

## 2022-06-10 DIAGNOSIS — R79.0 LOW MAGNESIUM LEVEL: ICD-10-CM

## 2022-06-13 RX ORDER — MAGNESIUM OXIDE 400 MG/1
TABLET ORAL
Qty: 60 TABLET | Refills: 0 | Status: SHIPPED | OUTPATIENT
Start: 2022-06-13 | End: 2022-07-15

## 2022-06-13 NOTE — TELEPHONE ENCOUNTER
Mag Ox      Last Written Prescription Date:  4.22.22  Last Fill Quantity: #60,   # refills: 0  Last Office Visit: 12.1.21  Future Office visit:       Routing refill request to provider for review/approval because:  Drug not on the [facility], P or [facility] refill protocol or controlled substance    
5

## 2022-06-28 DIAGNOSIS — G43.009 MIGRAINE WITHOUT AURA AND WITHOUT STATUS MIGRAINOSUS, NOT INTRACTABLE: ICD-10-CM

## 2022-06-28 RX ORDER — PROPRANOLOL HYDROCHLORIDE 80 MG/1
CAPSULE, EXTENDED RELEASE ORAL
Qty: 30 CAPSULE | Refills: 1 | Status: SHIPPED | OUTPATIENT
Start: 2022-06-28 | End: 2022-11-16

## 2022-07-09 ENCOUNTER — HEALTH MAINTENANCE LETTER (OUTPATIENT)
Age: 42
End: 2022-07-09

## 2022-07-13 DIAGNOSIS — R79.0 LOW MAGNESIUM LEVEL: ICD-10-CM

## 2022-07-15 RX ORDER — MAGNESIUM OXIDE 400 MG/1
TABLET ORAL
Qty: 60 TABLET | Refills: 0 | Status: SHIPPED | OUTPATIENT
Start: 2022-07-15 | End: 2022-08-12

## 2022-07-15 NOTE — TELEPHONE ENCOUNTER
Mag OX tablet     Last Written Prescription Date:  6.13.2022  Last Fill Quantity: 60,   # refills: 0  Last Office Visit: 12.1.2021  Future Office visit:       Routing refill request to provider for review/approval because:  Drug not on the [facility], [facility] or [facility] refill protocol or controlled substance    Joann Pollard RN

## 2022-07-20 ENCOUNTER — TELEPHONE (OUTPATIENT)
Dept: FAMILY MEDICINE | Facility: OTHER | Age: 42
End: 2022-07-20

## 2022-07-20 DIAGNOSIS — F41.9 ANXIETY: Primary | ICD-10-CM

## 2022-07-20 DIAGNOSIS — G47.00 PERSISTENT INSOMNIA: ICD-10-CM

## 2022-07-20 RX ORDER — TEMAZEPAM 7.5 MG/1
7.5 CAPSULE ORAL
Qty: 30 CAPSULE | Refills: 3 | Status: SHIPPED | OUTPATIENT
Start: 2022-07-20 | End: 2022-11-08

## 2022-07-20 RX ORDER — LORAZEPAM 0.5 MG/1
0.5 TABLET ORAL EVERY 6 HOURS PRN
Qty: 30 TABLET | Refills: 0 | Status: SHIPPED | OUTPATIENT
Start: 2022-07-20 | End: 2022-11-16

## 2022-07-20 NOTE — TELEPHONE ENCOUNTER
Ativan 0.5mg      Last Written Prescription Date:  8/31/21  Last Fill Quantity: 30,   # refills: 0  Last Office Visit: 10/22/21  Future Office visit:       Routing refill request to provider for review/approval because:  Drug not on the [facility], [facility] or [facility] refill protocol or controlled substance

## 2022-07-20 NOTE — TELEPHONE ENCOUNTER
Temazepam (Restoril) 7.5 mg capsule  Take 1 capsule (7.5 mg) by mouth nightly as needed for sleep  Last Written Prescription Date:  4-  Last Fill Quantity: 30 capsule ,   # refills: 3  Last Office Visit: 4-  Future Office visit:

## 2022-07-20 NOTE — TELEPHONE ENCOUNTER
"Patient called stating her anxiety if severely heightened.  Relays having a few recent life events that have her feeling that she \"just can't handle things right now\".  States she is not able to leave her home d/t the anxiety.  States Ativan was effective in helping her calm down & focus. Requesting a refill. Writer sent refill request to Provider Maury for review.    Patient would also like to schedule an appointment with Provider Maury.  Please call to schedule to [phone]    Thank you  "

## 2022-07-21 NOTE — TELEPHONE ENCOUNTER
Will you please schedule patient with Razia Mendiola in August. I am unsure if this should be a return or new pt visit. Or can this be done as virtual?  Patient of Brandy Abebe. Please see previous notes &  Schedule.  Thank you!

## 2022-07-21 NOTE — TELEPHONE ENCOUNTER
Patient scheduled 8/199/22 at 11:00 with Razia Mendiola. Notified patient Razia sent refills to [facility] in [place]. She is thankful for assistance of Razia and Triage RN for addressing situation promptly.

## 2022-08-10 DIAGNOSIS — R79.0 LOW MAGNESIUM LEVEL: ICD-10-CM

## 2022-08-10 DIAGNOSIS — F90.2 ADHD (ATTENTION DEFICIT HYPERACTIVITY DISORDER), COMBINED TYPE: ICD-10-CM

## 2022-08-10 DIAGNOSIS — K21.9 GASTROESOPHAGEAL REFLUX DISEASE WITHOUT ESOPHAGITIS: ICD-10-CM

## 2022-08-10 RX ORDER — ATOMOXETINE 25 MG/1
25 CAPSULE ORAL DAILY
Qty: 30 CAPSULE | Refills: 3 | Status: SHIPPED | OUTPATIENT
Start: 2022-08-10 | End: 2022-11-16

## 2022-08-10 NOTE — TELEPHONE ENCOUNTER
Strattera       Last Written Prescription Date:  4/15/22  Last Fill Quantity: 30,   # refills: 3  Last Office Visit: 4/15/22  Future Office visit:    Next 5 appointments (look out 90 days)    Aug 19, 2022 11:00 AM  (Arrive by 10:45 AM)  Return Visit with CARLITO Cervantes CNP  [facility] ([facility] ) [address] [place] [id number] [phone]

## 2022-08-12 RX ORDER — MAGNESIUM OXIDE 400 MG/1
TABLET ORAL
Qty: 60 TABLET | Refills: 0 | Status: SHIPPED | OUTPATIENT
Start: 2022-08-12 | End: 2022-09-30

## 2022-08-12 RX ORDER — OMEPRAZOLE 40 MG/1
CAPSULE, DELAYED RELEASE ORAL
Qty: 30 CAPSULE | Refills: 3 | Status: SHIPPED | OUTPATIENT
Start: 2022-08-12 | End: 2023-03-13

## 2022-09-04 ENCOUNTER — HEALTH MAINTENANCE LETTER (OUTPATIENT)
Age: 42
End: 2022-09-04

## 2022-09-12 DIAGNOSIS — R79.0 LOW MAGNESIUM LEVEL: ICD-10-CM

## 2022-09-30 RX ORDER — MAGNESIUM OXIDE 400 MG/1
TABLET ORAL
Qty: 60 TABLET | Refills: 0 | Status: SHIPPED | OUTPATIENT
Start: 2022-09-30 | End: 2023-03-13

## 2022-11-07 DIAGNOSIS — F10.920 ALCOHOLIC INTOXICATION WITHOUT COMPLICATION (H): ICD-10-CM

## 2022-11-07 DIAGNOSIS — F41.1 GAD (GENERALIZED ANXIETY DISORDER): ICD-10-CM

## 2022-11-07 DIAGNOSIS — G47.00 PERSISTENT INSOMNIA: ICD-10-CM

## 2022-11-07 RX ORDER — DULOXETIN HYDROCHLORIDE 60 MG/1
60 CAPSULE, DELAYED RELEASE ORAL DAILY
Qty: 30 CAPSULE | Refills: 11 | Status: SHIPPED | OUTPATIENT
Start: 2022-11-07 | End: 2023-11-06

## 2022-11-07 RX ORDER — FOLIC ACID 1 MG/1
1 TABLET ORAL DAILY
Qty: 30 TABLET | Refills: 6 | Status: SHIPPED | OUTPATIENT
Start: 2022-11-07 | End: 2023-08-04

## 2022-11-08 RX ORDER — TEMAZEPAM 7.5 MG/1
7.5 CAPSULE ORAL
Qty: 30 CAPSULE | Refills: 2 | Status: SHIPPED | OUTPATIENT
Start: 2022-11-08 | End: 2023-01-03

## 2022-11-08 NOTE — TELEPHONE ENCOUNTER
Restoril      Last Written Prescription Date:  10.10.22  Last Fill Quantity: #30,   # refills: 0  Last Office Visit: 4.15.22  Future Office visit:       Routing refill request to provider for review/approval because:  Drug not on the G, P or [facility] refill protocol or controlled substance

## 2022-11-16 ENCOUNTER — VIRTUAL VISIT (OUTPATIENT)
Dept: PSYCHIATRY | Facility: OTHER | Age: 42
End: 2022-11-16
Attending: PSYCHIATRY & NEUROLOGY
Payer: COMMERCIAL

## 2022-11-16 DIAGNOSIS — F17.210 CIGARETTE NICOTINE DEPENDENCE WITHOUT COMPLICATION: ICD-10-CM

## 2022-11-16 DIAGNOSIS — F90.2 ADHD (ATTENTION DEFICIT HYPERACTIVITY DISORDER), COMBINED TYPE: Primary | ICD-10-CM

## 2022-11-16 PROCEDURE — 99213 OFFICE O/P EST LOW 20 MIN: CPT | Mod: 93 | Performed by: PSYCHIATRY & NEUROLOGY

## 2022-11-16 RX ORDER — METHYLPHENIDATE HYDROCHLORIDE 20 MG/1
20 CAPSULE, EXTENDED RELEASE ORAL DAILY
Qty: 30 CAPSULE | Refills: 0 | Status: SHIPPED | OUTPATIENT
Start: 2022-12-14 | End: 2023-01-13

## 2022-11-16 RX ORDER — METHYLPHENIDATE HYDROCHLORIDE 20 MG/1
20 CAPSULE, EXTENDED RELEASE ORAL DAILY
Qty: 30 CAPSULE | Refills: 0 | Status: SHIPPED | OUTPATIENT
Start: 2023-01-11 | End: 2023-02-10

## 2022-11-16 RX ORDER — METHYLPHENIDATE HYDROCHLORIDE 20 MG/1
20 CAPSULE, EXTENDED RELEASE ORAL DAILY
Qty: 30 CAPSULE | Refills: 0 | Status: SHIPPED | OUTPATIENT
Start: 2022-11-16 | End: 2022-12-16

## 2022-11-16 ASSESSMENT — PAIN SCALES - GENERAL: PAINLEVEL: NO PAIN (0)
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
## 2022-11-16 NOTE — NURSING NOTE
"Chief Complaint   Patient presents with     RECHECK     Telephone visit.  ADHD, anxiety, depression.  Medication review.       Initial There were no vitals taken for this visit. Estimated body mass index is 21.46 kg/m  as calculated from the following:    Height as of 4/15/22: 1.626 m (5' 4\").    Weight as of 4/15/22: 56.7 kg (125 lb).  Medication Reconciliation: complete  JADA CASTRO LPN    "

## 2022-12-05 DIAGNOSIS — K21.9 GASTROESOPHAGEAL REFLUX DISEASE WITHOUT ESOPHAGITIS: ICD-10-CM

## 2022-12-05 RX ORDER — OMEPRAZOLE 40 MG/1
CAPSULE, DELAYED RELEASE ORAL
Qty: 30 CAPSULE | Refills: 3 | OUTPATIENT
Start: 2022-12-05

## 2022-12-05 NOTE — TELEPHONE ENCOUNTER
"Request for medication refill:  omeprazole (PRILOSEC) 40 MG DR capsule    Providers if patient needs an appointment and you are willing to give a one month supply please refill for one month and  send a letter/MyChart using \".SMILLIMITEDREFILL\" .smillimited and route chart to \"[facility] \" (Giving one month refill in non controlled medications is strongly recommended before denial)    If refill has been denied, meaning absolutely no refills without visit, please complete the smart phrase \".smirxrefuse\" and route it to the \"[facility] MED REFILLS\"  pool to inform the patient and the pharmacy.    Reina Main MA        "

## 2022-12-05 NOTE — TELEPHONE ENCOUNTER
Medications are being managed by Adrienne Dailey CNP, who does not work in the same clinic as this provider. Referring script back to patient's primary provider for care continuity.     Aidee Cruz MD

## 2022-12-26 NOTE — TELEPHONE ENCOUNTER
Ativan - Not on med list  Last visit: 5.10.21  Last refill: 6.7.21 per [place]     Mag-Ox - Not on med list. Last Magnesium level 9/14/19  Last refill: 3.23.21    Future appointments:        Yes

## 2022-12-30 DIAGNOSIS — R79.0 LOW MAGNESIUM LEVEL: ICD-10-CM

## 2022-12-30 DIAGNOSIS — K21.9 GASTROESOPHAGEAL REFLUX DISEASE WITHOUT ESOPHAGITIS: ICD-10-CM

## 2022-12-30 DIAGNOSIS — G47.00 PERSISTENT INSOMNIA: ICD-10-CM

## 2022-12-30 RX ORDER — MAGNESIUM OXIDE 400 MG/1
TABLET ORAL
Qty: 60 TABLET | Refills: 0 | OUTPATIENT
Start: 2022-12-30

## 2022-12-30 RX ORDER — OMEPRAZOLE 40 MG/1
CAPSULE, DELAYED RELEASE ORAL
Qty: 30 CAPSULE | Refills: 3 | OUTPATIENT
Start: 2022-12-30

## 2022-12-30 NOTE — TELEPHONE ENCOUNTER
Patient last seen at [facility] 10/22/21    Patient should contact clinic for appointment    Joselin Bowen RN

## 2023-01-03 RX ORDER — TEMAZEPAM 7.5 MG/1
7.5 CAPSULE ORAL
Qty: 30 CAPSULE | Refills: 2 | Status: SHIPPED | OUTPATIENT
Start: 2023-01-27 | End: 2023-03-27

## 2023-01-03 NOTE — TELEPHONE ENCOUNTER
TEMAZEPAM 7.5 MG CAPSULE 7.5 Capsule      Last Written Prescription Date:  11/8/22  Last Fill Quantity: 30,   # refills: 2  Last Office Visit: 11/16/22  Future Office visit:       Routing refill request to provider for review/approval because:    Drug not on the [facility], P or [facility] refill protocol or controlled substance

## 2023-01-09 DIAGNOSIS — R79.0 LOW MAGNESIUM LEVEL: ICD-10-CM

## 2023-01-09 DIAGNOSIS — K21.9 GASTROESOPHAGEAL REFLUX DISEASE WITHOUT ESOPHAGITIS: ICD-10-CM

## 2023-01-09 RX ORDER — OMEPRAZOLE 40 MG/1
CAPSULE, DELAYED RELEASE ORAL
Qty: 30 CAPSULE | Refills: 3 | OUTPATIENT
Start: 2023-01-09

## 2023-01-09 RX ORDER — MAGNESIUM OXIDE 400 MG/1
TABLET ORAL
Qty: 60 TABLET | Refills: 0 | OUTPATIENT
Start: 2023-01-09

## 2023-01-15 ENCOUNTER — HEALTH MAINTENANCE LETTER (OUTPATIENT)
Age: 43
End: 2023-01-15

## 2023-02-23 ENCOUNTER — TRANSFERRED RECORDS (OUTPATIENT)
Dept: MULTI SPECIALTY CLINIC | Facility: CLINIC | Age: 43
End: 2023-02-23

## 2023-02-24 DIAGNOSIS — F90.2 ADHD (ATTENTION DEFICIT HYPERACTIVITY DISORDER), COMBINED TYPE: Primary | ICD-10-CM

## 2023-02-24 NOTE — TELEPHONE ENCOUNTER
RITALIN 20mg      Last Written Prescription Date:  unknown  Last Fill Quantity: unknown,   # refills: unknown  Last Office Visit: 7/7/22  Future Office visit:       Routing refill request to provider for review/approval because:

## 2023-02-25 RX ORDER — METHYLPHENIDATE HCL 20 MG
CAPSULE,EXTENDED RELEASE BIPHASIC 50-50 ORAL
Qty: 30 CAPSULE | Refills: 0 | Status: SHIPPED | OUTPATIENT
Start: 2023-02-25 | End: 2023-03-22

## 2023-03-02 ENCOUNTER — NURSE TRIAGE (OUTPATIENT)
Dept: FAMILY MEDICINE | Facility: OTHER | Age: 43
End: 2023-03-02

## 2023-03-02 NOTE — TELEPHONE ENCOUNTER
"Patient called with symptoms of severe anxiety. Patient reports history of anxiety with worsening symptoms the last couple weeks after father passed. Patient reports anxiety level between 8-10 today. Patient denies any thoughts of harming or killing self or others. PCP and mental health provider out of the office. Patient advised to be seen in UC/ED for evaluation. Patient understood and agreeable to plan.    Reason for Disposition    Patient sounds very upset or troubled to the triager    Additional Information    Negative: SEVERE difficulty breathing (e.g., struggling for each breath, speaks in single words)    Negative: Bluish (or gray) lips or face    Negative: Difficult to awaken or acting confused (e.g., disoriented, slurred speech)    Negative: Hysterical or combative behavior    Negative: Sounds like a life-threatening emergency to the triager    Negative: Chest pain    Negative: Palpitations, skipped heart beat, or rapid heart beat    Negative: Cough is main symptom    Negative: Suicide thoughts, threats, attempts, or questions    Negative: Depression is main problem or symptom (e.g., feelings of sadness or hopelessness)    Negative: Difficulty breathing and persists > 10 minutes and not relieved by reassurance provided by triager    Negative: Lightheadedness or dizziness and persists > 10 minutes and not relieved by reassurance provided by triager    Negative: Substance use (drug use) or unhealthy alcohol use, known or suspected, and feeling very shaky (i.e., visible tremors of hands)    Negative: Patient sounds very sick or weak to the triager    Answer Assessment - Initial Assessment Questions  1. CONCERN: \"Did anything happen that prompted you to call today?\"       Last couple weeks patient has had anxiety attacks  2. ANXIETY SYMPTOMS: \"Can you describe how you (your loved one; patient) have been feeling?\" (e.g., tense, restless, panicky, anxious, keyed up, overwhelmed, sense of impending doom).       " "Panicky, anxious, restless  3. ONSET: \"How long have you been feeling this way?\" (e.g., hours, days, weeks)      Couple weeks  4. SEVERITY: \"How would you rate the level of anxiety?\" (e.g., 0 - 10; or mild, moderate, severe).      Yesterday 10, therapy this morning 8  5. FUNCTIONAL IMPAIRMENT: \"How have these feelings affected your ability to do daily activities?\" \"Have you had more difficulty than usual doing your normal daily activities?\" (e.g., getting better, same, worse; self-care, school, work, interactions)      yes  6. HISTORY: \"Have you felt this way before?\" \"Have you ever been diagnosed with an anxiety problem in the past?\" (e.g., generalized anxiety disorder, panic attacks, PTSD). If Yes, ask: \"How was this problem treated?\" (e.g., medicines, counseling, etc.)      Yes history of anxiety  7. RISK OF HARM - SUICIDAL IDEATION: \"Do you ever have thoughts of hurting or killing yourself?\" If Yes, ask:  \"Do you have these feelings now?\" \"Do you have a plan on how you would do this?\"      no  8. TREATMENT:  \"What has been done so far to treat this anxiety?\" (e.g., medicines, relaxation strategies). \"What has helped?\"      Saw counselor bailey smith  9. TREATMENT - THERAPIST: \"Do you have a counselor or therapist? Name?\"      Yes [facility]  10. POTENTIAL TRIGGERS: \"Do you drink caffeinated beverages (e.g., coffee, boni, teas), and how much daily?\" \"Do you drink alcohol or use any drugs?\" \"Have you started any new medicines recently?\"      No caffine, no alcohol, smoked pot- yesterday.  10. PATIENT SUPPORT: \"Who is with you now?\" \"Who do you live with?\" \"Do you have family or friends who you can talk to?\"         Lives with boyfriend and is very supported.  11. OTHER SYMPTOMS: \"Do you have any other symptoms?\" (e.g., feeling depressed, trouble concentrating, trouble sleeping, trouble breathing, palpitations or fast heartbeat, chest pain, sweating, nausea, or diarrhea)        Nauseated, " "decreased appetite, elevated HR  12. PREGNANCY: \"Is there any chance you are pregnant?\" \"When was your last menstrual period?\"        no    Protocols used: ANXIETY AND PANIC ATTACK-A-[place]    "

## 2023-03-13 ENCOUNTER — OFFICE VISIT (OUTPATIENT)
Dept: FAMILY MEDICINE | Facility: OTHER | Age: 43
End: 2023-03-13
Attending: NURSE PRACTITIONER
Payer: COMMERCIAL

## 2023-03-13 VITALS
SYSTOLIC BLOOD PRESSURE: 110 MMHG | WEIGHT: 124.4 LBS | DIASTOLIC BLOOD PRESSURE: 77 MMHG | HEART RATE: 98 BPM | TEMPERATURE: 98.7 F | RESPIRATION RATE: 18 BRPM | BODY MASS INDEX: 21.35 KG/M2 | OXYGEN SATURATION: 99 %

## 2023-03-13 DIAGNOSIS — R79.0 LOW MAGNESIUM LEVEL: ICD-10-CM

## 2023-03-13 DIAGNOSIS — Z12.31 ENCOUNTER FOR SCREENING MAMMOGRAM FOR MALIGNANT NEOPLASM OF BREAST: ICD-10-CM

## 2023-03-13 DIAGNOSIS — F41.9 ANXIETY: Primary | ICD-10-CM

## 2023-03-13 DIAGNOSIS — F51.01 PRIMARY INSOMNIA: ICD-10-CM

## 2023-03-13 DIAGNOSIS — K21.9 GASTROESOPHAGEAL REFLUX DISEASE WITHOUT ESOPHAGITIS: ICD-10-CM

## 2023-03-13 LAB
ALBUMIN SERPL BCG-MCNC: 4.2 G/DL (ref 3.5–5.2)
ALP SERPL-CCNC: 63 U/L (ref 35–104)
ALT SERPL W P-5'-P-CCNC: 11 U/L (ref 10–35)
ANION GAP SERPL CALCULATED.3IONS-SCNC: 13 MMOL/L (ref 7–15)
AST SERPL W P-5'-P-CCNC: 21 U/L (ref 10–35)
BILIRUB SERPL-MCNC: <0.2 MG/DL
BUN SERPL-MCNC: 8.2 MG/DL (ref 6–20)
CALCIUM SERPL-MCNC: 9.8 MG/DL (ref 8.6–10)
CHLORIDE SERPL-SCNC: 98 MMOL/L (ref 98–107)
CREAT SERPL-MCNC: 0.73 MG/DL (ref 0.51–0.95)
DEPRECATED HCO3 PLAS-SCNC: 28 MMOL/L (ref 22–29)
GFR SERPL CREATININE-BSD FRML MDRD: >90 ML/MIN/1.73M2
GLUCOSE SERPL-MCNC: 93 MG/DL (ref 70–99)
HOLD SPECIMEN: NORMAL
MAGNESIUM SERPL-MCNC: 2 MG/DL (ref 1.7–2.3)
POTASSIUM SERPL-SCNC: 3.2 MMOL/L (ref 3.4–5.3)
PROT SERPL-MCNC: 6.5 G/DL (ref 6.4–8.3)
SODIUM SERPL-SCNC: 139 MMOL/L (ref 136–145)
TSH SERPL DL<=0.005 MIU/L-ACNC: 0.74 UIU/ML (ref 0.3–4.2)

## 2023-03-13 PROCEDURE — 83735 ASSAY OF MAGNESIUM: CPT | Mod: ZL | Performed by: NURSE PRACTITIONER

## 2023-03-13 PROCEDURE — 80053 COMPREHEN METABOLIC PANEL: CPT | Mod: ZL | Performed by: NURSE PRACTITIONER

## 2023-03-13 PROCEDURE — 36415 COLL VENOUS BLD VENIPUNCTURE: CPT | Mod: ZL | Performed by: NURSE PRACTITIONER

## 2023-03-13 PROCEDURE — G0463 HOSPITAL OUTPT CLINIC VISIT: HCPCS

## 2023-03-13 PROCEDURE — 84443 ASSAY THYROID STIM HORMONE: CPT | Mod: ZL | Performed by: NURSE PRACTITIONER

## 2023-03-13 PROCEDURE — 99215 OFFICE O/P EST HI 40 MIN: CPT | Performed by: NURSE PRACTITIONER

## 2023-03-13 RX ORDER — LORAZEPAM 0.5 MG/1
0.5 TABLET ORAL EVERY 8 HOURS PRN
Qty: 30 TABLET | Refills: 0 | Status: SHIPPED | OUTPATIENT
Start: 2023-03-13 | End: 2023-08-02 | Stop reason: DRUGHIGH

## 2023-03-13 RX ORDER — OMEPRAZOLE 40 MG/1
CAPSULE, DELAYED RELEASE ORAL
Qty: 90 CAPSULE | Refills: 1 | Status: SHIPPED | OUTPATIENT
Start: 2023-03-13 | End: 2023-08-24

## 2023-03-13 RX ORDER — DULOXETIN HYDROCHLORIDE 30 MG/1
60 CAPSULE, DELAYED RELEASE ORAL
COMMUNITY
End: 2023-04-04

## 2023-03-13 RX ORDER — MAGNESIUM OXIDE 400 MG/1
TABLET ORAL
Qty: 60 TABLET | Refills: 1 | Status: SHIPPED | OUTPATIENT
Start: 2023-03-13 | End: 2023-05-26

## 2023-03-13 ASSESSMENT — ANXIETY QUESTIONNAIRES
GAD7 TOTAL SCORE: 21
6. BECOMING EASILY ANNOYED OR IRRITABLE: NEARLY EVERY DAY
1. FEELING NERVOUS, ANXIOUS, OR ON EDGE: NEARLY EVERY DAY
2. NOT BEING ABLE TO STOP OR CONTROL WORRYING: NEARLY EVERY DAY
IF YOU CHECKED OFF ANY PROBLEMS ON THIS QUESTIONNAIRE, HOW DIFFICULT HAVE THESE PROBLEMS MADE IT FOR YOU TO DO YOUR WORK, TAKE CARE OF THINGS AT HOME, OR GET ALONG WITH OTHER PEOPLE: EXTREMELY DIFFICULT
7. FEELING AFRAID AS IF SOMETHING AWFUL MIGHT HAPPEN: NEARLY EVERY DAY
5. BEING SO RESTLESS THAT IT IS HARD TO SIT STILL: NEARLY EVERY DAY
3. WORRYING TOO MUCH ABOUT DIFFERENT THINGS: NEARLY EVERY DAY
GAD7 TOTAL SCORE: 21

## 2023-03-13 ASSESSMENT — PATIENT HEALTH QUESTIONNAIRE - PHQ9
SUM OF ALL RESPONSES TO PHQ QUESTIONS 1-9: 15
5. POOR APPETITE OR OVEREATING: NEARLY EVERY DAY

## 2023-03-13 ASSESSMENT — PAIN SCALES - GENERAL: PAINLEVEL: NO PAIN (0)

## 2023-03-13 NOTE — PATIENT INSTRUCTIONS
Assessment & Plan          Low magnesium level  - magnesium oxide (MAG-OX) 400 MG tablet; TAKE 2 TABLETS BY MOUTH IN THE EVENING. Strength: 400 mg  - Magnesium; Future      Gastroesophageal reflux disease without esophagitis  - omeprazole (PRILOSEC) 40 MG DR capsule; TAKE ONE CAPSULE WITH BREAKFAST DAILY Strength: 40 mg      Anxiety  - TSH with free T4 reflex; Future  - LORazepam (ATIVAN) 0.5 MG tablet; Take 1 tablet (0.5 mg) by mouth every 8 hours as needed for anxiety      Encounter for screening mammogram for malignant neoplasm of breast  - MA Screen Bilateral w/Lyndon; Future      Insomnia  - Out of Restoril, cannot fill yet, as she used it for anxiety  - CMP ordered        Hold Ritalin until you see Dr Abebe  Use Ativan on a limited basis  Please make arrangements to see a counselor  To ER with any increased concerns or crisis situation      Please schedule a pap          Adrienne Dailey CNP  [facility]

## 2023-03-13 NOTE — PROGRESS NOTES
Assessment & Plan          Low magnesium level  - magnesium oxide (MAG-OX) 400 MG tablet; TAKE 2 TABLETS BY MOUTH IN THE EVENING. Strength: 400 mg  - Magnesium; Future      Gastroesophageal reflux disease without esophagitis  - omeprazole (PRILOSEC) 40 MG DR capsule; TAKE ONE CAPSULE WITH BREAKFAST DAILY Strength: 40 mg      Anxiety  - TSH with free T4 reflex; Future  - LORazepam (ATIVAN) 0.5 MG tablet; Take 1 tablet (0.5 mg) by mouth every 8 hours as needed for anxiety      Encounter for screening mammogram for malignant neoplasm of breast  - MA Screen Bilateral w/[facility]; Future      Insomnia  - Out of Restoril, cannot fill yet, as she used it for anxiety  - CMP ordered        Hold Ritalin until you see Dr Abebe  Use Ativan on a limited basis  Please make arrangements to see a counselor  To ER with any increased concerns or crisis situation      Please schedule a pap        40  minutes spent on the date of the encounter doing chart review, review of outside records, review of test results, interpretation of tests, patient visit and documentation         Adrienne Dailey, CNP  [facility] - [place] GIULIANO Hodge is a 43 year old, presenting for the following health issues:  Chronic Disease Management          Depression and Anxiety Follow-Up    How are you doing with your depression since your last visit? Worsened     How are you doing with your anxiety since your last visit?  Worsened     Are you having other symptoms that might be associated with depression or anxiety? Yes:  panick attacks , weight loss    Have you had a significant life event? Grief or Loss     Do you have any concerns with your use of alcohol or other drugs? No        She sees Dr. Abebe for mental health medication management  She is working on establishing a counselor to talk to, and resources are given today        Episodes of increased heart rate, panic attacks, temptation to drink but hasnt        Social History     Tobacco Use      Smoking status: Every Day     Packs/day: 0.40     Years: 10.00     Pack years: 4.00     Types: Cigarettes     Last attempt to quit: 2020     Years since quitting: 3.0     Smokeless tobacco: Never     Tobacco comments:     tobacco cessation discuseed - tried to quit: no - passive smoke exposure quitting on own    Substance Use Topics     Alcohol use: No     Drug use: No           PHQ 10/22/2021 4/15/2022 3/13/2023   PHQ-9 Total Score 15 11 15   Q9: Thoughts of better off dead/self-harm past 2 weeks Not at all Not at all Not at all   F/U: Thoughts of suicide or self-harm - - -   F/U: Safety concerns - - -       MAYKEL-7 SCORE 2021 2021 3/13/2023   Total Score 8 13 21           GERD/Heartburn  Onset/Duration: prior  Accompanying Signs & Symptoms:  Does it feel like food gets stuck or trouble swallowing: No  Nausea: No  Vomiting (bloody?): No  Abdominal Pain: No  Black-Tarry stools: No  Bloody stools: No  History:  Previous ulcers: No  Therapies tried and outcome:             Medications: Omeprazole (Prilosec)        Patient Active Problem List   Diagnosis     Migraine with aura and without status migrainosus, not intractable     Primary insomnia     ACP (advance care planning)     Gastroesophageal reflux disease, esophagitis presence not specified     Recurrent major depressive disorder (H)     Tobacco abuse     Hydronephrosis     Panic disorder without agoraphobia     Attention deficit hyperactivity disorder (ADHD), combined type     Hot flashes---2021     History of COVID-19 - 21     Anxiety     Alcoholic intoxication without complication (H)     Depression, unspecified depression type     Eating disorder, unspecified type     Past Surgical History:   Procedure Laterality Date      SECTION N/A 2000      SECTION N/A 2012    postop hemm with ruptured R Ov vein, transfusion     CHOLECYSTECTOMY       COLONOSCOPY N/A 2019    Procedure: COLONOSCOPY DIAGNOSTIC WITH  RANDOM COLON BIOPSIES ANOSCOPY; Surgeon: Igor Burgos MD; Location: Merged with [facility] OR       COLPOSCOPY CERVIX, BIOPSY CERVIX, ENDOCERVICAL CURETTAGE, COMBINED N/A 11/20/2008     DILATION AND CURETTAGE, ABLATE ENDOMETRIUM NOVASURE, COMBINED N/A 09/19/2018    Novasure Endometrial ablation     ESOPHAGOSCOPY, GASTROSCOPY, DUODENOSCOPY (EGD), COMBINED  04/09/2019    Procedure: ESOPHAGOGASTRODUODENOSCOPY DIAGNOSTIC with biopsies; Surgeon: Igor Burgos MD; Location: Merged with [facility] OR       HYSTEROSCOPY DIAGNOSTIC N/A 08/01/2018    Hysteroscopy, D&C     LAPAROSCOPIC APPENDECTOMY N/A 11/28/2012     LAPAROSCOPIC HYSTERECTOMY SUPRACERVICAL N/A 6/11/2021    Procedure: laparoscopic supracervical hysterectomy;  Surgeon: Rito Soler MD;  Location: [facility] OR     OOPHORECTOMY Right 02/01/2012    post operative hemorrhage with ruptured ovarian vein,      SALPINGECTOMY Left 09/11/2019    Procedure: LAPAROSCOPY LEFT SALPINGECTOMY; Surgeon: Regina Sweeney MD; Location: Merged with [facility] OR         Social History     Tobacco Use     Smoking status: Every Day     Packs/day: 0.40     Years: 10.00     Pack years: 4.00     Types: Cigarettes     Last attempt to quit: 2/13/2020     Years since quitting: 3.0     Smokeless tobacco: Never     Tobacco comments:     tobacco cessation discuseed - tried to quit: no - passive smoke exposure quitting on own    Substance Use Topics     Alcohol use: No     Family History   Problem Relation Age of Onset     Hypertension Mother      Hyperlipidemia Mother      Cardiovascular Father      Hypertension Father      Hyperlipidemia Father      Heart Failure Father      Sleep Apnea Father      Kidney failure Father              Current Outpatient Medications   Medication Sig Dispense Refill     DULoxetine (CYMBALTA) 30 MG capsule Take 60 mg by mouth       DULoxetine (CYMBALTA) 60 MG capsule TAKE 1 CAPSULE (60 MG) BY MOUTH DAILY 30 capsule 11     folic acid (FOLVITE) 1 MG tablet TAKE 1 TABLET (1 MG) BY MOUTH DAILY 30 tablet 6      magnesium oxide (MAG-OX) 400 MG tablet TAKE 2 TABLETS BY MOUTH IN THE EVENING. 60 tablet 0     multivitamin w/minerals (MULTI-VITAMIN) tablet Take 1 tablet by mouth daily 90 tablet 3     nicotine polacrilex (NICORETTE) 4 MG gum Place 1 each (4 mg) inside cheek every hour as needed for smoking cessation 110 each 3     omeprazole (PRILOSEC) 40 MG DR capsule TAKE ONE CAPSULE WITH BREAKFAST DAILY 30 capsule 3     RITALIN LA 20 MG 24 hr capsule TAKE 1 CAPSULE (20MG) BY MOUTH ONCE DAILY 30 capsule 0     temazepam (RESTORIL) 7.5 MG capsule Take 1 capsule (7.5 mg) by mouth nightly as needed for sleep 30 capsule 2     UNABLE TO FIND MEDICATION NAME: Protandim Lifevantage           Allergies   Allergen Reactions     Trazodone Swelling     Also very congested      Dust Mite Extract      Hydrocodone Other (See Comments) and Itching     Skin felt like it was burning.     Penicillins Rash         Recent Labs   Lab Test 10/27/21  2101 10/22/21  1555 09/24/21  1211 09/22/21  0328 09/07/21  1500 09/07/21  1500 07/06/21  0406 06/01/21  1011 04/27/21  0928 08/24/20  1358   A1C  --   --   --   --   --   --   --  5.1  --   --    ALT  --  19  --  35  --  26 17 19 19  --    CR 0.76 0.75   < > 0.70   < > 0.65 0.66 0.80 0.72  --    GFRESTIMATED >90 >90   < > >90   < > >90 >90 >90 >90  --    GFRESTBLACK  --   --   --   --   --   --  >90 >90 >90  --    POTASSIUM 4.0 2.8*   < > 3.6   < > 4.0 3.0* 3.9 3.7  --    TSH  --   --   --   --   --   --   --   --  0.42 0.68    < > = values in this interval not displayed.          BP Readings from Last 3 Encounters:   03/13/23 110/77   10/27/21 117/76   10/22/21 126/79    Wt Readings from Last 3 Encounters:   03/13/23 56.4 kg (124 lb 6.4 oz)   04/15/22 56.7 kg (125 lb)   10/22/21 50.1 kg (110 lb 6.4 oz)              Review of Systems   Constitutional, HEENT, cardiovascular, pulmonary, gi and gu systems are negative, except as otherwise noted.          Objective    /77 (BP Location: Right arm,  Patient Position: Sitting, Cuff Size: Adult Regular)   Pulse 98   Temp 98.7  F (37.1  C) (Tympanic)   Resp 18   Wt 56.4 kg (124 lb 6.4 oz)   SpO2 99%   BMI 21.35 kg/m    Body mass index is 21.35 kg/m .         Physical Exam   GENERAL: healthy, alert and no distress  RESP: lungs clear to auscultation - no rales, rhonchi or wheezes  CV: regular rate and rhythm, normal S1 S2, no S3 or S4, no murmur, click or rub, no peripheral edema and peripheral pulses strong  PSYCH: tearful and anxious

## 2023-03-13 NOTE — LETTER
[facility]  [address]  [place] [address]  Phone: [phone]    March 13, 2023        Nasra Lovelace  [address]  [address]   [place] [id number]          To whom it may concern:    RE: Nasra Lovelace    The above patient is on prescription medication but has no restrictions regarding driving.     Please contact me for questions or concerns.      Sincerely,        Adrienne Dailey, CNP

## 2023-03-14 NOTE — RESULT ENCOUNTER NOTE
Potassium a bit low, increase dietary potassium    Other labs are normal    [facility]  [phone]

## 2023-03-20 DIAGNOSIS — F90.2 ADHD (ATTENTION DEFICIT HYPERACTIVITY DISORDER), COMBINED TYPE: ICD-10-CM

## 2023-03-22 RX ORDER — METHYLPHENIDATE HCL 20 MG
CAPSULE,EXTENDED RELEASE BIPHASIC 50-50 ORAL
Qty: 30 CAPSULE | Refills: 0 | Status: SHIPPED | OUTPATIENT
Start: 2023-03-22 | End: 2023-05-26

## 2023-03-22 NOTE — TELEPHONE ENCOUNTER
Ritalin      Last Written Prescription Date:  2.27.23  Last Fill Quantity: #27,   # refills: 0  Last Office Visit: 11.16.22  Future Office visit:       Routing refill request to provider for review/approval because:  Drug not on the G, P or [facility] refill protocol or controlled substance

## 2023-03-24 DIAGNOSIS — G47.00 PERSISTENT INSOMNIA: ICD-10-CM

## 2023-03-27 RX ORDER — TEMAZEPAM 7.5 MG/1
7.5 CAPSULE ORAL
Qty: 30 CAPSULE | Refills: 2 | Status: SHIPPED | OUTPATIENT
Start: 2023-03-27 | End: 2023-07-13

## 2023-03-27 NOTE — TELEPHONE ENCOUNTER
temazepam (RESTORIL) 7.5 MG capsule    Last Written Prescription Date:  1-3-23  Last Fill Quantity: 30,   # refills: 2  Last Office Visit: 3-13-23  Future Office visit:       Routing refill request to provider for review/approval because:  Drug not on the [facility], P or [facility] refill protocol or controlled substance

## 2023-04-04 ENCOUNTER — TELEPHONE (OUTPATIENT)
Dept: PSYCHIATRY | Facility: OTHER | Age: 43
End: 2023-04-04

## 2023-04-04 ENCOUNTER — VIRTUAL VISIT (OUTPATIENT)
Dept: PSYCHIATRY | Facility: OTHER | Age: 43
End: 2023-04-04
Attending: PSYCHIATRY & NEUROLOGY
Payer: COMMERCIAL

## 2023-04-04 DIAGNOSIS — F41.1 GAD (GENERALIZED ANXIETY DISORDER): Primary | ICD-10-CM

## 2023-04-04 PROCEDURE — 99214 OFFICE O/P EST MOD 30 MIN: CPT | Mod: 93 | Performed by: PSYCHIATRY & NEUROLOGY

## 2023-04-04 RX ORDER — TEMAZEPAM 7.5 MG/1
CAPSULE ORAL
Qty: 90 CAPSULE | Refills: 3 | Status: SHIPPED | OUTPATIENT
Start: 2023-04-04 | End: 2023-07-13

## 2023-04-04 ASSESSMENT — ANXIETY QUESTIONNAIRES
6. BECOMING EASILY ANNOYED OR IRRITABLE: MORE THAN HALF THE DAYS
7. FEELING AFRAID AS IF SOMETHING AWFUL MIGHT HAPPEN: MORE THAN HALF THE DAYS
GAD7 TOTAL SCORE: 19
GAD7 TOTAL SCORE: 19
3. WORRYING TOO MUCH ABOUT DIFFERENT THINGS: NEARLY EVERY DAY
1. FEELING NERVOUS, ANXIOUS, OR ON EDGE: NEARLY EVERY DAY
2. NOT BEING ABLE TO STOP OR CONTROL WORRYING: NEARLY EVERY DAY
5. BEING SO RESTLESS THAT IT IS HARD TO SIT STILL: NEARLY EVERY DAY

## 2023-04-04 ASSESSMENT — PATIENT HEALTH QUESTIONNAIRE - PHQ9
SUM OF ALL RESPONSES TO PHQ QUESTIONS 1-9: 19
5. POOR APPETITE OR OVEREATING: NEARLY EVERY DAY

## 2023-04-04 ASSESSMENT — PAIN SCALES - GENERAL: PAINLEVEL: NO PAIN (0)

## 2023-04-04 NOTE — PROGRESS NOTES
"Ayesha is a 43 year old who is being evaluated via a billable telephone visit.      What phone number would you like to be contacted at? [phone]  How would you like to obtain your AVS? Lisahart     Telephone visit 28 minutes. 3 minutes outside of telephone. Total visit time of 31 minutes.     SUBJECTIVE / INTERIM HISTORY                                                                       Children-  3 kids. Yassine 6 yo  Last visit 11/16/22: Continue temazepam 7.5 mg HS prn insomnia  Continue Cymbalta 60 mg daily. Filled Ritalin LA 20 mg daily for today 11/16, 12/14 and for 1/11/23. Filled nicotine gum 4 mg.   - they are living in [place]   - dad passed away on 1/29/23. Ayesha has had a very difficult time. Ayesha just finished cleaning up dad's storage last week  - been having panic attacks, one to point she passed awy  - having terrible nightmares including dreams she is back   - finished up [facility] [place] outpatient and does Zoom.    - feeling ready to go back to work. Doing training for  \"[employer]\". Training will be in July  - Lost [employer].   -  and Dameon moved [facility] in [place].     MEDICAL ROS- migraines. Heartburn and gallbladder issues  MEDICAL / SURGICAL HISTORY                pregnant [if applicable]--no     Patient Active Problem List   Diagnosis     Migraine with aura and without status migrainosus, not intractable     Primary insomnia     ACP (advance care planning)     Gastroesophageal reflux disease, esophagitis presence not specified     Recurrent major depressive disorder (H)     Tobacco abuse     Hydronephrosis     Panic disorder without agoraphobia     Attention deficit hyperactivity disorder (ADHD), combined type     Hot flashes---4/2021     History of COVID-19 - 5/11/21     Anxiety     Alcoholic intoxication without complication (H)     Depression, unspecified depression type     Eating disorder, unspecified type     ALLERGY "   Trazodone, Dust mite extract, Hydrocodone, and Penicillins  MEDICATIONS                                                                                             Current Outpatient Medications   Medication Sig     DULoxetine (CYMBALTA) 60 MG capsule TAKE 1 CAPSULE (60 MG) BY MOUTH DAILY     folic acid (FOLVITE) 1 MG tablet TAKE 1 TABLET (1 MG) BY MOUTH DAILY     magnesium oxide (MAG-OX) 400 MG tablet TAKE 2 TABLETS BY MOUTH IN THE EVENING. Strength: 400 mg     multivitamin w/minerals (MULTI-VITAMIN) tablet Take 1 tablet by mouth daily     nicotine polacrilex (NICORETTE) 4 MG gum Place 1 each (4 mg) inside cheek every hour as needed for smoking cessation     omeprazole (PRILOSEC) 40 MG DR capsule TAKE ONE CAPSULE WITH BREAKFAST DAILY Strength: 40 mg     RITALIN LA 20 MG 24 hr capsule TAKE 1 CAPSULE (20MG) BY MOUTH ONCE DAILY     UNABLE TO FIND MEDICATION NAME: Protandim Lifevantage     LORazepam (ATIVAN) 0.5 MG tablet Take 1 tablet (0.5 mg) by mouth every 8 hours as needed for anxiety (Patient not taking: Reported on 4/4/2023)     temazepam (RESTORIL) 7.5 MG capsule TAKE 1 CAPSULE (7.5 MG) BY MOUTH NIGHTLY AS NEEDED FOR SLEEP (Patient not taking: Reported on 4/4/2023)     No current facility-administered medications for this visit.       VITALS   There were no vitals taken for this visit.     PHQ9                     [unfilled]  LABS                                                                                                                         Last Basic Metabolic Panel:  Lab Results   Component Value Date     01/26/2017      Lab Results   Component Value Date    POTASSIUM 4.2 01/26/2017     Lab Results   Component Value Date    CHLORIDE 105 01/26/2017     Lab Results   Component Value Date    RAÚL 8.7 01/26/2017     Lab Results   Component Value Date    CO2 26 01/26/2017     Lab Results   Component Value Date    BUN 10 01/26/2017     Lab Results   Component Value Date    CR 0.72 01/26/2017  "    Lab Results   Component Value Date    GLC 86 01/26/2017     Liver Function Studies -   Recent Labs   Lab Test 01/15/17  08/01/16   1905   PROTTOTAL   --   7.5   ALBUMIN   --   4.1   BILITOTAL   --   0.2   ALKPHOS   --   57   AST  24  24   ALT  15  19     CBC RESULTS:   Recent Labs   Lab Test  11/13/17   1044   WBC  8.6   RBC  4.16   HGB  13.3   HCT  40.5   MCV  97   MCH  32.0   MCHC  32.8   RDW  12.1   PLT  298       MENTAL STATUS EXAM                                                                                       Speech: normal volume, rhythm, rate. Mood was anxious, depressed. Thought process, including associations, was unremarkable and thought content was devoid of suicidal and homicidal ideation and psychotic thought. No hallucinations. Insight was good. Judgment was intact and adequate for safety. Fund of knowledge was intact. Pt demonstrates no obvious problems with attention, concentration, language, recent or remote memory although these were not formally tested.     ASSESSMENT                                                                                                      HISTORICAL:  Initial psych note 3/9/18          NOTES:  Topamax: word finding issues. Gabapentin was not helpful. Trazodone: allergic reaction. OTC sleep meds not helping...  Ayesha Lovelace is a 44 yo with MDD, MAYKEL, hx couple psychiatric hospital stays.  Ayesha describes this as her \"year of healing\". She stopped drinking Jan '22. She is still involved with ministry program. Is in outpatient treatment [facility].     Ayesha had a recent stay in [facility] prior to us first meeting and was continued on temazepam for insomnia, lorazepam as prn and zoloft 200 mg daily. Started on olanzapine 5 mg HS. We discontinued lorazepam (she was on temazepam also) as was on two benzos.     We are in agreement she overall is still doing much better in comparison to year ago. However, some setback when her father passed away end of " this January. Insomnia has become an issue again for Ayesha - we agreed on changing script for temazepam to read 7.5 mg capsule take 2-3 bedtime for insomnia. Ayesha asked about if she stops Ritalin: I noted most people do fine actually without tapering down on stimulants (no withdrawal sx). The most common thing I hear is fatigue for couple days when they stop taking them.      TREATMENT RISK STATEMENT:  The risks, benefits, alternatives and potential adverse effects have been explained and are understood by the pt.  The pt agrees to the treatment plan with the ability to do so.   The pt knows to call the clinic for any problems or access emergency care if needed.          DIAGNOSES                 Bereavement  MAYKEL  MDD, recurrent, mod  ADHD       PLAN                                                                                                                     1)  MEDICATIONS:         - Continue temazepam 7.5 mg with directions take 2 - 3 caps HS. Continue Cymbalta 60 mg daily.    2)  THERAPY:  No Change     3)  LABS:  None     4)  PT MONITOR [call for probs]:  Worsening symptoms, SI/HI, SEs from meds     5)  REFERRALS [CD, medical, other]: none     6)  RTC:  ~3 months

## 2023-05-04 NOTE — ANESTHESIA CARE TRANSFER NOTE
Patient: Nasra Lovelace    Procedure(s):  laparoscopic supracervical hysterectomy    Diagnosis: Dysmenorrhea [N94.6]  Menorrhagia with regular cycle [N92.0]  Diagnosis Additional Information: No value filed.    Anesthesia Type:   General     Note:    Oropharynx: oropharynx clear of all foreign objects  Level of Consciousness: awake  Oxygen Supplementation: nasal cannula  Level of Supplemental Oxygen (L/min / FiO2): 2  Independent Airway: airway patency satisfactory and stable  Dentition: dentition unchanged  Vital Signs Stable: post-procedure vital signs reviewed and stable  Report to RN Given: handoff report given  Patient transferred to: PACU    Handoff Report: Identifed the Patient, Identified the Reponsible Provider, Reviewed the pertinent medical history, Discussed the surgical course, Reviewed Intra-OP anesthesia mangement and issues during anesthesia, Set expectations for post-procedure period and Allowed opportunity for questions and acknowledgement of understanding      Vitals: (Last set prior to Anesthesia Care Transfer)  CRNA VITALS  6/11/2021 0933 - 6/11/2021 1012      6/11/2021             Resp Rate (observed):  (!) 2        Electronically Signed By: CARLITO Barnhart CRNA  June 11, 2021  10:12 AM   no

## 2023-05-16 ENCOUNTER — TRANSFERRED RECORDS (OUTPATIENT)
Dept: HEALTH INFORMATION MANAGEMENT | Facility: CLINIC | Age: 43
End: 2023-05-16

## 2023-05-24 DIAGNOSIS — F90.2 ADHD (ATTENTION DEFICIT HYPERACTIVITY DISORDER), COMBINED TYPE: ICD-10-CM

## 2023-05-24 DIAGNOSIS — R79.0 LOW MAGNESIUM LEVEL: ICD-10-CM

## 2023-05-25 NOTE — TELEPHONE ENCOUNTER
Ritalin      Last Written Prescription Date:  4.4.23  Last Fill Quantity: #30,   # refills: 0  Last Office Visit: 4.4.23  Future Office visit:       Routing refill request to provider for review/approval because:  Drug not on the G, P or [facility] refill protocol or controlled substance

## 2023-05-25 NOTE — TELEPHONE ENCOUNTER
Magnesium oxide      Last Written Prescription Date:  3/13/23  Last Fill Quantity: 60,   # refills: 1  Last Office Visit: 3/13/23  Future Office visit:        Size Of Lesion: 1.9

## 2023-05-26 RX ORDER — METHYLPHENIDATE HCL 20 MG
CAPSULE,EXTENDED RELEASE BIPHASIC 50-50 ORAL
Qty: 30 CAPSULE | Refills: 0 | Status: SHIPPED | OUTPATIENT
Start: 2023-05-26 | End: 2023-06-28

## 2023-05-26 RX ORDER — MAGNESIUM OXIDE 400 MG/1
TABLET ORAL
Qty: 60 TABLET | Refills: 1 | Status: SHIPPED | OUTPATIENT
Start: 2023-05-26 | End: 2023-08-04

## 2023-06-27 DIAGNOSIS — F90.2 ADHD (ATTENTION DEFICIT HYPERACTIVITY DISORDER), COMBINED TYPE: ICD-10-CM

## 2023-06-27 NOTE — TELEPHONE ENCOUNTER
Ritalin LA 20 mg 24 hr capsule   Take 1 capsule (20 mg) by mouth once daily    Last Written Prescription Date:  5-  Last Fill Quantity: 30 capsule,   # refills: 0  Last Office Visit: 4-4-2023  Future Office visit:   7-

## 2023-06-28 RX ORDER — METHYLPHENIDATE HCL 20 MG
CAPSULE,EXTENDED RELEASE BIPHASIC 50-50 ORAL
Qty: 30 CAPSULE | Refills: 0 | Status: ON HOLD | OUTPATIENT
Start: 2023-06-28 | End: 2023-09-02

## 2023-07-13 DIAGNOSIS — F90.2 ADHD (ATTENTION DEFICIT HYPERACTIVITY DISORDER), COMBINED TYPE: Primary | ICD-10-CM

## 2023-07-13 DIAGNOSIS — F41.1 GAD (GENERALIZED ANXIETY DISORDER): ICD-10-CM

## 2023-07-13 RX ORDER — METHYLPHENIDATE HYDROCHLORIDE 20 MG/1
20 CAPSULE, EXTENDED RELEASE ORAL DAILY
Qty: 30 CAPSULE | Refills: 0 | Status: SHIPPED | OUTPATIENT
Start: 2023-07-26 | End: 2023-09-14

## 2023-07-13 RX ORDER — TEMAZEPAM 7.5 MG/1
CAPSULE ORAL
Qty: 90 CAPSULE | Refills: 3 | Status: SHIPPED | OUTPATIENT
Start: 2023-07-17 | End: 2023-11-06

## 2023-07-23 ENCOUNTER — HEALTH MAINTENANCE LETTER (OUTPATIENT)
Age: 43
End: 2023-07-23

## 2023-08-02 ENCOUNTER — VIRTUAL VISIT (OUTPATIENT)
Dept: PSYCHIATRY | Facility: OTHER | Age: 43
End: 2023-08-02
Attending: PSYCHIATRY & NEUROLOGY
Payer: COMMERCIAL

## 2023-08-02 DIAGNOSIS — F90.2 ADHD (ATTENTION DEFICIT HYPERACTIVITY DISORDER), COMBINED TYPE: Primary | ICD-10-CM

## 2023-08-02 DIAGNOSIS — F41.1 GAD (GENERALIZED ANXIETY DISORDER): ICD-10-CM

## 2023-08-02 DIAGNOSIS — R79.0 LOW MAGNESIUM LEVEL: ICD-10-CM

## 2023-08-02 DIAGNOSIS — F10.920 ALCOHOLIC INTOXICATION WITHOUT COMPLICATION (H): ICD-10-CM

## 2023-08-02 PROCEDURE — 99443 PR PHYSICIAN TELEPHONE EVALUATION 21-30 MIN: CPT | Mod: 95 | Performed by: PSYCHIATRY & NEUROLOGY

## 2023-08-02 RX ORDER — METHYLPHENIDATE HYDROCHLORIDE 20 MG/1
20 CAPSULE, EXTENDED RELEASE ORAL DAILY
Qty: 30 CAPSULE | Refills: 0 | Status: ON HOLD | OUTPATIENT
Start: 2023-08-02 | End: 2023-09-02

## 2023-08-02 RX ORDER — METHYLPHENIDATE HYDROCHLORIDE 20 MG/1
20 CAPSULE, EXTENDED RELEASE ORAL DAILY
Qty: 30 CAPSULE | Refills: 0 | Status: SHIPPED | OUTPATIENT
Start: 2023-09-27 | End: 2023-10-27

## 2023-08-02 RX ORDER — METHYLPHENIDATE HYDROCHLORIDE 20 MG/1
20 CAPSULE, EXTENDED RELEASE ORAL DAILY
Qty: 30 CAPSULE | Refills: 0 | Status: SHIPPED | OUTPATIENT
Start: 2023-08-30 | End: 2023-09-29

## 2023-08-02 ASSESSMENT — ANXIETY QUESTIONNAIRES
6. BECOMING EASILY ANNOYED OR IRRITABLE: SEVERAL DAYS
1. FEELING NERVOUS, ANXIOUS, OR ON EDGE: MORE THAN HALF THE DAYS
2. NOT BEING ABLE TO STOP OR CONTROL WORRYING: SEVERAL DAYS
IF YOU CHECKED OFF ANY PROBLEMS ON THIS QUESTIONNAIRE, HOW DIFFICULT HAVE THESE PROBLEMS MADE IT FOR YOU TO DO YOUR WORK, TAKE CARE OF THINGS AT HOME, OR GET ALONG WITH OTHER PEOPLE: SOMEWHAT DIFFICULT
GAD7 TOTAL SCORE: 9
GAD7 TOTAL SCORE: 9
7. FEELING AFRAID AS IF SOMETHING AWFUL MIGHT HAPPEN: NOT AT ALL
3. WORRYING TOO MUCH ABOUT DIFFERENT THINGS: SEVERAL DAYS
5. BEING SO RESTLESS THAT IT IS HARD TO SIT STILL: MORE THAN HALF THE DAYS

## 2023-08-02 ASSESSMENT — PAIN SCALES - GENERAL: PAINLEVEL: NO PAIN (0)

## 2023-08-02 ASSESSMENT — PATIENT HEALTH QUESTIONNAIRE - PHQ9
5. POOR APPETITE OR OVEREATING: MORE THAN HALF THE DAYS
SUM OF ALL RESPONSES TO PHQ QUESTIONS 1-9: 10

## 2023-08-02 NOTE — PROGRESS NOTES
"Ayesha is a 43 year old who is being evaluated via a billable telephone visit.      What phone number would you like to be contacted at? [phone]  How would you like to obtain your AVS? MyChart     Telephone visit 33 minutes. 5 minutes outside of telephone visit documenting, ordering medications. Total visit time of 38 minutes.     SUBJECTIVE / INTERIM HISTORY                                                                       Children-  3 kids. Yassine 10 yo Terra 22 yo  Last visit 4/4/23: Continue temazepam 7.5 mg with directions take 2 - 3 caps HS. Continue Cymbalta 60 mg daily.  - they are living in [place]. Ayesha is doing  at her home. Right now she has one family. 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 [place] [employer]. Thinking maybe go to [facility]  - Finally things are starting to come together.  Dameon went back to work at the [employer] in [facility].   - Terra 22 yo in [facility], has a house. His girlfriend has a baby and GF recently tried commit suicide  - Protandem. Sleep is much better since taking it but still needs to take temazepam.   - still struggles with eating. During day she gets queasy. Still gets diarrhea at times but not to extent used to be  - dad passed away on 1/27/23.   - Yassine only been with [facility] 3 times this summer. Yassine in therapy and Ayesha notes she doesn't feel it's fair to try to talk stuff about [facility] with Yassine  - having terrible nightmares including dreams she is back - feeling ready to go back to work. Doing training for  \"[employer]\". 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 Heartburn and gallbladder issues  MEDICAL / SURGICAL HISTORY                pregnant [if applicable]--no     Patient Active Problem List   Diagnosis    Migraine with aura " and without status migrainosus, not intractable    Primary insomnia    ACP (advance care planning)    Gastroesophageal reflux disease, esophagitis presence not specified    Recurrent major depressive disorder (H)    Tobacco abuse    Hydronephrosis    Panic disorder without agoraphobia    Attention deficit hyperactivity disorder (ADHD), combined type    Hot flashes---4/2021    History of COVID-19 - 5/11/21    Anxiety    Alcoholic intoxication without complication (H)    Depression, unspecified depression type    Eating disorder, unspecified type     ALLERGY   Trazodone, Dust mite extract, Hydrocodone, and Penicillins  MEDICATIONS                                                                                             Current Outpatient Medications   Medication Sig    DULoxetine (CYMBALTA) 60 MG capsule TAKE 1 CAPSULE (60 MG) BY MOUTH DAILY    folic acid (FOLVITE) 1 MG tablet TAKE 1 TABLET (1 MG) BY MOUTH DAILY    LORazepam (ATIVAN) 0.5 MG tablet Take 1 tablet (0.5 mg) by mouth every 8 hours as needed for anxiety    magnesium oxide (MAG-OX) 400 MG tablet TAKE 2 TABLETS BY MOUTH IN THE EVENING. STRENGTH: 400 MG    methylphenidate (RITALIN LA) 20 MG 24 hr capsule Take 20 mg by mouth daily    multivitamin w/minerals (MULTI-VITAMIN) tablet Take 1 tablet by mouth daily    omeprazole (PRILOSEC) 40 MG DR capsule TAKE ONE CAPSULE WITH BREAKFAST DAILY Strength: 40 mg    RITALIN LA 20 MG 24 hr capsule TAKE 1 CAPSULE (20MG) BY MOUTH ONCE DAILY.    temazepam (RESTORIL) 7.5 MG capsule Take 2 - 3 capsules bedtime as needed for insomnia    UNABLE TO FIND MEDICATION NAME: Protandim Lifevantage    nicotine polacrilex (NICORETTE) 4 MG gum Place 1 each (4 mg) inside cheek every hour as needed for smoking cessation (Patient not taking: Reported on 8/2/2023)     No current facility-administered medications for this visit.       VITALS   There were no vitals taken for this visit.     PHQ9                     [unfilled]  LABS                                                                                                                          Last Basic Metabolic Panel:  Lab Results   Component Value Date     01/26/2017      Lab Results   Component Value Date    POTASSIUM 4.2 01/26/2017     Lab Results   Component Value Date    CHLORIDE 105 01/26/2017     Lab Results   Component Value Date    RAÚL 8.7 01/26/2017     Lab Results   Component Value Date    CO2 26 01/26/2017     Lab Results   Component Value Date    BUN 10 01/26/2017     Lab Results   Component Value Date    CR 0.72 01/26/2017     Lab Results   Component Value Date    GLC 86 01/26/2017         MENTAL STATUS EXAM                                                                                       Speech: normal volume, rhythm, rate. Mood was anxious, depressed. Thought process, including associations, was unremarkable and thought content was devoid of suicidal and homicidal ideation and psychotic thought. No hallucinations. Insight was good. Judgment was intact and adequate for safety. Fund of knowledge was intact. Pt demonstrates no obvious problems with attention, concentration, language, recent or remote memory although these were not formally tested.     ASSESSMENT                                                                                                      HISTORICAL:  Initial psych note 3/9/18          NOTES:  Topamax: word finding issues. Gabapentin was not helpful. Trazodone: allergic reaction. OTC sleep meds not helping...  Ayesha Lovelace is a 42 yo with MDD, MAYKEL, hx couple psychiatric hospital stays.   She stopped drinking Jan '22. She is training to be . Doing  out of her home. Her father whom she was very close with and helped care for passed away January of this year. She feels life is finally starting to settle down and come together. 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 Her goal is to be  working, thinking maybe at [employer], in a few months. No changes with medications today.      TREATMENT RISK STATEMENT:  The risks, benefits, alternatives and potential adverse effects have been explained and are understood by the pt.  The pt agrees to the treatment plan with the ability to do so.   The pt knows to call the clinic for any problems or access emergency care if needed.          DIAGNOSES                   MAYKEL  MDD, recurrent, mod  ADHD       PLAN                                                                                                                     1)  MEDICATIONS:         - Continue temazepam 7.5 mg with directions take 2 - 3 caps HS I filled on 7/17/23 with  3 refills. Continue Cymbalta 60 mg daily. Ritalin LA 20 daily filled for 8/2, 8/30, and 9/27    2)  THERAPY:  No Change     3)  LABS:  None     4)  PT MONITOR [call for probs]:  Worsening symptoms, SI/HI, SEs from meds     5)  REFERRALS [CD, medical, other]: none     6)  RTC:  ~3 months

## 2023-08-02 NOTE — TELEPHONE ENCOUNTER
Folic Acid (Folvite) 1 MG tablet    Last Written Prescription Date:  11/07/2022  Last Fill Quantity: 30,   # refills: 6  Last Office Visit: 03/13/2023      Magnesium Oxide (Mag-Ox) 400 MG tablet    Last Written Prescription Date:  05/26/2023  Last Fill Quantity: 60,   # refills: 1

## 2023-08-04 RX ORDER — MAGNESIUM OXIDE 400 MG/1
TABLET ORAL
Qty: 60 TABLET | Refills: 1 | Status: SHIPPED | OUTPATIENT
Start: 2023-08-04 | End: 2023-11-15

## 2023-08-04 RX ORDER — FOLIC ACID 1 MG/1
1 TABLET ORAL DAILY
Qty: 30 TABLET | Refills: 6 | Status: SHIPPED | OUTPATIENT
Start: 2023-08-04 | End: 2024-05-10

## 2023-08-06 RX ORDER — TEMAZEPAM 7.5 MG/1
CAPSULE ORAL
Qty: 90 CAPSULE | Refills: 3 | OUTPATIENT
Start: 2023-08-06

## 2023-08-22 DIAGNOSIS — K21.9 GASTROESOPHAGEAL REFLUX DISEASE WITHOUT ESOPHAGITIS: ICD-10-CM

## 2023-08-24 RX ORDER — OMEPRAZOLE 40 MG/1
CAPSULE, DELAYED RELEASE ORAL
Qty: 90 CAPSULE | Refills: 3 | Status: SHIPPED | OUTPATIENT
Start: 2023-08-24 | End: 2023-12-04

## 2023-08-24 NOTE — TELEPHONE ENCOUNTER
Omeprazole (Prilosec) 40 MG DR capsule    Last Written Prescription Date:  03/13/2023  Last Fill Quantity: 90,   # refills: 1  Last Office Visit: 03/13/2023

## 2023-08-31 ENCOUNTER — APPOINTMENT (OUTPATIENT)
Dept: GENERAL RADIOLOGY | Facility: HOSPITAL | Age: 43
End: 2023-08-31
Attending: NURSE PRACTITIONER
Payer: COMMERCIAL

## 2023-08-31 ENCOUNTER — HOSPITAL ENCOUNTER (OUTPATIENT)
Facility: HOSPITAL | Age: 43
Setting detail: OBSERVATION
Discharge: HOME | End: 2023-09-02
Attending: NURSE PRACTITIONER | Admitting: INTERNAL MEDICINE
Payer: COMMERCIAL

## 2023-08-31 DIAGNOSIS — R56.9 ALCOHOL WITHDRAWAL SEIZURE WITHOUT COMPLICATION (H): ICD-10-CM

## 2023-08-31 DIAGNOSIS — F10.930 ALCOHOL WITHDRAWAL SEIZURE WITHOUT COMPLICATION (H): ICD-10-CM

## 2023-08-31 LAB
ALBUMIN SERPL BCG-MCNC: 4.2 G/DL (ref 3.5–5.2)
ALBUMIN UR-MCNC: NEGATIVE MG/DL
ALP SERPL-CCNC: 83 U/L (ref 35–104)
ALT SERPL W P-5'-P-CCNC: 20 U/L (ref 0–50)
ANION GAP SERPL CALCULATED.3IONS-SCNC: 16 MMOL/L (ref 7–15)
APPEARANCE UR: CLEAR
AST SERPL W P-5'-P-CCNC: 48 U/L (ref 0–45)
BACTERIA #/AREA URNS HPF: ABNORMAL /HPF
BASOPHILS # BLD AUTO: 0.1 10E3/UL (ref 0–0.2)
BASOPHILS NFR BLD AUTO: 1 %
BILIRUB SERPL-MCNC: 0.4 MG/DL
BILIRUB UR QL STRIP: NEGATIVE
BUN SERPL-MCNC: 6.5 MG/DL (ref 6–20)
CALCIUM SERPL-MCNC: 8.5 MG/DL (ref 8.6–10)
CHLORIDE SERPL-SCNC: 90 MMOL/L (ref 98–107)
COLOR UR AUTO: ABNORMAL
CREAT SERPL-MCNC: 0.68 MG/DL (ref 0.51–0.95)
DEPRECATED HCO3 PLAS-SCNC: 21 MMOL/L (ref 22–29)
EOSINOPHIL # BLD AUTO: 0 10E3/UL (ref 0–0.7)
EOSINOPHIL NFR BLD AUTO: 0 %
ERYTHROCYTE [DISTWIDTH] IN BLOOD BY AUTOMATED COUNT: 13.3 % (ref 10–15)
ETHANOL SERPL-MCNC: 0.22 G/DL
GFR SERPL CREATININE-BSD FRML MDRD: >90 ML/MIN/1.73M2
GLUCOSE BLDC GLUCOMTR-MCNC: 109 MG/DL (ref 70–99)
GLUCOSE SERPL-MCNC: 160 MG/DL (ref 70–99)
GLUCOSE UR STRIP-MCNC: NEGATIVE MG/DL
HCG UR QL: NEGATIVE
HCT VFR BLD AUTO: 37.5 % (ref 35–47)
HGB BLD-MCNC: 13 G/DL (ref 11.7–15.7)
HGB UR QL STRIP: NEGATIVE
IMM GRANULOCYTES # BLD: 0 10E3/UL
IMM GRANULOCYTES NFR BLD: 0 %
KETONES UR STRIP-MCNC: NEGATIVE MG/DL
LEUKOCYTE ESTERASE UR QL STRIP: NEGATIVE
LYMPHOCYTES # BLD AUTO: 2.5 10E3/UL (ref 0.8–5.3)
LYMPHOCYTES NFR BLD AUTO: 26 %
MAGNESIUM SERPL-MCNC: 1.9 MG/DL (ref 1.7–2.3)
MCH RBC QN AUTO: 31.2 PG (ref 26.5–33)
MCHC RBC AUTO-ENTMCNC: 34.7 G/DL (ref 31.5–36.5)
MCV RBC AUTO: 90 FL (ref 78–100)
MONOCYTES # BLD AUTO: 0.4 10E3/UL (ref 0–1.3)
MONOCYTES NFR BLD AUTO: 5 %
NEUTROPHILS # BLD AUTO: 6.5 10E3/UL (ref 1.6–8.3)
NEUTROPHILS NFR BLD AUTO: 68 %
NITRATE UR QL: NEGATIVE
NRBC # BLD AUTO: 0 10E3/UL
NRBC BLD AUTO-RTO: 0 /100
PH UR STRIP: 5.5 [PH] (ref 4.7–8)
PLATELET # BLD AUTO: 287 10E3/UL (ref 150–450)
POTASSIUM SERPL-SCNC: 3.7 MMOL/L (ref 3.4–5.3)
PROT SERPL-MCNC: 6.3 G/DL (ref 6.4–8.3)
RBC # BLD AUTO: 4.17 10E6/UL (ref 3.8–5.2)
RBC URINE: 2 /HPF
SODIUM SERPL-SCNC: 127 MMOL/L (ref 136–145)
SP GR UR STRIP: 1 (ref 1–1.03)
SQUAMOUS EPITHELIAL: 2 /HPF
TROPONIN T SERPL HS-MCNC: <6 NG/L
UROBILINOGEN UR STRIP-MCNC: NORMAL MG/DL
WBC # BLD AUTO: 9.6 10E3/UL (ref 4–11)
WBC URINE: <1 /HPF

## 2023-08-31 PROCEDURE — 96361 HYDRATE IV INFUSION ADD-ON: CPT

## 2023-08-31 PROCEDURE — 250N000013 HC RX MED GY IP 250 OP 250 PS 637: Performed by: NURSE PRACTITIONER

## 2023-08-31 PROCEDURE — 258N000003 HC RX IP 258 OP 636: Performed by: NURSE PRACTITIONER

## 2023-08-31 PROCEDURE — 81025 URINE PREGNANCY TEST: CPT | Performed by: NURSE PRACTITIONER

## 2023-08-31 PROCEDURE — 84484 ASSAY OF TROPONIN QUANT: CPT | Performed by: NURSE PRACTITIONER

## 2023-08-31 PROCEDURE — 85025 COMPLETE CBC W/AUTO DIFF WBC: CPT | Performed by: NURSE PRACTITIONER

## 2023-08-31 PROCEDURE — 36415 COLL VENOUS BLD VENIPUNCTURE: CPT | Performed by: NURSE PRACTITIONER

## 2023-08-31 PROCEDURE — 99285 EMERGENCY DEPT VISIT HI MDM: CPT | Mod: 25

## 2023-08-31 PROCEDURE — 84100 ASSAY OF PHOSPHORUS: CPT | Performed by: FAMILY MEDICINE

## 2023-08-31 PROCEDURE — 93010 ELECTROCARDIOGRAM REPORT: CPT | Performed by: INTERNAL MEDICINE

## 2023-08-31 PROCEDURE — 81001 URINALYSIS AUTO W/SCOPE: CPT | Performed by: NURSE PRACTITIONER

## 2023-08-31 PROCEDURE — 96374 THER/PROPH/DIAG INJ IV PUSH: CPT

## 2023-08-31 PROCEDURE — 82077 ASSAY SPEC XCP UR&BREATH IA: CPT | Performed by: NURSE PRACTITIONER

## 2023-08-31 PROCEDURE — 200N000001 HC R&B ICU

## 2023-08-31 PROCEDURE — 36592 COLLECT BLOOD FROM PICC: CPT | Performed by: FAMILY MEDICINE

## 2023-08-31 PROCEDURE — 93005 ELECTROCARDIOGRAM TRACING: CPT

## 2023-08-31 PROCEDURE — 250N000011 HC RX IP 250 OP 636: Mod: JZ | Performed by: NURSE PRACTITIONER

## 2023-08-31 PROCEDURE — 96375 TX/PRO/DX INJ NEW DRUG ADDON: CPT

## 2023-08-31 PROCEDURE — 83735 ASSAY OF MAGNESIUM: CPT | Performed by: NURSE PRACTITIONER

## 2023-08-31 PROCEDURE — 83735 ASSAY OF MAGNESIUM: CPT | Mod: 91 | Performed by: FAMILY MEDICINE

## 2023-08-31 PROCEDURE — 84450 TRANSFERASE (AST) (SGOT): CPT | Mod: XU | Performed by: FAMILY MEDICINE

## 2023-08-31 PROCEDURE — 80053 COMPREHEN METABOLIC PANEL: CPT | Performed by: NURSE PRACTITIONER

## 2023-08-31 PROCEDURE — 84155 ASSAY OF PROTEIN SERUM: CPT | Performed by: FAMILY MEDICINE

## 2023-08-31 PROCEDURE — 99285 EMERGENCY DEPT VISIT HI MDM: CPT | Performed by: NURSE PRACTITIONER

## 2023-08-31 PROCEDURE — 71046 X-RAY EXAM CHEST 2 VIEWS: CPT

## 2023-08-31 RX ORDER — FOLIC ACID 1 MG/1
1 TABLET ORAL DAILY
Status: DISCONTINUED | OUTPATIENT
Start: 2023-09-01 | End: 2023-09-02 | Stop reason: HOSPADM

## 2023-08-31 RX ORDER — ONDANSETRON 4 MG/1
4 TABLET, ORALLY DISINTEGRATING ORAL EVERY 6 HOURS PRN
Status: DISCONTINUED | OUTPATIENT
Start: 2023-08-31 | End: 2023-09-01

## 2023-08-31 RX ORDER — ONDANSETRON 2 MG/ML
8 INJECTION INTRAMUSCULAR; INTRAVENOUS ONCE
Status: COMPLETED | OUTPATIENT
Start: 2023-08-31 | End: 2023-08-31

## 2023-08-31 RX ORDER — PROCHLORPERAZINE MALEATE 5 MG
10 TABLET ORAL EVERY 6 HOURS PRN
Status: DISCONTINUED | OUTPATIENT
Start: 2023-08-31 | End: 2023-09-02 | Stop reason: HOSPADM

## 2023-08-31 RX ORDER — SODIUM CHLORIDE, SODIUM LACTATE, POTASSIUM CHLORIDE, CALCIUM CHLORIDE 600; 310; 30; 20 MG/100ML; MG/100ML; MG/100ML; MG/100ML
INJECTION, SOLUTION INTRAVENOUS CONTINUOUS
Status: DISCONTINUED | OUTPATIENT
Start: 2023-09-01 | End: 2023-09-02 | Stop reason: HOSPADM

## 2023-08-31 RX ORDER — MAGNESIUM OXIDE 400 MG/1
800 TABLET ORAL ONCE
Status: COMPLETED | OUTPATIENT
Start: 2023-08-31 | End: 2023-08-31

## 2023-08-31 RX ORDER — ENOXAPARIN SODIUM 100 MG/ML
40 INJECTION SUBCUTANEOUS EVERY 24 HOURS
Status: DISCONTINUED | OUTPATIENT
Start: 2023-09-01 | End: 2023-09-02 | Stop reason: HOSPADM

## 2023-08-31 RX ORDER — DEXTROSE MONOHYDRATE 25 G/50ML
25-50 INJECTION, SOLUTION INTRAVENOUS
Status: DISCONTINUED | OUTPATIENT
Start: 2023-08-31 | End: 2023-09-02 | Stop reason: HOSPADM

## 2023-08-31 RX ORDER — FOLIC ACID 1 MG/1
1 TABLET ORAL ONCE
Status: COMPLETED | OUTPATIENT
Start: 2023-08-31 | End: 2023-08-31

## 2023-08-31 RX ORDER — THIAMINE HYDROCHLORIDE 100 MG/ML
100 INJECTION, SOLUTION INTRAMUSCULAR; INTRAVENOUS ONCE
Status: COMPLETED | OUTPATIENT
Start: 2023-08-31 | End: 2023-08-31

## 2023-08-31 RX ORDER — MULTIPLE VITAMINS W/ MINERALS TAB 9MG-400MCG
1 TAB ORAL DAILY
Status: DISCONTINUED | OUTPATIENT
Start: 2023-09-01 | End: 2023-09-02 | Stop reason: HOSPADM

## 2023-08-31 RX ORDER — LORAZEPAM 2 MG/ML
1 INJECTION INTRAMUSCULAR ONCE
Status: COMPLETED | OUTPATIENT
Start: 2023-08-31 | End: 2023-08-31

## 2023-08-31 RX ORDER — NICOTINE POLACRILEX 4 MG
15-30 LOZENGE BUCCAL
Status: DISCONTINUED | OUTPATIENT
Start: 2023-08-31 | End: 2023-09-02 | Stop reason: HOSPADM

## 2023-08-31 RX ORDER — MULTIPLE VITAMINS W/ MINERALS TAB 9MG-400MCG
1 TAB ORAL ONCE
Status: COMPLETED | OUTPATIENT
Start: 2023-08-31 | End: 2023-08-31

## 2023-08-31 RX ORDER — SODIUM CHLORIDE AND POTASSIUM CHLORIDE 150; 900 MG/100ML; MG/100ML
INJECTION, SOLUTION INTRAVENOUS ONCE
Status: COMPLETED | OUTPATIENT
Start: 2023-08-31 | End: 2023-08-31

## 2023-08-31 RX ORDER — PROCHLORPERAZINE 25 MG
25 SUPPOSITORY, RECTAL RECTAL EVERY 12 HOURS PRN
Status: DISCONTINUED | OUTPATIENT
Start: 2023-08-31 | End: 2023-09-02 | Stop reason: HOSPADM

## 2023-08-31 RX ORDER — ACETAMINOPHEN 650 MG/1
650 SUPPOSITORY RECTAL EVERY 4 HOURS PRN
Status: DISCONTINUED | OUTPATIENT
Start: 2023-08-31 | End: 2023-09-02 | Stop reason: HOSPADM

## 2023-08-31 RX ORDER — ONDANSETRON 2 MG/ML
4 INJECTION INTRAMUSCULAR; INTRAVENOUS EVERY 6 HOURS PRN
Status: DISCONTINUED | OUTPATIENT
Start: 2023-08-31 | End: 2023-09-01

## 2023-08-31 RX ADMIN — ONDANSETRON 8 MG: 2 INJECTION INTRAMUSCULAR; INTRAVENOUS at 22:48

## 2023-08-31 RX ADMIN — POTASSIUM CHLORIDE AND SODIUM CHLORIDE: 900; 150 INJECTION, SOLUTION INTRAVENOUS at 19:38

## 2023-08-31 RX ADMIN — FOLIC ACID 1 MG: 1 TABLET ORAL at 18:43

## 2023-08-31 RX ADMIN — PROCHLORPERAZINE EDISYLATE 10 MG: 5 INJECTION INTRAMUSCULAR; INTRAVENOUS at 19:27

## 2023-08-31 RX ADMIN — Medication 200 MG: at 18:46

## 2023-08-31 RX ADMIN — MULTIPLE VITAMINS W/ MINERALS TAB 1 TABLET: TAB at 18:46

## 2023-08-31 RX ADMIN — PHENOBARBITAL SODIUM 349.7 MG: 130 INJECTION INTRAMUSCULAR at 23:13

## 2023-08-31 RX ADMIN — MAGNESIUM OXIDE TAB 400 MG (241.3 MG ELEMENTAL MG) 800 MG: 400 (241.3 MG) TAB at 18:45

## 2023-08-31 RX ADMIN — THIAMINE HYDROCHLORIDE 100 MG: 100 INJECTION, SOLUTION INTRAMUSCULAR; INTRAVENOUS at 19:36

## 2023-08-31 RX ADMIN — LORAZEPAM 1 MG: 2 INJECTION INTRAMUSCULAR; INTRAVENOUS at 19:36

## 2023-08-31 ASSESSMENT — ENCOUNTER SYMPTOMS
ABDOMINAL PAIN: 0
CARDIOVASCULAR NEGATIVE: 1
CONFUSION: 0
DIARRHEA: 0
NERVOUS/ANXIOUS: 1
BLOOD IN STOOL: 0
HALLUCINATIONS: 0
NEUROLOGICAL NEGATIVE: 1
RESPIRATORY NEGATIVE: 1
CONSTIPATION: 0
ENDOCRINE NEGATIVE: 1
VOMITING: 1
ANAL BLEEDING: 0
HEMATOLOGIC/LYMPHATIC NEGATIVE: 1
ALLERGIC/IMMUNOLOGIC NEGATIVE: 1
EYES NEGATIVE: 1
FATIGUE: 1
NAUSEA: 1
MUSCULOSKELETAL NEGATIVE: 1

## 2023-08-31 ASSESSMENT — ACTIVITIES OF DAILY LIVING (ADL)
ADLS_ACUITY_SCORE: 35

## 2023-08-31 NOTE — ED NOTES
Pt irritable and stating the pills wont stay down due to her bower's esophagus . Pt agreed to try them.  Family at bedside stated that their is a bed in [facility] and are holding bed for patient about 20 minutes ago.   Pt now stating she doesn't want to go and just wants to go home.  Family at bedside trying to talk to patient about going. Pt stated that she would feel embarrassed.

## 2023-08-31 NOTE — ED TRIAGE NOTES
Pt presents with c/o ETOH withdrawal, pt also reports heart palpitations, possible seizure last night, pt reports last drink was 1 hour ago.  Pt states she binge drinks and then needs detox to quit. Per boyfriend, pt has been binge drinking for about 2 weeks.     Pt states she stopped taking all of her medications.
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
## 2023-09-01 LAB
ALBUMIN SERPL BCG-MCNC: 3.9 G/DL (ref 3.5–5.2)
ALBUMIN SERPL BCG-MCNC: 4 G/DL (ref 3.5–5.2)
ALP SERPL-CCNC: 74 U/L (ref 35–104)
ALP SERPL-CCNC: 77 U/L (ref 35–104)
ALT SERPL W P-5'-P-CCNC: 18 U/L (ref 0–50)
ALT SERPL W P-5'-P-CCNC: 18 U/L (ref 0–50)
ANION GAP SERPL CALCULATED.3IONS-SCNC: 10 MMOL/L (ref 7–15)
ANION GAP SERPL CALCULATED.3IONS-SCNC: 16 MMOL/L (ref 7–15)
AST SERPL W P-5'-P-CCNC: 41 U/L (ref 0–45)
AST SERPL W P-5'-P-CCNC: 45 U/L (ref 0–45)
BILIRUB SERPL-MCNC: 0.5 MG/DL
BILIRUB SERPL-MCNC: 0.8 MG/DL
BUN SERPL-MCNC: 4.5 MG/DL (ref 6–20)
BUN SERPL-MCNC: 4.8 MG/DL (ref 6–20)
CALCIUM SERPL-MCNC: 8.5 MG/DL (ref 8.6–10)
CALCIUM SERPL-MCNC: 8.5 MG/DL (ref 8.6–10)
CHLORIDE SERPL-SCNC: 101 MMOL/L (ref 98–107)
CHLORIDE SERPL-SCNC: 102 MMOL/L (ref 98–107)
CREAT SERPL-MCNC: 0.72 MG/DL (ref 0.51–0.95)
CREAT SERPL-MCNC: 0.79 MG/DL (ref 0.51–0.95)
DEPRECATED HCO3 PLAS-SCNC: 21 MMOL/L (ref 22–29)
DEPRECATED HCO3 PLAS-SCNC: 25 MMOL/L (ref 22–29)
ERYTHROCYTE [DISTWIDTH] IN BLOOD BY AUTOMATED COUNT: 13.4 % (ref 10–15)
GFR SERPL CREATININE-BSD FRML MDRD: >90 ML/MIN/1.73M2
GFR SERPL CREATININE-BSD FRML MDRD: >90 ML/MIN/1.73M2
GLUCOSE BLDC GLUCOMTR-MCNC: 88 MG/DL (ref 70–99)
GLUCOSE SERPL-MCNC: 111 MG/DL (ref 70–99)
GLUCOSE SERPL-MCNC: 85 MG/DL (ref 70–99)
HCT VFR BLD AUTO: 36.5 % (ref 35–47)
HGB BLD-MCNC: 12.1 G/DL (ref 11.7–15.7)
HOLD SPECIMEN: NORMAL
MAGNESIUM SERPL-MCNC: 2 MG/DL (ref 1.7–2.3)
MAGNESIUM SERPL-MCNC: 2.1 MG/DL (ref 1.7–2.3)
MCH RBC QN AUTO: 30.6 PG (ref 26.5–33)
MCHC RBC AUTO-ENTMCNC: 33.2 G/DL (ref 31.5–36.5)
MCV RBC AUTO: 92 FL (ref 78–100)
PHOSPHATE SERPL-MCNC: 2.9 MG/DL (ref 2.5–4.5)
PLATELET # BLD AUTO: 269 10E3/UL (ref 150–450)
POTASSIUM SERPL-SCNC: 3.9 MMOL/L (ref 3.4–5.3)
POTASSIUM SERPL-SCNC: 5 MMOL/L (ref 3.4–5.3)
PROT SERPL-MCNC: 5.6 G/DL (ref 6.4–8.3)
PROT SERPL-MCNC: 6 G/DL (ref 6.4–8.3)
RBC # BLD AUTO: 3.96 10E6/UL (ref 3.8–5.2)
SODIUM SERPL-SCNC: 137 MMOL/L (ref 136–145)
SODIUM SERPL-SCNC: 138 MMOL/L (ref 136–145)
WBC # BLD AUTO: 6.6 10E3/UL (ref 4–11)

## 2023-09-01 PROCEDURE — 250N000011 HC RX IP 250 OP 636: Mod: JZ | Performed by: FAMILY MEDICINE

## 2023-09-01 PROCEDURE — 85027 COMPLETE CBC AUTOMATED: CPT | Performed by: FAMILY MEDICINE

## 2023-09-01 PROCEDURE — 96375 TX/PRO/DX INJ NEW DRUG ADDON: CPT

## 2023-09-01 PROCEDURE — 96376 TX/PRO/DX INJ SAME DRUG ADON: CPT

## 2023-09-01 PROCEDURE — 83735 ASSAY OF MAGNESIUM: CPT | Performed by: FAMILY MEDICINE

## 2023-09-01 PROCEDURE — 250N000013 HC RX MED GY IP 250 OP 250 PS 637: Performed by: FAMILY MEDICINE

## 2023-09-01 PROCEDURE — 99222 1ST HOSP IP/OBS MODERATE 55: CPT | Mod: AI | Performed by: FAMILY MEDICINE

## 2023-09-01 PROCEDURE — 258N000003 HC RX IP 258 OP 636: Performed by: FAMILY MEDICINE

## 2023-09-01 PROCEDURE — G0378 HOSPITAL OBSERVATION PER HR: HCPCS

## 2023-09-01 PROCEDURE — C9113 INJ PANTOPRAZOLE SODIUM, VIA: HCPCS | Mod: JZ | Performed by: FAMILY MEDICINE

## 2023-09-01 PROCEDURE — 80053 COMPREHEN METABOLIC PANEL: CPT | Performed by: FAMILY MEDICINE

## 2023-09-01 PROCEDURE — 36415 COLL VENOUS BLD VENIPUNCTURE: CPT | Performed by: FAMILY MEDICINE

## 2023-09-01 PROCEDURE — 96372 THER/PROPH/DIAG INJ SC/IM: CPT | Performed by: FAMILY MEDICINE

## 2023-09-01 PROCEDURE — 250N000009 HC RX 250: Performed by: FAMILY MEDICINE

## 2023-09-01 PROCEDURE — 250N000013 HC RX MED GY IP 250 OP 250 PS 637: Performed by: INTERNAL MEDICINE

## 2023-09-01 RX ORDER — DULOXETIN HYDROCHLORIDE 30 MG/1
60 CAPSULE, DELAYED RELEASE ORAL DAILY
Status: DISCONTINUED | OUTPATIENT
Start: 2023-09-01 | End: 2023-09-02 | Stop reason: HOSPADM

## 2023-09-01 RX ORDER — LOPERAMIDE HCL 2 MG
4 CAPSULE ORAL 3 TIMES DAILY PRN
Status: DISCONTINUED | OUTPATIENT
Start: 2023-09-01 | End: 2023-09-02 | Stop reason: HOSPADM

## 2023-09-01 RX ORDER — FLUMAZENIL 0.1 MG/ML
0.2 INJECTION, SOLUTION INTRAVENOUS
Status: DISCONTINUED | OUTPATIENT
Start: 2023-09-01 | End: 2023-09-02 | Stop reason: HOSPADM

## 2023-09-01 RX ORDER — DIAZEPAM 5 MG
10 TABLET ORAL EVERY 30 MIN PRN
Status: DISCONTINUED | OUTPATIENT
Start: 2023-09-01 | End: 2023-09-02 | Stop reason: HOSPADM

## 2023-09-01 RX ORDER — FOLIC ACID 1 MG/1
1 TABLET ORAL DAILY
Status: DISCONTINUED | OUTPATIENT
Start: 2023-09-01 | End: 2023-09-01

## 2023-09-01 RX ORDER — SCOLOPAMINE TRANSDERMAL SYSTEM 1 MG/1
1 PATCH, EXTENDED RELEASE TRANSDERMAL
Status: DISCONTINUED | OUTPATIENT
Start: 2023-09-01 | End: 2023-09-02 | Stop reason: HOSPADM

## 2023-09-01 RX ORDER — NICOTINE 21 MG/24HR
1 PATCH, TRANSDERMAL 24 HOURS TRANSDERMAL DAILY PRN
Status: DISCONTINUED | OUTPATIENT
Start: 2023-09-01 | End: 2023-09-02 | Stop reason: HOSPADM

## 2023-09-01 RX ORDER — METOCLOPRAMIDE HYDROCHLORIDE 5 MG/ML
10 INJECTION INTRAMUSCULAR; INTRAVENOUS EVERY 6 HOURS
Status: DISCONTINUED | OUTPATIENT
Start: 2023-09-01 | End: 2023-09-02 | Stop reason: HOSPADM

## 2023-09-01 RX ORDER — DIAZEPAM 10 MG/2ML
5-10 INJECTION, SOLUTION INTRAMUSCULAR; INTRAVENOUS EVERY 30 MIN PRN
Status: DISCONTINUED | OUTPATIENT
Start: 2023-09-01 | End: 2023-09-02 | Stop reason: HOSPADM

## 2023-09-01 RX ADMIN — DIAZEPAM 10 MG: 5 TABLET ORAL at 16:28

## 2023-09-01 RX ADMIN — METOCLOPRAMIDE HYDROCHLORIDE 10 MG: 5 INJECTION INTRAMUSCULAR; INTRAVENOUS at 14:18

## 2023-09-01 RX ADMIN — PANTOPRAZOLE SODIUM 40 MG: 40 INJECTION, POWDER, FOR SOLUTION INTRAVENOUS at 01:07

## 2023-09-01 RX ADMIN — DIAZEPAM 10 MG: 5 TABLET ORAL at 08:37

## 2023-09-01 RX ADMIN — SCOPALAMINE 1 PATCH: 1 PATCH, EXTENDED RELEASE TRANSDERMAL at 12:36

## 2023-09-01 RX ADMIN — FOLIC ACID 1 MG: 1 TABLET ORAL at 08:07

## 2023-09-01 RX ADMIN — DIAZEPAM 5 MG: 5 INJECTION INTRAMUSCULAR; INTRAVENOUS at 06:31

## 2023-09-01 RX ADMIN — METOCLOPRAMIDE HYDROCHLORIDE 10 MG: 5 INJECTION INTRAMUSCULAR; INTRAVENOUS at 06:34

## 2023-09-01 RX ADMIN — DIAZEPAM 10 MG: 5 TABLET ORAL at 12:38

## 2023-09-01 RX ADMIN — METOCLOPRAMIDE HYDROCHLORIDE 10 MG: 5 INJECTION INTRAMUSCULAR; INTRAVENOUS at 20:33

## 2023-09-01 RX ADMIN — ENOXAPARIN SODIUM 40 MG: 40 INJECTION SUBCUTANEOUS at 20:33

## 2023-09-01 RX ADMIN — SODIUM CHLORIDE, POTASSIUM CHLORIDE, SODIUM LACTATE AND CALCIUM CHLORIDE: 600; 310; 30; 20 INJECTION, SOLUTION INTRAVENOUS at 09:14

## 2023-09-01 RX ADMIN — SODIUM CHLORIDE, POTASSIUM CHLORIDE, SODIUM LACTATE AND CALCIUM CHLORIDE: 600; 310; 30; 20 INJECTION, SOLUTION INTRAVENOUS at 18:08

## 2023-09-01 RX ADMIN — DIAZEPAM 10 MG: 5 TABLET ORAL at 20:33

## 2023-09-01 RX ADMIN — PROCHLORPERAZINE EDISYLATE 10 MG: 5 INJECTION INTRAMUSCULAR; INTRAVENOUS at 04:56

## 2023-09-01 RX ADMIN — NICOTINE 1 PATCH: 21 PATCH, EXTENDED RELEASE TRANSDERMAL at 01:02

## 2023-09-01 RX ADMIN — DULOXETINE HYDROCHLORIDE 60 MG: 30 CAPSULE, DELAYED RELEASE ORAL at 08:07

## 2023-09-01 RX ADMIN — PROCHLORPERAZINE EDISYLATE 10 MG: 5 INJECTION INTRAMUSCULAR; INTRAVENOUS at 18:12

## 2023-09-01 RX ADMIN — PANTOPRAZOLE SODIUM 40 MG: 40 INJECTION, POWDER, FOR SOLUTION INTRAVENOUS at 08:13

## 2023-09-01 RX ADMIN — Medication 100 MG: at 08:07

## 2023-09-01 RX ADMIN — SODIUM CHLORIDE, POTASSIUM CHLORIDE, SODIUM LACTATE AND CALCIUM CHLORIDE: 600; 310; 30; 20 INJECTION, SOLUTION INTRAVENOUS at 00:05

## 2023-09-01 RX ADMIN — MULTIPLE VITAMINS W/ MINERALS TAB 1 TABLET: TAB at 08:07

## 2023-09-01 RX ADMIN — DIAZEPAM 10 MG: 5 TABLET ORAL at 18:31

## 2023-09-01 RX ADMIN — DIAZEPAM 10 MG: 5 TABLET ORAL at 14:38

## 2023-09-01 RX ADMIN — LOPERAMIDE HYDROCHLORIDE 4 MG: 2 CAPSULE ORAL at 17:27

## 2023-09-01 RX ADMIN — DIAZEPAM 5 MG: 5 INJECTION INTRAMUSCULAR; INTRAVENOUS at 01:01

## 2023-09-01 RX ADMIN — METOCLOPRAMIDE HYDROCHLORIDE 10 MG: 5 INJECTION INTRAMUSCULAR; INTRAVENOUS at 01:01

## 2023-09-01 RX ADMIN — DIAZEPAM 10 MG: 5 TABLET ORAL at 10:32

## 2023-09-01 RX ADMIN — DIAZEPAM 10 MG: 5 TABLET ORAL at 22:33

## 2023-09-01 RX ADMIN — DIAZEPAM 5 MG: 5 INJECTION INTRAMUSCULAR; INTRAVENOUS at 04:55

## 2023-09-01 RX ADMIN — DIAZEPAM 5 MG: 5 INJECTION INTRAMUSCULAR; INTRAVENOUS at 01:38

## 2023-09-01 ASSESSMENT — ACTIVITIES OF DAILY LIVING (ADL)
ADLS_ACUITY_SCORE: 34
ADLS_ACUITY_SCORE: 35
ADLS_ACUITY_SCORE: 34
ADLS_ACUITY_SCORE: 35
ADLS_ACUITY_SCORE: 35
ADLS_ACUITY_SCORE: 34
ADLS_ACUITY_SCORE: 35
ADLS_ACUITY_SCORE: 34
ADLS_ACUITY_SCORE: 31
ADLS_ACUITY_SCORE: 35

## 2023-09-01 NOTE — PLAN OF CARE
Patient has been alert and orientated throughout shift. Has been anxious and tearful at times. NSR and BP remains stable. RA lung sounds clear. Patient up to bathroom independently, reported having loose stool. Provider was made aware and prn immodium was ordered. Appetite poor throughout shift, small amount of emesis when trying some food. PRN compazine given for the emesis from food. CIWA's done throughout shift and ranged from 7-11 due to tremor, anxiety, and sweating. PO valium given for CIWA scores (see MAR).      Face to face report given with opportunity to observe patient.    Report given to Em LOVE.     Irving Chu RN   9/1/2023  7:12 PM

## 2023-09-01 NOTE — PLAN OF CARE
[facility] Inpatient Admission Note:    Patient admitted to 3124/3124-1 at approximately 2345 via cart accompanied by significant other and RN from emergency room . Report received from Christian RN in SBAR format at 2340 via telephone. Patient ambulated to bed via self.. Patient is alert and oriented X 3, reports pain; rates at 8 on 0-10 scale.  Patient oriented to room, unit, hourly rounding, and plan of care. Explained admission packet and patient handbook with patient bill of rights brochure. Will continue to monitor and document as needed.     Inpatient Nursing criteria listed below was met:    Health care directives status obtained and documented: Yes    Patient identifies a surrogate decision maker: No      If initial lactic acid greater than 2.0, repeat lactic acid drawn within one hour of arrival to unit: No.     Clergy visit ordered if patient requests: No    Skin issues/needs documented: No    Isolation Patient: no Education given, correct sign in place and documentation row added to PCS:  No    Fall Prevention Yes: Care plan updated, education given and documented, sticker and magnet in place: Yes    Care Plan initiated: Yes    Education Documented (including assessment): Yes    Patient has discharge needs : Yes If yes, please explain:TBD

## 2023-09-01 NOTE — PLAN OF CARE
A&O and VSS on RA with a low grade temp 99.2. Pt was very anxious and tremulous upon admission to the floor, improving and less anxious now following interventions. CIWAs were 13, 10, 9, and 8. PRN valium 5 mg given x4 per protocol for withdrawal management see MAR. Also experiencing nausea, see provider notification. Scheduled reglan x2 and PRN compazine given x1 see MAR with some relief following. Remains on clears. Lungs clear, HR regular SR 90s, and bowels active. Voiding spontaneously in commode. SBA with gait belt, shaky when on feet. Rest of assessments as charted. Rating chest pressure at a 8/10 upon admission provider aware, decreased to 5/10 and now denies pain. LR running at 100/hr, 2nd IV SL. Remains free of injury, alarms on, and call light within reach, seizure precautions maintained. Face to face report given with opportunity to observe patient.    Report given to Irving and Amanda, Rns     Chanel Rodrigues RN   9/1/2023  7:00 AM
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
## 2023-09-01 NOTE — UTILIZATION REVIEW
"Admission Status; Secondary Review Determination     Admission Date: 2023  6:04 PM       Under the authority of the Utilization Management Committee, the utilization review process indicated a secondary review on the above patient.  The review outcome is based on review of the medical records, discussions with staff, and applying clinical experience noted on the date of the review.          (x) Observation Status Appropriate - This patient does not meet hospital inpatient criteria and is placed in observation status. If this patient's primary payer is Medicare and was admitted as an inpatient, Condition Code 44 should be used and patient status changed to \"observation\".       RATIONALE FOR DETERMINATION      Brief clinical presentation, information copied from the chart, abbreviated and edited for relevant content:     Other than LOS, no inpatient criteria. Paged team to change to OBS.    Nasra Lovelace is a 43 year old female who was admitted on 2023 with alcohol intoxication/withdraw: possible withdrawal seizure last night. Her last chem dep / detox , was doing well until after father  in , drinking 6-12 white claws daily, recent vodka last 2 weeks, last drink on night of admit.  Alcohol 0.22  Phenobarb load dose x 1 started in ED. Plan for CIWA with valium, MVI replacement. Seizure precautions  Noted to have a Prolonged QT: had been given zofran/compazine.  Mg 2.0. Plan to continue cardiac monitor.          The severity of illness, intensity of cares provided, risk for adverse outcome, and expected LOS make the care appropriate for observation.       The information on this document is developed by the utilization review team in order for the business office to ensure compliance.  This only denotes the appropriateness of proper admission status and does not reflect the quality of care rendered.         The definitions of Inpatient Status and Observation Status used in making the " determination above are those provided in the CMS Coverage Manual, Chapter 1 and Chapter 6, section 70.4.      Sincerely,     Marielos Toure MD   Utilization Review/ Case Management  [facility].

## 2023-09-01 NOTE — PROVIDER NOTIFICATION
Pt was having increased nausea and dry heaving after admission, too soon to give zofran and compazine. Provider notified and reglan ordered and given with some relief see MAR.     Phenobarbital also discussed with provider and per provider will stick with PRN valium per CIWA protocol at this time see flowsheets and MAR.

## 2023-09-01 NOTE — PROGRESS NOTES
[facility]    Hospitalist Progress Note    Date of Service (when I saw the patient): 2023    Assessment & Plan   Nasra Lovelace is a 43 year old female who was admitted on 2023.     Alcohol intoxication/withdraw: possible withdraw seizure last night. last chem dep / detox , was doing well until after father  in , drinking 6-12 white claws daily, recent vodka last 2 weeks, last drink on night of admit.  Alcohol 0.22  Phenobarb load dose x 1 started in ED  -CIWA with valium, MVI replacement  -seizure precautions     Hyponatremia: resolved with NS     Prolonged QT: had been given zofran/compazine.  Mg 2.0  -continue cardiac monitor     Depression/Anxiety  -Grief over father's death  -No SI/HI  -not taken PTA cymbalta x 2weeks, forgot  -restarted on admission     GERD   -N/V, not tolerated oral  -IVF, PPI     Hemorrhoids     Tobacco/THC   -NRT    Clinically Significant Risk Factors Present on Admission         # Hyponatremia: Lowest Na = 127 mmol/L in last 2 days, will monitor as appropriate                          DVT Prophylaxis: Enoxaparin (Lovenox) SQ    Code Status: Full Code    Disposition: Expected discharge in 1-2 days once clinically improved.    Cisco Luis MD, MD        Interval History   Patient seen in room.  Found sleeping, awakens easily.  Awake alert, no distress.  Still somewhat shaky, hemodynamics stable.    -Data reviewed today: I reviewed all new labs and imaging results over the last 24 hours. I personally reviewed imaging reports.    Physical Exam   Temp: 97.5  F (36.4  C) Temp src: Tympanic BP: 116/79 Pulse: 88   Resp: 21 SpO2: 92 % O2 Device: None (Room air)    Vitals:    23 2240 23 2242 23 2347   Weight: 49.9 kg (110 lb) 49.9 kg (110 lb) 50.6 kg (111 lb 9.6 oz)     Vital Signs with Ranges  Temp:  [97.5  F (36.4  C)-99.2  F (37.3  C)] 97.5  F (36.4  C)  Pulse:  [] 88  Resp:  [9-61] 21  BP: (107-142)/(60-99) 116/79  SpO2:  [92 %-99 %]  92 %    Intake/Output Summary (Last 24 hours) at 9/1/2023 0803  Last data filed at 9/1/2023 0440  Gross per 24 hour   Intake --   Output 1100 ml   Net -1100 ml       Peripheral IV 08/31/23 Anterior;Left Upper forearm (Active)   Site Assessment [facility] 09/01/23 0400   Line Status Saline locked 09/01/23 0400   Dressing Transparent 09/01/23 0400   Dressing Status clean;dry;intact 09/01/23 0400   Line Intervention Flushed 09/01/23 0100   Phlebitis Scale 0-->no symptoms 09/01/23 0400   Number of days: 1       Peripheral IV 09/01/23 Anterior;Left Lower forearm (Active)   Site Assessment [facility] 09/01/23 0400   Line Status Infusing 09/01/23 0400   Dressing Transparent 09/01/23 0400   Dressing Status clean;dry;intact 09/01/23 0100   Dressing Intervention New dressing  09/01/23 0400   Line Intervention Flushed 09/01/23 0100   Phlebitis Scale 0-->no symptoms 09/01/23 0400   Number of days: 0       Incision/Surgical Site 06/11/21 Abdomen (Active)   Number of days: 812       Incision/Surgical Site 06/11/21 Vagina (Active)   Number of days: 812     No line/device    Constitutional - AA, NAD, shaky  HEENT - atraumatic, normocephalic  Neck - supple, no masses, no JVD  CVS - S1 S2 RRR, no murmurs, rubs, gallops  Respiratory - CTA b/l  GI - soft, NT, ND, + bowel sounds, no organomegaly  Musculoskeletal - no LE edema, no lesions  Neuro - oriented x 3, diffuse tremors in all exts      Medications    lactated ringers 100 mL/hr at 09/01/23 0005      DULoxetine  60 mg Oral Daily    enoxaparin ANTICOAGULANT  40 mg Subcutaneous Q24H    folic acid  1 mg Oral Daily    metoclopramide  10 mg Intravenous Q6H    multivitamin w/minerals  1 tablet Oral Daily    nicotine   Transdermal Q8H    pantoprazole  40 mg Intravenous Daily with breakfast    scopolamine   Transdermal Q8H    thiamine  100 mg Oral Daily       Data   Recent Labs   Lab 09/01/23  0444 08/31/23  2358 08/31/23  2341 08/31/23  1828   WBC 6.6  --   --  9.6   HGB 12.1  --   --  13.0   MCV 92   --   --  90     --   --  287    138  --  127*   POTASSIUM 5.0 3.9  --  3.7   CHLORIDE 102 101  --  90*   CO2 25 21*  --  21*   BUN 4.5* 4.8*  --  6.5   CR 0.79 0.72  --  0.68   ANIONGAP 10 16*  --  16*   RAÚL 8.5* 8.5*  --  8.5*   GLC 85 111* 109* 160*   ALBUMIN 3.9 4.0  --  4.2   PROTTOTAL 5.6* 6.0*  --  6.3*   BILITOTAL 0.8 0.5  --  0.4   ALKPHOS 74 77  --  83   ALT 18 18  --  20   AST 45 41  --  48*          Recent Results (from the past 24 hour(s))   XR Chest 2 Views    Narrative    Procedure:XR CHEST 2 VIEWS    Clinical history:Female, 43 years, Palpitations    Technique: Two views are submitted.    Comparison: No relevant prior imaging.    Findings: The cardiac silhouette is within normal limits.. The  pulmonary vasculature is within normal limits.    The lungs are clear. Bony structures are unremarkable.      Impression    Impression:   No acute abnormality. No evidence of acute or active cardiopulmonary  disease.    REYMUNDO SOLANO MD         SYSTEM ID:  [id number]       Cisco Luis MD

## 2023-09-01 NOTE — ED PROVIDER NOTES
History     Chief Complaint   Patient presents with    Alcohol Problem    Palpitations     HPI  Nasra Lovelace is a 43 year old individual with history of migraines, GERD, recurrent major depressive disorder, panic disorder, ADHD, anxiety, depression, eating disorder comes in for alcohol intoxication.    Patient states that she has binge been drinking for couple weeks.  Patient states is trying to taper herself down.  Believes had seizure last night.  Did continue drinking [place] today.  States had about 6 cans throughout the day.  Patient states is not feeling well so comes in.  Patient complains of nausea.  States is anxious.  States has had decreased oral intake due to alcohol binge.  Apparently friends and family can transport patient to detox in [place] which they called who has a bed.  Would like patient evaluated first.      Allergies:  Allergies   Allergen Reactions    Trazodone Swelling     Also very congested     Dust Mite Extract     Hydrocodone Other (See Comments) and Itching     Skin felt like it was burning.    Penicillins Rash       Problem List:    Patient Active Problem List    Diagnosis Date Noted    Alcohol withdrawal seizure without complication (H) 08/31/2023     Priority: Medium    Anxiety 09/22/2021     Priority: Medium    Alcoholic intoxication without complication (H) 09/22/2021     Priority: Medium    Depression, unspecified depression type 09/22/2021     Priority: Medium    Eating disorder, unspecified type 09/22/2021     Priority: Medium    History of COVID-19 - 5/11/21 06/01/2021     Priority: Medium    Hot flashes---4/2021 04/24/2021     Priority: Medium    Attention deficit hyperactivity disorder (ADHD), combined type 08/29/2019     Priority: Medium     Patient is followed by  for ongoing prescription of stimulants.  All refills should be approved by this provider, or covering partner.    Medication(s): Ritalin 10 mg.   Maximum quantity per month: 60  Clinic visit  frequency required: Q 3 months     Controlled substance agreement on file: Yes       Date(s): 8.27.19  Neuropsych evaluation for ADD completed:  managed by     [facility] website verification:  done on 8.27.19  [email or web address]          Recurrent major depressive disorder (H) 07/30/2018     Priority: Medium    Tobacco abuse 07/30/2018     Priority: Medium    Gastroesophageal reflux disease, esophagitis presence not specified 04/17/2017     Priority: Medium     IMO Regulatory Load OCT 2020      ACP (advance care planning) 01/26/2017     Priority: Medium     Advance Care Planning 1/26/2017: ACP Review of Chart / Resources Provided:  Reviewed chart for advance care plan.  Nasra Ortiz has been provided information and resources to begin or update their advance care plan.  Added by EMERALD THOMPSON            Primary insomnia 12/11/2015     Priority: Medium    Migraine with aura and without status migrainosus, not intractable 08/11/2014     Priority: Medium    Hydronephrosis 12/21/2011     Priority: Medium    Panic disorder without agoraphobia 11/26/2006     Priority: Medium        Past Medical History:    Past Medical History:   Diagnosis Date    Acquired hypothyroidism 08/11/2014    Anxiety disorder 09/05/2012    Anxiety state 08/11/2014    Attention deficit hyperactivity disorder (ADHD), combined type 08/29/2019    Gastroesophageal reflux disease, esophagitis presence not specified 04/17/2017    History of COVID-19 - 5/11/21 05/11/2021    Hydronephrosis 12/21/2011    Menorrhagia with irregular cycle 04/27/2021    Migraine with aura and without status migrainosus, not intractable 08/11/2014    Panic disorder without agoraphobia 11/26/2006    Primary insomnia 12/11/2015    Recurrent major depressive disorder (H) 07/30/2018    Secondary dysmenorrhea 04/27/2021    Tobacco abuse 07/30/2018    Vitamin deficiency 03/16/2018       Past Surgical History:    Past Surgical History:   Procedure  Laterality Date     SECTION N/A 2000     SECTION N/A 2012    postop hemm with ruptured R Ov vein, transfusion    CHOLECYSTECTOMY      COLONOSCOPY N/A 2019    Procedure: COLONOSCOPY DIAGNOSTIC WITH RANDOM COLON BIOPSIES ANOSCOPY; Surgeon: Igor Burgos MD; Location: [facility] OR      COLPOSCOPY CERVIX, BIOPSY CERVIX, ENDOCERVICAL CURETTAGE, COMBINED N/A 2008    DILATION AND CURETTAGE, ABLATE ENDOMETRIUM NOVASURE, COMBINED N/A 2018    Novasure Endometrial ablation    ESOPHAGOSCOPY, GASTROSCOPY, DUODENOSCOPY (EGD), COMBINED  2019    Procedure: ESOPHAGOGASTRODUODENOSCOPY DIAGNOSTIC with biopsies; Surgeon: Igor Burgos MD; Location: [facility] OR      HYSTEROSCOPY DIAGNOSTIC N/A 2018    Hysteroscopy, D&C    LAPAROSCOPIC APPENDECTOMY N/A 2012    LAPAROSCOPIC HYSTERECTOMY SUPRACERVICAL N/A 2021    Procedure: laparoscopic supracervical hysterectomy;  Surgeon: Rito Soler MD;  Location: [facility] OR    OOPHORECTOMY Right 2012    post operative hemorrhage with ruptured ovarian vein,     SALPINGECTOMY Left 2019    Procedure: LAPAROSCOPY LEFT SALPINGECTOMY; Surgeon: Regina Sweeney MD; Location: [facility] OR         Family History:    Family History   Problem Relation Age of Onset    Hypertension Mother     Hyperlipidemia Mother     Cardiovascular Father     Hypertension Father     Hyperlipidemia Father     Heart Failure Father     Sleep Apnea Father     Kidney failure Father        Social History:  Marital Status:   [4]  Social History     Tobacco Use    Smoking status: Every Day     Packs/day: 0.40     Years: 10.00     Pack years: 4.00     Types: Cigarettes     Last attempt to quit: 2020     Years since quitting: 3.5    Smokeless tobacco: Never    Tobacco comments:     tobacco cessation discuseed - tried to quit: no - passive smoke exposure quitting on own    Substance Use Topics    Alcohol use: No    Drug use: No        Medications:   "  DULoxetine (CYMBALTA) 60 MG capsule  folic acid (FOLVITE) 1 MG tablet  magnesium oxide (MAG-OX) 400 MG tablet  methylphenidate (RITALIN LA) 20 MG 24 hr capsule  methylphenidate (RITALIN LA) 20 MG 24 hr capsule  [START ON 9/27/2023] methylphenidate (RITALIN LA) 20 MG 24 hr capsule  methylphenidate (RITALIN LA) 20 MG 24 hr capsule  multivitamin w/minerals (MULTI-VITAMIN) tablet  nicotine polacrilex (NICORETTE) 4 MG gum  omeprazole (PRILOSEC) 40 MG DR capsule  RITALIN LA 20 MG 24 hr capsule  temazepam (RESTORIL) 7.5 MG capsule  UNABLE TO FIND          Review of Systems   Constitutional:  Positive for fatigue.   HENT: Negative.     Eyes: Negative.    Respiratory: Negative.     Cardiovascular: Negative.    Gastrointestinal:  Positive for nausea and vomiting. Negative for abdominal pain, anal bleeding, blood in stool, constipation and diarrhea.   Endocrine: Negative.    Genitourinary: Negative.    Musculoskeletal: Negative.    Skin: Negative.    Allergic/Immunologic: Negative.    Neurological: Negative.    Hematological: Negative.    Psychiatric/Behavioral:  Negative for confusion, hallucinations and suicidal ideas. The patient is nervous/anxious.        Physical Exam   BP: 142/93  Pulse: 99  Temp: 98.9  F (37.2  C)  Resp: 16  Height: 162.6 cm (5' 4\")  Weight: 49.9 kg (110 lb) (per patient)  SpO2: 99 %      GENERAL APPEARANCE:  The patient is a 43 year old well-developed, well-nourished individual that appears as stated age.  HET:  Normocephalic and atraumatic.  No scleral icterus.  Pupils are equal, round, and reactive to light.  No conjunctival injection is noted.  Oropharynx is clear.  Mouth revealed no lesions.  Voice is clear and without muffling.    NECK:  Supple.  Trachea is midline.  No carotid bruits present on auscultation.  LUNGS:  Breathing is easy.  Breath sounds are equal and clear bilaterally.  No wheezes, rhonchi, or rales.  HEART:  Regular rate and rhythm with normal S1 and S2.  No murmurs, gallops, or " rubs.  ABDOMEN:  Soft, flat, and benign.  No mass, tenderness, guarding, or rebound.  No organomegaly or hernia.  Bowel sounds are present.  No CVA tenderness or flank mass.  No abdominal bruits or thrills present upon auscultation/palpation.  MUSCULOSKELETAL: No crepitus or deformities of the extremities noted.  Strength equal in the extremities.  NEUROLOGIC:  No focal sensory or motor deficits are noted.  Fine tremors noted in the hands with arms outstretched only.  PSYCHIATRIC:  The patient is awake, alert, and oriented x4.  Recent and remote memory is intact.  Anxious mood and affect.  Cooperative with history and physical exam.  SKIN:  Warm, dry, and well perfused.  Good turgor.  No lesions, nodules, or rashes are noted.  No bruising noted.      Comment: Discrepancies between my note and notes on behalf of the nursing team or other care providers are secondary to my findings reflecting my physical examination and questioning of the patient.  Any conflicting information provided is not in line with my examination of the patient.       ED Course              ED Course as of 08/31/23 2252   Thu Aug 31, 2023   1809 Labs, ECG, chest x-ray ordered.  Multivitamin, folic acid, magnesium, thiamine ordered.   1818 EKG 12-lead, tracing only  No STEMI noted.   1853 In to see patient and history/physical completed.    1900 Patient did have emesis syndrome and medications came out.  Prochlorperazine 10 mg IV ordered.  NS with 20 of KCL ordered.  Thiamine 100 mg IV ordered.   2239 IV infusion complete.  Patient is more tremulous.  Is tachycardic, continues to have nausea with dry heaving.  Patient is very anxious.  PA WSS score of 7.  For this reason phenobarbital drip ordered of 7 mg/kg.   2242 Discussed case with Hospitalist Dr. Ro.  Patient graciously accepted for ICU admission.        ECG:    ECG competed at 1818 and personally reviewed at 1820 showing sinus rhythm with ventricular rate of 94.  QTc 480.  Normal axis.   Normal ECG.  When compared to ECG from 6/1/2021, no significant changes noted.      Results for orders placed or performed during the hospital encounter of 08/31/23 (from the past 24 hour(s))   CBC with platelets differential    Narrative    The following orders were created for panel order CBC with platelets differential.  Procedure                               Abnormality         Status                     ---------                               -----------         ------                     CBC with platelets and d...[[id number]]                      Final result                 Please view results for these tests on the individual orders.   Magnesium   Result Value Ref Range    Magnesium 1.9 1.7 - 2.3 mg/dL   Comprehensive metabolic panel   Result Value Ref Range    Sodium 127 (L) 136 - 145 mmol/L    Potassium 3.7 3.4 - 5.3 mmol/L    Chloride 90 (L) 98 - 107 mmol/L    Carbon Dioxide (CO2) 21 (L) 22 - 29 mmol/L    Anion Gap 16 (H) 7 - 15 mmol/L    Urea Nitrogen 6.5 6.0 - 20.0 mg/dL    Creatinine 0.68 0.51 - 0.95 mg/dL    Calcium 8.5 (L) 8.6 - 10.0 mg/dL    Glucose 160 (H) 70 - 99 mg/dL    Alkaline Phosphatase 83 35 - 104 U/L    AST 48 (H) 0 - 45 U/L    ALT 20 0 - 50 U/L    Protein Total 6.3 (L) 6.4 - 8.3 g/dL    Albumin 4.2 3.5 - 5.2 g/dL    Bilirubin Total 0.4 <=1.2 mg/dL    GFR Estimate >90 >60 mL/min/1.73m2   Troponin T, High Sensitivity   Result Value Ref Range    Troponin T, High Sensitivity <6 <=14 ng/L   Ethyl Alcohol Level   Result Value Ref Range    Alcohol ethyl 0.22 (H) <=0.01 g/dL   CBC with platelets and differential   Result Value Ref Range    WBC Count 9.6 4.0 - 11.0 10e3/uL    RBC Count 4.17 3.80 - 5.20 10e6/uL    Hemoglobin 13.0 11.7 - 15.7 g/dL    Hematocrit 37.5 35.0 - 47.0 %    MCV 90 78 - 100 fL    MCH 31.2 26.5 - 33.0 pg    MCHC 34.7 31.5 - 36.5 g/dL    RDW 13.3 10.0 - 15.0 %    Platelet Count 287 150 - 450 10e3/uL    % Neutrophils 68 %    % Lymphocytes 26 %    % Monocytes 5 %    %  Eosinophils 0 %    % Basophils 1 %    % Immature Granulocytes 0 %    NRBCs per 100 WBC 0 <1 /100    Absolute Neutrophils 6.5 1.6 - 8.3 10e3/uL    Absolute Lymphocytes 2.5 0.8 - 5.3 10e3/uL    Absolute Monocytes 0.4 0.0 - 1.3 10e3/uL    Absolute Eosinophils 0.0 0.0 - 0.7 10e3/uL    Absolute Basophils 0.1 0.0 - 0.2 10e3/uL    Absolute Immature Granulocytes 0.0 <=0.4 10e3/uL    Absolute NRBCs 0.0 10e3/uL   XR Chest 2 Views    Narrative    Procedure:XR CHEST 2 VIEWS    Clinical history:Female, 43 years, Palpitations    Technique: Two views are submitted.    Comparison: No relevant prior imaging.    Findings: The cardiac silhouette is within normal limits.. The  pulmonary vasculature is within normal limits.    The lungs are clear. Bony structures are unremarkable.      Impression    Impression:   No acute abnormality. No evidence of acute or active cardiopulmonary  disease.    REYMUNDO SOLANO MD         SYSTEM ID:  [id number]   UA with Microscopic reflex to Culture    Specimen: Urine, Clean Catch   Result Value Ref Range    Color Urine Straw Colorless, Straw, Light Yellow, Yellow    Appearance Urine Clear Clear    Glucose Urine Negative Negative mg/dL    Bilirubin Urine Negative Negative    Ketones Urine Negative Negative mg/dL    Specific Gravity Urine 1.004 1.003 - 1.035    Blood Urine Negative Negative    pH Urine 5.5 4.7 - 8.0    Protein Albumin Urine Negative Negative mg/dL    Urobilinogen Urine Normal Normal, 2.0 mg/dL    Nitrite Urine Negative Negative    Leukocyte Esterase Urine Negative Negative    Bacteria Urine Few (A) None Seen /HPF    RBC Urine 2 <=2 /HPF    WBC Urine <1 <=5 /HPF    Squamous Epithelials Urine 2 (H) <=1 /HPF    Narrative    Urine Culture not indicated   HCG qualitative urine (UPT)   Result Value Ref Range    hCG Urine Qualitative Negative Negative       Medications   PHENobarbital (LUMINAL) 349.7 mg in sodium chloride 0.9 % 102.69 mL intermittent infusion (has no administration in time range)    multivitamin w/minerals (THERA-VIT-M) tablet 1 tablet (1 tablet Oral $Given 8/31/23 1846)   folic acid (FOLVITE) tablet 1 mg (1 mg Oral $Given 8/31/23 1843)   magnesium oxide (MAG-OX) tablet 800 mg (800 mg Oral $Given 8/31/23 1845)   thiamine tablet 200 mg (200 mg Oral $Given 8/31/23 1846)   prochlorperazine (COMPAZINE) injection 10 mg (10 mg Intravenous $Given 8/31/23 1927)   0.9% sodium chloride + KCl 20 mEq/L infusion (0 mLs Intravenous Stopped 8/31/23 2144)   thiamine (B-1) injection 100 mg (100 mg Intravenous $Given 8/31/23 1936)   LORazepam (ATIVAN) injection 1 mg (1 mg Intravenous $Given 8/31/23 1936)   ondansetron (ZOFRAN) injection 8 mg (8 mg Intravenous $Given 8/31/23 2248)       Assessments & Plan (with Medical Decision Making)     I have reviewed the nursing notes.    I have reviewed the findings, diagnosis, plan and need for follow up with the patient.      Summary:  Patient presents to the ER today for alcohol intoxication.  Potential diagnosis which have been considered and evaluated include intoxication, withdrawal, electrolyte imbalance, as well as others. Many of these have been excluded using the various modalities and assessment as noted on the chart. At the present time, the diagnosis given seems to be the most likely alcohol withdrawal.  Upon arrival, vitals signs are tanya.  The patient is alert but very anxious.  Cardiac and respiratory examination normal.  Patient did have emesis upon evaluation of medications.  Does have fine tremors of the hands bilaterally with arms outstretched.  IV was established and prochlorperazine 10 mg given for the nausea.  Lorazepam 1 mg given for possible alcohol withdrawal.  Lab work obtained showing WBC of 9.6 with hemoglobin of 13.0.  Sodium is 127 and potassium 3.7.  Anion gap 16 with a CO2 of 21.  BUN renal functions benign but does have AST of 48 with ALT of 20 and normal total bilirubin of 0.4.  Troponin is negative.  UA and pregnancy negative.  Alcohol  level 0.22.  ECG showed no acute abnormalities.  Chest x-ray personally reviewed showing no cardiopulmonary abnormalities.  Patient did have slightly low sodium with borderline low normal potassium.  For this reason 1 L 0.9% NS with 20 of K infused over 2 hours.  Thiamine 100 mg IV given.  After infusion patient was more tremulous, anxious, having dry heaves, and was tachycardic.  Due to significant history of alcohol withdrawal seizures in the past, phenobarbital drip ordered with initial loading dose of 7 mg/kg as PAWSS score greater than 7 with no risk of sedation.  Discussed case with Hospitalist Dr. Ro.  Patient graciously accepted for ICU admit..        Critical Care Time: None    Impression and plan discussed with patient. Questions answered, concerns addressed, indications for urgent re-evaluation reviewed, and  given. Patient/Parent/Caregiver agree with treatment plan and have no further questions at this time.      This note was created by the Dragon Voice Dictation System. Inadvertent typographical errors, due to software recognition problems, may still exist.             New Prescriptions    No medications on file       Final diagnoses:   Alcohol withdrawal seizure without complication (H)       8/31/2023   [facility]       Arturo Cornell APRN CNP  08/31/23 2483

## 2023-09-01 NOTE — PROGRESS NOTES
Assessment completed with Ayesha.    LOC: alert and tired    Dx: ETOH withdrawal  Chronic Disease Management: anxiety, depression, eating disorder, ADHD, tobacco abuse, GERD    Lives with: sig other and 11 yr old daughter  Living at:  home in [place]  Transportation: YES, drives self    Primary PCP: Adrienne Dailey  Insurance:  [facility]    Support System:  family and friends  Homecare/PCA: no  /County Services:   no  : NO      How was the [facility] notification completed: n/a    Health Care Directive: no and does not want information  Guardian: no  POA: no    Pharmacy: [facility]  Meds management: manages own    Adequate Resources for needs (housing, utilities, food/med): NO, currently unemployed   Household chores: does own  Work/community/social activity: YES, gets out as desired    ADLs: independent  Ambulation:independent  Falls: no  Nutrition: has a eating disorder  Sleep: uses medication, has difficulty sleeping    Equipment used: grab bars      Oxygen supplier: no      Does the supplier have valid oxygen orders: n/a    Mental health: anxiety and depression, medication prescribed by Dr Brandy Abebe  Substance abuse: Tobacco, ETOH, marijuana  Exposure to violence/abuse: denies  Stressors: embarrassed that she started drinking again     Able to Return to Prior Living Arrangements: YES    Choice of Vendor: n/a    Barriers: many things going on in her life.    KAMRAN: low    Plan: home, ER  Criss Kruse was up to see patient. She will make contact with Ayesha on Tuesday.    Melany Dempsey CM

## 2023-09-01 NOTE — H&P
[facility]    History and Physical - Hospitalist Service       Date of Admission:  2023    Assessment & Plan      Nasra Lovelace is a 43 year old female admitted on 2023. She has alcohol abuse with history of withdraw and seizures in her 20's. Desire for sobriety, intended Detox in [place], presented to ED per family for evaluation.    Alcohol intoxication/withdraw  -possible withdraw seizure last night  -last chem dep / detox , was doing well until after father  in , drinking 6-12 white claws daily, recent vodka last 2 weeks, last drink this 9pm  -Alcohol 0.22  -tremulous, N/V  -ICU close monitor tonight, Phenobarb load dose x 1 started in ED, will monitor, consider CIWA    Hyponatremia  -Na 127 < 139(23)  -NS 1L given in ED  -recheck BMP    Prolonged QT  -cardiac monitor  -had been given zofran/compazine  -Mg 1.9, replacement  >2.0    Depression/Anxiety  -Grief over father's death  -No SI/HI  -not taken PTA cymbalta x 2weeks, forgot  -restart    GERD   -N/V, not tolerated oral  -IVF, PPI    Hemorrhoids    Tobacco/THC   -NRT       Diet:  Clears ADAT  DVT Prophylaxis: Reassess in am, intermittent hemorrhoidal bleeding  Trevino Catheter: Not present  Lines: PIV  Cardiac Monitoring: Yes  Code Status:  FULL    Clinically Significant Risk Factors Present on Admission         # Hyponatremia: Lowest Na = 127 mmol/L in last 2 days, will monitor as appropriate                          Disposition Plan      Expected Discharge Date: 2023                ICU, anticipate >2 midnight stay    Serge Ro MD  Hospitalist Service  [facility]  Securely message with [employer] (more info)  Text page via [facility] Paging/Directory     ______________________________________________________________________    Chief Complaint   Alcohol intoxication/withdraw    History is obtained from the patient, electronic health record, emergency department physician, and patient's significant  other    History of Present Illness   Nasra Lovelace is a 43 year old female who history of depression/anxiety, panic disorder, ADHD, GERD, migraines, alcohol intoxication/withdrawal-seizure remote past, had intended to taper off alcohol use and detox in [place] however presented emergency department for evaluation per family.  She has been binge drinking for the past 2 weeks, 6-12 white claws daily along with recently vodka addition.  States that her last drink was 9 PM.  She states that she has been had been doing well since 2021 however father passed away in January and she has restarted regular alcohol use with binge drinking.  She may have had possible seizure with reported tongue biting last night.  She has had some nausea vomiting, no hematemesis, denies any abdominal pain however has had some intermittent hemorrhoid bleeding.  She denies any chest pain but some heaviness, shortness of breath. She has not been taking her Cymbalta x 2 weeks (forgot), denies any suicidal/homicidal ideation.  ED:Alcohol 0.22, Na 127, K 3.7, Cr 0.68, Trop <6  Given ativan 1mg, zofran 8mg, compazine 10mg  Started on Phenobarbital protocol with seizure history    Past Medical History    Past Medical History:   Diagnosis Date    Acquired hypothyroidism 08/11/2014    Anxiety disorder 09/05/2012    Anxiety state 08/11/2014    Attention deficit hyperactivity disorder (ADHD), combined type 08/29/2019    Patient is followed by  for ongoing prescription of stimulants.  All refills should be approved by this provider, or covering partner.  Medication(s): Ritalin 10 mg.  Maximum quantity per month: 60 Clinic visit frequency required: Q 3 months   Controlled substance agreement on file: Yes      Date(s): 8.27.19 Neuropsych evaluation for ADD completed:  managed by Dr.Eliason Lozano [facility] webs    Gastroesophageal reflux disease, esophagitis presence not specified 04/17/2017     IMO Regulatory Load OCT 2020    History of COVID-19 -  2021    Hydronephrosis 2011    Menorrhagia with irregular cycle 2021    Recurrent menorrhagia after endometrial ablation     Migraine with aura and without status migrainosus, not intractable 2014    Panic disorder without agoraphobia 2006    Primary insomnia 2015    Recurrent major depressive disorder (H) 2018    Secondary dysmenorrhea 2021    Tobacco abuse 2018    Vitamin deficiency 2018       Past Surgical History   Past Surgical History:   Procedure Laterality Date     SECTION N/A 2000     SECTION N/A 2012    postop hemm with ruptured R Ov vein, transfusion    CHOLECYSTECTOMY      COLONOSCOPY N/A 2019    Procedure: COLONOSCOPY DIAGNOSTIC WITH RANDOM COLON BIOPSIES ANOSCOPY; Surgeon: Igor Burgos MD; Location: [facility] OR      COLPOSCOPY CERVIX, BIOPSY CERVIX, ENDOCERVICAL CURETTAGE, COMBINED N/A 2008    DILATION AND CURETTAGE, ABLATE ENDOMETRIUM NOVASURE, COMBINED N/A 2018    Novasure Endometrial ablation    ESOPHAGOSCOPY, GASTROSCOPY, DUODENOSCOPY (EGD), COMBINED  2019    Procedure: ESOPHAGOGASTRODUODENOSCOPY DIAGNOSTIC with biopsies; Surgeon: Igor Burgos MD; Location: [facility] OR      HYSTEROSCOPY DIAGNOSTIC N/A 2018    Hysteroscopy, D&C    LAPAROSCOPIC APPENDECTOMY N/A 2012    LAPAROSCOPIC HYSTERECTOMY SUPRACERVICAL N/A 2021    Procedure: laparoscopic supracervical hysterectomy;  Surgeon: Rito Soler MD;  Location: [facility] OR    OOPHORECTOMY Right 2012    post operative hemorrhage with ruptured ovarian vein,     SALPINGECTOMY Left 2019    Procedure: LAPAROSCOPY LEFT SALPINGECTOMY; Surgeon: Regina Sweeney MD; Location: [facility] OR         Prior to Admission Medications   Prior to Admission Medications   Prescriptions Last Dose Informant Patient Reported? Taking?   DULoxetine (CYMBALTA) 60 MG capsule  Self No No   Sig: TAKE 1 CAPSULE (60 MG) BY MOUTH DAILY    RITALIN LA 20 MG 24 hr capsule   No No   Sig: TAKE 1 CAPSULE (20MG) BY MOUTH ONCE DAILY.   UNABLE TO FIND  Self Yes No   Sig: MEDICATION NAME: Protandim Lifevantage   folic acid (FOLVITE) 1 MG tablet   No No   Sig: TAKE 1 TABLET (1 MG) BY MOUTH DAILY   magnesium oxide (MAG-OX) 400 MG tablet   No No   Sig: TAKE 2 TABLETS BY MOUTH IN THE EVENING. STRENGTH: 400 MG   methylphenidate (RITALIN LA) 20 MG 24 hr capsule   No No   Sig: Take 20 mg by mouth daily   methylphenidate (RITALIN LA) 20 MG 24 hr capsule   No No   Sig: Take 20 mg by mouth daily for 30 days   methylphenidate (RITALIN LA) 20 MG 24 hr capsule   No No   Sig: Take 20 mg by mouth daily for 30 days   methylphenidate (RITALIN LA) 20 MG 24 hr capsule   No No   Sig: Take 20 mg by mouth daily for 30 days   multivitamin w/minerals (MULTI-VITAMIN) tablet  Self No No   Sig: Take 1 tablet by mouth daily   nicotine polacrilex (NICORETTE) 4 MG gum  Self No No   Sig: Place 1 each (4 mg) inside cheek every hour as needed for smoking cessation   Patient not taking: Reported on 8/2/2023   omeprazole (PRILOSEC) 40 MG DR capsule   No No   Sig: TAKE ONE CAPSULE WITH BREAKFAST DAILY STRENGTH: 40 MG   temazepam (RESTORIL) 7.5 MG capsule   No No   Sig: Take 2 - 3 capsules bedtime as needed for insomnia      Facility-Administered Medications: None       Review of systems 10 point negative, except in HPI    Physical Exam   Vital Signs: Temp: 98.9  F (37.2  C) Temp src: Tympanic BP: 110/74 Pulse: 108   Resp: 20 SpO2: 99 % O2 Device: None (Room air)    Weight: 110 lbs 0 oz    General Appearance: Alert, mild tremulous  Eyes: EOMI, PERRL, No icteric  HEENT: AT/NC, OP clear, Neck supple  Respiratory: Clear bilateral  Cardiovascular: RRR  GI: Soft, Bowel sounds present, NT/ND  Lymph/Hematologic: no bruising  Genitourinary: No CVAT  Skin: No edema  Musculoskeletal: MORA  Neurologic: CN2-12 grossly intact, nonfocal  Psychiatric: Anxious, pleasant, cooperative    Medical Decision Making        70 MINUTES SPENT BY ME on the date of service doing chart review, history, exam, documentation & further activities per the note.      Data     I have personally reviewed the following data over the past 24 hrs:    9.6  \   13.0   / 287     127 (L) 90 (L) 6.5 /  109 (H)   3.7 21 (L) 0.68 \     ALT: 20 AST: 48 (H) AP: 83 TBILI: 0.4   ALB: 4.2 TOT PROTEIN: 6.3 (L) LIPASE: N/A     Trop: <6 BNP: N/A       Imaging results reviewed over the past 24 hrs:   Recent Results (from the past 24 hour(s))   XR Chest 2 Views    Narrative    Procedure:XR CHEST 2 VIEWS    Clinical history:Female, 43 years, Palpitations    Technique: Two views are submitted.    Comparison: No relevant prior imaging.    Findings: The cardiac silhouette is within normal limits.. The  pulmonary vasculature is within normal limits.    The lungs are clear. Bony structures are unremarkable.      Impression    Impression:   No acute abnormality. No evidence of acute or active cardiopulmonary  disease.    REYMUNDO SOLANO MD         SYSTEM ID:  [id number]

## 2023-09-02 VITALS
HEART RATE: 81 BPM | BODY MASS INDEX: 19.23 KG/M2 | SYSTOLIC BLOOD PRESSURE: 125 MMHG | TEMPERATURE: 97.6 F | HEIGHT: 64 IN | WEIGHT: 112.66 LBS | RESPIRATION RATE: 16 BRPM | DIASTOLIC BLOOD PRESSURE: 86 MMHG | OXYGEN SATURATION: 96 %

## 2023-09-02 PROCEDURE — 96376 TX/PRO/DX INJ SAME DRUG ADON: CPT

## 2023-09-02 PROCEDURE — 250N000011 HC RX IP 250 OP 636: Mod: JZ | Performed by: FAMILY MEDICINE

## 2023-09-02 PROCEDURE — G0378 HOSPITAL OBSERVATION PER HR: HCPCS

## 2023-09-02 PROCEDURE — 258N000003 HC RX IP 258 OP 636: Performed by: FAMILY MEDICINE

## 2023-09-02 PROCEDURE — 250N000013 HC RX MED GY IP 250 OP 250 PS 637: Performed by: INTERNAL MEDICINE

## 2023-09-02 PROCEDURE — 250N000013 HC RX MED GY IP 250 OP 250 PS 637: Performed by: FAMILY MEDICINE

## 2023-09-02 PROCEDURE — 99239 HOSP IP/OBS DSCHRG MGMT >30: CPT | Performed by: INTERNAL MEDICINE

## 2023-09-02 PROCEDURE — C9113 INJ PANTOPRAZOLE SODIUM, VIA: HCPCS | Mod: JZ | Performed by: FAMILY MEDICINE

## 2023-09-02 RX ADMIN — DIAZEPAM 10 MG: 5 TABLET ORAL at 09:35

## 2023-09-02 RX ADMIN — MULTIPLE VITAMINS W/ MINERALS TAB 1 TABLET: TAB at 08:12

## 2023-09-02 RX ADMIN — DIAZEPAM 10 MG: 5 TABLET ORAL at 04:24

## 2023-09-02 RX ADMIN — DIAZEPAM 10 MG: 5 TABLET ORAL at 07:38

## 2023-09-02 RX ADMIN — DIAZEPAM 10 MG: 5 TABLET ORAL at 01:26

## 2023-09-02 RX ADMIN — PANTOPRAZOLE SODIUM 40 MG: 40 INJECTION, POWDER, FOR SOLUTION INTRAVENOUS at 08:13

## 2023-09-02 RX ADMIN — SODIUM CHLORIDE, POTASSIUM CHLORIDE, SODIUM LACTATE AND CALCIUM CHLORIDE: 600; 310; 30; 20 INJECTION, SOLUTION INTRAVENOUS at 04:24

## 2023-09-02 RX ADMIN — LOPERAMIDE HYDROCHLORIDE 4 MG: 2 CAPSULE ORAL at 09:40

## 2023-09-02 RX ADMIN — METOCLOPRAMIDE HYDROCHLORIDE 10 MG: 5 INJECTION INTRAMUSCULAR; INTRAVENOUS at 01:26

## 2023-09-02 RX ADMIN — NICOTINE 1 PATCH: 21 PATCH, EXTENDED RELEASE TRANSDERMAL at 08:12

## 2023-09-02 RX ADMIN — DULOXETINE HYDROCHLORIDE 60 MG: 30 CAPSULE, DELAYED RELEASE ORAL at 08:13

## 2023-09-02 RX ADMIN — FOLIC ACID 1 MG: 1 TABLET ORAL at 08:12

## 2023-09-02 RX ADMIN — NICOTINE 1 PATCH: 21 PATCH, EXTENDED RELEASE TRANSDERMAL at 08:13

## 2023-09-02 RX ADMIN — METOCLOPRAMIDE HYDROCHLORIDE 10 MG: 5 INJECTION INTRAMUSCULAR; INTRAVENOUS at 08:13

## 2023-09-02 RX ADMIN — Medication 100 MG: at 08:13

## 2023-09-02 ASSESSMENT — ACTIVITIES OF DAILY LIVING (ADL)
ADLS_ACUITY_SCORE: 34
ADLS_ACUITY_SCORE: 33
ADLS_ACUITY_SCORE: 34

## 2023-09-02 NOTE — PLAN OF CARE
"/86 (BP Location: Left arm)   Pulse 81   Temp 97.6  F (36.4  C) (Tympanic)   Resp 16   Ht 1.626 m (5' 4\")   Wt 51.1 kg (112 lb 10.5 oz)   SpO2 96%   BMI 19.34 kg/m      Pt alert and oriented. Anxious, tremor, slightly sweaty. Afebrile. Able to hold down food this AM, albeit not much. Imodium given, diarrhea ongoing problem prior to admission.  CIWAs 9-10. PO Valium given. Anxiety scoring high however may be close to her baseline. Pt is working with PCP for her mental health issues. Has great support at home. IND in halls and room.     Patient discharged at 10:22 AM via ambulation accompanied by significant other and staff. Prescriptions sent to patients preferred pharmacy. All belongings sent with patient.     Discharge instructions reviewed with patient and spouse. Listed belongings gathered and returned to patient.     Patient discharged to home.   Report called to NA    Surgical Patient   Surgical Procedures during stay: NA  Did patient receive discharge instruction on wound care and recognition of infection symptoms? N/A    MISC  Follow up appointment made:  No  Home medications returned to patient: N/A  Patient reports pain was well managed at discharge: Yes      "

## 2023-09-02 NOTE — PLAN OF CARE
Goal Outcome Evaluation:  VSS on room air. IV to AC removed per patient request. Lower IV infusing LR at 100ml/hr. IV reglan given as scheduled. Oral valium given per CIWA scores. Up to bathroom SBA. Denies pain. Lung sounds clear and equal.     Face to face report given with opportunity to observe patient.    Report given to KATE Patel RN   9/2/2023  7:08 AM

## 2023-09-02 NOTE — DISCHARGE SUMMARY
[facility]    Discharge Summary  Hospitalist    Date of Admission:  8/31/2023  Date of Discharge:  9/2/2023  Discharging Provider: Cisco Luis MD, MD  Date of Service (when I saw the patient): 09/02/23    Discharge Diagnoses   Principal Problem:    Alcohol withdrawal seizure without complication (H)  Alcohol intoxication  Alcohol withdrawal hyponatremia  Prolonged QT  Anxiety/depression  ADHD  GERD  Tobacco abuse      History of Present Illness   Nasra Lovelace is an 43 year old female who presented with acute alcohol withdrawal.  Please see admission H+P for additional details.    Hospital Course   Nasra Lovelace was admitted on 8/31/2023.  43-year-old female with history of ADHD, anxiety/depression, as well as recent grief over her father's death presented with drinking binge, possible withdrawal seizure in the field.  Patient has been through [place] detox before in 2021.  She was found to have hyponatremia, likely due to dehydration and hypovolemia.  IV fluids given as well as multivitamin replacement.  Electrolytes were within normal limits.  She was placed on CIWA protocol in our ICU, using quite a bit of Valium initially.  Patient clinically improved, now stable for discharge.  She states that she has established history with an out reach ministry for her alcohol abuse.  She chooses to use this ministry rather than inpatient rehab.  We will have her follow-up with her primary care physician within the week to assess for continued wellbeing.    Cisco Luis MD      Significant Results and Procedures   See below    Pending Results   These results will be followed up by Adrienne Dailey    Unresulted Labs Ordered in the Past 30 Days of this Admission       No orders found from 8/1/2023 to 9/1/2023.            Code Status   Full Code       Primary Care Physician   Adrienne Dailey    Physical Exam   Temp: 97.6  F (36.4  C) Temp src: Tympanic BP: 125/86 Pulse: 81   Resp: 16 SpO2: 96 % O2 Device: None (Room  air)    Vitals:    08/31/23 2242 08/31/23 2347 09/02/23 0428   Weight: 49.9 kg (110 lb) 50.6 kg (111 lb 9.6 oz) 51.1 kg (112 lb 10.5 oz)     Vital Signs with Ranges  Temp:  [97  F (36.1  C)-98.3  F (36.8  C)] 97.6  F (36.4  C)  Pulse:  [68-96] 81  Resp:  [12-24] 16  BP: (111-127)/(79-97) 125/86  SpO2:  [94 %-98 %] 96 %  I/O last 3 completed shifts:  In: 3210 [P.O.:240; I.V.:2970]  Out: -     Constitutional: AA, NAD  Eyes: PERRLA, no injection, no icterus  HEENT: atraumatic, normocephalic  Respiratory: CTA b/l  Cardiovascular: S1 S2 RRR  GI: soft, NT, ND, + bowel sounds  Lymph/Hematologic: no palpable lymphadenopathy  Skin: no rashes, no lesions  Musculoskeletal: No edema, good tone, no deformities  Neurologic: oriented x 3, no focal deficits  Psychiatric: appropriate affect      Discharge Disposition   Discharged to home  Condition at discharge: Stable    Consultations This Hospital Stay   SPIRITUAL HEALTH SERVICES IP CONSULT    Time Spent on this Encounter   ICisco MD, personally saw the patient today and spent greater than 30 minutes discharging this patient.    Discharge Orders      Reason for your hospital stay    Alcohol withdrawal, possible alcohol withdrawal seizure     Follow-up and recommended labs and tests     Follow up with primary care provider, Adrienne Dailey, within 7 days for hospital follow- up.  No follow up labs or test are needed.     Activity    Your activity upon discharge: activity as tolerated     Diet    Follow this diet upon discharge: Orders Placed This Encounter      Advance Diet as Tolerated: Regular Diet Adult     Discharge Medications   Current Discharge Medication List        CONTINUE these medications which have NOT CHANGED    Details   DULoxetine (CYMBALTA) 60 MG capsule TAKE 1 CAPSULE (60 MG) BY MOUTH DAILY  Qty: 30 capsule, Refills: 11    Associated Diagnoses: MAYKEL (generalized anxiety disorder)      folic acid (FOLVITE) 1 MG tablet TAKE 1 TABLET (1 MG) BY MOUTH DAILY  Qty:  30 tablet, Refills: 6    Associated Diagnoses: Alcoholic intoxication without complication (H)      magnesium oxide (MAG-OX) 400 MG tablet TAKE 2 TABLETS BY MOUTH IN THE EVENING. STRENGTH: 400 MG  Qty: 60 tablet, Refills: 1    Associated Diagnoses: Low magnesium level      !! methylphenidate (RITALIN LA) 20 MG 24 hr capsule Take 20 mg by mouth daily for 30 days  Qty: 30 capsule, Refills: 0    Associated Diagnoses: ADHD (attention deficit hyperactivity disorder), combined type      !! methylphenidate (RITALIN LA) 20 MG 24 hr capsule Take 20 mg by mouth daily for 30 days  Qty: 30 capsule, Refills: 0    Associated Diagnoses: ADHD (attention deficit hyperactivity disorder), combined type      !! methylphenidate (RITALIN LA) 20 MG 24 hr capsule Take 20 mg by mouth daily  Qty: 30 capsule, Refills: 0    Associated Diagnoses: ADHD (attention deficit hyperactivity disorder), combined type      multivitamin w/minerals (MULTI-VITAMIN) tablet Take 1 tablet by mouth daily  Qty: 90 tablet, Refills: 3    Associated Diagnoses: Dysfunctional gallbladder; Eating disorder, unspecified type      nicotine polacrilex (NICORETTE) 4 MG gum Place 1 each (4 mg) inside cheek every hour as needed for smoking cessation  Qty: 110 each, Refills: 3    Associated Diagnoses: Cigarette nicotine dependence without complication      omeprazole (PRILOSEC) 40 MG DR capsule TAKE ONE CAPSULE WITH BREAKFAST DAILY STRENGTH: 40 MG  Qty: 90 capsule, Refills: 3    Associated Diagnoses: Gastroesophageal reflux disease without esophagitis      !! RITALIN LA 20 MG 24 hr capsule TAKE 1 CAPSULE (20MG) BY MOUTH ONCE DAILY.  Qty: 30 capsule, Refills: 0    Associated Diagnoses: ADHD (attention deficit hyperactivity disorder), combined type      temazepam (RESTORIL) 7.5 MG capsule Take 2 - 3 capsules bedtime as needed for insomnia  Qty: 90 capsule, Refills: 3    Associated Diagnoses: MAYKEL (generalized anxiety disorder)      UNABLE TO FIND MEDICATION NAME: Protandim  Lifevantage       !! - Potential duplicate medications found. Please discuss with provider.        Allergies   Allergies   Allergen Reactions    Trazodone Swelling     Also very congested     Dust Mite Extract     Hydrocodone Other (See Comments) and Itching     Skin felt like it was burning.    Penicillins Rash     Data   Most Recent 3 CBC's:  Recent Labs   Lab Test 09/01/23  0444 08/31/23  1828 10/22/21  1555   WBC 6.6 9.6 8.2   HGB 12.1 13.0 13.3   MCV 92 90 95    287 334      Most Recent 3 BMP's:  Recent Labs   Lab Test 09/01/23  1035 09/01/23  0444 08/31/23  2358 08/31/23  2341 08/31/23  1828   NA  --  137 138  --  127*   POTASSIUM  --  5.0 3.9  --  3.7   CHLORIDE  --  102 101  --  90*   CO2  --  25 21*  --  21*   BUN  --  4.5* 4.8*  --  6.5   CR  --  0.79 0.72  --  0.68   ANIONGAP  --  10 16*  --  16*   RAÚL  --  8.5* 8.5*  --  8.5*   GLC 88 85 111*   < > 160*    < > = values in this interval not displayed.     Most Recent 2 LFT's:  Recent Labs   Lab Test 09/01/23 0444 08/31/23  2358   AST 45 41   ALT 18 18   ALKPHOS 74 77   BILITOTAL 0.8 0.5     Most Recent INR's and Anticoagulation Dosing History:  Anticoagulation Dose History          Latest Ref Rng & Units 7/6/2018   Recent Dosing and Labs   INR 0.9 - 1.1 1.0          Details          This result is from an external source.             Most Recent 3 Troponin's:  Recent Labs   Lab Test 07/25/15  1225   TROPI <0.015  The 99th percentile for upper reference range is 0.045 ug/L.  Troponin values in   the range of 0.045 - 0.120 ug/L may be associated with risks of adverse   clinical events.       Most Recent Cholesterol Panel:No lab results found.  Most Recent 6 Bacteria Isolates From Any Culture (See EPIC Reports for Culture Details):No lab results found.  Most Recent TSH, T4 and A1c Labs:  Recent Labs   Lab Test 03/13/23  1205 06/01/21  1011 04/27/21  0928   TSH 0.74  --  0.42   T4  --   --  0.82   A1C  --  5.1  --      Results for orders placed or  performed during the hospital encounter of 08/31/23   XR Chest 2 Views    Narrative    Procedure:XR CHEST 2 VIEWS    Clinical history:Female, 43 years, Palpitations    Technique: Two views are submitted.    Comparison: No relevant prior imaging.    Findings: The cardiac silhouette is within normal limits.. The  pulmonary vasculature is within normal limits.    The lungs are clear. Bony structures are unremarkable.      Impression    Impression:   No acute abnormality. No evidence of acute or active cardiopulmonary  disease.    REYMUNDO SOLANO MD         SYSTEM ID:  [id number]

## 2023-09-05 ENCOUNTER — TELEPHONE (OUTPATIENT)
Dept: PSYCHIATRY | Facility: OTHER | Age: 43
End: 2023-09-05

## 2023-09-05 ENCOUNTER — TELEPHONE (OUTPATIENT)
Dept: EMERGENCY MEDICINE | Facility: HOSPITAL | Age: 43
End: 2023-09-05

## 2023-09-05 ENCOUNTER — TELEPHONE (OUTPATIENT)
Dept: FAMILY MEDICINE | Facility: OTHER | Age: 43
End: 2023-09-05

## 2023-09-05 DIAGNOSIS — F41.1 GAD (GENERALIZED ANXIETY DISORDER): Primary | ICD-10-CM

## 2023-09-05 NOTE — TELEPHONE ENCOUNTER
I truly have no idea what I could prescribe to help her to avoid drinking.  This seems to be a more appropriate question for detox, or a treatment program.  There are no openings on my schedule today or tomorrow.      Adrienne CARCAMO  [phone]

## 2023-09-05 NOTE — TELEPHONE ENCOUNTER
Spoke with patient, she is requesting a short term supply of ativan to help. Patient is high anxiety right now. Hospital did not prescribe anything when leaving and told her to contact her primary. She also has a phone encounter out to [facility] as well for Ativan but has not heard back. Patient states she is not suicidal and has someone staying with her. Is working with a  and planning on getting into the PHP program. Please advise. Patient states her [facility] closes at 5 and was hoping to get this before then. Please advise

## 2023-09-05 NOTE — ED NOTES
Care Transitions focused note:      Follow up call to patient from previous hospital admission.  Pt tearful and anxious on the phone.  States she has not had any alcohol but is very worried she is going to relapse due to her anxiety.    She has called her PCP and psychiatry and left messages.  Still adamantly refuses in patient but is willing to do PHP.    During our conversation, Dr Abebe's nurse called so we were cut short.    I am making a referral for PHP at this time.    I will follow up with patient.    MARITZA Ibarra

## 2023-09-05 NOTE — TELEPHONE ENCOUNTER
Return call to patient.  Patient tells me that she had a relapse with alcohol.  She also had a seizure.  She ended up in the hospital and was discharged on 9-2-23.  Patient is requesting a short-term supply of Ativan to help get her through this.  Patient is shaky, high anxiety and cannot eat.  Patient has someone staying with her.  She is not suicidal.  She is working with [facility] and is planning on getting into the PHP program.  Patient would like a short-term supply of Ativan sent to [facility] in [place].  Thank you, please advise.  Next follow up appt is on 11-6-2023.

## 2023-09-05 NOTE — TELEPHONE ENCOUNTER
12:44 PM    Reason for Call: OVERBOOK    Patient is having the following symptoms:  for hospital follow up for relaspse of alchohol. She is on the verge of drinking and needs some help with some type of meds to help her through this.    The patient is requesting an appointment for  with Adrienne Dailey.    Was an appointment offered for this call? Yes  If yes : Appointment type              Date    Preferred method for responding to this message: Telephone Call  What is your phone number ?[phone]    If we cannot reach you directly, may we leave a detailed response at the number you provided? Yes    Can this message wait until your PCP/provider returns, if unavailable today? YES, provider is in today    REJI JACQUES

## 2023-09-06 RX ORDER — GABAPENTIN 300 MG/1
300 CAPSULE ORAL 3 TIMES DAILY
Qty: 90 CAPSULE | Refills: 3 | Status: SHIPPED | OUTPATIENT
Start: 2023-09-06 | End: 2023-09-14

## 2023-09-06 NOTE — TELEPHONE ENCOUNTER
I don't feel adding another benzodiazepine (Ativan) is a good / safe option here. We could try gabapentin. In fact there is some data to support gabapentin's use in help with alcohol abuse / withdrawal.

## 2023-09-06 NOTE — TELEPHONE ENCOUNTER
Patient contacted through  that medication has been sent to [facility] for her.  Gave call back # for questions

## 2023-09-11 ENCOUNTER — TELEPHONE (OUTPATIENT)
Dept: FAMILY MEDICINE | Facility: OTHER | Age: 43
End: 2023-09-11

## 2023-09-11 ENCOUNTER — TELEPHONE (OUTPATIENT)
Dept: EMERGENCY MEDICINE | Facility: HOSPITAL | Age: 43
End: 2023-09-11

## 2023-09-11 DIAGNOSIS — F41.1 GAD (GENERALIZED ANXIETY DISORDER): Primary | ICD-10-CM

## 2023-09-11 NOTE — ED NOTES
Care Transitions focused note:      Follow up call.  Message left.  Referral has been made to PHP    Will await call back.    MARITZA Ibarra

## 2023-09-12 NOTE — PROGRESS NOTES
Assessment & Plan   Hospital Discharge follow-up    Alcohol Abuse  In early remission. Mild withdrawal symptoms. Appropriate for out patient status. No changes to medications accept as patient reported.   -Gabapentin discontinued per pt due to feeling disassociated  -No longer taking multivitamin. Encourage to restart. Is taking folic acid.        Grief reaction  Therapy referral placed  - Adult Mental Health  Referral; Future    Cigarette nicotine dependence without complication  - nicotine polacrilex (NICORETTE) 4 MG gum; Place 1 each (4 mg) inside cheek every hour as needed for smoking cessation    Anxiety  - TSH with free T4 reflex; Future  - Vitamin B12; Future  - Vitamin D Deficiency; Future  - TSH with free T4 reflex  - Vitamin B12  - Vitamin D Deficiency    Sinus pressure  - Symptomatic Influenza A/B, RSV, & SARS-CoV2 PCR (COVID-19); Future  - Symptomatic Influenza A/B, RSV, & SARS-CoV2 PCR (COVID-19) Nose    Review of external notes as documented elsewhere in note  Ordering of each unique test  Prescription drug management  No LOS data to display   Time spent by me doing chart review, history and exam, documentation and further activities per the note     MED REC REQUIRED  Post Medication Reconciliation Status: discharge medications reconciled, continue medications without change      No follow-ups on file. - follow up with Dr. Abebe per plan.     Alta Ortiz, CNP  [facility] - LÁZARO Hodge is a 43 year old, presenting for the following health issues:  Hospital F/U      [facility]       Hospital Follow-up Visit:    Hospital/Nursing Home/IP Rehab Facility: [facility]  Date of Admission: 8/31/23  Date of Discharge: 9/2/23  Reason(s) for Admission:  Alcohol withdrawal seizure without complication (H)  Alcohol intoxication  Alcohol withdrawal hyponatremia  Prolonged QT  Anxiety/depression  ADHD     Was your hospitalization related to COVID-19? No   Problems  "taking medications regularly:  None  Medication changes since discharge: Gabapentin  Problems adhering to non-medication therapy:  None    Summary of hospitalization:  [facility] discharge summary reviewed  Diagnostic Tests/Treatments reviewed.  Follow up needed: none  Other Healthcare Providers Involved in Patient s Care:      PCP- Adrienne Dailey, ARIAS  Psychriatry- Dr. Abebe ([facility])  Update since discharge: improved.     Boyfriend had a viral infection that ended up swelling up his uvula and Ayesha returned home from ICU and developed a cold.     Has not had alcohol since discharge. Has been on Pro-Tandum. Working with recovery [facility] Tada.     Working with Dr. Brandy Abebe.   + night terrors - waking up in panic. Vivid dreams. Wakes up with tachycardia and sometime vomits. Started around March/April    Plan of care communicated with patient           Review of Systems   Constitutional:  Positive for fatigue. Negative for chills and fever.        Body aches   HENT:  Positive for sinus pressure.             Objective    /66   Pulse 91   Temp 99  F (37.2  C) (Tympanic)   Resp 18   Ht 1.626 m (5' 4\")   Wt 54.2 kg (119 lb 6.4 oz)   SpO2 97%   BMI 20.49 kg/m    Body mass index is 20.49 kg/m .  Physical Exam   GENERAL: alert, no distress, and fatigued  EYES: Eyes grossly normal to inspection, PERRL and conjunctivae and sclerae normal  HENT: ear canals and TM's normal, nose and mouth without ulcers or lesions  NECK: no adenopathy, no asymmetry, masses, or scars and thyroid normal to palpation  RESP: lungs clear to auscultation - no rales, rhonchi or wheezes  CV: regular rate and rhythm, normal S1 S2, no S3 or S4, no murmur, click or rub, no peripheral edema and peripheral pulses strong  MS: no gross musculoskeletal defects noted, no edema  SKIN: no suspicious lesions or rashes  NEURO: Normal strength and tone, mentation intact and speech normal  PSYCH: mentation appears normal, anxious, " judgement and insight intact, and appearance well groomed. Very minimal tremor.      Results for orders placed or performed in visit on 09/14/23   Symptomatic Influenza A/B, RSV, & SARS-CoV2 PCR (COVID-19) Nose     Status: Normal    Specimen: Nose; Swab   Result Value Ref Range    Influenza A PCR Negative Negative    Influenza B PCR Negative Negative    RSV PCR Negative Negative    SARS CoV2 PCR Negative Negative    Narrative    Testing was performed using the Xpert Xpress CoV2/Flu/RSV Assay on the [employer] GeneXpert Instrument. This test should be ordered for the detection of SARS-CoV-2, influenza, and RSV viruses in individuals who meet clinical and/or epidemiological criteria. Test performance is unknown in asymptomatic patients. This test is for in vitro diagnostic use under the FDA EUA for laboratories certified under CLIA to perform high or moderate complexity testing. This test has not been FDA cleared or approved. A negative result does not rule out the presence of PCR inhibitors in the specimen or target RNA in concentration below the limit of detection for the assay. If only one viral target is positive but coinfection with multiple targets is suspected, the sample should be re-tested with another FDA cleared, approved, or authorized test, if coinfection would change clinical management. This test was validated by the [facility]. These laboratories are certified under the Clinical Laboratory Improvement Amendments of 1988 (CLIA-88) as qualified to perform high complexity laboratory testing.   TSH with free T4 reflex     Status: Normal   Result Value Ref Range    TSH 0.83 0.30 - 4.20 uIU/mL   Vitamin B12     Status: Normal   Result Value Ref Range    Vitamin B12 1,003 232 - 1,245 pg/mL   Vitamin D Deficiency     Status: Normal   Result Value Ref Range    Vitamin D, Total (25-Hydroxy) 49 20 - 75 ug/L    Narrative    Season, race, dietary intake, and treatment affect the concentration of  25-hydroxy-Vitamin D. Values may decrease during winter months and increase during summer months. Values 20-29 ug/L may indicate Vitamin D insufficiency and values <20 ug/L may indicate Vitamin D deficiency.    Vitamin D determination is routinely performed by an immunoassay specific for 25 hydroxyvitamin D3.  If an individual is on vitamin D2(ergocalciferol) supplementation, please specify 25 OH vitamin D2 and D3 level determination by LCMSMS test VITD23.     Extra Tube     Status: None    Narrative    The following orders were created for panel order Extra Tube.  Procedure                               Abnormality         Status                     ---------                               -----------         ------                     Extra Purple Top Tube[[id number]]                            Final result                 Please view results for these tests on the individual orders.   Extra Purple Top Tube     Status: None   Result Value Ref Range    Hold Specimen JIC

## 2023-09-14 ENCOUNTER — OFFICE VISIT (OUTPATIENT)
Dept: FAMILY MEDICINE | Facility: OTHER | Age: 43
End: 2023-09-14
Attending: NURSE PRACTITIONER
Payer: COMMERCIAL

## 2023-09-14 VITALS
RESPIRATION RATE: 18 BRPM | DIASTOLIC BLOOD PRESSURE: 66 MMHG | WEIGHT: 119.4 LBS | OXYGEN SATURATION: 97 % | HEIGHT: 64 IN | HEART RATE: 91 BPM | SYSTOLIC BLOOD PRESSURE: 102 MMHG | TEMPERATURE: 99 F | BODY MASS INDEX: 20.38 KG/M2

## 2023-09-14 DIAGNOSIS — J34.89 SINUS PRESSURE: ICD-10-CM

## 2023-09-14 DIAGNOSIS — F43.21 GRIEF REACTION: ICD-10-CM

## 2023-09-14 DIAGNOSIS — Z09 HOSPITAL DISCHARGE FOLLOW-UP: Primary | ICD-10-CM

## 2023-09-14 DIAGNOSIS — F10.10 ALCOHOL ABUSE: ICD-10-CM

## 2023-09-14 DIAGNOSIS — F17.210 CIGARETTE NICOTINE DEPENDENCE WITHOUT COMPLICATION: ICD-10-CM

## 2023-09-14 DIAGNOSIS — F41.9 ANXIETY: ICD-10-CM

## 2023-09-14 LAB
FLUAV RNA SPEC QL NAA+PROBE: NEGATIVE
FLUBV RNA RESP QL NAA+PROBE: NEGATIVE
HOLD SPECIMEN: NORMAL
RSV RNA SPEC NAA+PROBE: NEGATIVE
SARS-COV-2 RNA RESP QL NAA+PROBE: NEGATIVE
TSH SERPL DL<=0.005 MIU/L-ACNC: 0.83 UIU/ML (ref 0.3–4.2)

## 2023-09-14 PROCEDURE — 82607 VITAMIN B-12: CPT | Mod: ZL | Performed by: NURSE PRACTITIONER

## 2023-09-14 PROCEDURE — 99214 OFFICE O/P EST MOD 30 MIN: CPT | Performed by: NURSE PRACTITIONER

## 2023-09-14 PROCEDURE — G0463 HOSPITAL OUTPT CLINIC VISIT: HCPCS

## 2023-09-14 PROCEDURE — 36415 COLL VENOUS BLD VENIPUNCTURE: CPT | Mod: ZL | Performed by: NURSE PRACTITIONER

## 2023-09-14 PROCEDURE — 84443 ASSAY THYROID STIM HORMONE: CPT | Mod: ZL | Performed by: NURSE PRACTITIONER

## 2023-09-14 PROCEDURE — 87637 SARSCOV2&INF A&B&RSV AMP PRB: CPT | Mod: ZL | Performed by: NURSE PRACTITIONER

## 2023-09-14 PROCEDURE — 82306 VITAMIN D 25 HYDROXY: CPT | Mod: ZL | Performed by: NURSE PRACTITIONER

## 2023-09-14 ASSESSMENT — ANXIETY QUESTIONNAIRES
2. NOT BEING ABLE TO STOP OR CONTROL WORRYING: MORE THAN HALF THE DAYS
IF YOU CHECKED OFF ANY PROBLEMS ON THIS QUESTIONNAIRE, HOW DIFFICULT HAVE THESE PROBLEMS MADE IT FOR YOU TO DO YOUR WORK, TAKE CARE OF THINGS AT HOME, OR GET ALONG WITH OTHER PEOPLE: VERY DIFFICULT
4. TROUBLE RELAXING: NEARLY EVERY DAY
5. BEING SO RESTLESS THAT IT IS HARD TO SIT STILL: NEARLY EVERY DAY
6. BECOMING EASILY ANNOYED OR IRRITABLE: MORE THAN HALF THE DAYS
1. FEELING NERVOUS, ANXIOUS, OR ON EDGE: NEARLY EVERY DAY
3. WORRYING TOO MUCH ABOUT DIFFERENT THINGS: NEARLY EVERY DAY
GAD7 TOTAL SCORE: 17
7. FEELING AFRAID AS IF SOMETHING AWFUL MIGHT HAPPEN: SEVERAL DAYS
GAD7 TOTAL SCORE: 17

## 2023-09-14 ASSESSMENT — ENCOUNTER SYMPTOMS
FEVER: 0
SINUS PRESSURE: 1
FATIGUE: 1
CHILLS: 0

## 2023-09-14 ASSESSMENT — PATIENT HEALTH QUESTIONNAIRE - PHQ9
SUM OF ALL RESPONSES TO PHQ QUESTIONS 1-9: 11
10. IF YOU CHECKED OFF ANY PROBLEMS, HOW DIFFICULT HAVE THESE PROBLEMS MADE IT FOR YOU TO DO YOUR WORK, TAKE CARE OF THINGS AT HOME, OR GET ALONG WITH OTHER PEOPLE: VERY DIFFICULT
SUM OF ALL RESPONSES TO PHQ QUESTIONS 1-9: 11

## 2023-09-14 ASSESSMENT — PAIN SCALES - GENERAL: PAINLEVEL: NO PAIN (0)

## 2023-09-15 LAB — VIT B12 SERPL-MCNC: 1003 PG/ML (ref 232–1245)

## 2023-09-20 LAB — DEPRECATED CALCIDIOL+CALCIFEROL SERPL-MC: 49 UG/L (ref 20–75)

## 2023-11-06 ENCOUNTER — VIRTUAL VISIT (OUTPATIENT)
Dept: PSYCHIATRY | Facility: OTHER | Age: 43
End: 2023-11-06
Attending: PSYCHIATRY & NEUROLOGY
Payer: COMMERCIAL

## 2023-11-06 DIAGNOSIS — F90.2 ADHD (ATTENTION DEFICIT HYPERACTIVITY DISORDER), COMBINED TYPE: ICD-10-CM

## 2023-11-06 DIAGNOSIS — F43.10 PTSD (POST-TRAUMATIC STRESS DISORDER): Primary | ICD-10-CM

## 2023-11-06 DIAGNOSIS — F41.1 GAD (GENERALIZED ANXIETY DISORDER): ICD-10-CM

## 2023-11-06 PROCEDURE — 99443 PR PHYSICIAN TELEPHONE EVALUATION 21-30 MIN: CPT | Mod: 95 | Performed by: PSYCHIATRY & NEUROLOGY

## 2023-11-06 RX ORDER — DULOXETIN HYDROCHLORIDE 60 MG/1
60 CAPSULE, DELAYED RELEASE ORAL DAILY
Qty: 30 CAPSULE | Refills: 11 | Status: SHIPPED | OUTPATIENT
Start: 2023-11-06 | End: 2024-09-23

## 2023-11-06 RX ORDER — TEMAZEPAM 7.5 MG/1
CAPSULE ORAL
Qty: 90 CAPSULE | Refills: 3 | Status: SHIPPED | OUTPATIENT
Start: 2023-11-06 | End: 2024-02-06

## 2023-11-06 RX ORDER — METHYLPHENIDATE HYDROCHLORIDE 20 MG/1
20 CAPSULE, EXTENDED RELEASE ORAL DAILY
Qty: 30 CAPSULE | Refills: 0 | Status: SHIPPED | OUTPATIENT
Start: 2023-11-06 | End: 2024-02-06

## 2023-11-06 RX ORDER — PRAZOSIN HYDROCHLORIDE 1 MG/1
1 CAPSULE ORAL AT BEDTIME
Qty: 30 CAPSULE | Refills: 3 | Status: SHIPPED | OUTPATIENT
Start: 2023-11-06 | End: 2024-02-06

## 2023-11-06 ASSESSMENT — ANXIETY QUESTIONNAIRES
2. NOT BEING ABLE TO STOP OR CONTROL WORRYING: NEARLY EVERY DAY
7. FEELING AFRAID AS IF SOMETHING AWFUL MIGHT HAPPEN: MORE THAN HALF THE DAYS
6. BECOMING EASILY ANNOYED OR IRRITABLE: MORE THAN HALF THE DAYS
GAD7 TOTAL SCORE: 18
3. WORRYING TOO MUCH ABOUT DIFFERENT THINGS: NEARLY EVERY DAY
5. BEING SO RESTLESS THAT IT IS HARD TO SIT STILL: NEARLY EVERY DAY
GAD7 TOTAL SCORE: 18
1. FEELING NERVOUS, ANXIOUS, OR ON EDGE: MORE THAN HALF THE DAYS

## 2023-11-06 ASSESSMENT — PATIENT HEALTH QUESTIONNAIRE - PHQ9
SUM OF ALL RESPONSES TO PHQ QUESTIONS 1-9: 14
5. POOR APPETITE OR OVEREATING: NEARLY EVERY DAY

## 2023-11-06 ASSESSMENT — PAIN SCALES - GENERAL: PAINLEVEL: MODERATE PAIN (4)

## 2023-11-06 NOTE — PROGRESS NOTES
"Ayesha is a 43 year old who is being evaluated via a billable telephone visit.      What phone number would you like to be contacted at? [phone]  How would you like to obtain your AVS? Evelyn     Telephone visit 38 minutes. 2 minutes outside of telephone visit documenting, ordering medications. Total visit time of 40 minutes.     SUBJECTIVE / INTERIM HISTORY                                                                       Children-  3 kids. Yassine 12 yo Terra 22 yo  Last visit 8/2/23: Continue temazepam 7.5 mg with directions take 2 - 3 caps HS I filled on 7/17/23 with  3 refills. Continue Cymbalta 60 mg daily. Ritalin LA 20 daily filled for 8/2, 8/30, and 9/27  - put up her [employer] decorations yesterday  - has NOT been drinking.   - has changed way she takes temazepam in that takes 1 during day. Has helped her eat a little better   - has started having \"sleep terrors\". Yelling to point waking up Dameon, is thrashing around, has had to go to chiropractor. Sometimes wakes up from it.   - interim last visit Ayesha ended up admitted for alcohol 8/31/ to 9/2/23. Had prolonged AT, may have had a seizure, had hyponatremia.   - I didn't feel comfortable prescribing Ativan and we agreed on gabapentin 300 mg 3 x daily. Note indicates she stopped taking as she felt dissociated   - they are living in [place]. Ayehsa is doing  at her home. Right now she has one family. Part-time 4 kids, other part of the time just the 2 younger kids  - Dameon went back to work at the [employer] in [facility].   - [place] 22 yo in [place] , has a house. His girlfriend has a baby and GF recently tried commit suicide  - still struggles with eating. During day she gets queasy. Still gets diarrhea at times but not to extent used to be  - dad passed away on 1/27/23.   - Yassine only been with [facility] 3 times this summer. Yassine in therapy and Ayesha notes she doesn't feel it's fair to try to talk stuff about [facility] with Yassine  - " "having terrible nightmares including dreams she is back - feeling ready to go back to work. Doing training for  \"[employer]\". 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 Heartburn and gallbladder issues  MEDICAL / SURGICAL HISTORY                pregnant [if applicable]--no     Patient Active Problem List   Diagnosis    Migraine with aura and without status migrainosus, not intractable    Primary insomnia    ACP (advance care planning)    Gastroesophageal reflux disease, esophagitis presence not specified    Recurrent major depressive disorder (H24)    Tobacco abuse    Hydronephrosis    Panic disorder without agoraphobia    Attention deficit hyperactivity disorder (ADHD), combined type    Hot flashes---4/2021    History of COVID-19 - 5/11/21    Anxiety    Alcoholic intoxication without complication (H24)    Depression, unspecified depression type    Eating disorder, unspecified type    Alcohol withdrawal seizure without complication (H)     ALLERGY   Trazodone, Dust mite extract, Gabapentin, Hydrocodone, and Penicillins  MEDICATIONS                                                                                             Current Outpatient Medications   Medication Sig    DULoxetine (CYMBALTA) 60 MG capsule TAKE 1 CAPSULE (60 MG) BY MOUTH DAILY    folic acid (FOLVITE) 1 MG tablet TAKE 1 TABLET (1 MG) BY MOUTH DAILY    magnesium oxide (MAG-OX) 400 MG tablet TAKE 2 TABLETS BY MOUTH IN THE EVENING. STRENGTH: 400 MG    nicotine polacrilex (NICORETTE) 4 MG gum Place 1 each (4 mg) inside cheek every hour as needed for smoking cessation    NONFORMULARY Take 1 tablet by mouth daily Protandim NRF-2    omeprazole (PRILOSEC) 40 MG DR capsule TAKE ONE CAPSULE WITH BREAKFAST DAILY STRENGTH: 40 MG    temazepam (RESTORIL) 7.5 MG capsule Take 2 - 3 capsules bedtime as needed for insomnia    UNABLE TO FIND MEDICATION NAME: Protandim Life[facility]age     No " current facility-administered medications for this visit.       VITALS   There were no vitals taken for this visit.     PHQ9                     [unfilled]  LABS                                                                                                                           Last Comprehensive Metabolic Panel:  Sodium   Date Value Ref Range Status   09/01/2023 137 136 - 145 mmol/L Final   07/06/2021 140 133 - 144 mmol/L Final     Potassium   Date Value Ref Range Status   09/01/2023 5.0 3.4 - 5.3 mmol/L Final   10/27/2021 4.0 3.4 - 5.3 mmol/L Final   07/06/2021 3.0 (L) 3.4 - 5.3 mmol/L Final     Chloride   Date Value Ref Range Status   09/01/2023 102 98 - 107 mmol/L Final   10/27/2021 110 (H) 94 - 109 mmol/L Final   07/06/2021 108 94 - 109 mmol/L Final     Carbon Dioxide   Date Value Ref Range Status   07/06/2021 28 20 - 32 mmol/L Final     Carbon Dioxide (CO2)   Date Value Ref Range Status   09/01/2023 25 22 - 29 mmol/L Final   10/27/2021 29 20 - 32 mmol/L Final     Anion Gap   Date Value Ref Range Status   09/01/2023 10 7 - 15 mmol/L Final   10/27/2021 2 (L) 3 - 14 mmol/L Final   07/06/2021 4 3 - 14 mmol/L Final     Glucose   Date Value Ref Range Status   10/27/2021 106 (H) 70 - 99 mg/dL Final   07/06/2021 76 70 - 99 mg/dL Final     GLUCOSE BY METER POCT   Date Value Ref Range Status   09/01/2023 88 70 - 99 mg/dL Final     Urea Nitrogen   Date Value Ref Range Status   09/01/2023 4.5 (L) 6.0 - 20.0 mg/dL Final   10/27/2021 6 (L) 7 - 30 mg/dL Final   07/06/2021 4 (L) 7 - 30 mg/dL Final     Creatinine   Date Value Ref Range Status   09/01/2023 0.79 0.51 - 0.95 mg/dL Final   07/06/2021 0.66 0.52 - 1.04 mg/dL Final     GFR Estimate   Date Value Ref Range Status   09/01/2023 >90 >60 mL/min/1.73m2 Final   07/06/2021 >90 >60 mL/min/[1.73_m2] Final     Comment:     Non  GFR Calc  Starting 12/18/2018, serum creatinine based estimated GFR (eGFR) will be   calculated using the Chronic Kidney  Disease Epidemiology Collaboration   (CKD-EPI) equation.       Calcium   Date Value Ref Range Status   09/01/2023 8.5 (L) 8.6 - 10.0 mg/dL Final   07/06/2021 7.9 (L) 8.5 - 10.1 mg/dL Final     Bilirubin Total   Date Value Ref Range Status   09/01/2023 0.8 <=1.2 mg/dL Final   07/06/2021 0.3 0.2 - 1.3 mg/dL Final     Alkaline Phosphatase   Date Value Ref Range Status   09/01/2023 74 35 - 104 U/L Final   07/06/2021 64 40 - 150 U/L Final     ALT   Date Value Ref Range Status   09/01/2023 18 0 - 50 U/L Final     Comment:     Reference intervals for this test were updated on 6/12/2023 to more accurately reflect our healthy population. There may be differences in the flagging of prior results with similar values performed with this method. Interpretation of those prior results can be made in the context of the updated reference intervals.     07/06/2021 17 0 - 50 U/L Final     AST   Date Value Ref Range Status   09/01/2023 45 0 - 45 U/L Final     Comment:     Reference intervals for this test were updated on 6/12/2023 to more accurately reflect our healthy population. There may be differences in the flagging of prior results with similar values performed with this method. Interpretation of those prior results can be made in the context of the updated reference intervals.   07/06/2021 22 0 - 45 U/L Final     CBC RESULTS:   Recent Labs   Lab Test 09/01/23  0444   WBC 6.6   RBC 3.96   HGB 12.1   HCT 36.5   MCV 92   MCH 30.6   MCHC 33.2   RDW 13.4               MENTAL STATUS EXAM                                                                                       Speech: normal volume, rhythm, rate. Mood was anxious, depressed. Thought process, including associations, was unremarkable and thought content was devoid of suicidal and homicidal ideation and psychotic thought. No hallucinations. Insight was good. Judgment was intact and adequate for safety. Fund of knowledge was intact. Pt demonstrates no obvious problems  with attention, concentration, language, recent or remote memory although these were not formally tested.     ASSESSMENT                                                                                                      HISTORICAL:  Initial psych note 3/9/18          NOTES:  Topamax: word finding issues. Gabapentin: dissociation and and akathisia. Trazodone: allergic reaction. OTC sleep meds not helping...  Ayesha Lovelace is a 42 yo with MDD, MAYKEL, hx couple psychiatric hospital stays.  Interim last visit she had hospital stay for alcohol and possibly had seizure and had hyponatremia and prolonged QTc. She has NOT been drinking and is doing pretty well this. Discussed we could have Ayesha try naltrexone and we discussed how it works, that it blocks opioids, etc. She opted not to for now but may consider in future. Main thing Ayesha has been struggling with has been nightmares and what sounds like some REM sleep behavior disorder sx. We are going to have her try prazosin and reviewed can lower blood pressure hence caution any lightheadedness...      TREATMENT RISK STATEMENT:  The risks, benefits, alternatives and potential adverse effects have been explained and are understood by the pt.  The pt agrees to the treatment plan with the ability to do so.   The pt knows to call the clinic for any problems or access emergency care if needed.          DIAGNOSES                   MAYKEL  MDD, recurrent, mod  ADHD       PLAN                                                                                                                     1)  MEDICATIONS:         - Start prazosin 1 mg HS. Continue temazepam 7.5 mg of which she has been taking one during day and then 2 a tnight.  Continue Cymbalta 60 mg daily. Ritalin LA 20 daily filled today.     2)  THERAPY:  No Change     3)  LABS:  None     4)  PT MONITOR [call for probs]:  Worsening symptoms, SI/HI, SEs from meds     5)  REFERRALS [CD, medical, other]: none     6)  RTC:  1 month

## 2023-11-14 DIAGNOSIS — R79.0 LOW MAGNESIUM LEVEL: ICD-10-CM

## 2023-11-15 RX ORDER — MAGNESIUM OXIDE 400 MG/1
TABLET ORAL
Qty: 60 TABLET | Refills: 1 | Status: SHIPPED | OUTPATIENT
Start: 2023-11-15 | End: 2024-02-19

## 2023-11-15 NOTE — TELEPHONE ENCOUNTER
Mag Ox      Last Written Prescription Date:  9.13.23  Last Fill Quantity: #60,   # refills: 0  Last Office Visit: 3.13.23  Future Office visit:       Routing refill request to provider for review/approval because:  Drug not on the G, P or [facility] refill protocol or controlled substance

## 2023-12-04 ENCOUNTER — VIRTUAL VISIT (OUTPATIENT)
Dept: PSYCHIATRY | Facility: OTHER | Age: 43
End: 2023-12-04
Attending: PSYCHIATRY & NEUROLOGY
Payer: COMMERCIAL

## 2023-12-04 DIAGNOSIS — F41.1 GAD (GENERALIZED ANXIETY DISORDER): Primary | ICD-10-CM

## 2023-12-04 PROCEDURE — 99442 PR PHYSICIAN TELEPHONE EVALUATION 11-20 MIN: CPT | Mod: 95 | Performed by: PSYCHIATRY & NEUROLOGY

## 2023-12-04 RX ORDER — PANTOPRAZOLE SODIUM 40 MG/1
40 TABLET, DELAYED RELEASE ORAL
COMMUNITY
Start: 2023-11-28

## 2023-12-04 RX ORDER — SUCRALFATE 1 G/1
1 TABLET ORAL
COMMUNITY
Start: 2023-11-28 | End: 2024-02-26

## 2023-12-04 RX ORDER — ONDANSETRON 4 MG/1
4 TABLET, ORALLY DISINTEGRATING ORAL
COMMUNITY
Start: 2023-11-28

## 2023-12-04 ASSESSMENT — ANXIETY QUESTIONNAIRES
GAD7 TOTAL SCORE: 19
2. NOT BEING ABLE TO STOP OR CONTROL WORRYING: NEARLY EVERY DAY
3. WORRYING TOO MUCH ABOUT DIFFERENT THINGS: NEARLY EVERY DAY
5. BEING SO RESTLESS THAT IT IS HARD TO SIT STILL: MORE THAN HALF THE DAYS
1. FEELING NERVOUS, ANXIOUS, OR ON EDGE: NEARLY EVERY DAY
GAD7 TOTAL SCORE: 19
6. BECOMING EASILY ANNOYED OR IRRITABLE: MORE THAN HALF THE DAYS
7. FEELING AFRAID AS IF SOMETHING AWFUL MIGHT HAPPEN: NEARLY EVERY DAY

## 2023-12-04 ASSESSMENT — PATIENT HEALTH QUESTIONNAIRE - PHQ9
5. POOR APPETITE OR OVEREATING: NEARLY EVERY DAY
SUM OF ALL RESPONSES TO PHQ QUESTIONS 1-9: 19

## 2023-12-04 ASSESSMENT — PAIN SCALES - GENERAL: PAINLEVEL: MODERATE PAIN (5)

## 2023-12-04 NOTE — PROGRESS NOTES
Ayesha is a 43 year old who is being evaluated via a billable telephone visit.      What phone number would you like to be contacted at? [phone]  How would you [facility] to [facility] your [facility]? [facility]     Telephone visit 19 minutes. 4 minutes outside of telephone visit documenting, ordering medications. Total visit time of 23 minutes.     SUBJECTIVE / INTERIM HISTORY                                                                       Children-  3 kids. Yassine 12 yo Terra 24 yo  Last visit 11/6/23: Start prazosin 1 mg HS. Continue temazepam 7.5 mg of which she has been taking one during day and then 2 a tnight.  Continue Cymbalta 60 mg daily. Ritalin LA 20 daily filled today.   - ended up admitted for GI issues. Next will have EGD manometry as part of workup  - did NOT start prazosin given GI issues. Hasn't been taking Ritalin  - her and Dameon on way to [place]   - has not been drinking EtOH  - they are living in [place]. Ayesha is doing  at her home. Right now she has one family. Part-time 4 kids, other part of the time just the 2 younger kids  - Dameon working at the [employer] in [facility].   - Terra 24 yo in [facility], has a house. His girlfriend has a baby and GF recently tried commit suicide  - dad passed away on 1/27/23.   - [employer]assine basketball, orchestra  - is still involved in [employer]    MEDICAL / SURGICAL HISTORY                pregnant [if applicable]--no     Patient Active Problem List   Diagnosis    Migraine with aura and without status migrainosus, not intractable    Primary insomnia    ACP (advance care planning)    Gastroesophageal reflux disease, esophagitis presence not specified    Recurrent major depressive disorder (H24)    Tobacco abuse    Hydronephrosis    Panic disorder without agoraphobia    Attention deficit hyperactivity disorder (ADHD), combined type    Hot flashes---4/2021    History of COVID-19 - 5/11/21    Anxiety    Alcoholic intoxication without  complication (H24)    Depression, unspecified depression type    Eating disorder, unspecified type    Alcohol withdrawal seizure without complication (H)     ALLERGY   Trazodone, Dust mite extract, Gabapentin, Hydrocodone, and Penicillins  MEDICATIONS                                                                                             Current Outpatient Medications   Medication Sig    DULoxetine (CYMBALTA) 60 MG capsule Take 1 capsule (60 mg) by mouth daily    folic acid (FOLVITE) 1 MG tablet TAKE 1 TABLET (1 MG) BY MOUTH DAILY    magnesium oxide (MAG-OX) 400 MG tablet TAKE 2 TABLETS BY MOUTH IN THE EVENING. STRENGTH: 400 MG    NONFORMULARY Take 1 tablet by mouth daily Protandim NRF-2    ondansetron (ZOFRAN ODT) 4 MG ODT tab Take 4 mg by mouth    pantoprazole (PROTONIX) 40 MG EC tablet Take 40 mg by mouth    sucralfate (CARAFATE) 1 GM tablet Take 1 g by mouth    temazepam (RESTORIL) 7.5 MG capsule Take 2 - 3 capsules bedtime as needed for insomnia    methylphenidate (RITALIN LA) 20 MG 24 hr capsule Take 1 capsule (20 mg) by mouth daily (Patient not taking: Reported on 12/4/2023)    nicotine polacrilex (NICORETTE) 4 MG gum Place 1 each (4 mg) inside cheek every hour as needed for smoking cessation (Patient not taking: Reported on 12/4/2023)    prazosin (MINIPRESS) 1 MG capsule Take 1 capsule (1 mg) by mouth at bedtime (Patient not taking: Reported on 12/4/2023)     No current facility-administered medications for this visit.       VITALS   There were no vitals taken for this visit.     PHQ9                     [unfilled]  LABS                                                                                                                           Last Comprehensive Metabolic Panel:  Sodium   Date Value Ref Range Status   09/01/2023 137 136 - 145 mmol/L Final   07/06/2021 140 133 - 144 mmol/L Final     Potassium   Date Value Ref Range Status   09/01/2023 5.0 3.4 - 5.3 mmol/L Final   10/27/2021 4.0 3.4 -  5.3 mmol/L Final   07/06/2021 3.0 (L) 3.4 - 5.3 mmol/L Final     Chloride   Date Value Ref Range Status   09/01/2023 102 98 - 107 mmol/L Final   10/27/2021 110 (H) 94 - 109 mmol/L Final   07/06/2021 108 94 - 109 mmol/L Final     Carbon Dioxide   Date Value Ref Range Status   07/06/2021 28 20 - 32 mmol/L Final     Carbon Dioxide (CO2)   Date Value Ref Range Status   09/01/2023 25 22 - 29 mmol/L Final   10/27/2021 29 20 - 32 mmol/L Final     Anion Gap   Date Value Ref Range Status   09/01/2023 10 7 - 15 mmol/L Final   10/27/2021 2 (L) 3 - 14 mmol/L Final   07/06/2021 4 3 - 14 mmol/L Final     Glucose   Date Value Ref Range Status   10/27/2021 106 (H) 70 - 99 mg/dL Final   07/06/2021 76 70 - 99 mg/dL Final     GLUCOSE BY METER POCT   Date Value Ref Range Status   09/01/2023 88 70 - 99 mg/dL Final     Urea Nitrogen   Date Value Ref Range Status   09/01/2023 4.5 (L) 6.0 - 20.0 mg/dL Final   10/27/2021 6 (L) 7 - 30 mg/dL Final   07/06/2021 4 (L) 7 - 30 mg/dL Final     Creatinine   Date Value Ref Range Status   09/01/2023 0.79 0.51 - 0.95 mg/dL Final   07/06/2021 0.66 0.52 - 1.04 mg/dL Final     GFR Estimate   Date Value Ref Range Status   09/01/2023 >90 >60 mL/min/1.73m2 Final   07/06/2021 >90 >60 mL/min/[1.73_m2] Final     Comment:     Non  GFR Calc  Starting 12/18/2018, serum creatinine based estimated GFR (eGFR) will be   calculated using the Chronic Kidney Disease Epidemiology Collaboration   (CKD-EPI) equation.       Calcium   Date Value Ref Range Status   09/01/2023 8.5 (L) 8.6 - 10.0 mg/dL Final   07/06/2021 7.9 (L) 8.5 - 10.1 mg/dL Final     Bilirubin Total   Date Value Ref Range Status   09/01/2023 0.8 <=1.2 mg/dL Final   07/06/2021 0.3 0.2 - 1.3 mg/dL Final     Alkaline Phosphatase   Date Value Ref Range Status   09/01/2023 74 35 - 104 U/L Final   07/06/2021 64 40 - 150 U/L Final     ALT   Date Value Ref Range Status   09/01/2023 18 0 - 50 U/L Final     Comment:     Reference intervals for this  test were updated on 6/12/2023 to more accurately reflect our healthy population. There may be differences in the flagging of prior results with similar values performed with this method. Interpretation of those prior results can be made in the context of the updated reference intervals.     07/06/2021 17 0 - 50 U/L Final     AST   Date Value Ref Range Status   09/01/2023 45 0 - 45 U/L Final     Comment:     Reference intervals for this test were updated on 6/12/2023 to more accurately reflect our healthy population. There may be differences in the flagging of prior results with similar values performed with this method. Interpretation of those prior results can be made in the context of the updated reference intervals.   07/06/2021 22 0 - 45 U/L Final     CBC RESULTS:   Recent Labs   Lab Test 09/01/23  0444   WBC 6.6   RBC 3.96   HGB 12.1   HCT 36.5   MCV 92   MCH 30.6   MCHC 33.2   RDW 13.4               MENTAL STATUS EXAM                                                                                       Speech: normal volume, rhythm, rate. Mood was anxious, depressed. Thought process, including associations, was unremarkable and thought content was devoid of suicidal and homicidal ideation and psychotic thought. No hallucinations. Insight was good. Judgment was intact and adequate for safety. Fund of knowledge was intact. Pt demonstrates no obvious problems with attention, concentration, language, recent or remote memory although these were not formally tested.     ASSESSMENT                                                                                                      HISTORICAL:  Initial psych note 3/9/18          NOTES:  Topamax: word finding issues. Gabapentin: dissociation and and akathisia. Trazodone: allergic reaction. OTC sleep meds not helping...  Ayesha Lovelace is a 42 yo with MDD, MAYKEL, hx couple psychiatric hospital stays.  Interim last visit she was hospitalized 11/22 - 11/28 for abdominal  pain, vomiting, diarrhea. Ongoing GI issues and next will be esophageal manometry. She did not start prazosin given this and has not been taking Ritalin.       TREATMENT RISK STATEMENT:  The risks, benefits, alternatives and potential adverse effects have been explained and are understood by the pt.  The pt agrees to the treatment plan with the ability to do so.   The pt knows to call the clinic for any problems or access emergency care if needed.          DIAGNOSES                   MAYKEL  MDD, recurrent, mod  ADHD       PLAN                                                                                                                     1)  MEDICATIONS:         - Continue temazepam 7.5 mg of which she has been taking 7.5 mg daily and 15 mg HS.   Continue Cymbalta 60 mg daily.     2)  THERAPY:  No Change     3)  LABS:  None     4)  PT MONITOR [call for probs]:  Worsening symptoms, SI/HI, SEs from meds     5)  REFERRALS [CD, medical, other]: none     6)  RTC: 6-8 weeks

## 2024-02-06 ENCOUNTER — VIRTUAL VISIT (OUTPATIENT)
Dept: PSYCHIATRY | Facility: OTHER | Age: 44
End: 2024-02-06
Attending: PSYCHIATRY & NEUROLOGY
Payer: COMMERCIAL

## 2024-02-06 DIAGNOSIS — F41.1 GAD (GENERALIZED ANXIETY DISORDER): ICD-10-CM

## 2024-02-06 PROCEDURE — 99443 PR PHYSICIAN TELEPHONE EVALUATION 21-30 MIN: CPT | Mod: 93 | Performed by: PSYCHIATRY & NEUROLOGY

## 2024-02-06 RX ORDER — TEMAZEPAM 7.5 MG/1
CAPSULE ORAL
Qty: 90 CAPSULE | Refills: 3 | Status: SHIPPED | OUTPATIENT
Start: 2024-02-19 | End: 2024-06-10

## 2024-02-06 ASSESSMENT — ANXIETY QUESTIONNAIRES
GAD7 TOTAL SCORE: 15
7. FEELING AFRAID AS IF SOMETHING AWFUL MIGHT HAPPEN: MORE THAN HALF THE DAYS
GAD7 TOTAL SCORE: 15
6. BECOMING EASILY ANNOYED OR IRRITABLE: MORE THAN HALF THE DAYS
2. NOT BEING ABLE TO STOP OR CONTROL WORRYING: MORE THAN HALF THE DAYS
5. BEING SO RESTLESS THAT IT IS HARD TO SIT STILL: NEARLY EVERY DAY
1. FEELING NERVOUS, ANXIOUS, OR ON EDGE: MORE THAN HALF THE DAYS
3. WORRYING TOO MUCH ABOUT DIFFERENT THINGS: MORE THAN HALF THE DAYS

## 2024-02-06 ASSESSMENT — PAIN SCALES - GENERAL: PAINLEVEL: NO PAIN (0)

## 2024-02-06 NOTE — PROGRESS NOTES
Ayesha is a 43 year old who is being evaluated via a billable telephone visit.      What phone number would you like to be contacted at? [phone]  How would you like to obtain your AVS? [facility]     Type of service: telephone visit 25 minutes     start time: 1:10 pm     end time: 1:35 pm    Originating location (pt location):    Distant location (provider location): home        SUBJECTIVE / INTERIM HISTORY                                                                       Children-  3 kids. Yassine 12 yo Terra 24 yo  Last visit 12/4/23: Continue temazepam 7.5 mg of which she has been taking 7.5 mg daily and 15 mg HS.   Continue Cymbalta 60 mg daily.   - hemorrhoidectomy 1/10/24. Was very painful for first 3 weeks.   - saw GI and carafate started is helping. Ayesha has come to realize she has to be particular as to what she eats. For one softer foods. Also high protein, low fat  - not taking Ritalin.   - Dameon's [place] opportunity [place] the [place]. They could end up moving there. He was promoted as supervisor.   - has not been drinking EtOH  - they are living in [place]. Ayesha is doing  at her home. Right now she has one family. Part-time 4 kids, other part of the time just the 2 younger kids  - Terra 24 yo in [facility], has a house. . Daniel just got engaged. They've been together since 10th grade.   - dad passed away on 1/27/23. Pain she was in was a distraction through this this year   - [employer] basketball, orchestra  - is still involved in [employer]    MEDICAL / SURGICAL HISTORY                pregnant [if applicable]--no     Patient Active Problem List   Diagnosis    Migraine with aura and without status migrainosus, not intractable    Primary insomnia    ACP (advance care planning)    Gastroesophageal reflux disease, esophagitis presence not specified    Recurrent major depressive disorder (H24)    Tobacco abuse    Hydronephrosis    Panic disorder without agoraphobia    Attention deficit  hyperactivity disorder (ADHD), combined type    Hot flashes---4/2021    History of COVID-19 - 5/11/21    Anxiety    Alcoholic intoxication without complication (H24)    Depression, unspecified depression type    Eating disorder, unspecified type    Alcohol withdrawal seizure without complication (H)     ALLERGY   Trazodone, Dust mite extract, Gabapentin, Hydrocodone, and Penicillins  MEDICATIONS                                                                                             Current Outpatient Medications   Medication Sig    COLLAGEN PO Patient states she takes liquid Collagen    DULoxetine (CYMBALTA) 60 MG capsule Take 1 capsule (60 mg) by mouth daily    folic acid (FOLVITE) 1 MG tablet TAKE 1 TABLET (1 MG) BY MOUTH DAILY    magnesium oxide (MAG-OX) 400 MG tablet TAKE 2 TABLETS BY MOUTH IN THE EVENING. STRENGTH: 400 MG    NONFORMULARY Take 1 tablet by mouth daily Protandim NRF-2    ondansetron (ZOFRAN ODT) 4 MG ODT tab Take 4 mg by mouth    pantoprazole (PROTONIX) 40 MG EC tablet Take 40 mg by mouth    sucralfate (CARAFATE) 1 GM tablet Take 1 g by mouth    temazepam (RESTORIL) 7.5 MG capsule Take 2 - 3 capsules bedtime as needed for insomnia    methylphenidate (RITALIN LA) 20 MG 24 hr capsule Take 1 capsule (20 mg) by mouth daily (Patient not taking: Reported on 12/4/2023)    nicotine polacrilex (NICORETTE) 4 MG gum Place 1 each (4 mg) inside cheek every hour as needed for smoking cessation (Patient not taking: Reported on 12/4/2023)    prazosin (MINIPRESS) 1 MG capsule Take 1 capsule (1 mg) by mouth at bedtime (Patient not taking: Reported on 12/4/2023)     No current facility-administered medications for this visit.       VITALS   There were no vitals taken for this visit.     PHQ9                     [unfilled]  LABS                                                                                                                           Last Comprehensive Metabolic Panel:  Sodium   Date Value  Ref Range Status   09/01/2023 137 136 - 145 mmol/L Final   07/06/2021 140 133 - 144 mmol/L Final     Potassium   Date Value Ref Range Status   09/01/2023 5.0 3.4 - 5.3 mmol/L Final   10/27/2021 4.0 3.4 - 5.3 mmol/L Final   07/06/2021 3.0 (L) 3.4 - 5.3 mmol/L Final     Chloride   Date Value Ref Range Status   09/01/2023 102 98 - 107 mmol/L Final   10/27/2021 110 (H) 94 - 109 mmol/L Final   07/06/2021 108 94 - 109 mmol/L Final     Carbon Dioxide   Date Value Ref Range Status   07/06/2021 28 20 - 32 mmol/L Final     Carbon Dioxide (CO2)   Date Value Ref Range Status   09/01/2023 25 22 - 29 mmol/L Final   10/27/2021 29 20 - 32 mmol/L Final     Anion Gap   Date Value Ref Range Status   09/01/2023 10 7 - 15 mmol/L Final   10/27/2021 2 (L) 3 - 14 mmol/L Final   07/06/2021 4 3 - 14 mmol/L Final     Glucose   Date Value Ref Range Status   10/27/2021 106 (H) 70 - 99 mg/dL Final   07/06/2021 76 70 - 99 mg/dL Final     GLUCOSE BY METER POCT   Date Value Ref Range Status   09/01/2023 88 70 - 99 mg/dL Final     Urea Nitrogen   Date Value Ref Range Status   09/01/2023 4.5 (L) 6.0 - 20.0 mg/dL Final   10/27/2021 6 (L) 7 - 30 mg/dL Final   07/06/2021 4 (L) 7 - 30 mg/dL Final     Creatinine   Date Value Ref Range Status   09/01/2023 0.79 0.51 - 0.95 mg/dL Final   07/06/2021 0.66 0.52 - 1.04 mg/dL Final     GFR Estimate   Date Value Ref Range Status   09/01/2023 >90 >60 mL/min/1.73m2 Final   07/06/2021 >90 >60 mL/min/[1.73_m2] Final     Comment:     Non  GFR Calc  Starting 12/18/2018, serum creatinine based estimated GFR (eGFR) will be   calculated using the Chronic Kidney Disease Epidemiology Collaboration   (CKD-EPI) equation.       Calcium   Date Value Ref Range Status   09/01/2023 8.5 (L) 8.6 - 10.0 mg/dL Final   07/06/2021 7.9 (L) 8.5 - 10.1 mg/dL Final     Bilirubin Total   Date Value Ref Range Status   09/01/2023 0.8 <=1.2 mg/dL Final   07/06/2021 0.3 0.2 - 1.3 mg/dL Final     Alkaline Phosphatase   Date Value  Ref Range Status   09/01/2023 74 35 - 104 U/L Final   07/06/2021 64 40 - 150 U/L Final     ALT   Date Value Ref Range Status   09/01/2023 18 0 - 50 U/L Final     Comment:     Reference intervals for this test were updated on 6/12/2023 to more accurately reflect our healthy population. There may be differences in the flagging of prior results with similar values performed with this method. Interpretation of those prior results can be made in the context of the updated reference intervals.     07/06/2021 17 0 - 50 U/L Final     AST   Date Value Ref Range Status   09/01/2023 45 0 - 45 U/L Final     Comment:     Reference intervals for this test were updated on 6/12/2023 to more accurately reflect our healthy population. There may be differences in the flagging of prior results with similar values performed with this method. Interpretation of those prior results can be made in the context of the updated reference intervals.   07/06/2021 22 0 - 45 U/L Final     CBC RESULTS:   Recent Labs   Lab Test 09/01/23  0444   WBC 6.6   RBC 3.96   HGB 12.1   HCT 36.5   MCV 92   MCH 30.6   MCHC 33.2   RDW 13.4               MENTAL STATUS EXAM                                                                                       Speech: normal volume, rhythm, rate. Mood was anxious, depressed. Thought process, including associations, was unremarkable and thought content was devoid of suicidal and homicidal ideation and psychotic thought. No hallucinations. Insight was good. Judgment was intact and adequate for safety. Fund of knowledge was intact. Pt demonstrates no obvious problems with attention, concentration, language, recent or remote memory although these were not formally tested.     ASSESSMENT                                                                                                      HISTORICAL:  Initial psych note 3/9/18          NOTES:  Topamax: word finding issues. Gabapentin: dissociation and and akathisia.  Trazodone: allergic reaction. OTC sleep meds not helping...  Ayesha Lovelace is a 44 yo with MDD, MAYKEL, hx couple psychiatric hospital stays.  Ayesha's plan is to eventually work as a . [employer] helps her significantly in multiple areas of life. Ayesha is also considering getting her license so she could take more than one family for . Another possibility is her and family moving to the [place] given [employer] has positions there and he has a lot of family there.      TREATMENT RISK STATEMENT:  The risks, benefits, alternatives and potential adverse effects have been explained and are understood by the pt.  The pt agrees to the treatment plan with the ability to do so.   The pt knows to call the clinic for any problems or access emergency care if needed.          DIAGNOSES                   MAYKEL  MDD, recurrent, mod  ADHD       PLAN                                                                                                                     1)  MEDICATIONS:         - Continue temazepam 7.5 mg 7.5 mg daily and 15 mg HS.   Continue Cymbalta 60 mg daily.     2)  THERAPY:  No Change     3)  LABS:  None     4)  PT MONITOR [call for probs]:  Worsening symptoms, SI/HI, SEs from meds     5)  REFERRALS [CD, medical, other]: none     6)  RTC: 3 months

## 2024-02-17 ENCOUNTER — HEALTH MAINTENANCE LETTER (OUTPATIENT)
Age: 44
End: 2024-02-17

## 2024-02-17 DIAGNOSIS — R79.0 LOW MAGNESIUM LEVEL: ICD-10-CM

## 2024-02-19 RX ORDER — MAGNESIUM OXIDE 400 MG/1
TABLET ORAL
Qty: 60 TABLET | Refills: 1 | Status: SHIPPED | OUTPATIENT
Start: 2024-02-19 | End: 2024-04-12

## 2024-02-19 NOTE — TELEPHONE ENCOUNTER
Mag Ox       Last Written Prescription Date:  11/15/2023  Last Fill Quantity: 60,   # refills: 1  Last Office Visit: 9/14/2023

## 2024-02-19 NOTE — TELEPHONE ENCOUNTER
Routing refill request to provider for review/approval because:    Drug not on the [facility], [facility] or [facility] refill protocol or controlled substance

## 2024-03-12 DIAGNOSIS — K21.9 GASTROESOPHAGEAL REFLUX DISEASE WITHOUT ESOPHAGITIS: ICD-10-CM

## 2024-03-12 RX ORDER — OMEPRAZOLE 40 MG/1
CAPSULE, DELAYED RELEASE ORAL
Qty: 90 CAPSULE | Refills: 3 | Status: SHIPPED | OUTPATIENT
Start: 2024-03-12

## 2024-03-12 NOTE — TELEPHONE ENCOUNTER
Not on EMR    Last Office Visit: 09/14/2023  Future Office visit:       Routing refill request to provider for review/approval because:

## 2024-03-12 NOTE — TELEPHONE ENCOUNTER
OMEPRAZOLE  40 MG CAPSULE 40 Capsule       Routing refill request to provider for review/approval because:  Drug not active on patient's medication list

## 2024-04-11 DIAGNOSIS — R79.0 LOW MAGNESIUM LEVEL: ICD-10-CM

## 2024-04-11 NOTE — TELEPHONE ENCOUNTER
magnesium oxide 400 MG tablet 60 tablet 1 2/19/2024   Last Office Visit: 09/14/2023     Future Office visit:       Routing refill request to provider for review/approval because:

## 2024-04-11 NOTE — TELEPHONE ENCOUNTER
Routing refill request to provider for review/approval because:  Drug not on the [facility], [facility] or [facility] refill protocol or controlled substance

## 2024-04-12 RX ORDER — MAGNESIUM OXIDE 400 MG/1
TABLET ORAL
Qty: 60 TABLET | Refills: 1 | Status: SHIPPED | OUTPATIENT
Start: 2024-04-12 | End: 2024-07-30

## 2024-05-06 ENCOUNTER — VIRTUAL VISIT (OUTPATIENT)
Dept: PSYCHIATRY | Facility: OTHER | Age: 44
End: 2024-05-06
Attending: PSYCHIATRY & NEUROLOGY
Payer: COMMERCIAL

## 2024-05-06 DIAGNOSIS — F41.1 GAD (GENERALIZED ANXIETY DISORDER): Primary | ICD-10-CM

## 2024-05-06 PROCEDURE — 99443 PR PHYSICIAN TELEPHONE EVALUATION 21-30 MIN: CPT | Mod: 93 | Performed by: PSYCHIATRY & NEUROLOGY

## 2024-05-06 RX ORDER — ERGOCALCIFEROL (VITAMIN D2) 10 MCG
TABLET ORAL
COMMUNITY

## 2024-05-06 RX ORDER — OMEGA-3 FATTY ACIDS/FISH OIL 300-1000MG
CAPSULE ORAL
COMMUNITY

## 2024-05-06 RX ORDER — PROPRANOLOL HYDROCHLORIDE 10 MG/1
10 TABLET ORAL 3 TIMES DAILY PRN
Qty: 90 TABLET | Refills: 2 | Status: SHIPPED | OUTPATIENT
Start: 2024-05-06

## 2024-05-06 ASSESSMENT — PATIENT HEALTH QUESTIONNAIRE - PHQ9
SUM OF ALL RESPONSES TO PHQ QUESTIONS 1-9: 22
5. POOR APPETITE OR OVEREATING: NEARLY EVERY DAY

## 2024-05-06 ASSESSMENT — ANXIETY QUESTIONNAIRES
5. BEING SO RESTLESS THAT IT IS HARD TO SIT STILL: NEARLY EVERY DAY
1. FEELING NERVOUS, ANXIOUS, OR ON EDGE: NEARLY EVERY DAY
7. FEELING AFRAID AS IF SOMETHING AWFUL MIGHT HAPPEN: NEARLY EVERY DAY
6. BECOMING EASILY ANNOYED OR IRRITABLE: MORE THAN HALF THE DAYS
3. WORRYING TOO MUCH ABOUT DIFFERENT THINGS: NEARLY EVERY DAY
2. NOT BEING ABLE TO STOP OR CONTROL WORRYING: NEARLY EVERY DAY
GAD7 TOTAL SCORE: 20
GAD7 TOTAL SCORE: 20

## 2024-05-06 ASSESSMENT — PAIN SCALES - GENERAL: PAINLEVEL: NO PAIN (0)

## 2024-05-06 NOTE — PROGRESS NOTES
Virtual Visit Details    Type of service:  Telephone Visit   Phone call duration: 36 minutes   Originating Location (pt. Location): Home    Distant Location (provider location):  Off-site    Start time: 1:30 pm  End time: 2:06 pm    SUBJECTIVE / INTERIM HISTORY                                                                       Children-  3 kids. Yassine 11 yo Terra 22 yo  Last visit 2/6/24: Continue temazepam 7.5 mg 7.5 mg daily and 15 mg HS.   Continue Cymbalta 60 mg daily.   - Ayesha struggling. For one taking on too much of other's people problems. For one had sister Saira's kids over past weekend. Saira and family losing their house.Saira fantastic mom however the dad Jose into meth.  - jose antonio Monge upstairs had mental breakdown and quit her job [employer].   - having issues with food again. Gained weight since her surgery which is good but not feeling good about herself  - Yassine is getting to the age where she can tell Ayesha not doing well and Ayesha works   - Yassine and Dameon wanted the dog. Ayesha ends up taking care of the dog.   - starting to think time change antidepressants.  - saw GI and carafate started is helping. Ayesha has come to realize she has to be particular as to what she eats. For one softer foods. Also high protein, low fat  - not taking Ritalin.   - Dameon's job opportunity in the [place]. They could end up moving there. He was promoted as supervisor.   - goal was April for her to do something for herself. Feels ready to go back to work.  - they are living in [place]. Ayesha is doing  at her home. Has had to nanny crowell to go their house / given the puppy Sisu  - Terra 22 yo in [facility], has a house. . Daniel just got engaged. They've been together since 10th grade.   - dad passed away on 1/27/23. Pain she was in was a distraction through this this year   - Yassine basketball, orchestra  - is still involved in [employer]    MEDICAL / SURGICAL HISTORY                 pregnant [if applicable]--no     Patient Active Problem List   Diagnosis    Migraine with aura and without status migrainosus, not intractable    Primary insomnia    ACP (advance care planning)    Gastroesophageal reflux disease, esophagitis presence not specified    Recurrent major depressive disorder (H24)    Tobacco abuse    Hydronephrosis    Panic disorder without agoraphobia    Attention deficit hyperactivity disorder (ADHD), combined type    Hot flashes---4/2021    History of COVID-19 - 5/11/21    Anxiety    Alcoholic intoxication without complication (H24)    Depression, unspecified depression type    Eating disorder, unspecified type    Alcohol withdrawal seizure without complication (H)     ALLERGY   Trazodone, Dust mite extract, Gabapentin, Hydrocodone, and Penicillins  MEDICATIONS                                                                                             Current Outpatient Medications   Medication Sig Dispense Refill    COLLAGEN PO Patient states she takes liquid Collagen      DULoxetine (CYMBALTA) 60 MG capsule Take 1 capsule (60 mg) by mouth daily 30 capsule 11    folic acid (FOLVITE) 1 MG tablet TAKE 1 TABLET (1 MG) BY MOUTH DAILY 30 tablet 6    magnesium oxide 400 MG tablet TAKE 2 TABLETS BY MOUTH IN THE EVENING. STRENGTH: 400 MG 60 tablet 1    nicotine polacrilex (NICORETTE) 4 MG gum Place 1 each (4 mg) inside cheek every hour as needed for smoking cessation 110 each 3    NONFORMULARY Take 1 tablet by mouth daily Protandim NRF-2      omega 3 1000 MG CAPS       omeprazole (PRILOSEC) 40 MG DR capsule TAKE ONE CAPSULE WITH BREAKFAST DAILY STRENGTH: 40 MG 90 capsule 3    ondansetron (ZOFRAN ODT) 4 MG ODT tab Take 4 mg by mouth      pantoprazole (PROTONIX) 40 MG EC tablet Take 40 mg by mouth      temazepam (RESTORIL) 7.5 MG capsule Take 2 - 3 capsules bedtime as needed for insomnia 90 capsule 3    vitamin B-Complex Take 1 tablet by mouth daily      Vitamin D, Cholecalciferol, 10 MCG (400  UNIT) TABS        No current facility-administered medications for this visit.       VITALS   There were no vitals taken for this visit.     PHQ9                     [unfilled]  LABS                                                                                                                           Last Comprehensive Metabolic Panel:  Sodium   Date Value Ref Range Status   09/01/2023 137 136 - 145 mmol/L Final   07/06/2021 140 133 - 144 mmol/L Final     Potassium   Date Value Ref Range Status   09/01/2023 5.0 3.4 - 5.3 mmol/L Final   10/27/2021 4.0 3.4 - 5.3 mmol/L Final   07/06/2021 3.0 (L) 3.4 - 5.3 mmol/L Final     Chloride   Date Value Ref Range Status   09/01/2023 102 98 - 107 mmol/L Final   10/27/2021 110 (H) 94 - 109 mmol/L Final   07/06/2021 108 94 - 109 mmol/L Final     Carbon Dioxide   Date Value Ref Range Status   07/06/2021 28 20 - 32 mmol/L Final     Carbon Dioxide (CO2)   Date Value Ref Range Status   09/01/2023 25 22 - 29 mmol/L Final   10/27/2021 29 20 - 32 mmol/L Final     Anion Gap   Date Value Ref Range Status   09/01/2023 10 7 - 15 mmol/L Final   10/27/2021 2 (L) 3 - 14 mmol/L Final   07/06/2021 4 3 - 14 mmol/L Final     Glucose   Date Value Ref Range Status   10/27/2021 106 (H) 70 - 99 mg/dL Final   07/06/2021 76 70 - 99 mg/dL Final     GLUCOSE BY METER POCT   Date Value Ref Range Status   09/01/2023 88 70 - 99 mg/dL Final     Urea Nitrogen   Date Value Ref Range Status   09/01/2023 4.5 (L) 6.0 - 20.0 mg/dL Final   10/27/2021 6 (L) 7 - 30 mg/dL Final   07/06/2021 4 (L) 7 - 30 mg/dL Final     Creatinine   Date Value Ref Range Status   09/01/2023 0.79 0.51 - 0.95 mg/dL Final   07/06/2021 0.66 0.52 - 1.04 mg/dL Final     GFR Estimate   Date Value Ref Range Status   09/01/2023 >90 >60 mL/min/1.73m2 Final   07/06/2021 >90 >60 mL/min/[1.73_m2] Final     Comment:     Non  GFR Calc  Starting 12/18/2018, serum creatinine based estimated GFR (eGFR) will be   calculated using the  Chronic Kidney Disease Epidemiology Collaboration   (CKD-EPI) equation.       Calcium   Date Value Ref Range Status   09/01/2023 8.5 (L) 8.6 - 10.0 mg/dL Final   07/06/2021 7.9 (L) 8.5 - 10.1 mg/dL Final     Bilirubin Total   Date Value Ref Range Status   09/01/2023 0.8 <=1.2 mg/dL Final   07/06/2021 0.3 0.2 - 1.3 mg/dL Final     Alkaline Phosphatase   Date Value Ref Range Status   09/01/2023 74 35 - 104 U/L Final   07/06/2021 64 40 - 150 U/L Final     ALT   Date Value Ref Range Status   09/01/2023 18 0 - 50 U/L Final     Comment:     Reference intervals for this test were updated on 6/12/2023 to more accurately reflect our healthy population. There may be differences in the flagging of prior results with similar values performed with this method. Interpretation of those prior results can be made in the context of the updated reference intervals.     07/06/2021 17 0 - 50 U/L Final     AST   Date Value Ref Range Status   09/01/2023 45 0 - 45 U/L Final     Comment:     Reference intervals for this test were updated on 6/12/2023 to more accurately reflect our healthy population. There may be differences in the flagging of prior results with similar values performed with this method. Interpretation of those prior results can be made in the context of the updated reference intervals.   07/06/2021 22 0 - 45 U/L Final     CBC RESULTS:   Recent Labs   Lab Test 09/01/23  0444   WBC 6.6   RBC 3.96   HGB 12.1   HCT 36.5   MCV 92   MCH 30.6   MCHC 33.2   RDW 13.4               MENTAL STATUS EXAM                                                                                       Speech: normal volume, rhythm, rate. Mood was anxious, depressed. Thought process, including associations, was unremarkable and thought content was devoid of   homicidal ideation and psychotic thought. +SI no intent or plan. No hallucinations. Insight was good. Judgment was intact and adequate for safety. Fund of knowledge was intact. Pt  demonstrates no obvious problems with attention, concentration, language, recent or remote memory although these were not formally tested.     ASSESSMENT                                                                                                      HISTORICAL:  Initial psych note 3/9/18          NOTES:  Topamax: word finding issues. Gabapentin: dissociation and and akathisia. Trazodone: allergic reaction. OTC sleep meds not helping...  Ayesha Lovelace is a 45 yo with MDD, MAYKEL, hx couple psychiatric hospital stays. Ayesha really struggling anxiety, panic attacks. We are thinking in near future change duloxetine however first going to have Ayesha try propranolol as a prn for her anxiety / panic attacks of which she experiences a lot of physical symptoms. She took this in past (I suspect higher dose) for rpevention migraines. She feels ready to give therapy a try; I'm going to see if Guillermina Sanders taking new patients as I feel they would work well together.      TREATMENT RISK STATEMENT:  The risks, benefits, alternatives and potential adverse effects have been explained and are understood by the pt.  The pt agrees to the treatment plan with the ability to do so.   The pt knows to call the clinic for any problems or access emergency care if needed.          DIAGNOSES                   MAYKEL  MDD, recurrent, severe without psychosis  ADHD       PLAN                                                                                                                     1)  MEDICATIONS:         - start propranolol 10 mg up to 3 x daily prn anxiety. Continue temazepam 7.5 mg 7.5 mg daily and 15 mg HS.   Continue Cymbalta 60 mg daily.     2)  THERAPY:  No Change     3)  LABS:  None     4)  PT MONITOR [call for probs]:  Worsening symptoms, SI/HI, SEs from meds     5)  REFERRALS [CD, medical, other]: none     6)  RTC: 1 month

## 2024-05-09 DIAGNOSIS — F10.920 ALCOHOLIC INTOXICATION WITHOUT COMPLICATION (H): ICD-10-CM

## 2024-05-09 NOTE — TELEPHONE ENCOUNTER
Tim      Last Written Prescription Date:  8/4/23  Last Fill Quantity: 30,   # refills: 6  Last Office Visit: 9/14/23  Future Office visit:       Routing refill request to provider for review/approval because:

## 2024-05-10 RX ORDER — FOLIC ACID 1 MG/1
1 TABLET ORAL DAILY
Qty: 30 TABLET | Refills: 6 | Status: SHIPPED | OUTPATIENT
Start: 2024-05-10

## 2024-05-17 ENCOUNTER — MYC MEDICAL ADVICE (OUTPATIENT)
Dept: PSYCHIATRY | Facility: OTHER | Age: 44
End: 2024-05-17

## 2024-05-28 ENCOUNTER — TELEPHONE (OUTPATIENT)
Dept: PSYCHIATRY | Facility: OTHER | Age: 44
End: 2024-05-28

## 2024-05-28 NOTE — TELEPHONE ENCOUNTER
8:54 AM    Reason for Call: Phone Call    Description: patient states that Mis should have received a medical opinion form faxed by Lavinia Rodriguez. Patient just calling to confirm that this was received.     Was an appointment offered for this call? No  If yes : Appointment type              Date    Preferred method for responding to this message: Telephone Call  What is your phone number? [phone]    If we cannot reach you directly, may we leave a detailed response at the number you provided? Yes    Can this message wait until your PCP/provider returns, if available today? Not applicable    Claudia Givens

## 2024-05-28 NOTE — TELEPHONE ENCOUNTER
I spoke with the patient and let her know that we have not received anything. I provided her with the correct fax number and she will have them resend the form.

## 2024-06-02 ENCOUNTER — APPOINTMENT (OUTPATIENT)
Dept: GENERAL RADIOLOGY | Facility: HOSPITAL | Age: 44
End: 2024-06-02
Attending: FAMILY MEDICINE
Payer: COMMERCIAL

## 2024-06-02 ENCOUNTER — HOSPITAL ENCOUNTER (EMERGENCY)
Facility: HOSPITAL | Age: 44
Discharge: ANOTHER HOSPITAL | End: 2024-06-02
Attending: FAMILY MEDICINE | Admitting: FAMILY MEDICINE
Payer: COMMERCIAL

## 2024-06-02 VITALS
SYSTOLIC BLOOD PRESSURE: 132 MMHG | RESPIRATION RATE: 18 BRPM | HEART RATE: 98 BPM | TEMPERATURE: 98 F | OXYGEN SATURATION: 100 % | DIASTOLIC BLOOD PRESSURE: 86 MMHG

## 2024-06-02 DIAGNOSIS — F10.930 ALCOHOL WITHDRAWAL, UNCOMPLICATED (H): Primary | ICD-10-CM

## 2024-06-02 LAB
ALBUMIN SERPL BCG-MCNC: 4 G/DL (ref 3.5–5.2)
ALBUMIN UR-MCNC: NEGATIVE MG/DL
ALP SERPL-CCNC: 68 U/L (ref 40–150)
ALT SERPL W P-5'-P-CCNC: 10 U/L (ref 0–50)
AMPHETAMINES UR QL SCN: ABNORMAL
ANION GAP SERPL CALCULATED.3IONS-SCNC: 14 MMOL/L (ref 7–15)
APPEARANCE UR: CLEAR
APTT PPP: 27 SECONDS (ref 22–38)
AST SERPL W P-5'-P-CCNC: 30 U/L (ref 0–45)
BACTERIA #/AREA URNS HPF: ABNORMAL /HPF
BARBITURATES UR QL SCN: ABNORMAL
BASOPHILS # BLD AUTO: 0.1 10E3/UL (ref 0–0.2)
BASOPHILS NFR BLD AUTO: 1 %
BENZODIAZ UR QL SCN: ABNORMAL
BILIRUB SERPL-MCNC: 0.3 MG/DL
BILIRUB UR QL STRIP: NEGATIVE
BUN SERPL-MCNC: 9.7 MG/DL (ref 6–20)
BZE UR QL SCN: ABNORMAL
CALCIUM SERPL-MCNC: 9.1 MG/DL (ref 8.6–10)
CANNABINOIDS UR QL SCN: ABNORMAL
CHLORIDE SERPL-SCNC: 99 MMOL/L (ref 98–107)
COLOR UR AUTO: ABNORMAL
CREAT SERPL-MCNC: 0.72 MG/DL (ref 0.51–0.95)
DEPRECATED HCO3 PLAS-SCNC: 25 MMOL/L (ref 22–29)
EGFRCR SERPLBLD CKD-EPI 2021: >90 ML/MIN/1.73M2
EOSINOPHIL # BLD AUTO: 0 10E3/UL (ref 0–0.7)
EOSINOPHIL NFR BLD AUTO: 0 %
ERYTHROCYTE [DISTWIDTH] IN BLOOD BY AUTOMATED COUNT: 12.8 % (ref 10–15)
ETHANOL SERPL-MCNC: <0.01 G/DL
FENTANYL UR QL: ABNORMAL
FLUAV RNA SPEC QL NAA+PROBE: NEGATIVE
FLUBV RNA RESP QL NAA+PROBE: NEGATIVE
GLUCOSE SERPL-MCNC: 93 MG/DL (ref 70–99)
GLUCOSE UR STRIP-MCNC: NEGATIVE MG/DL
HCG UR QL: NEGATIVE
HCT VFR BLD AUTO: 40.7 % (ref 35–47)
HGB BLD-MCNC: 13.7 G/DL (ref 11.7–15.7)
HGB UR QL STRIP: NEGATIVE
HOLD SPECIMEN: NORMAL
HOLD SPECIMEN: NORMAL
IMM GRANULOCYTES # BLD: 0 10E3/UL
IMM GRANULOCYTES NFR BLD: 0 %
INR PPP: 1.05 (ref 0.85–1.15)
KETONES UR STRIP-MCNC: 40 MG/DL
LEUKOCYTE ESTERASE UR QL STRIP: NEGATIVE
LYMPHOCYTES # BLD AUTO: 2.7 10E3/UL (ref 0.8–5.3)
LYMPHOCYTES NFR BLD AUTO: 25 %
MAGNESIUM SERPL-MCNC: 2 MG/DL (ref 1.7–2.3)
MCH RBC QN AUTO: 30.7 PG (ref 26.5–33)
MCHC RBC AUTO-ENTMCNC: 33.7 G/DL (ref 31.5–36.5)
MCV RBC AUTO: 91 FL (ref 78–100)
MONOCYTES # BLD AUTO: 0.4 10E3/UL (ref 0–1.3)
MONOCYTES NFR BLD AUTO: 3 %
NEUTROPHILS # BLD AUTO: 7.8 10E3/UL (ref 1.6–8.3)
NEUTROPHILS NFR BLD AUTO: 71 %
NITRATE UR QL: NEGATIVE
NRBC # BLD AUTO: 0 10E3/UL
NRBC BLD AUTO-RTO: 0 /100
OPIATES UR QL SCN: ABNORMAL
PCP QUAL URINE (ROCHE): ABNORMAL
PH UR STRIP: 5.5 [PH] (ref 4.7–8)
PLATELET # BLD AUTO: 320 10E3/UL (ref 150–450)
POTASSIUM SERPL-SCNC: 4.2 MMOL/L (ref 3.4–5.3)
PROT SERPL-MCNC: 6.8 G/DL (ref 6.4–8.3)
RBC # BLD AUTO: 4.46 10E6/UL (ref 3.8–5.2)
RBC URINE: 1 /HPF
RSV RNA SPEC NAA+PROBE: NEGATIVE
SARS-COV-2 RNA RESP QL NAA+PROBE: NEGATIVE
SODIUM SERPL-SCNC: 138 MMOL/L (ref 135–145)
SP GR UR STRIP: 1 (ref 1–1.03)
SQUAMOUS EPITHELIAL: 0 /HPF
TROPONIN T SERPL HS-MCNC: <6 NG/L
UROBILINOGEN UR STRIP-MCNC: NORMAL MG/DL
WBC # BLD AUTO: 10.9 10E3/UL (ref 4–11)
WBC URINE: <1 /HPF

## 2024-06-02 PROCEDURE — 80307 DRUG TEST PRSMV CHEM ANLYZR: CPT | Performed by: FAMILY MEDICINE

## 2024-06-02 PROCEDURE — 258N000003 HC RX IP 258 OP 636: Performed by: FAMILY MEDICINE

## 2024-06-02 PROCEDURE — 99285 EMERGENCY DEPT VISIT HI MDM: CPT | Mod: 25

## 2024-06-02 PROCEDURE — 36415 COLL VENOUS BLD VENIPUNCTURE: CPT | Performed by: FAMILY MEDICINE

## 2024-06-02 PROCEDURE — 87637 SARSCOV2&INF A&B&RSV AMP PRB: CPT | Performed by: FAMILY MEDICINE

## 2024-06-02 PROCEDURE — 99285 EMERGENCY DEPT VISIT HI MDM: CPT | Performed by: FAMILY MEDICINE

## 2024-06-02 PROCEDURE — 85730 THROMBOPLASTIN TIME PARTIAL: CPT | Performed by: FAMILY MEDICINE

## 2024-06-02 PROCEDURE — 85025 COMPLETE CBC W/AUTO DIFF WBC: CPT | Performed by: FAMILY MEDICINE

## 2024-06-02 PROCEDURE — 80053 COMPREHEN METABOLIC PANEL: CPT | Performed by: FAMILY MEDICINE

## 2024-06-02 PROCEDURE — 84484 ASSAY OF TROPONIN QUANT: CPT | Performed by: FAMILY MEDICINE

## 2024-06-02 PROCEDURE — 81001 URINALYSIS AUTO W/SCOPE: CPT | Performed by: FAMILY MEDICINE

## 2024-06-02 PROCEDURE — 85610 PROTHROMBIN TIME: CPT | Performed by: FAMILY MEDICINE

## 2024-06-02 PROCEDURE — 250N000011 HC RX IP 250 OP 636: Performed by: FAMILY MEDICINE

## 2024-06-02 PROCEDURE — 93005 ELECTROCARDIOGRAM TRACING: CPT

## 2024-06-02 PROCEDURE — 71045 X-RAY EXAM CHEST 1 VIEW: CPT

## 2024-06-02 PROCEDURE — 96361 HYDRATE IV INFUSION ADD-ON: CPT

## 2024-06-02 PROCEDURE — 96374 THER/PROPH/DIAG INJ IV PUSH: CPT

## 2024-06-02 PROCEDURE — 82077 ASSAY SPEC XCP UR&BREATH IA: CPT | Performed by: FAMILY MEDICINE

## 2024-06-02 PROCEDURE — 81025 URINE PREGNANCY TEST: CPT | Performed by: FAMILY MEDICINE

## 2024-06-02 PROCEDURE — 83735 ASSAY OF MAGNESIUM: CPT | Performed by: FAMILY MEDICINE

## 2024-06-02 PROCEDURE — 93010 ELECTROCARDIOGRAM REPORT: CPT | Performed by: INTERNAL MEDICINE

## 2024-06-02 RX ORDER — LORAZEPAM 2 MG/ML
1 INJECTION INTRAMUSCULAR ONCE
Status: COMPLETED | OUTPATIENT
Start: 2024-06-02 | End: 2024-06-02

## 2024-06-02 RX ADMIN — LORAZEPAM 1 MG: 2 INJECTION INTRAMUSCULAR; INTRAVENOUS at 16:51

## 2024-06-02 RX ADMIN — SODIUM CHLORIDE 1000 ML: 9 INJECTION, SOLUTION INTRAVENOUS at 13:32

## 2024-06-02 RX ADMIN — LORAZEPAM 1 MG: 2 INJECTION INTRAMUSCULAR; INTRAVENOUS at 13:33

## 2024-06-02 ASSESSMENT — ACTIVITIES OF DAILY LIVING (ADL)
ADLS_ACUITY_SCORE: 37
ADLS_ACUITY_SCORE: 35
ADLS_ACUITY_SCORE: 37
ADLS_ACUITY_SCORE: 37

## 2024-06-02 ASSESSMENT — LIFESTYLE VARIABLES
HEADACHE, FULLNESS IN HEAD: NOT PRESENT
ORIENTATION AND CLOUDING OF SENSORIUM: ORIENTED AND CAN DO SERIAL ADDITIONS
AUDITORY DISTURBANCES: NOT PRESENT
ANXIETY: 2
ORIENTATION AND CLOUDING OF SENSORIUM: ORIENTED AND CAN DO SERIAL ADDITIONS
VISUAL DISTURBANCES: NOT PRESENT
TOTAL SCORE: 6
PAROXYSMAL SWEATS: NO SWEAT VISIBLE
NAUSEA AND VOMITING: MILD NAUSEA WITH NO VOMITING
TREMOR: 2
TREMOR: 2
AGITATION: 2
HEADACHE, FULLNESS IN HEAD: NOT PRESENT
ANXIETY: 5
AGITATION: SOMEWHAT MORE THAN NORMAL ACTIVITY
AUDITORY DISTURBANCES: NOT PRESENT
NAUSEA AND VOMITING: MILD NAUSEA WITH NO VOMITING
PAROXYSMAL SWEATS: NO SWEAT VISIBLE
VISUAL DISTURBANCES: NOT PRESENT
TOTAL SCORE: 10

## 2024-06-02 ASSESSMENT — COLUMBIA-SUICIDE SEVERITY RATING SCALE - C-SSRS
6. HAVE YOU EVER DONE ANYTHING, STARTED TO DO ANYTHING, OR PREPARED TO DO ANYTHING TO END YOUR LIFE?: NO
1. IN THE PAST MONTH, HAVE YOU WISHED YOU WERE DEAD OR WISHED YOU COULD GO TO SLEEP AND NOT WAKE UP?: NO
2. HAVE YOU ACTUALLY HAD ANY THOUGHTS OF KILLING YOURSELF IN THE PAST MONTH?: NO

## 2024-06-02 NOTE — ED PROVIDER NOTES
History     Chief Complaint   Patient presents with    Withdrawal    Palpitations     HPI  Nasra Lovelace is a 44 year old female who presented to the ER with chief complaint of alcohol withdrawal.  Patient stated she has a lot of anxiety and she was drinking heavily for the last 3 days.  Last drink was last night.  She also use marijuana.  She thinks she is withdrawing.  Feeling shaky and jittery and tremulous and sweating.  Has some nausea and vomiting.  Diarrhea which is typical with her drinking alcohol.  Otherwise denies any abdominal pain.  Also feeling tight in her chest and short of breath.  She has mild dry cough and runny nose for the last few days.  Denies any sick contacts.  Denies any fever.  Denies any urinary symptoms.    Allergies:  Allergies   Allergen Reactions    Trazodone Swelling     Also very congested     Dust Mite Extract     Gabapentin Other (See Comments)     Feels disassociated.     Hydrocodone Other (See Comments) and Itching     Skin felt like it was burning.    Penicillins Rash       Problem List:    Patient Active Problem List    Diagnosis Date Noted    Alcohol withdrawal, uncomplicated (H) 06/02/2024     Priority: Medium    Alcohol withdrawal seizure without complication (H) 08/31/2023     Priority: Medium    Anxiety 09/22/2021     Priority: Medium    Alcoholic intoxication without complication (H24) 09/22/2021     Priority: Medium    Depression, unspecified depression type 09/22/2021     Priority: Medium    Eating disorder, unspecified type 09/22/2021     Priority: Medium    History of COVID-19 - 5/11/21 06/01/2021     Priority: Medium    Hot flashes---4/2021 04/24/2021     Priority: Medium    Attention deficit hyperactivity disorder (ADHD), combined type 08/29/2019     Priority: Medium     Patient is followed by  for ongoing prescription of stimulants.  All refills should be approved by this provider, or covering partner.    Medication(s): Ritalin 10 mg.   Maximum  quantity per month: 60  Clinic visit frequency required: Q 3 months     Controlled substance agreement on file: Yes       Date(s): 8.27.19  Neuropsych evaluation for ADD completed:  managed by     [facility] website verification:  done on 8.27.19  [email or web address]          Recurrent major depressive disorder (H24) 07/30/2018     Priority: Medium    Tobacco abuse 07/30/2018     Priority: Medium    Gastroesophageal reflux disease, esophagitis presence not specified 04/17/2017     Priority: Medium     IMO Regulatory Load OCT 2020      ACP (advance care planning) 01/26/2017     Priority: Medium     Advance Care Planning 1/26/2017: ACP Review of Chart / Resources Provided:  Reviewed chart for advance care plan.  Nasra MOLLY Ortiz has been provided information and resources to begin or update their advance care plan.  Added by EMERALD THOMPSON            Primary insomnia 12/11/2015     Priority: Medium    Migraine with aura and without status migrainosus, not intractable 08/11/2014     Priority: Medium    Hydronephrosis 12/21/2011     Priority: Medium    Panic disorder without agoraphobia 11/26/2006     Priority: Medium        Past Medical History:    Past Medical History:   Diagnosis Date    Acquired hypothyroidism 08/11/2014    Anxiety disorder 09/05/2012    Anxiety state 08/11/2014    Attention deficit hyperactivity disorder (ADHD), combined type 08/29/2019    Gastroesophageal reflux disease, esophagitis presence not specified 04/17/2017    History of COVID-19 - 5/11/21 05/11/2021    Hydronephrosis 12/21/2011    Menorrhagia with irregular cycle 04/27/2021    Migraine with aura and without status migrainosus, not intractable 08/11/2014    Panic disorder without agoraphobia 11/26/2006    Primary insomnia 12/11/2015    Recurrent major depressive disorder (H24) 07/30/2018    Secondary dysmenorrhea 04/27/2021    Tobacco abuse 07/30/2018    Vitamin deficiency 03/16/2018       Past Surgical  History:    Past Surgical History:   Procedure Laterality Date     SECTION N/A 2000     SECTION N/A 2012    postop hemm with ruptured R Ov vein, transfusion    CHOLECYSTECTOMY      COLONOSCOPY N/A 2019    Procedure: COLONOSCOPY DIAGNOSTIC WITH RANDOM COLON BIOPSIES ANOSCOPY; Surgeon: Igor Burgos MD; Location: [facility] OR      COLPOSCOPY CERVIX, BIOPSY CERVIX, ENDOCERVICAL CURETTAGE, COMBINED N/A 2008    DILATION AND CURETTAGE, ABLATE ENDOMETRIUM NOVASURE, COMBINED N/A 2018    Novasure Endometrial ablation    ESOPHAGOSCOPY, GASTROSCOPY, DUODENOSCOPY (EGD), COMBINED  2019    Procedure: ESOPHAGOGASTRODUODENOSCOPY DIAGNOSTIC with biopsies; Surgeon: Igor Burgos MD; Location: [facility] OR      HYSTEROSCOPY DIAGNOSTIC N/A 2018    Hysteroscopy, D&C    LAPAROSCOPIC APPENDECTOMY N/A 2012    LAPAROSCOPIC HYSTERECTOMY SUPRACERVICAL N/A 2021    Procedure: laparoscopic supracervical hysterectomy;  Surgeon: Rito Solre MD;  Location: [facility] OR    OOPHORECTOMY Right 2012    post operative hemorrhage with ruptured ovarian vein,     SALPINGECTOMY Left 2019    Procedure: LAPAROSCOPY LEFT SALPINGECTOMY; Surgeon: Regina Sweeney MD; Location: [facility] OR         Family History:    Family History   Problem Relation Age of Onset    Hypertension Mother     Hyperlipidemia Mother     Cardiovascular Father     Hypertension Father     Hyperlipidemia Father     Heart Failure Father     Sleep Apnea Father     Kidney failure Father        Social History:  Marital Status:   [4]  Social History     Tobacco Use    Smoking status: Every Day     Current packs/day: 0.00     Average packs/day: 0.4 packs/day for 10.0 years (4.0 ttl pk-yrs)     Types: Cigarettes     Start date: 2010     Last attempt to quit: 2020     Years since quittin.3    Smokeless tobacco: Never    Tobacco comments:     tobacco cessation discuseed - tried to quit: no - passive smoke  exposure quitting on own    Substance Use Topics    Alcohol use: No    Drug use: No        Medications:    COLLAGEN PO  DULoxetine (CYMBALTA) 60 MG capsule  folic acid (FOLVITE) 1 MG tablet  magnesium oxide 400 MG tablet  nicotine polacrilex (NICORETTE) 4 MG gum  NONFORMULARY  omega 3 1000 MG CAPS  omeprazole (PRILOSEC) 40 MG DR capsule  ondansetron (ZOFRAN ODT) 4 MG ODT tab  pantoprazole (PROTONIX) 40 MG EC tablet  propranolol (INDERAL) 10 MG tablet  temazepam (RESTORIL) 7.5 MG capsule  vitamin B-Complex  Vitamin D, Cholecalciferol, 10 MCG (400 UNIT) TABS          Review of Systems   All other systems reviewed and are negative.      Physical Exam   BP: 134/88  Pulse: 108  Temp: 98  F (36.7  C)  Resp: 18  SpO2: 96 %      Physical Exam  Constitutional:       General: She is not in acute distress.     Appearance: Normal appearance. She is well-developed. She is not ill-appearing, toxic-appearing or diaphoretic.   HENT:      Head: Normocephalic and atraumatic.      Nose: Nose normal. No congestion or rhinorrhea.      Mouth/Throat:      Pharynx: No oropharyngeal exudate or posterior oropharyngeal erythema.   Eyes:      General: No scleral icterus.        Right eye: No discharge.         Left eye: No discharge.      Extraocular Movements: Extraocular movements intact.      Conjunctiva/sclera: Conjunctivae normal.      Pupils: Pupils are equal, round, and reactive to light.   Neck:      Vascular: No carotid bruit.   Cardiovascular:      Rate and Rhythm: Normal rate and regular rhythm.      Heart sounds: Normal heart sounds.   Pulmonary:      Effort: No respiratory distress.      Breath sounds: Normal breath sounds. No stridor. No wheezing, rhonchi or rales.   Chest:      Chest wall: No tenderness.   Abdominal:      General: Bowel sounds are normal. There is no distension.      Palpations: Abdomen is soft. There is no mass.      Tenderness: There is no abdominal tenderness. There is no right CVA tenderness, left CVA  tenderness, guarding or rebound.      Hernia: No hernia is present.   Musculoskeletal:         General: No swelling, tenderness, deformity or signs of injury.      Cervical back: Normal range of motion and neck supple. No rigidity or tenderness.      Right lower leg: No edema.      Left lower leg: No edema.   Lymphadenopathy:      Cervical: No cervical adenopathy.   Skin:     General: Skin is warm and dry.      Coloration: Skin is not jaundiced or pale.      Findings: No bruising, erythema, lesion or rash.   Neurological:      General: No focal deficit present.      Mental Status: She is alert and oriented to person, place, and time. Mental status is at baseline.      Cranial Nerves: No cranial nerve deficit.      Sensory: No sensory deficit.      Motor: No weakness.      Coordination: Coordination normal.      Gait: Gait normal.      Deep Tendon Reflexes: Reflexes normal.      Comments: Hand tremors and tongue fasciculation         ED Course       Patient was seen and examined shortly after arrival.  Stable.  On cardiac monitor.  EKG shows sinus rhythm.  No sign of acute ischemia or dysrhythmia.  Given 1 L normal saline bolus, 1 mg IV Ativan.  Lab and imaging reviewed.  No significant acute abnormalities.  Given the patient history of withdrawal seizure I did consulted with hospitalist on-call Dr. Dorsey and he accepted admission for further management.  Patient agrees with the plan.  Stable for admission.      Addendum later on I was informed that there is no beds available locally.  I did consulted with hospitalist on-call at [facility] Dr. Sotelo and he accepted the transfer for further management patient agrees with plan.  Stable for transfer.     Procedures              Results for orders placed or performed during the hospital encounter of 06/02/24 (from the past 24 hour(s))   CBC with platelets differential    Narrative    The following orders were created for panel order CBC with platelets  differential.  Procedure                               Abnormality         Status                     ---------                               -----------         ------                     CBC with platelets and d...[[id number]]                      Final result                 Please view results for these tests on the individual orders.   INR   Result Value Ref Range    INR 1.05 0.85 - 1.15   Comprehensive metabolic panel   Result Value Ref Range    Sodium 138 135 - 145 mmol/L    Potassium 4.2 3.4 - 5.3 mmol/L    Carbon Dioxide (CO2) 25 22 - 29 mmol/L    Anion Gap 14 7 - 15 mmol/L    Urea Nitrogen 9.7 6.0 - 20.0 mg/dL    Creatinine 0.72 0.51 - 0.95 mg/dL    GFR Estimate >90 >60 mL/min/1.73m2    Calcium 9.1 8.6 - 10.0 mg/dL    Chloride 99 98 - 107 mmol/L    Glucose 93 70 - 99 mg/dL    Alkaline Phosphatase 68 40 - 150 U/L    AST 30 0 - 45 U/L    ALT 10 0 - 50 U/L    Protein Total 6.8 6.4 - 8.3 g/dL    Albumin 4.0 3.5 - 5.2 g/dL    Bilirubin Total 0.3 <=1.2 mg/dL   Troponin T, High Sensitivity   Result Value Ref Range    Troponin T, High Sensitivity <6 <=14 ng/L   Magnesium   Result Value Ref Range    Magnesium 2.0 1.7 - 2.3 mg/dL   Ethyl Alcohol Level   Result Value Ref Range    Alcohol ethyl <0.01 <=0.01 g/dL   CBC with platelets and differential   Result Value Ref Range    WBC Count 10.9 4.0 - 11.0 10e3/uL    RBC Count 4.46 3.80 - 5.20 10e6/uL    Hemoglobin 13.7 11.7 - 15.7 g/dL    Hematocrit 40.7 35.0 - 47.0 %    MCV 91 78 - 100 fL    MCH 30.7 26.5 - 33.0 pg    MCHC 33.7 31.5 - 36.5 g/dL    RDW 12.8 10.0 - 15.0 %    Platelet Count 320 150 - 450 10e3/uL    % Neutrophils 71 %    % Lymphocytes 25 %    % Monocytes 3 %    % Eosinophils 0 %    % Basophils 1 %    % Immature Granulocytes 0 %    NRBCs per 100 WBC 0 <1 /100    Absolute Neutrophils 7.8 1.6 - 8.3 10e3/uL    Absolute Lymphocytes 2.7 0.8 - 5.3 10e3/uL    Absolute Monocytes 0.4 0.0 - 1.3 10e3/uL    Absolute Eosinophils 0.0 0.0 - 0.7 10e3/uL    Absolute Basophils  0.1 0.0 - 0.2 10e3/uL    Absolute Immature Granulocytes 0.0 <=0.4 10e3/uL    Absolute NRBCs 0.0 10e3/uL   Extra Tube    Narrative    The following orders were created for panel order Extra Tube.  Procedure                               Abnormality         Status                     ---------                               -----------         ------                     Extra Red Top Tube[[id number]]                               Final result               Extra Heparinized Syringe[[id number]]                        Final result                 Please view results for these tests on the individual orders.   Extra Red Top Tube   Result Value Ref Range    Hold Specimen JIC    Extra Heparinized Syringe   Result Value Ref Range    Hold Specimen JIC    Symptomatic Influenza A/B, RSV, & SARS-CoV2 PCR (COVID-19) Nose    Specimen: Nose; Swab   Result Value Ref Range    Influenza A PCR Negative Negative    Influenza B PCR Negative Negative    RSV PCR Negative Negative    SARS CoV2 PCR Negative Negative    Narrative    Testing was performed using the Xpert Xpress CoV2/Flu/RSV Assay on the [employer] GeneXpert Instrument. This test should be ordered for the detection of SARS-CoV-2, influenza, and RSV viruses in individuals who meet clinical and/or epidemiological criteria. Test performance is unknown in asymptomatic patients. This test is for in vitro diagnostic use under the FDA EUA for laboratories certified under CLIA to perform high or moderate complexity testing. This test has not been FDA cleared or approved. A negative result does not rule out the presence of PCR inhibitors in the specimen or target RNA in concentration below the limit of detection for the assay. If only one viral target is positive but coinfection with multiple targets is suspected, the sample should be re-tested with another FDA cleared, approved, or authorized test, if coinfection would change clinical management. This test was validated by the  [facility]. These laboratories are certified under the Clinical Laboratory Improvement Amendments of 1988 (CLIA-88) as qualified to perform high complexity laboratory testing.   UA with Microscopic reflex to Culture    Specimen: Urine, Midstream   Result Value Ref Range    Color Urine Straw Colorless, Straw, Light Yellow, Yellow    Appearance Urine Clear Clear    Glucose Urine Negative Negative mg/dL    Bilirubin Urine Negative Negative    Ketones Urine 40 (A) Negative mg/dL    Specific Gravity Urine 1.005 1.003 - 1.035    Blood Urine Negative Negative    pH Urine 5.5 4.7 - 8.0    Protein Albumin Urine Negative Negative mg/dL    Urobilinogen Urine Normal Normal, 2.0 mg/dL    Nitrite Urine Negative Negative    Leukocyte Esterase Urine Negative Negative    Bacteria Urine Few (A) None Seen /HPF    RBC Urine 1 <=2 /HPF    WBC Urine <1 <=5 /HPF    Squamous Epithelials Urine 0 <=1 /HPF    Narrative    Urine Culture not indicated   HCG qualitative urine (UPT)   Result Value Ref Range    hCG Urine Qualitative Negative Negative   Urine Drug Screen    Narrative    The following orders were created for panel order Urine Drug Screen.  Procedure                               Abnormality         Status                     ---------                               -----------         ------                     Urine Drug Screen Panel[[id number]]      Abnormal            Final result                 Please view results for these tests on the individual orders.   Urine Drug Screen Panel   Result Value Ref Range    Amphetamines Urine Screen Negative Screen Negative    Barbituates Urine Screen Negative Screen Negative    Benzodiazepine Urine Screen Negative Screen Negative    Cannabinoids Urine Screen Positive (A) Screen Negative    Cocaine Urine Screen Negative Screen Negative    Fentanyl Qual Urine Screen Negative Screen Negative    Opiates Urine Screen Negative Screen Negative    PCP Urine Screen Negative Screen Negative    XR Chest Port 1 View    Narrative    Exam:  XR CHEST PORT 1 VIEW    HISTORY: cough.    COMPARISON:  8/31/2023    FINDINGS:     The cardiomediastinal contours are normal.      No focal consolidation, effusion, or pneumothorax.      No acute osseous abnormality.       Impression    IMPRESSION:      No acute cardiopulmonary process.      JUSTICE SANDRA MD         SYSTEM ID:  [id number]       Medications   sodium chloride 0.9% BOLUS 1,000 mL (0 mLs Intravenous Stopped 6/2/24 1521)   LORazepam (ATIVAN) injection 1 mg (1 mg Intravenous $Given 6/2/24 1333)       Assessments & Plan (with Medical Decision Making)     I have reviewed the nursing notes.    I have reviewed the findings, diagnosis, plan and need for follow up with the patient.        New Prescriptions    No medications on file       Final diagnoses:   Alcohol withdrawal, uncomplicated (H)       6/2/2024   [facility]       Melissa Cueva MD  06/02/24 1501       Melissa Cueva MD  06/02/24 7228

## 2024-06-02 NOTE — ED NOTES
Patient updated that she will be transferring to [place] instead of staying inpatient @ [facility].   She is ok with this.   She reports she is feeling much more relaxed.   Significant other brought her food.   Resting in a position of comfort.   Call light within reach.

## 2024-06-02 NOTE — ED NOTES
"Patient presents w/ concerns that she will go into ETOH withdrawals, feeling anxious.   She reports, \"I've been hitting it pretty hard the last few days, 6-12 [place]/day. I have a lot of shit going on in my life right now.\"  A&Ox4. No apparent respiratory distress.     She reports some chest heaviness a few days ago, not currently.  Denies chest pain, ABD pain, headache, fever, chills, lightheadedness, dizziness.   Refusing cardiac monitor at this time.   Refused to change into a gown.    Resting in a position of comfort.   Call light within reach.     "

## 2024-06-02 NOTE — ED TRIAGE NOTES
Patient states she has been drinking alcohol excessively the past weekend. Reports concerns for withdrawals, states she has a hx of seizures when withdrawing. Reports SOB, chest heaviness, anxiety. Last drink 2100 last night.

## 2024-06-03 LAB
ATRIAL RATE - MUSE: 98 BPM
DIASTOLIC BLOOD PRESSURE - MUSE: NORMAL MMHG
INTERPRETATION ECG - MUSE: NORMAL
P AXIS - MUSE: 55 DEGREES
PR INTERVAL - MUSE: 146 MS
QRS DURATION - MUSE: 74 MS
QT - MUSE: 364 MS
QTC - MUSE: 464 MS
R AXIS - MUSE: 52 DEGREES
SYSTOLIC BLOOD PRESSURE - MUSE: NORMAL MMHG
T AXIS - MUSE: 32 DEGREES
VENTRICULAR RATE- MUSE: 98 BPM

## 2024-06-03 NOTE — ED NOTES
"Agreed to cardiac monitoring.   Applied and then she took them off.   Encouraged her to let us monitor her, she agreed to keep the blood pressure cuff and pulse ox on, \"the monitor chords are too restraining\".   Again declined cardiac monitoring.   "

## 2024-06-04 ENCOUNTER — TELEPHONE (OUTPATIENT)
Dept: FAMILY MEDICINE | Facility: OTHER | Age: 44
End: 2024-06-04

## 2024-06-04 NOTE — TELEPHONE ENCOUNTER
4:38 PM    Reason for Call: Phone Call    Description: Samantha (addiction counselor) @ [facility] in [place] wanted to inform Mis that it sounds like pt was taking more than prescribed for temazepam (RESTORIL) 7.5 MG capsule  and pt was admitted for drinking.     Was an appointment offered for this call? No  If yes : Appointment type              Date    Preferred method for responding to this message: Telephone Call  What is your phone number? [phone]    If we cannot reach you directly, may we leave a detailed response at the number you provided? Yes    Can this message wait until your PCP/provider returns, if available today? Not applicable    Claudia Givens

## 2024-06-10 ENCOUNTER — VIRTUAL VISIT (OUTPATIENT)
Dept: PSYCHIATRY | Facility: OTHER | Age: 44
End: 2024-06-10
Attending: PSYCHIATRY & NEUROLOGY
Payer: COMMERCIAL

## 2024-06-10 DIAGNOSIS — F41.1 GAD (GENERALIZED ANXIETY DISORDER): ICD-10-CM

## 2024-06-10 DIAGNOSIS — F10.20 ALCOHOL USE DISORDER, MODERATE, DEPENDENCE (H): ICD-10-CM

## 2024-06-10 DIAGNOSIS — F41.1 GAD (GENERALIZED ANXIETY DISORDER): Primary | ICD-10-CM

## 2024-06-10 PROCEDURE — 99443 PR PHYSICIAN TELEPHONE EVALUATION 21-30 MIN: CPT | Mod: 93 | Performed by: PSYCHIATRY & NEUROLOGY

## 2024-06-10 RX ORDER — TEMAZEPAM 7.5 MG/1
CAPSULE ORAL
Qty: 90 CAPSULE | Refills: 3 | Status: SHIPPED | OUTPATIENT
Start: 2024-06-10 | End: 2024-06-11

## 2024-06-10 RX ORDER — HYDROXYZINE HYDROCHLORIDE 50 MG/1
1 TABLET, FILM COATED ORAL 3 TIMES DAILY PRN
COMMUNITY
Start: 2024-06-06 | End: 2024-07-23

## 2024-06-10 RX ORDER — NALTREXONE HYDROCHLORIDE 50 MG/1
TABLET, FILM COATED ORAL
COMMUNITY
Start: 2024-06-06 | End: 2024-07-23

## 2024-06-10 RX ORDER — DULOXETIN HYDROCHLORIDE 30 MG/1
CAPSULE, DELAYED RELEASE ORAL
Qty: 30 CAPSULE | Refills: 5 | Status: SHIPPED | OUTPATIENT
Start: 2024-06-10

## 2024-06-10 RX ORDER — NALTREXONE HYDROCHLORIDE 50 MG/1
50 TABLET, FILM COATED ORAL DAILY
Qty: 30 TABLET | Refills: 5 | Status: SHIPPED | OUTPATIENT
Start: 2024-06-10 | End: 2024-09-23

## 2024-06-10 RX ORDER — DULOXETIN HYDROCHLORIDE 30 MG/1
CAPSULE, DELAYED RELEASE ORAL
COMMUNITY
Start: 2024-06-06 | End: 2024-07-23

## 2024-06-10 ASSESSMENT — PATIENT HEALTH QUESTIONNAIRE - PHQ9
SUM OF ALL RESPONSES TO PHQ QUESTIONS 1-9: 17
5. POOR APPETITE OR OVEREATING: NEARLY EVERY DAY

## 2024-06-10 ASSESSMENT — ANXIETY QUESTIONNAIRES
5. BEING SO RESTLESS THAT IT IS HARD TO SIT STILL: MORE THAN HALF THE DAYS
1. FEELING NERVOUS, ANXIOUS, OR ON EDGE: NEARLY EVERY DAY
7. FEELING AFRAID AS IF SOMETHING AWFUL MIGHT HAPPEN: MORE THAN HALF THE DAYS
GAD7 TOTAL SCORE: 18
6. BECOMING EASILY ANNOYED OR IRRITABLE: MORE THAN HALF THE DAYS
GAD7 TOTAL SCORE: 18
2. NOT BEING ABLE TO STOP OR CONTROL WORRYING: NEARLY EVERY DAY
3. WORRYING TOO MUCH ABOUT DIFFERENT THINGS: NEARLY EVERY DAY

## 2024-06-10 ASSESSMENT — PAIN SCALES - GENERAL: PAINLEVEL: NO PAIN (0)

## 2024-06-10 NOTE — PROGRESS NOTES
Virtual Visit Details    Type of service:  Telephone Visit   Phone call duration:37 minutes   Originating Location (pt. Location): Home    Distant Location (provider location):  Off-site    Start time: 1:22 pm  End time: 1:59 pm    SUBJECTIVE / INTERIM HISTORY                                                                       Children-  3 kids. Yassine 13 yo Terra 22 yo  Last visit 5/6/24: start propranolol 10 mg up to 3 x daily prn anxiety. Continue temazepam 7.5 mg 7.5 mg daily and 15 mg HS.   Continue Cymbalta 60 mg daily.   - interim last visit admitted to [facility] - detox from alcohol. Ayesha started on naltrexone. They started her on hydroxyzine prn anxiety, Cymbalta dose was increased.   - triggers: financial for one, renter upstairs Saleem not doing well and is behind on   - set up with [facility] for therapy. Intake is on 6/18/24.   - working on standing up for herself.   - Sisu is now 6 months  - watching the kids 30 hours per week  - Vandana wanted the dog. Ayesha ends up taking care of the dog.   - Dameon's [place] opportunity in the [place]. They could end up moving there. He was promoted as supervisor.   - they are living in [place]. Ayesha is doing  at her home. Has had to nanny crowell to go their house / given the puppy Sisu  - Terra 22 yo in [place], [facility], has a house. . Daniel just got engaged. They've been together since 10th grade.   - dad passed away on 1/27/23. Pain she was in was a distraction through this this year   - Yassine basketball, orchestra  - is still involved in [employer]    MEDICAL / SURGICAL HISTORY                pregnant [if applicable]--no     Patient Active Problem List   Diagnosis    Migraine with aura and without status migrainosus, not intractable    Primary insomnia    ACP (advance care planning)    Gastroesophageal reflux disease, esophagitis presence not specified    Recurrent major depressive disorder (H24)    Tobacco abuse    Hydronephrosis     Panic disorder without agoraphobia    Attention deficit hyperactivity disorder (ADHD), combined type    Hot flashes---4/2021    History of COVID-19 - 5/11/21    Anxiety    Alcoholic intoxication without complication (H24)    Depression, unspecified depression type    Eating disorder, unspecified type    Alcohol withdrawal seizure without complication (H)    Alcohol withdrawal, uncomplicated (H)     ALLERGY   Trazodone, Dust mite extract, Gabapentin, Hydrocodone, and Penicillins  MEDICATIONS                                                                                             Current Outpatient Medications   Medication Sig Dispense Refill    COLLAGEN PO Patient states she takes liquid Collagen      DULoxetine (CYMBALTA) 30 MG capsule       DULoxetine (CYMBALTA) 60 MG capsule Take 1 capsule (60 mg) by mouth daily 30 capsule 11    folic acid (FOLVITE) 1 MG tablet TAKE 1 TABLET (1 MG) BY MOUTH DAILY 30 tablet 6    hydrOXYzine HCl (ATARAX) 50 MG tablet Take 1 tablet by mouth 3 times daily as needed      magnesium oxide 400 MG tablet TAKE 2 TABLETS BY MOUTH IN THE EVENING. STRENGTH: 400 MG 60 tablet 1    naltrexone (DEPADE/REVIA) 50 MG tablet       nicotine (NICORETTE) 2 MG gum       nicotine polacrilex (NICORETTE) 4 MG gum Place 1 each (4 mg) inside cheek every hour as needed for smoking cessation 110 each 3    NONFORMULARY Take 1 tablet by mouth daily Protandim NRF-2      omega 3 1000 MG CAPS       omeprazole (PRILOSEC) 40 MG DR capsule TAKE ONE CAPSULE WITH BREAKFAST DAILY STRENGTH: 40 MG 90 capsule 3    ondansetron (ZOFRAN ODT) 4 MG ODT tab Take 4 mg by mouth      pantoprazole (PROTONIX) 40 MG EC tablet Take 40 mg by mouth      propranolol (INDERAL) 10 MG tablet Take 1 tablet (10 mg) by mouth 3 times daily as needed (anxiety) 90 tablet 2    temazepam (RESTORIL) 7.5 MG capsule Take 2 - 3 capsules bedtime as needed for insomnia 90 capsule 3    vitamin B-Complex Take 1 tablet by mouth daily      Vitamin D,  Cholecalciferol, 10 MCG (400 UNIT) TABS        No current facility-administered medications for this visit.       VITALS   There were no vitals taken for this visit.     PHQ9                     [unfilled]  LABS                                                                                                                           Last Comprehensive Metabolic Panel:  Sodium   Date Value Ref Range Status   06/02/2024 138 135 - 145 mmol/L Final     Comment:     Reference intervals for this test were updated on 09/26/2023 to more accurately reflect our healthy population. There may be differences in the flagging of prior results with similar values performed with this method. Interpretation of those prior results can be made in the context of the updated reference intervals.    07/06/2021 140 133 - 144 mmol/L Final     Potassium   Date Value Ref Range Status   06/02/2024 4.2 3.4 - 5.3 mmol/L Final   10/27/2021 4.0 3.4 - 5.3 mmol/L Final   07/06/2021 3.0 (L) 3.4 - 5.3 mmol/L Final     Chloride   Date Value Ref Range Status   06/02/2024 99 98 - 107 mmol/L Final   10/27/2021 110 (H) 94 - 109 mmol/L Final   07/06/2021 108 94 - 109 mmol/L Final     Carbon Dioxide   Date Value Ref Range Status   07/06/2021 28 20 - 32 mmol/L Final     Carbon Dioxide (CO2)   Date Value Ref Range Status   06/02/2024 25 22 - 29 mmol/L Final   10/27/2021 29 20 - 32 mmol/L Final     Anion Gap   Date Value Ref Range Status   06/02/2024 14 7 - 15 mmol/L Final   10/27/2021 2 (L) 3 - 14 mmol/L Final   07/06/2021 4 3 - 14 mmol/L Final     Glucose   Date Value Ref Range Status   06/02/2024 93 70 - 99 mg/dL Final   10/27/2021 106 (H) 70 - 99 mg/dL Final   07/06/2021 76 70 - 99 mg/dL Final     GLUCOSE BY METER POCT   Date Value Ref Range Status   09/01/2023 88 70 - 99 mg/dL Final     Urea Nitrogen   Date Value Ref Range Status   06/02/2024 9.7 6.0 - 20.0 mg/dL Final   10/27/2021 6 (L) 7 - 30 mg/dL Final   07/06/2021 4 (L) 7 - 30 mg/dL Final      Creatinine   Date Value Ref Range Status   06/02/2024 0.72 0.51 - 0.95 mg/dL Final   07/06/2021 0.66 0.52 - 1.04 mg/dL Final     GFR Estimate   Date Value Ref Range Status   06/02/2024 >90 >60 mL/min/1.73m2 Final   07/06/2021 >90 >60 mL/min/[1.73_m2] Final     Comment:     Non  GFR Calc  Starting 12/18/2018, serum creatinine based estimated GFR (eGFR) will be   calculated using the Chronic Kidney Disease Epidemiology Collaboration   (CKD-EPI) equation.       Calcium   Date Value Ref Range Status   06/02/2024 9.1 8.6 - 10.0 mg/dL Final   07/06/2021 7.9 (L) 8.5 - 10.1 mg/dL Final     Bilirubin Total   Date Value Ref Range Status   06/02/2024 0.3 <=1.2 mg/dL Final   07/06/2021 0.3 0.2 - 1.3 mg/dL Final     Alkaline Phosphatase   Date Value Ref Range Status   06/02/2024 68 40 - 150 U/L Final   07/06/2021 64 40 - 150 U/L Final     ALT   Date Value Ref Range Status   06/02/2024 10 0 - 50 U/L Final     Comment:     Reference intervals for this test were updated on 6/12/2023 to more accurately reflect our healthy population. There may be differences in the flagging of prior results with similar values performed with this method. Interpretation of those prior results can be made in the context of the updated reference intervals.     07/06/2021 17 0 - 50 U/L Final     AST   Date Value Ref Range Status   06/02/2024 30 0 - 45 U/L Final     Comment:     Reference intervals for this test were updated on 6/12/2023 to more accurately reflect our healthy population. There may be differences in the flagging of prior results with similar values performed with this method. Interpretation of those prior results can be made in the context of the updated reference intervals.   07/06/2021 22 0 - 45 U/L Final     Last Comprehensive Metabolic Panel:  Lab Results   Component Value Date     06/02/2024    POTASSIUM 4.2 06/02/2024    CHLORIDE 99 06/02/2024    CO2 25 06/02/2024    ANIONGAP 14 06/02/2024    GLC 93  06/02/2024    BUN 9.7 06/02/2024    CR 0.72 06/02/2024    GFRESTIMATED >90 06/02/2024    RAÚL 9.1 06/02/2024            MENTAL STATUS EXAM                                                                                       Speech: normal volume, rhythm, rate. Mood was anxious, depressed. Thought process, including associations, was unremarkable and thought content was devoid of   homicidal ideation and psychotic thought. No SI. No hallucinations. Insight was good. Judgment was intact and adequate for safety. Fund of knowledge was intact. Pt demonstrates no obvious problems with attention, concentration, language, recent or remote memory although these were not formally tested.     ASSESSMENT                                                                                                      HISTORICAL:  Initial psych note 3/9/18          NOTES:  Topamax: word finding issues. Gabapentin: dissociation and and akathisia. Trazodone: allergic reaction. OTC sleep meds not helping... propranolol 10 mg ineffective for insomnia.  Ayesha Lovelace is a 43 yo with MDD, MAYKEL, hx couple psychiatric hospital stays. Ayesha was admitted to [facility] ([facility]). She started on naltrexone and they suggested she consider going off temazepam. She notes she is putting thought to this however struggling rigyt now and would like to get some time in with a therapist (intake is in a week) and we will continue revisit this. Team increased her duloxetine from 60 mg to 90 mg, added hydroxyzine prn and started naltrexone. Propranolol we started last visit she doesn't feel helps any so for today we agreed on discontinuing it.      TREATMENT RISK STATEMENT:  The risks, benefits, alternatives and potential adverse effects have been explained and are understood by the pt.  The pt agrees to the treatment plan with the ability to do so.   The pt knows to call the clinic for any problems or access emergency care if needed.          DIAGNOSES                    MAYKEL  MDD, recurrent, severe without psychosis  ADHD       PLAN                                                                                                                     1)  MEDICATIONS:         -Discontinue propranolol 10 mg up to 3 x daily prn anxiety. Continue temazepam 7.5 mg daily and 15 mg HS.   Continue Cymbalta 90 mg daily, hydroxyzine 50 mg up to 3 times daily prn anxiety, and naltrexone 50 mg daily. .     2)  THERAPY:  No Change     3)  LABS:  None today. Check LFTs in July (started on naltrexone)     4)  PT MONITOR [call for probs]:  Worsening symptoms, SI/HI, SEs from meds     5)  REFERRALS [CD, medical, other]: none     6)  RTC: 1 month

## 2024-06-11 RX ORDER — TEMAZEPAM 7.5 MG/1
CAPSULE ORAL
Qty: 90 CAPSULE | Refills: 3 | Status: SHIPPED | OUTPATIENT
Start: 2024-06-11 | End: 2024-09-23

## 2024-06-11 NOTE — TELEPHONE ENCOUNTER
Received incoming VM from patient about the refill of Temazepam.  Medication was sent to [facility] yesterday.  The E-Prescribing Status states:  Transmission to pharmacy failed.  Do we need to send to pharmacy again?  Thank you, please advise.

## 2024-07-23 ENCOUNTER — VIRTUAL VISIT (OUTPATIENT)
Dept: PSYCHIATRY | Facility: OTHER | Age: 44
End: 2024-07-23
Attending: PSYCHIATRY & NEUROLOGY
Payer: COMMERCIAL

## 2024-07-23 DIAGNOSIS — F41.1 GAD (GENERALIZED ANXIETY DISORDER): Primary | ICD-10-CM

## 2024-07-23 PROCEDURE — 99443 PR PHYSICIAN TELEPHONE EVALUATION 21-30 MIN: CPT | Mod: 93 | Performed by: PSYCHIATRY & NEUROLOGY

## 2024-07-23 RX ORDER — HYDROXYZINE HYDROCHLORIDE 50 MG/1
50 TABLET, FILM COATED ORAL 3 TIMES DAILY PRN
Qty: 90 TABLET | Refills: 5 | Status: SHIPPED | OUTPATIENT
Start: 2024-07-23

## 2024-07-23 ASSESSMENT — ANXIETY QUESTIONNAIRES
7. FEELING AFRAID AS IF SOMETHING AWFUL MIGHT HAPPEN: SEVERAL DAYS
3. WORRYING TOO MUCH ABOUT DIFFERENT THINGS: MORE THAN HALF THE DAYS
5. BEING SO RESTLESS THAT IT IS HARD TO SIT STILL: NEARLY EVERY DAY
GAD7 TOTAL SCORE: 14
GAD7 TOTAL SCORE: 14
1. FEELING NERVOUS, ANXIOUS, OR ON EDGE: MORE THAN HALF THE DAYS
6. BECOMING EASILY ANNOYED OR IRRITABLE: SEVERAL DAYS
2. NOT BEING ABLE TO STOP OR CONTROL WORRYING: MORE THAN HALF THE DAYS

## 2024-07-23 ASSESSMENT — PATIENT HEALTH QUESTIONNAIRE - PHQ9
SUM OF ALL RESPONSES TO PHQ QUESTIONS 1-9: 8
5. POOR APPETITE OR OVEREATING: NEARLY EVERY DAY

## 2024-07-23 ASSESSMENT — PAIN SCALES - GENERAL: PAINLEVEL: NO PAIN (0)

## 2024-07-23 NOTE — PROGRESS NOTES
Virtual Visit Details    Type of service:  Telephone Visit   Phone call duration:32 minutes   Originating Location (pt. Location): Home    Distant Location (provider location):  Off-site    Start time:2:30 pm  End time: 3:02 pm    SUBJECTIVE / INTERIM HISTORY                                                                       Children-  3 kids. Yassine 11 yo Terra 24 yo  Last visit 6/10/24: Discontinue propranolol 10 mg up to 3 x daily prn anxiety. Continue temazepam 7.5 mg daily and 15 mg HS.   Continue Cymbalta 90 mg daily, hydroxyzine 50 mg up to 3 times daily prn anxiety, and naltrexone 50 mg daily. .   - been camping on [place]   - interim last visit admitted to  - detox from alcohol. Ayesha started on naltrexone. They started her on hydroxyzine prn anxiety, Cymbalta dose was increased.   - triggers: financial for one, jose antonio upstairs Saleem not doing well and is behind on   - Dameon's daughter 18 yo   - Bela from [facility] zoom visits. Couple visits thus far and feels they connect / click. One of her treatment goals: getting back to work, Dameon and Yassine, what brings her ann marie. [place]  - jose antonio's stuff is still upstairs.  - Sisu their dog be  - Dameon's job opportunity [place] the [place]. They could end up moving there. He was promoted as supervisor.   - Terra 24 yo in [place], [facility], has a house. . Daniel just got engaged. They've been together since 10th grade.   - dad passed away on 1/27/23. Pain she was in was a distraction through this this year   - Yassine basketball, orchestra    MEDICAL / SURGICAL HISTORY                pregnant [if applicable]--no     Patient Active Problem List   Diagnosis    Migraine with aura and without status migrainosus, not intractable    Primary insomnia    ACP (advance care planning)    Gastroesophageal reflux disease, esophagitis presence not specified    Recurrent major depressive disorder (H24)    Tobacco abuse    Hydronephrosis    Panic disorder  without agoraphobia    Attention deficit hyperactivity disorder (ADHD), combined type    Hot flashes---4/2021    History of COVID-19 - 5/11/21    Anxiety    Alcoholic intoxication without complication (H24)    Depression, unspecified depression type    Eating disorder, unspecified type    Alcohol withdrawal seizure without complication (H)    Alcohol withdrawal, uncomplicated (H)     ALLERGY   Trazodone, Dust mite extract, Gabapentin, Hydrocodone, and Penicillins  MEDICATIONS                                                                                             Current Outpatient Medications   Medication Sig Dispense Refill    COLLAGEN PO Patient states she takes liquid Collagen      DULoxetine (CYMBALTA) 30 MG capsule Take 1 capsule daily along with 60 mg capsule (90 mg) 30 capsule 5    DULoxetine (CYMBALTA) 60 MG capsule Take 1 capsule (60 mg) by mouth daily 30 capsule 11    folic acid (FOLVITE) 1 MG tablet TAKE 1 TABLET (1 MG) BY MOUTH DAILY 30 tablet 6    hydrOXYzine HCl (ATARAX) 50 MG tablet Take 1 tablet by mouth 3 times daily as needed      magnesium oxide 400 MG tablet TAKE 2 TABLETS BY MOUTH IN THE EVENING. STRENGTH: 400 MG 60 tablet 1    naltrexone (DEPADE/REVIA) 50 MG tablet Take 1 tablet (50 mg) by mouth daily 30 tablet 5    nicotine polacrilex (NICORETTE) 4 MG gum Place 1 each (4 mg) inside cheek every hour as needed for smoking cessation 110 each 3    omeprazole (PRILOSEC) 40 MG DR capsule TAKE ONE CAPSULE WITH BREAKFAST DAILY STRENGTH: 40 MG 90 capsule 3    temazepam (RESTORIL) 7.5 MG capsule TAKE 2 - 3 CAPSULES BEDTIME AS NEEDED FOR INSOMNIA 90 capsule 3    NONFORMULARY Take 1 tablet by mouth daily Protandim NRF-2      omega 3 1000 MG CAPS       ondansetron (ZOFRAN ODT) 4 MG ODT tab Take 4 mg by mouth      pantoprazole (PROTONIX) 40 MG EC tablet Take 40 mg by mouth      propranolol (INDERAL) 10 MG tablet Take 1 tablet (10 mg) by mouth 3 times daily as needed (anxiety) (Patient not taking:  Reported on 7/23/2024) 90 tablet 2    vitamin B-Complex Take 1 tablet by mouth daily      Vitamin D, Cholecalciferol, 10 MCG (400 UNIT) TABS        No current facility-administered medications for this visit.       VITALS   There were no vitals taken for this visit.     PHQ9                     [unfilled]  LABS                                                                                                                           Last Comprehensive Metabolic Panel:  Sodium   Date Value Ref Range Status   06/02/2024 138 135 - 145 mmol/L Final     Comment:     Reference intervals for this test were updated on 09/26/2023 to more accurately reflect our healthy population. There may be differences in the flagging of prior results with similar values performed with this method. Interpretation of those prior results can be made in the context of the updated reference intervals.    07/06/2021 140 133 - 144 mmol/L Final     Potassium   Date Value Ref Range Status   06/02/2024 4.2 3.4 - 5.3 mmol/L Final   10/27/2021 4.0 3.4 - 5.3 mmol/L Final   07/06/2021 3.0 (L) 3.4 - 5.3 mmol/L Final     Chloride   Date Value Ref Range Status   06/02/2024 99 98 - 107 mmol/L Final   10/27/2021 110 (H) 94 - 109 mmol/L Final   07/06/2021 108 94 - 109 mmol/L Final     Carbon Dioxide   Date Value Ref Range Status   07/06/2021 28 20 - 32 mmol/L Final     Carbon Dioxide (CO2)   Date Value Ref Range Status   06/02/2024 25 22 - 29 mmol/L Final   10/27/2021 29 20 - 32 mmol/L Final     Anion Gap   Date Value Ref Range Status   06/02/2024 14 7 - 15 mmol/L Final   10/27/2021 2 (L) 3 - 14 mmol/L Final   07/06/2021 4 3 - 14 mmol/L Final     Glucose   Date Value Ref Range Status   06/02/2024 93 70 - 99 mg/dL Final   10/27/2021 106 (H) 70 - 99 mg/dL Final   07/06/2021 76 70 - 99 mg/dL Final     GLUCOSE BY METER POCT   Date Value Ref Range Status   09/01/2023 88 70 - 99 mg/dL Final     Urea Nitrogen   Date Value Ref Range Status   06/02/2024 9.7 6.0 -  20.0 mg/dL Final   10/27/2021 6 (L) 7 - 30 mg/dL Final   07/06/2021 4 (L) 7 - 30 mg/dL Final     Creatinine   Date Value Ref Range Status   06/02/2024 0.72 0.51 - 0.95 mg/dL Final   07/06/2021 0.66 0.52 - 1.04 mg/dL Final     GFR Estimate   Date Value Ref Range Status   06/02/2024 >90 >60 mL/min/1.73m2 Final   07/06/2021 >90 >60 mL/min/[1.73_m2] Final     Comment:     Non  GFR Calc  Starting 12/18/2018, serum creatinine based estimated GFR (eGFR) will be   calculated using the Chronic Kidney Disease Epidemiology Collaboration   (CKD-EPI) equation.       Calcium   Date Value Ref Range Status   06/02/2024 9.1 8.6 - 10.0 mg/dL Final   07/06/2021 7.9 (L) 8.5 - 10.1 mg/dL Final     Bilirubin Total   Date Value Ref Range Status   06/02/2024 0.3 <=1.2 mg/dL Final   07/06/2021 0.3 0.2 - 1.3 mg/dL Final     Alkaline Phosphatase   Date Value Ref Range Status   06/02/2024 68 40 - 150 U/L Final   07/06/2021 64 40 - 150 U/L Final     ALT   Date Value Ref Range Status   06/02/2024 10 0 - 50 U/L Final     Comment:     Reference intervals for this test were updated on 6/12/2023 to more accurately reflect our healthy population. There may be differences in the flagging of prior results with similar values performed with this method. Interpretation of those prior results can be made in the context of the updated reference intervals.     07/06/2021 17 0 - 50 U/L Final     AST   Date Value Ref Range Status   06/02/2024 30 0 - 45 U/L Final     Comment:     Reference intervals for this test were updated on 6/12/2023 to more accurately reflect our healthy population. There may be differences in the flagging of prior results with similar values performed with this method. Interpretation of those prior results can be made in the context of the updated reference intervals.   07/06/2021 22 0 - 45 U/L Final     Last Comprehensive Metabolic Panel:  Lab Results   Component Value Date     06/02/2024    POTASSIUM 4.2  "06/02/2024    CHLORIDE 99 06/02/2024    CO2 25 06/02/2024    ANIONGAP 14 06/02/2024    GLC 93 06/02/2024    BUN 9.7 06/02/2024    CR 0.72 06/02/2024    GFRESTIMATED >90 06/02/2024    RAÚL 9.1 06/02/2024         MENTAL STATUS EXAM                                                                                       Speech: normal volume, rhythm, rate. Mood \"doing okay\". Thought process, including associations, was unremarkable and thought content was devoid of   homicidal ideation and psychotic thought. No SI. No hallucinations. Insight was good. Judgment was intact and adequate for safety. Fund of knowledge was intact. Pt demonstrates no obvious problems with attention, concentration, language, recent or remote memory although these were not formally tested.     ASSESSMENT                                                                                                      HISTORICAL:  Initial psych note 3/9/18          NOTES:  Topamax: word finding issues. Gabapentin: dissociation and and akathisia. Trazodone: allergic reaction. OTC sleep meds not helping... propranolol 10 mg ineffective for insomnia.  Ayesha Lovelace is a 43 yo with MDD, MAYKEL, hx couple psychiatric hospital stays. Ayesha was admitted to [facility] ([facility]). She started on naltrexone and they suggested she consider going off temazepam. Last visit Ayesha noted she would like to keep temazepam for now especially as she establishes with a therapist. I'm happy to hear she has met several times with her new therapist and feeling they really click. Ayesha is doing much better in comparison to the last several visits. No changes with meds, I'm going to refill her hydroxyzine.      TREATMENT RISK STATEMENT:  The risks, benefits, alternatives and potential adverse effects have been explained and are understood by the pt.  The pt agrees to the treatment plan with the ability to do so.   The pt knows to call the clinic for any problems or access emergency care if " needed.          DIAGNOSES                   MAYKEL  MDD, recurrent, severe without psychosis  ADHD       PLAN                                                                                                                     1)  MEDICATIONS:         - Continue temazepam 7.5 mg daily and 15 mg HS.   Continue Cymbalta 90 mg daily, hydroxyzine 50 mg up to 3 times daily prn anxiety, and naltrexone 50 mg daily. .     2)  THERAPY:  No Change     3)  LABS:  None today.      4)  PT MONITOR [call for probs]:  Worsening symptoms, SI/HI, SEs from meds     5)  REFERRALS [CD, medical, other]: none     6)  RTC: 2 months

## 2024-07-29 DIAGNOSIS — R79.0 LOW MAGNESIUM LEVEL: ICD-10-CM

## 2024-07-29 NOTE — TELEPHONE ENCOUNTER
Magnesium Oxide      Last Written Prescription Date:  4/12/24  Last Fill Quantity: 60,   # refills: 1  Last Office Visit: 9/14/23  Future Office visit:       Routing refill request to provider for review/approval because:

## 2024-07-30 RX ORDER — MAGNESIUM OXIDE 400 MG/1
TABLET ORAL
Qty: 60 TABLET | Refills: 0 | Status: SHIPPED | OUTPATIENT
Start: 2024-07-30 | End: 2024-08-30

## 2024-08-30 DIAGNOSIS — R79.0 LOW MAGNESIUM LEVEL: ICD-10-CM

## 2024-08-30 RX ORDER — MAGNESIUM OXIDE 400 MG/1
TABLET ORAL
Qty: 60 TABLET | Refills: 0 | Status: SHIPPED | OUTPATIENT
Start: 2024-08-30

## 2024-08-30 NOTE — TELEPHONE ENCOUNTER
MAGNESIUM OXIDE 400 MG  (240 MG Tablet       Last Written Prescription Date:  7/30/24  Last Fill Quantity: 60,   # refills: 0  Last Office Visit: 9/14/23  Future Office visit:       Routing refill request to provider for review/approval because:

## 2024-08-30 NOTE — TELEPHONE ENCOUNTER
Routing refill request to provider for review/approval because:  Drug not on the [facility], [facility] or [facility] refill protocol or controlled substance

## 2024-08-30 NOTE — TELEPHONE ENCOUNTER
Please schedule patient for office visit with Adrienne, then please route task back to me to approve refill once appointment scheduled.

## 2024-09-14 ENCOUNTER — HEALTH MAINTENANCE LETTER (OUTPATIENT)
Age: 44
End: 2024-09-14

## 2024-09-23 ENCOUNTER — VIRTUAL VISIT (OUTPATIENT)
Dept: PSYCHIATRY | Facility: OTHER | Age: 44
End: 2024-09-23
Attending: PSYCHIATRY & NEUROLOGY
Payer: COMMERCIAL

## 2024-09-23 DIAGNOSIS — F41.1 GAD (GENERALIZED ANXIETY DISORDER): ICD-10-CM

## 2024-09-23 PROCEDURE — 99443 PR PHYSICIAN TELEPHONE EVALUATION 21-30 MIN: CPT | Mod: 93 | Performed by: PSYCHIATRY & NEUROLOGY

## 2024-09-23 RX ORDER — TEMAZEPAM 7.5 MG/1
CAPSULE ORAL
Qty: 90 CAPSULE | Refills: 3 | Status: SHIPPED | OUTPATIENT
Start: 2024-09-23

## 2024-09-23 RX ORDER — DULOXETIN HYDROCHLORIDE 60 MG/1
60 CAPSULE, DELAYED RELEASE ORAL DAILY
Qty: 30 CAPSULE | Refills: 11 | Status: SHIPPED | OUTPATIENT
Start: 2024-09-23

## 2024-09-23 ASSESSMENT — ANXIETY QUESTIONNAIRES
7. FEELING AFRAID AS IF SOMETHING AWFUL MIGHT HAPPEN: SEVERAL DAYS
3. WORRYING TOO MUCH ABOUT DIFFERENT THINGS: MORE THAN HALF THE DAYS
GAD7 TOTAL SCORE: 11
1. FEELING NERVOUS, ANXIOUS, OR ON EDGE: MORE THAN HALF THE DAYS
5. BEING SO RESTLESS THAT IT IS HARD TO SIT STILL: SEVERAL DAYS
2. NOT BEING ABLE TO STOP OR CONTROL WORRYING: MORE THAN HALF THE DAYS
6. BECOMING EASILY ANNOYED OR IRRITABLE: SEVERAL DAYS
GAD7 TOTAL SCORE: 11

## 2024-09-23 ASSESSMENT — PAIN SCALES - GENERAL: PAINLEVEL: NO PAIN (0)

## 2024-09-23 NOTE — PROGRESS NOTES
Virtual Visit Details    Type of service:  Telephone Visit   Phone call duration:31 minutes   Originating Location (pt. Location): Home    Distant Location (provider location):  Off-site    Start time:1:30 pm  End time: 2:01 pm    SUBJECTIVE / INTERIM HISTORY                                                                       Children-  3 kids. Yassine 13 yo Terra 22 yo  Last visit July '24: Continue temazepam 7.5 mg daily and 15 mg HS.   Continue Cymbalta 90 mg daily, hydroxyzine 50 mg up to 3 times daily prn anxiety, and naltrexone 50 mg daily. .     -  Yassine hasn't been going to her dad's and Ayesha doesn't want to make her go  - working with counselor at the [employer]  - watching the kids out on [address]. Their mom is doing clinicals in [place] and Ayesha has long days: Leaves around 5:30 am and doesn't get home until around 7  - Saleem the renter is gone  - Dameon's daughter 18 yo   - no longer working with Bela from [facility].Didn't feel like was getting anywhere with it.   - Sisu their dog with anxiety   - Dameon's job opportunity in the [place]. They could end up moving there. He was promoted as supervisor.   - Terra 22 yo in [place], [email or web address], has a house. . Daniel just got engaged. They've been together since 10th grade.   - dad passed away on 1/27/23.   - Yassine basketball, orchestra    MEDICAL / SURGICAL HISTORY                pregnant [if applicable]--no     Patient Active Problem List   Diagnosis    Migraine with aura and without status migrainosus, not intractable    Primary insomnia    ACP (advance care planning)    Gastroesophageal reflux disease, esophagitis presence not specified    Recurrent major depressive disorder (H24)    Tobacco abuse    Hydronephrosis    Panic disorder without agoraphobia    Attention deficit hyperactivity disorder (ADHD), combined type    Hot flashes---4/2021    History of COVID-19 - 5/11/21    Anxiety    Alcoholic intoxication without complication (H24)     Depression, unspecified depression type    Eating disorder, unspecified type    Alcohol withdrawal seizure without complication (H)    Alcohol withdrawal, uncomplicated (H)     ALLERGY   Trazodone, Dust mite extract, Gabapentin, Hydrocodone, and Penicillins  MEDICATIONS                                                                                             Current Outpatient Medications   Medication Sig Dispense Refill    COLLAGEN PO Patient states she takes liquid Collagen      DULoxetine (CYMBALTA) 30 MG capsule Take 1 capsule daily along with 60 mg capsule (90 mg) 30 capsule 5    DULoxetine (CYMBALTA) 60 MG capsule Take 1 capsule (60 mg) by mouth daily 30 capsule 11    folic acid (FOLVITE) 1 MG tablet TAKE 1 TABLET (1 MG) BY MOUTH DAILY 30 tablet 6    hydrOXYzine HCl (ATARAX) 50 MG tablet Take 1 tablet (50 mg) by mouth 3 times daily as needed for anxiety 90 tablet 5    magnesium oxide 400 MG tablet TAKE 2 TABLETS BY MOUTH IN THE EVENING. STRENGTH: 400 MG 60 tablet 0    naltrexone (DEPADE/REVIA) 50 MG tablet Take 1 tablet (50 mg) by mouth daily 30 tablet 5    nicotine polacrilex (NICORETTE) 4 MG gum Place 1 each (4 mg) inside cheek every hour as needed for smoking cessation 110 each 3    NONFORMULARY Take 1 tablet by mouth daily Protandim NRF-2      omega 3 1000 MG CAPS       omeprazole (PRILOSEC) 40 MG DR capsule TAKE ONE CAPSULE WITH BREAKFAST DAILY STRENGTH: 40 MG 90 capsule 3    ondansetron (ZOFRAN ODT) 4 MG ODT tab Take 4 mg by mouth      pantoprazole (PROTONIX) 40 MG EC tablet Take 40 mg by mouth      temazepam (RESTORIL) 7.5 MG capsule TAKE 2 - 3 CAPSULES BEDTIME AS NEEDED FOR INSOMNIA 90 capsule 3    vitamin B-Complex Take 1 tablet by mouth daily      Vitamin D, Cholecalciferol, 10 MCG (400 UNIT) TABS       propranolol (INDERAL) 10 MG tablet Take 1 tablet (10 mg) by mouth 3 times daily as needed (anxiety) (Patient not taking: Reported on 7/23/2024) 90 tablet 2     No current facility-administered  medications for this visit.       VITALS   There were no vitals taken for this visit.     PHQ9                     [unfilled]  LABS                                                                                                                           Last Comprehensive Metabolic Panel:  Sodium   Date Value Ref Range Status   06/02/2024 138 135 - 145 mmol/L Final     Comment:     Reference intervals for this test were updated on 09/26/2023 to more accurately reflect our healthy population. There may be differences in the flagging of prior results with similar values performed with this method. Interpretation of those prior results can be made in the context of the updated reference intervals.    07/06/2021 140 133 - 144 mmol/L Final     Potassium   Date Value Ref Range Status   06/02/2024 4.2 3.4 - 5.3 mmol/L Final   10/27/2021 4.0 3.4 - 5.3 mmol/L Final   07/06/2021 3.0 (L) 3.4 - 5.3 mmol/L Final     Chloride   Date Value Ref Range Status   06/02/2024 99 98 - 107 mmol/L Final   10/27/2021 110 (H) 94 - 109 mmol/L Final   07/06/2021 108 94 - 109 mmol/L Final     Carbon Dioxide   Date Value Ref Range Status   07/06/2021 28 20 - 32 mmol/L Final     Carbon Dioxide (CO2)   Date Value Ref Range Status   06/02/2024 25 22 - 29 mmol/L Final   10/27/2021 29 20 - 32 mmol/L Final     Anion Gap   Date Value Ref Range Status   06/02/2024 14 7 - 15 mmol/L Final   10/27/2021 2 (L) 3 - 14 mmol/L Final   07/06/2021 4 3 - 14 mmol/L Final     Glucose   Date Value Ref Range Status   06/02/2024 93 70 - 99 mg/dL Final   10/27/2021 106 (H) 70 - 99 mg/dL Final   07/06/2021 76 70 - 99 mg/dL Final     GLUCOSE BY METER POCT   Date Value Ref Range Status   09/01/2023 88 70 - 99 mg/dL Final     Urea Nitrogen   Date Value Ref Range Status   06/02/2024 9.7 6.0 - 20.0 mg/dL Final   10/27/2021 6 (L) 7 - 30 mg/dL Final   07/06/2021 4 (L) 7 - 30 mg/dL Final     Creatinine   Date Value Ref Range Status   06/02/2024 0.72 0.51 - 0.95 mg/dL Final    07/06/2021 0.66 0.52 - 1.04 mg/dL Final     GFR Estimate   Date Value Ref Range Status   06/02/2024 >90 >60 mL/min/1.73m2 Final   07/06/2021 >90 >60 mL/min/[1.73_m2] Final     Comment:     Non  GFR Calc  Starting 12/18/2018, serum creatinine based estimated GFR (eGFR) will be   calculated using the Chronic Kidney Disease Epidemiology Collaboration   (CKD-EPI) equation.       Calcium   Date Value Ref Range Status   06/02/2024 9.1 8.6 - 10.0 mg/dL Final   07/06/2021 7.9 (L) 8.5 - 10.1 mg/dL Final     Bilirubin Total   Date Value Ref Range Status   06/02/2024 0.3 <=1.2 mg/dL Final   07/06/2021 0.3 0.2 - 1.3 mg/dL Final     Alkaline Phosphatase   Date Value Ref Range Status   06/02/2024 68 40 - 150 U/L Final   07/06/2021 64 40 - 150 U/L Final     ALT   Date Value Ref Range Status   06/02/2024 10 0 - 50 U/L Final     Comment:     Reference intervals for this test were updated on 6/12/2023 to more accurately reflect our healthy population. There may be differences in the flagging of prior results with similar values performed with this method. Interpretation of those prior results can be made in the context of the updated reference intervals.     07/06/2021 17 0 - 50 U/L Final     AST   Date Value Ref Range Status   06/02/2024 30 0 - 45 U/L Final     Comment:     Reference intervals for this test were updated on 6/12/2023 to more accurately reflect our healthy population. There may be differences in the flagging of prior results with similar values performed with this method. Interpretation of those prior results can be made in the context of the updated reference intervals.   07/06/2021 22 0 - 45 U/L Final     Last Comprehensive Metabolic Panel:  Lab Results   Component Value Date     06/02/2024    POTASSIUM 4.2 06/02/2024    CHLORIDE 99 06/02/2024    CO2 25 06/02/2024    ANIONGAP 14 06/02/2024    GLC 93 06/02/2024    BUN 9.7 06/02/2024    CR 0.72 06/02/2024    GFRESTIMATED >90 06/02/2024    RAÚL  "9.1 06/02/2024         MENTAL STATUS EXAM                                                                                       Speech: normal volume, rhythm, rate. Mood \"doing okay\". Thought process, including associations, was unremarkable and thought content was devoid of   homicidal ideation and psychotic thought. No SI. No hallucinations. Insight was good. Judgment was intact and adequate for safety. Fund of knowledge was intact. Pt demonstrates no obvious problems with attention, concentration, language, recent or remote memory although these were not formally tested.     ASSESSMENT                                                                                                      HISTORICAL:  Initial psych note 3/9/18          NOTES:  Topamax: word finding issues. Gabapentin: dissociation and and akathisia. Trazodone: allergic reaction. OTC sleep meds not helping... propranolol 10 mg ineffective for insomnia.  Ayesha Lovelace is a 43 yo with MDD, MAYKEL, hx couple psychiatric hospital stays. Things have been stable for the past couple months. Kim Monge is no longer staying in their upstairs apartment. Ayesha is doing well with not drinking and notes she stopped taking naltrexone. She is still taking duloxetine, temazepam. We agreed on having her RTC in ~3 months.      TREATMENT RISK STATEMENT:  The risks, benefits, alternatives and potential adverse effects have been explained and are understood by the pt.  The pt agrees to the treatment plan with the ability to do so.   The pt knows to call the clinic for any problems or access emergency care if needed.          DIAGNOSES                   MAYKEL  MDD, recurrent, mild  ADHD       PLAN                                                                                                                     1)  MEDICATIONS:         - Continue temazepam 7.5 mg daily and 15 mg HS refilled.   Continue Cymbalta 90 mg daily, hydroxyzine 50 mg up to 3 times daily prn anxiety, she " stopped taking naltrexone 50 mg daily. .     2)  THERAPY:  No Change     3)  LABS:  None today.      4)  PT MONITOR [call for probs]:  Worsening symptoms, SI/HI, SEs from meds     5)  REFERRALS [CD, medical, other]: none     6)  RTC: 3 months

## 2024-10-19 ENCOUNTER — HOSPITAL ENCOUNTER (EMERGENCY)
Facility: HOSPITAL | Age: 44
Discharge: LEFT BEFORE DISCHARGE | End: 2024-10-19
Attending: NURSE PRACTITIONER | Admitting: NURSE PRACTITIONER
Payer: COMMERCIAL

## 2024-10-19 VITALS
SYSTOLIC BLOOD PRESSURE: 125 MMHG | HEART RATE: 61 BPM | BODY MASS INDEX: 21.99 KG/M2 | RESPIRATION RATE: 19 BRPM | TEMPERATURE: 99.5 F | DIASTOLIC BLOOD PRESSURE: 90 MMHG | WEIGHT: 128.09 LBS | OXYGEN SATURATION: 99 %

## 2024-10-19 DIAGNOSIS — R11.2 NAUSEA AND VOMITING: ICD-10-CM

## 2024-10-19 DIAGNOSIS — F41.9 ANXIETY: ICD-10-CM

## 2024-10-19 DIAGNOSIS — R10.9 ABDOMINAL PAIN: ICD-10-CM

## 2024-10-19 LAB
ALBUMIN SERPL BCG-MCNC: 4.1 G/DL (ref 3.5–5.2)
ALP SERPL-CCNC: 71 U/L (ref 40–150)
ALT SERPL W P-5'-P-CCNC: 14 U/L (ref 0–50)
ANION GAP SERPL CALCULATED.3IONS-SCNC: 14 MMOL/L (ref 7–15)
AST SERPL W P-5'-P-CCNC: 27 U/L (ref 0–45)
BASE EXCESS BLDV CALC-SCNC: -0.2 MMOL/L (ref -3–3)
BILIRUB SERPL-MCNC: 0.4 MG/DL
BUN SERPL-MCNC: 6.2 MG/DL (ref 6–20)
CALCIUM SERPL-MCNC: 9.6 MG/DL (ref 8.8–10.4)
CHLORIDE SERPL-SCNC: 105 MMOL/L (ref 98–107)
CREAT SERPL-MCNC: 0.77 MG/DL (ref 0.51–0.95)
EGFRCR SERPLBLD CKD-EPI 2021: >90 ML/MIN/1.73M2
ERYTHROCYTE [DISTWIDTH] IN BLOOD BY AUTOMATED COUNT: 13.1 % (ref 10–15)
GLUCOSE SERPL-MCNC: 107 MG/DL (ref 70–99)
HCO3 BLDV-SCNC: 23 MMOL/L (ref 21–28)
HCO3 SERPL-SCNC: 20 MMOL/L (ref 22–29)
HCT VFR BLD AUTO: 40.9 % (ref 35–47)
HGB BLD-MCNC: 13.7 G/DL (ref 11.7–15.7)
HOLD SPECIMEN: NORMAL
HOLD SPECIMEN: NORMAL
LACTATE SERPL-SCNC: 1.8 MMOL/L (ref 0.7–2)
LIPASE SERPL-CCNC: 33 U/L (ref 13–60)
MAGNESIUM SERPL-MCNC: 1.8 MG/DL (ref 1.7–2.3)
MCH RBC QN AUTO: 29.7 PG (ref 26.5–33)
MCHC RBC AUTO-ENTMCNC: 33.5 G/DL (ref 31.5–36.5)
MCV RBC AUTO: 89 FL (ref 78–100)
O2/TOTAL GAS SETTING VFR VENT: 21 %
OXYHGB MFR BLDV: 67 % (ref 70–75)
PCO2 BLDV: 32 MM HG (ref 40–50)
PH BLDV: 7.46 [PH] (ref 7.32–7.43)
PHOSPHATE SERPL-MCNC: 2.8 MG/DL (ref 2.5–4.5)
PLATELET # BLD AUTO: 399 10E3/UL (ref 150–450)
PO2 BLDV: 34 MM HG (ref 25–47)
POTASSIUM SERPL-SCNC: 3.8 MMOL/L (ref 3.4–5.3)
PROT SERPL-MCNC: 6.6 G/DL (ref 6.4–8.3)
RBC # BLD AUTO: 4.62 10E6/UL (ref 3.8–5.2)
SAO2 % BLDV: 69 % (ref 70–75)
SODIUM SERPL-SCNC: 139 MMOL/L (ref 135–145)
WBC # BLD AUTO: 7.6 10E3/UL (ref 4–11)

## 2024-10-19 PROCEDURE — 82805 BLOOD GASES W/O2 SATURATION: CPT | Performed by: NURSE PRACTITIONER

## 2024-10-19 PROCEDURE — 85027 COMPLETE CBC AUTOMATED: CPT | Performed by: NURSE PRACTITIONER

## 2024-10-19 PROCEDURE — 96372 THER/PROPH/DIAG INJ SC/IM: CPT | Performed by: NURSE PRACTITIONER

## 2024-10-19 PROCEDURE — 99284 EMERGENCY DEPT VISIT MOD MDM: CPT | Performed by: NURSE PRACTITIONER

## 2024-10-19 PROCEDURE — 83605 ASSAY OF LACTIC ACID: CPT | Performed by: NURSE PRACTITIONER

## 2024-10-19 PROCEDURE — 93010 ELECTROCARDIOGRAM REPORT: CPT | Performed by: INTERNAL MEDICINE

## 2024-10-19 PROCEDURE — 84100 ASSAY OF PHOSPHORUS: CPT | Performed by: NURSE PRACTITIONER

## 2024-10-19 PROCEDURE — 250N000011 HC RX IP 250 OP 636: Performed by: NURSE PRACTITIONER

## 2024-10-19 PROCEDURE — 99284 EMERGENCY DEPT VISIT MOD MDM: CPT | Mod: 25

## 2024-10-19 PROCEDURE — 83690 ASSAY OF LIPASE: CPT | Performed by: NURSE PRACTITIONER

## 2024-10-19 PROCEDURE — 96375 TX/PRO/DX INJ NEW DRUG ADDON: CPT

## 2024-10-19 PROCEDURE — 96374 THER/PROPH/DIAG INJ IV PUSH: CPT

## 2024-10-19 PROCEDURE — 80053 COMPREHEN METABOLIC PANEL: CPT | Performed by: NURSE PRACTITIONER

## 2024-10-19 PROCEDURE — 83735 ASSAY OF MAGNESIUM: CPT | Performed by: NURSE PRACTITIONER

## 2024-10-19 PROCEDURE — 93005 ELECTROCARDIOGRAM TRACING: CPT

## 2024-10-19 PROCEDURE — 36415 COLL VENOUS BLD VENIPUNCTURE: CPT | Performed by: NURSE PRACTITIONER

## 2024-10-19 RX ORDER — DICYCLOMINE HYDROCHLORIDE 10 MG/ML
20 INJECTION INTRAMUSCULAR ONCE
Status: COMPLETED | OUTPATIENT
Start: 2024-10-19 | End: 2024-10-19

## 2024-10-19 RX ORDER — KETOROLAC TROMETHAMINE 30 MG/ML
30 INJECTION, SOLUTION INTRAMUSCULAR; INTRAVENOUS ONCE
Status: COMPLETED | OUTPATIENT
Start: 2024-10-19 | End: 2024-10-19

## 2024-10-19 RX ORDER — DROPERIDOL 2.5 MG/ML
2.5 INJECTION, SOLUTION INTRAMUSCULAR; INTRAVENOUS ONCE
Status: COMPLETED | OUTPATIENT
Start: 2024-10-19 | End: 2024-10-19

## 2024-10-19 RX ADMIN — DICYCLOMINE HYDROCHLORIDE 15 MG: 10 INJECTION INTRAMUSCULAR at 14:21

## 2024-10-19 RX ADMIN — DROPERIDOL 2.5 MG: 2.5 INJECTION, SOLUTION INTRAMUSCULAR; INTRAVENOUS at 14:42

## 2024-10-19 RX ADMIN — PROCHLORPERAZINE EDISYLATE 10 MG: 5 INJECTION INTRAMUSCULAR; INTRAVENOUS at 14:17

## 2024-10-19 RX ADMIN — KETOROLAC TROMETHAMINE 30 MG: 30 INJECTION, SOLUTION INTRAMUSCULAR at 15:32

## 2024-10-19 ASSESSMENT — ENCOUNTER SYMPTOMS
CONSTITUTIONAL NEGATIVE: 1
PSYCHIATRIC NEGATIVE: 1
TREMORS: 1
RESPIRATORY NEGATIVE: 1
HEMATOLOGIC/LYMPHATIC NEGATIVE: 1
BLOOD IN STOOL: 0
EYES NEGATIVE: 1
CARDIOVASCULAR NEGATIVE: 1
ENDOCRINE NEGATIVE: 1
NAUSEA: 1
VOMITING: 1
DIARRHEA: 1
ALLERGIC/IMMUNOLOGIC NEGATIVE: 1
ABDOMINAL PAIN: 1

## 2024-10-19 ASSESSMENT — COLUMBIA-SUICIDE SEVERITY RATING SCALE - C-SSRS
1. IN THE PAST MONTH, HAVE YOU WISHED YOU WERE DEAD OR WISHED YOU COULD GO TO SLEEP AND NOT WAKE UP?: NO
2. HAVE YOU ACTUALLY HAD ANY THOUGHTS OF KILLING YOURSELF IN THE PAST MONTH?: NO
6. HAVE YOU EVER DONE ANYTHING, STARTED TO DO ANYTHING, OR PREPARED TO DO ANYTHING TO END YOUR LIFE?: NO

## 2024-10-19 ASSESSMENT — ACTIVITIES OF DAILY LIVING (ADL)
ADLS_ACUITY_SCORE: 39
ADLS_ACUITY_SCORE: 35

## 2024-10-19 NOTE — ED TRIAGE NOTES
"Pt presents with ongoing sx of pain and tingling down the both arms, diarrhea, vomiting, and generalized body aches, and fever starting 3 days ago. Pt was evaluated in [place] yesterday, she has been trying zofran and continued to vomit. Pt report she quit drinking in August and has had \"not a drop since.\" Pt felt like she had a seizure yesterday. She reports that she was incontinent of stool, tongue hurt, and woke up to dog licking her. She doesn't know how long she was there.     Pt denies tick bites, but does spend time outdoors.         "

## 2024-10-19 NOTE — ED PROVIDER NOTES
History     Chief Complaint   Patient presents with    Generalized Body Aches    Nausea, Vomiting, & Diarrhea     HPI  Nasra Lovelace is a 44 year old individual with history of GERD, major depressive disorder, panic disorder, comes in for nausea and vomiting and bilateral arm pain.  States that she went down to the "[employer] for her volleyball game and developed nausea, vomiting, and diarrhea.  Has been having pain down both arms this time.  States that she went to [facility] ER and had evaluation but nothing significant found.  Was given medications and did feel little bit better and went home.  States that now she is feeling bad again despite the use of ondansetron.  States she has now lost control of her bowels.  No melena or hematochezia.  States that she is vomiting every couple of hours.  No blood in stools or emesis reported.  Does complain of lower abdominal cramping that just started.  No dysuria or hematuria.  No fever or chills.  States has been sober for the past couple of months.    Allergies:  Allergies   Allergen Reactions    Trazodone Swelling     Also very congested     Dust Mite Extract     Gabapentin Other (See Comments)     Feels disassociated.     Hydrocodone Other (See Comments) and Itching     Skin felt like it was burning.    Penicillins Rash       Problem List:    Patient Active Problem List    Diagnosis Date Noted    Alcohol withdrawal, uncomplicated (H) 06/02/2024     Priority: Medium    Alcohol withdrawal seizure without complication (H) 08/31/2023     Priority: Medium    Anxiety 09/22/2021     Priority: Medium    Alcoholic intoxication without complication (H) 09/22/2021     Priority: Medium    Depression, unspecified depression type 09/22/2021     Priority: Medium    Eating disorder, unspecified type 09/22/2021     Priority: Medium    History of COVID-19 - 5/11/21 06/01/2021     Priority: Medium    Hot flashes---4/2021 04/24/2021     Priority: Medium    Attention deficit  hyperactivity disorder (ADHD), combined type 08/29/2019     Priority: Medium     Patient is followed by  for ongoing prescription of stimulants.  All refills should be approved by this provider, or covering partner.    Medication(s): Ritalin 10 mg.   Maximum quantity per month: 60  Clinic visit frequency required: Q 3 months     Controlled substance agreement on file: Yes       Date(s): 8.27.19  Neuropsych evaluation for ADD completed:  managed by     [facility] website verification:  done on 8.27.19  [email or web address]          Recurrent major depressive disorder (H) 07/30/2018     Priority: Medium    Tobacco abuse 07/30/2018     Priority: Medium    Gastroesophageal reflux disease, esophagitis presence not specified 04/17/2017     Priority: Medium     IMO Regulatory Load OCT 2020      ACP (advance care planning) 01/26/2017     Priority: Medium     Advance Care Planning 1/26/2017: ACP Review of Chart / Resources Provided:  Reviewed chart for advance care plan.  Nasra Ortiz has been provided information and resources to begin or update their advance care plan.  Added by EMERALD THOMPSON            Primary insomnia 12/11/2015     Priority: Medium    Migraine with aura and without status migrainosus, not intractable 08/11/2014     Priority: Medium    Hydronephrosis 12/21/2011     Priority: Medium    Panic disorder without agoraphobia 11/26/2006     Priority: Medium        Past Medical History:    Past Medical History:   Diagnosis Date    Acquired hypothyroidism 08/11/2014    Anxiety disorder 09/05/2012    Anxiety state 08/11/2014    Attention deficit hyperactivity disorder (ADHD), combined type 08/29/2019    Gastroesophageal reflux disease, esophagitis presence not specified 04/17/2017    History of COVID-19 - 5/11/21 05/11/2021    Hydronephrosis 12/21/2011    Menorrhagia with irregular cycle 04/27/2021    Migraine with aura and without status migrainosus, not intractable  2014    Panic disorder without agoraphobia 2006    Primary insomnia 2015    Recurrent major depressive disorder (H) 2018    Secondary dysmenorrhea 2021    Tobacco abuse 2018    Vitamin deficiency 2018       Past Surgical History:    Past Surgical History:   Procedure Laterality Date     SECTION N/A 2000     SECTION N/A 2012    postop hemm with ruptured R Ov vein, transfusion    CHOLECYSTECTOMY      COLONOSCOPY N/A 2019    Procedure: COLONOSCOPY DIAGNOSTIC WITH RANDOM COLON BIOPSIES ANOSCOPY; Surgeon: Igor Burgos MD; Location: [facility] OR      COLPOSCOPY CERVIX, BIOPSY CERVIX, ENDOCERVICAL CURETTAGE, COMBINED N/A 2008    DILATION AND CURETTAGE, ABLATE ENDOMETRIUM NOVASURE, COMBINED N/A 2018    Novasure Endometrial ablation    ESOPHAGOSCOPY, GASTROSCOPY, DUODENOSCOPY (EGD), COMBINED  2019    Procedure: ESOPHAGOGASTRODUODENOSCOPY DIAGNOSTIC with biopsies; Surgeon: Igor Burgos MD; Location: [facility] OR      HYSTEROSCOPY DIAGNOSTIC N/A 2018    Hysteroscopy, D&C    LAPAROSCOPIC APPENDECTOMY N/A 2012    LAPAROSCOPIC HYSTERECTOMY SUPRACERVICAL N/A 2021    Procedure: laparoscopic supracervical hysterectomy;  Surgeon: Rito Soler MD;  Location: [facility] OR    OOPHORECTOMY Right 2012    post operative hemorrhage with ruptured ovarian vein,     SALPINGECTOMY Left 2019    Procedure: LAPAROSCOPY LEFT SALPINGECTOMY; Surgeon: Regina Sweeney MD; Location: [facility] OR         Family History:    Family History   Problem Relation Age of Onset    Hypertension Mother     Hyperlipidemia Mother     Cardiovascular Father     Hypertension Father     Hyperlipidemia Father     Heart Failure Father     Sleep Apnea Father     Kidney failure Father        Social History:  Marital Status:   [4]  Social History     Tobacco Use    Smoking status: Every Day     Current packs/day: 0.00     Average packs/day: 0.4 packs/day  for 10.0 years (4.0 ttl pk-yrs)     Types: Cigarettes     Start date: 2010     Last attempt to quit: 2020     Years since quittin.6    Smokeless tobacco: Never    Tobacco comments:     tobacco cessation discuseed - tried to quit: no - passive smoke exposure quitting on own      Patient is working on smoking cessation with the gum. 6-10-24   Substance Use Topics    Alcohol use: No    Drug use: No        Medications:    COLLAGEN PO  DULoxetine (CYMBALTA) 30 MG capsule  DULoxetine (CYMBALTA) 60 MG capsule  folic acid (FOLVITE) 1 MG tablet  hydrOXYzine HCl (ATARAX) 50 MG tablet  magnesium oxide 400 MG tablet  nicotine polacrilex (NICORETTE) 4 MG gum  NONFORMULARY  omega 3 1000 MG CAPS  omeprazole (PRILOSEC) 40 MG DR capsule  ondansetron (ZOFRAN ODT) 4 MG ODT tab  pantoprazole (PROTONIX) 40 MG EC tablet  propranolol (INDERAL) 10 MG tablet  temazepam (RESTORIL) 7.5 MG capsule  vitamin B-Complex  Vitamin D, Cholecalciferol, 10 MCG (400 UNIT) TABS          Review of Systems   Constitutional: Negative.    HENT: Negative.     Eyes: Negative.    Respiratory: Negative.     Cardiovascular: Negative.    Gastrointestinal:  Positive for abdominal pain (Lower), diarrhea, nausea and vomiting. Negative for blood in stool.   Endocrine: Negative.    Genitourinary: Negative.    Musculoskeletal:         Arm pain   Skin: Negative.    Allergic/Immunologic: Negative.    Neurological:  Positive for tremors (Arms).   Hematological: Negative.    Psychiatric/Behavioral: Negative.         Physical Exam   BP: 127/79  Pulse: 98  Temp: 99.5  F (37.5  C)  Resp: 20  Weight: 58.1 kg (128 lb 1.4 oz)  SpO2: 99 %      GENERAL APPEARANCE:  The patient is a 44 year old well-developed, well-nourished individual that appears as stated age.  LUNGS:  Breathing is easy.  Breath sounds are equal and clear bilaterally.  No wheezes, rhonchi, or rales.  HEART:  Regular rate and rhythm with normal S1 and S2.  No murmurs, gallops, or rubs.  ABDOMEN:   "Soft.  No mass, guarding, or rebound.  Lower abdominal tenderness to palpation.  No organomegaly or hernia.  Bowel sounds are present.  No CVA tenderness or flank mass.  No abdominal bruits or thrills present upon auscultation/palpation.  GENITOURINARY: No obvious anterior pelvic tenderness, hernia, mass noted to palpation.  NEUROLOGIC:  No focal sensory or motor deficits are noted.  Patient does have tremoring of the hands noted.  PSYCHIATRIC:  The patient is awake, alert, and oriented x4.  Recent and remote memory is intact.  Anxious mood and affect.  Cooperative with history and physical exam.  SKIN:  Warm, dry, and well perfused.  Good turgor.  No lesions, nodules, or rashes are noted.  No bruising noted.      ED Course     ED Course as of 10/19/24 1603   Sat Oct 19, 2024   1356 Labs ordered.  Prochlorperazine 10 mg IV ordered.   1400 In to see patient and history/physical completed.    1427 Patient refused full dose of dicyclomine x-ray the patient was injection being given because \"it hurt too much\".  Droperidol 2.5 mg IV ordered.   1437 EKG 12-lead, tracing only  ECG shows no acute abnormality with QTc of 448.  Droperidol okay to be given.   1525 Patient having improvement of nausea but continues to have abdominal pain.  Ketorolac 30 mg IV ordered.   1602 Patient wanting to leave because her significant other was leaving.  Patient advised that no diagnosis made yet and patient may deteriorate but patient still wanted to leave and signed out AGAINST MEDICAL ADVICE.  Did encourage returning to ER if new or worsening symptoms.             ECG:    ECG competed at 1434 and personally reviewed at 1437 showing sinus rhythm with ventricular rate of 74 and QTc of 448.  Normal axis.  Normal ECG.  When compared to ECG from 6/2/2024, no significant changes noted.      Results for orders placed or performed during the hospital encounter of 10/19/24 (from the past 24 hour(s))   CBC with platelets   Result Value Ref Range "    WBC Count 7.6 4.0 - 11.0 10e3/uL    RBC Count 4.62 3.80 - 5.20 10e6/uL    Hemoglobin 13.7 11.7 - 15.7 g/dL    Hematocrit 40.9 35.0 - 47.0 %    MCV 89 78 - 100 fL    MCH 29.7 26.5 - 33.0 pg    MCHC 33.5 31.5 - 36.5 g/dL    RDW 13.1 10.0 - 15.0 %    Platelet Count 399 150 - 450 10e3/uL   Comprehensive metabolic panel   Result Value Ref Range    Sodium 139 135 - 145 mmol/L    Potassium 3.8 3.4 - 5.3 mmol/L    Carbon Dioxide (CO2) 20 (L) 22 - 29 mmol/L    Anion Gap 14 7 - 15 mmol/L    Urea Nitrogen 6.2 6.0 - 20.0 mg/dL    Creatinine 0.77 0.51 - 0.95 mg/dL    GFR Estimate >90 >60 mL/min/1.73m2    Calcium 9.6 8.8 - 10.4 mg/dL    Chloride 105 98 - 107 mmol/L    Glucose 107 (H) 70 - 99 mg/dL    Alkaline Phosphatase 71 40 - 150 U/L    AST 27 0 - 45 U/L    ALT 14 0 - 50 U/L    Protein Total 6.6 6.4 - 8.3 g/dL    Albumin 4.1 3.5 - 5.2 g/dL    Bilirubin Total 0.4 <=1.2 mg/dL   Lipase   Result Value Ref Range    Lipase 33 13 - 60 U/L   Magnesium   Result Value Ref Range    Magnesium 1.8 1.7 - 2.3 mg/dL   Phosphorus   Result Value Ref Range    Phosphorus 2.8 2.5 - 4.5 mg/dL   Lactic acid whole blood with 1x repeat in 2 hr when >2   Result Value Ref Range    Lactic Acid, Initial 1.8 0.7 - 2.0 mmol/L   Blood gas venous   Result Value Ref Range    pH Venous 7.46 (H) 7.32 - 7.43    pCO2 Venous 32 (L) 40 - 50 mm Hg    pO2 Venous 34 25 - 47 mm Hg    Bicarbonate Venous 23 21 - 28 mmol/L    Base Excess/Deficit Venous -0.2 -3.0 - 3.0 mmol/L    FIO2 21     Oxyhemoglobin Venous 67 (L) 70 - 75 %    O2 Sat, Venous 69.0 (L) 70.0 - 75.0 %    Narrative    In healthy individuals, oxyhemoglobin (O2Hb) and oxygen saturation (SO2) are approximately equal. In the presence of dyshemoglobins, oxyhemoglobin can be considerably lower than oxygen saturation.   [place] Draw    Narrative    The following orders were created for panel order [place] Draw.  Procedure                               Abnormality         Status                     ---------                                -----------         ------                     Extra Blue Top Tube[[id number]]                              Final result               Extra Red Top Tube[[id number]]                               Final result                 Please view results for these tests on the individual orders.   Extra Blue Top Tube   Result Value Ref Range    Hold Specimen JIC    Extra Red Top Tube   Result Value Ref Range    Hold Specimen JIC    EKG 12-lead, tracing only   Result Value Ref Range    Systolic Blood Pressure  mmHg    Diastolic Blood Pressure  mmHg    Ventricular Rate 74 BPM    Atrial Rate 74 BPM    KS Interval 152 ms    QRS Duration 78 ms     ms    QTc 448 ms    P Axis 38 degrees    R AXIS 59 degrees    T Axis 41 degrees    Interpretation ECG       Sinus rhythm  Normal ECG  When compared with ECG of 02-Jun-2024 13:09,  No significant change was found         Medications   prochlorperazine (COMPAZINE) injection 10 mg (10 mg Intravenous $Given 10/19/24 1417)   dicyclomine (BENTYL) injection 20 mg (15 mg Intramuscular $Given 10/19/24 1421)   droPERidol (INAPSINE) injection 2.5 mg (2.5 mg Intravenous $Given 10/19/24 1442)   ketorolac (TORADOL) injection 30 mg (30 mg Intravenous $Given 10/19/24 1532)       Assessments & Plan (with Medical Decision Making)     I have reviewed the nursing notes.    I have reviewed the findings, diagnosis, plan and need for follow up with the patient.    Summary:  Patient presents to the ER today nausea, vomiting, diarrhea, arm pain.  Potential diagnosis which have been considered and evaluated include gastroenteritis, bowel obstruction, electrolyte abnormality, infectious diarrhea,, as well as others. Many of these have been excluded using the various modalities and assessment as noted on the chart. At the present time, the diagnosis given seems to be the most likely nausea and vomiting with abdominal pain and high anxiety.  Upon arrival, vitals signs are normal.  The  "patient is alert and oriented but very anxious on arrival.  Patient does have tremors of the arms.  Cardiac and respiratory examination normal.  Lower abdominal tenderness to palpation.  No hernia or mass.  No anterior pelvic tenderness noted.  IV established and prochlorperazine 10 mg given for nausea.  Dicyclomine 20 mg IM for spasming patient deferred full dose as \"it hurt too much\".  Lab work obtained showing WBC of 7.6 with hemoglobin 13.7.  Electrolytes, renal, hepatic functions normal.  Carbon dioxide is low at 20 with a venous pH of 7.46 and a CO2 of 32 making hyperventilation highly likely.  Lipase normal at 33.  UA not obtained.  Patient given prochlorperazine 10 mg for nausea and vomiting.  10 point dicyclomine 20 mg IM was ordered for gastric spasming as patient having metabolic panel continuous dry heaving.  Apparently patient refused dicyclomine injection to be completed due to pain.  Patient was complaining of pain because of spasm so droperidol 2.5 mg given after ECG was obtained showing no QT prolongation.  Patient had improvement of nausea after this but continued to have lower abdominal pain.  Patient given ketorolac 30 mg IV for pain.  Patient apparently wanted to leave because her significant other was leaving.  Patient signed out AGAINST MEDICAL ADVICE for this reason.  Patient was advised to return to ER if any concerns or worsening, otherwise recommended PCP follow-up.      Critical Care Time: None     Impression and plan discussed with patient. Questions answered, concerns addressed, indications for urgent re-evaluation reviewed, and  given. Patient/Parent/Caregiver agree with treatment plan and have no further questions at this time.     This note was created by the Dragon Voice Dictation System. Inadvertent typographical errors, due to software recognition problems, may still exist.             Discharge Medication List as of 10/19/2024  3:59 PM          Final diagnoses:   Nausea " and vomiting   Abdominal pain   Anxiety       10/19/2024   [facility]       Arturo Cornell, CARLITO CNP  10/19/24 1314

## 2024-10-20 LAB
ATRIAL RATE - MUSE: 74 BPM
DIASTOLIC BLOOD PRESSURE - MUSE: NORMAL MMHG
INTERPRETATION ECG - MUSE: NORMAL
P AXIS - MUSE: 38 DEGREES
PR INTERVAL - MUSE: 152 MS
QRS DURATION - MUSE: 78 MS
QT - MUSE: 404 MS
QTC - MUSE: 448 MS
R AXIS - MUSE: 59 DEGREES
SYSTOLIC BLOOD PRESSURE - MUSE: NORMAL MMHG
T AXIS - MUSE: 41 DEGREES
VENTRICULAR RATE- MUSE: 74 BPM

## 2024-10-22 DIAGNOSIS — R79.0 LOW MAGNESIUM LEVEL: ICD-10-CM

## 2024-10-22 NOTE — TELEPHONE ENCOUNTER
Magnesium Oxide      Last Written Prescription Date:  08/30/24  Last Fill Quantity: 60,   # refills: 0  Last Office Visit: 09/14/23  Future Office visit:

## 2024-10-23 RX ORDER — MAGNESIUM OXIDE 400 MG/1
TABLET ORAL
Qty: 60 TABLET | Refills: 0 | Status: SHIPPED | OUTPATIENT
Start: 2024-10-23

## 2024-10-23 NOTE — TELEPHONE ENCOUNTER
MAGNESIUM OXIDE 400 MG  Tablet         Routing refill request to provider for review/approval because:  Drug not on the [facility] or [facility] refill protocol or controlled substance

## 2024-11-18 DIAGNOSIS — R79.0 LOW MAGNESIUM LEVEL: ICD-10-CM

## 2024-11-18 NOTE — TELEPHONE ENCOUNTER
Please schedule Yearly Preventative Visit with PCP and return to refill pool.      MAGNESIUM OXIDE 400 MG  Tablet         Last Written Prescription Date:  10/23/24  Last Fill Quantity: 60,   # refills: 0  Last Office Visit: 9/14/23  Future Office visit:       Routing refill request to provider for review/approval because:  Drug not on the [facility], [facility] or [facility] refill protocol or controlled substance

## 2024-11-19 NOTE — TELEPHONE ENCOUNTER
Attempt # 1  Outcome: Left Message   Comment: LEONCIOM to schedule Physical with Adrienne Dailey

## 2024-11-26 RX ORDER — MAGNESIUM OXIDE 400 MG/1
TABLET ORAL
Qty: 60 TABLET | Refills: 0 | OUTPATIENT
Start: 2024-11-26

## 2024-12-13 DIAGNOSIS — F41.1 GAD (GENERALIZED ANXIETY DISORDER): ICD-10-CM

## 2024-12-14 RX ORDER — TEMAZEPAM 7.5 MG/1
CAPSULE ORAL
Qty: 90 CAPSULE | Refills: 3 | Status: SHIPPED | OUTPATIENT
Start: 2024-12-14

## 2024-12-14 RX ORDER — DULOXETIN HYDROCHLORIDE 30 MG/1
CAPSULE, DELAYED RELEASE ORAL
Qty: 30 CAPSULE | Refills: 11 | Status: SHIPPED | OUTPATIENT
Start: 2024-12-14

## 2025-01-08 ENCOUNTER — VIRTUAL VISIT (OUTPATIENT)
Dept: PSYCHIATRY | Facility: OTHER | Age: 45
End: 2025-01-08
Attending: PSYCHIATRY & NEUROLOGY
Payer: COMMERCIAL

## 2025-01-08 DIAGNOSIS — F41.1 GAD (GENERALIZED ANXIETY DISORDER): ICD-10-CM

## 2025-01-08 DIAGNOSIS — F90.2 ADHD (ATTENTION DEFICIT HYPERACTIVITY DISORDER), COMBINED TYPE: Primary | ICD-10-CM

## 2025-01-08 RX ORDER — METHYLPHENIDATE HYDROCHLORIDE 20 MG/1
20 CAPSULE, EXTENDED RELEASE ORAL DAILY
Qty: 30 CAPSULE | Refills: 0 | Status: SHIPPED | OUTPATIENT
Start: 2025-03-05 | End: 2025-04-04

## 2025-01-08 RX ORDER — METHYLPHENIDATE HYDROCHLORIDE 20 MG/1
20 CAPSULE, EXTENDED RELEASE ORAL DAILY
Qty: 30 CAPSULE | Refills: 0 | Status: SHIPPED | OUTPATIENT
Start: 2025-02-05 | End: 2025-03-07

## 2025-01-08 RX ORDER — HYDROXYZINE HYDROCHLORIDE 50 MG/1
50 TABLET, FILM COATED ORAL 3 TIMES DAILY PRN
Qty: 90 TABLET | Refills: 5 | Status: SHIPPED | OUTPATIENT
Start: 2025-01-08

## 2025-01-08 RX ORDER — TEMAZEPAM 7.5 MG/1
CAPSULE ORAL
Qty: 120 CAPSULE | Refills: 3 | Status: SHIPPED | OUTPATIENT
Start: 2025-01-08

## 2025-01-08 RX ORDER — METHYLPHENIDATE HYDROCHLORIDE 20 MG/1
20 CAPSULE, EXTENDED RELEASE ORAL DAILY
Qty: 30 CAPSULE | Refills: 0 | Status: SHIPPED | OUTPATIENT
Start: 2025-01-08 | End: 2025-02-07

## 2025-01-08 ASSESSMENT — PATIENT HEALTH QUESTIONNAIRE - PHQ9
5. POOR APPETITE OR OVEREATING: NEARLY EVERY DAY
SUM OF ALL RESPONSES TO PHQ QUESTIONS 1-9: 9

## 2025-01-08 ASSESSMENT — ANXIETY QUESTIONNAIRES
5. BEING SO RESTLESS THAT IT IS HARD TO SIT STILL: SEVERAL DAYS
2. NOT BEING ABLE TO STOP OR CONTROL WORRYING: MORE THAN HALF THE DAYS
3. WORRYING TOO MUCH ABOUT DIFFERENT THINGS: MORE THAN HALF THE DAYS
1. FEELING NERVOUS, ANXIOUS, OR ON EDGE: MORE THAN HALF THE DAYS
GAD7 TOTAL SCORE: 12
7. FEELING AFRAID AS IF SOMETHING AWFUL MIGHT HAPPEN: SEVERAL DAYS
GAD7 TOTAL SCORE: 12
6. BECOMING EASILY ANNOYED OR IRRITABLE: SEVERAL DAYS

## 2025-01-08 ASSESSMENT — PAIN SCALES - GENERAL: PAINLEVEL_OUTOF10: MODERATE PAIN (5)

## 2025-01-08 NOTE — PROGRESS NOTES
Virtual Visit Details    Type of service:  Telephone Visit   Phone call duration: 33 minutes   Originating Location (pt. Location): Home    Distant Location (provider location):  On-site  Telephone visit completed due to the patient did not have access to video, while the distant provider did.    Start time: 2:37  End time: 3:10     SUBJECTIVE / INTERIM HISTORY                                                                       Children-  3 kids. Yassine 13 yo Terra 22 yo  Last visit September '24: Continue temazepam 7.5 mg daily and 15 mg HS refilled.   Continue Cymbalta 90 mg daily, hydroxyzine 50 mg up to 3 times daily prn anxiety, she stopped taking naltrexone 50 mg daily.  - Ayesha notes this is new that she is having issues with gripping pen, changing diapers. 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 Has been there about one month  - watching the kids out on [employer]. Their mom is doing clinicals in [place] and Ayesha has long days: Leaves around 5:30 am and doesn't get home until around 7  - Dameon's daughter 18 yo   - Sisu their dog with anxiety   - Dameon's job opportunity in the [place]. They could end up moving there. He was promoted as supervisor.   - Terra 22 yo in [place], [employer], has a house. . Daniel just got engaged. They've been together since 10th grade.   - dad passed away on 1/27/23.   - Yassine basketball, orchestra    MEDICAL / SURGICAL HISTORY                pregnant [if applicable]--no     Patient Active Problem List   Diagnosis    Migraine with aura and without status migrainosus, not intractable    Primary insomnia    ACP (advance care planning)    Gastroesophageal reflux disease, esophagitis presence not specified    Recurrent major depressive  disorder    Tobacco abuse    Hydronephrosis    Panic disorder without agoraphobia    Attention deficit hyperactivity disorder (ADHD), combined type    Hot flashes---4/2021    History of COVID-19 - 5/11/21    Anxiety    Alcoholic intoxication without complication    Depression, unspecified depression type    Eating disorder, unspecified type    Alcohol withdrawal seizure without complication (H)    Alcohol withdrawal, uncomplicated (H)     ALLERGY   Trazodone, Dust mite extract, Gabapentin, Hydrocodone, and Penicillins  MEDICATIONS                                                                                             Current Outpatient Medications   Medication Sig Dispense Refill    COLLAGEN PO Patient states she takes liquid Collagen      DULoxetine (CYMBALTA) 30 MG capsule TAKE 1 CAPSULE DAILY ALONG WITH 60 MG CAPSULE (90 MG) 30 capsule 11    DULoxetine (CYMBALTA) 60 MG capsule Take 1 capsule (60 mg) by mouth daily. 30 capsule 11    folic acid (FOLVITE) 1 MG tablet TAKE 1 TABLET (1 MG) BY MOUTH DAILY 30 tablet 6    hydrOXYzine HCl (ATARAX) 50 MG tablet Take 1 tablet (50 mg) by mouth 3 times daily as needed for anxiety 90 tablet 5    magnesium oxide (MAG-OX) 400 MG tablet TAKE 2 TABLETS BY MOUTH IN THE EVENING. STRENGTH: 400 MG 60 tablet 0    nicotine polacrilex (NICORETTE) 4 MG gum Place 1 each (4 mg) inside cheek every hour as needed for smoking cessation 110 each 3    NONFORMULARY Take 1 tablet by mouth daily Protandim NRF-2      omega 3 1000 MG CAPS       omeprazole (PRILOSEC) 40 MG DR capsule TAKE ONE CAPSULE WITH BREAKFAST DAILY STRENGTH: 40 MG 90 capsule 3    ondansetron (ZOFRAN ODT) 4 MG ODT tab Take 4 mg by mouth      pantoprazole (PROTONIX) 40 MG EC tablet Take 40 mg by mouth      temazepam (RESTORIL) 7.5 MG capsule TAKE 2 - 3 CAPSULES BEDTIME AS NEEDED FOR INSOMNIA 90 capsule 3    vitamin B-Complex Take 1 tablet by mouth daily      Vitamin D, Cholecalciferol, 10 MCG (400 UNIT) TABS       propranolol  (INDERAL) 10 MG tablet Take 1 tablet (10 mg) by mouth 3 times daily as needed (anxiety) (Patient not taking: Reported on 1/8/2025) 90 tablet 2     No current facility-administered medications for this visit.       VITALS   LMP 05/15/2021 (Approximate)      PHQ9                     [unfilled]  LABS                                                                                                                           Last Comprehensive Metabolic Panel:  Sodium   Date Value Ref Range Status   10/19/2024 139 135 - 145 mmol/L Final   07/06/2021 140 133 - 144 mmol/L Final     Potassium   Date Value Ref Range Status   10/19/2024 3.8 3.4 - 5.3 mmol/L Final   10/27/2021 4.0 3.4 - 5.3 mmol/L Final   07/06/2021 3.0 (L) 3.4 - 5.3 mmol/L Final     Chloride   Date Value Ref Range Status   10/19/2024 105 98 - 107 mmol/L Final   10/27/2021 110 (H) 94 - 109 mmol/L Final   07/06/2021 108 94 - 109 mmol/L Final     Carbon Dioxide   Date Value Ref Range Status   07/06/2021 28 20 - 32 mmol/L Final     Carbon Dioxide (CO2)   Date Value Ref Range Status   10/19/2024 20 (L) 22 - 29 mmol/L Final   10/27/2021 29 20 - 32 mmol/L Final     Anion Gap   Date Value Ref Range Status   10/19/2024 14 7 - 15 mmol/L Final   10/27/2021 2 (L) 3 - 14 mmol/L Final   07/06/2021 4 3 - 14 mmol/L Final     Glucose   Date Value Ref Range Status   10/19/2024 107 (H) 70 - 99 mg/dL Final   10/27/2021 106 (H) 70 - 99 mg/dL Final   07/06/2021 76 70 - 99 mg/dL Final     GLUCOSE BY METER POCT   Date Value Ref Range Status   09/01/2023 88 70 - 99 mg/dL Final     Urea Nitrogen   Date Value Ref Range Status   10/19/2024 6.2 6.0 - 20.0 mg/dL Final   10/27/2021 6 (L) 7 - 30 mg/dL Final   07/06/2021 4 (L) 7 - 30 mg/dL Final     Creatinine   Date Value Ref Range Status   10/19/2024 0.77 0.51 - 0.95 mg/dL Final   07/06/2021 0.66 0.52 - 1.04 mg/dL Final     GFR Estimate   Date Value Ref Range Status   10/19/2024 >90 >60 mL/min/1.73m2 Final     Comment:     eGFR  "calculated using 2021 CKD-EPI equation.   07/06/2021 >90 >60 mL/min/[1.73_m2] Final     Comment:     Non  GFR Calc  Starting 12/18/2018, serum creatinine based estimated GFR (eGFR) will be   calculated using the Chronic Kidney Disease Epidemiology Collaboration   (CKD-EPI) equation.       Calcium   Date Value Ref Range Status   10/19/2024 9.6 8.8 - 10.4 mg/dL Final     Comment:     Reference intervals for this test were updated on 7/16/2024 to reflect our healthy population more accurately. There may be differences in the flagging of prior results with similar values performed with this method. Those prior results can be interpreted in the context of the updated reference intervals.   07/06/2021 7.9 (L) 8.5 - 10.1 mg/dL Final     Bilirubin Total   Date Value Ref Range Status   10/19/2024 0.4 <=1.2 mg/dL Final   07/06/2021 0.3 0.2 - 1.3 mg/dL Final     Alkaline Phosphatase   Date Value Ref Range Status   10/19/2024 71 40 - 150 U/L Final   07/06/2021 64 40 - 150 U/L Final     ALT   Date Value Ref Range Status   10/19/2024 14 0 - 50 U/L Final   07/06/2021 17 0 - 50 U/L Final     AST   Date Value Ref Range Status   10/19/2024 27 0 - 45 U/L Final   07/06/2021 22 0 - 45 U/L Final     Last Comprehensive Metabolic Panel:  Lab Results   Component Value Date     10/19/2024    POTASSIUM 3.8 10/19/2024    CHLORIDE 105 10/19/2024    CO2 20 (L) 10/19/2024    ANIONGAP 14 10/19/2024     (H) 10/19/2024    BUN 6.2 10/19/2024    CR 0.77 10/19/2024    GFRESTIMATED >90 10/19/2024    RAÚL 9.6 10/19/2024         MENTAL STATUS EXAM                                                                                       Speech: normal volume, rhythm, rate. Mood \"doing okay\". Thought process, including associations, was unremarkable and thought content was devoid of   homicidal ideation and psychotic thought. No SI. No hallucinations. Insight was good. Judgment was intact and adequate for safety. Fund of knowledge " was intact. Pt demonstrates no obvious problems with attention, concentration, language, recent or remote memory although these were not formally tested.     ASSESSMENT                                                                                                      HISTORICAL:  Initial psych note 3/9/18          NOTES:  Topamax: word finding issues. Gabapentin: dissociation and and akathisia. Trazodone: allergic reaction. OTC sleep meds not helping... propranolol 10 mg ineffective for insomnia.  Ayesha Lovelace is a 43 yo with MDD, MAYKEL, hx couple psychiatric hospital stays. 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 Inquires about temazepam 30 mg HS ofwhich I'm comfortable with. We are adding back in medication for ADHD given she is working more: 7 days per week in fact. We agreed on having her RTC in 3 months.    TREATMENT RISK STATEMENT:  The risks, benefits, alternatives and potential adverse effects have been explained and are understood by the pt.  The pt agrees to the treatment plan with the ability to do so.   The pt knows to call the clinic for any problems or access emergency care if needed.          DIAGNOSES                   MAYKEL  MDD, recurrent, mild  ADHD       PLAN                                                                                                                     1)  MEDICATIONS:         - Start Ritalin LA 20 mg daily filled today 1/8, 2/5 and 3/5/ Continue temazepam 7.5 mg take 3 to 4 tablets at bedtime as needed for insomnia.  Continue Cymbalta 90 mg daily, hydroxyzine 50 mg up to 3 times daily prn anxiety.     2)  THERAPY:  No Change     3)  LABS:  None today.      4)  PT MONITOR [call for probs]:  Worsening symptoms, SI/HI, SEs from meds     5)  REFERRALS [CD, medical, other]: none     6)  RTC: 3 months

## 2025-01-20 NOTE — PROGRESS NOTES
Assessment & Plan       Pain in both upper extremities  - EMG; Future  - Miscellaneous DME Supply Order (Use only if a more specific DME order does not already exist)    Pain in both wrists  - EMG; Future  - Miscellaneous DME Supply Order (Use only if a more specific DME order does not already exist)    Bilateral hand numbness  - EMG; Future  - Miscellaneous DME Supply Order (Use only if a more specific DME order does not already exist)    Encounter for screening mammogram for malignant neoplasm of breast  - MA Screen Bilateral w/Lyndon; Future        Adrienne Dailey [facility]  [phone]           Ayesha is a 44 year old, presenting for the following health issues:  Musculoskeletal Problem        Musculoskeletal problem/pain  When did you first notice your pain? 09/2024   Have you seen anyone else for your pain? No  How has your pain affected your ability to work? Pain does not limit ability to work   Where in your body do you have pain?   Onset/Duration: 09/2025  Description  Location: hand and arm - left and right  Joint Swelling: No  Redness: No  Pain: YES  Warmth: No  Intensity:  moderate  Progression of Symptoms:  worsening  Accompanying signs and symptoms:   Fevers: No  Numbness/tingling/weakness: YES  History  Trauma to the area: No  Recent illness:  No  Previous similar problem: No  Previous evaluation:  No  Precipitating or alleviating factors:  Aggravating factors include: lifting and overuse  Therapies tried and outcome: acetaminophen and Ibuprofen        Reduced  - both hands  Numbness and tingling both hands      Patient Active Problem List   Diagnosis    Migraine with aura and without status migrainosus, not intractable    Primary insomnia    ACP (advance care planning)    Gastroesophageal reflux disease, esophagitis presence not specified    Recurrent major depressive disorder    Tobacco abuse    Hydronephrosis    Panic disorder without agoraphobia    Attention deficit hyperactivity disorder (ADHD),  combined type    Hot flashes---2021    History of COVID-19 - 21    Anxiety    Alcoholic intoxication without complication    Depression, unspecified depression type    Eating disorder, unspecified type    Alcohol withdrawal seizure without complication (H)    Alcohol withdrawal, uncomplicated (H)     Past Surgical History:   Procedure Laterality Date     SECTION N/A 2000     SECTION N/A 2012    postop hemm with ruptured R Ov vein, transfusion    CHOLECYSTECTOMY      COLONOSCOPY N/A 2019    Procedure: COLONOSCOPY DIAGNOSTIC WITH RANDOM COLON BIOPSIES ANOSCOPY; Surgeon: Igor Burgos MD; Location: [facility] OR      COLPOSCOPY CERVIX, BIOPSY CERVIX, ENDOCERVICAL CURETTAGE, COMBINED N/A 2008    DILATION AND CURETTAGE, ABLATE ENDOMETRIUM NOVASURE, COMBINED N/A 2018    Novasure Endometrial ablation    ESOPHAGOSCOPY, GASTROSCOPY, DUODENOSCOPY (EGD), COMBINED  2019    Procedure: ESOPHAGOGASTRODUODENOSCOPY DIAGNOSTIC with biopsies; Surgeon: Igor Burgos MD; Location: [facility] OR      HYSTEROSCOPY DIAGNOSTIC N/A 2018    Hysteroscopy, D&C    LAPAROSCOPIC APPENDECTOMY N/A 2012    LAPAROSCOPIC HYSTERECTOMY SUPRACERVICAL N/A 2021    Procedure: laparoscopic supracervical hysterectomy;  Surgeon: Rito Soler MD;  Location: [facility] OR    OOPHORECTOMY Right 2012    post operative hemorrhage with ruptured ovarian vein,     SALPINGECTOMY Left 2019    Procedure: LAPAROSCOPY LEFT SALPINGECTOMY; Surgeon: Regina Sweeney MD; Location: [facility] OR         Social History     Tobacco Use    Smoking status: Every Day     Current packs/day: 0.00     Average packs/day: 0.4 packs/day for 10.0 years (4.0 ttl pk-yrs)     Types: Cigarettes     Start date: 2010     Last attempt to quit: 2020     Years since quittin.9     Passive exposure: Current    Smokeless tobacco: Never    Tobacco comments:     tobacco cessation discuseed - tried to quit: no -  passive smoke exposure quitting on own      Patient is working on smoking cessation with the gum. 6-10-24   Substance Use Topics    Alcohol use: No     Family History   Problem Relation Age of Onset    Hypertension Mother     Hyperlipidemia Mother     Cardiovascular Father     Hypertension Father     Hyperlipidemia Father     Heart Failure Father     Sleep Apnea Father     Kidney failure Father            Current Outpatient Medications   Medication Sig Dispense Refill    COLLAGEN PO Patient states she takes liquid Collagen      DULoxetine (CYMBALTA) 30 MG capsule TAKE 1 CAPSULE DAILY ALONG WITH 60 MG CAPSULE (90 MG) 30 capsule 11    DULoxetine (CYMBALTA) 60 MG capsule Take 1 capsule (60 mg) by mouth daily. 30 capsule 11    folic acid (FOLVITE) 1 MG tablet TAKE 1 TABLET (1 MG) BY MOUTH DAILY 30 tablet 6    hydrOXYzine HCl (ATARAX) 50 MG tablet Take 1 tablet (50 mg) by mouth 3 times daily as needed for anxiety. 90 tablet 5    magnesium oxide (MAG-OX) 400 MG tablet TAKE 2 TABLETS BY MOUTH IN THE EVENING. STRENGTH: 400 MG 60 tablet 0    methylphenidate (RITALIN LA) 20 MG 24 hr capsule Take 1 capsule (20 mg) by mouth daily. 30 capsule 0    [START ON 2/5/2025] methylphenidate (RITALIN LA) 20 MG 24 hr capsule Take 1 capsule (20 mg) by mouth daily. 30 capsule 0    [START ON 3/5/2025] methylphenidate (RITALIN LA) 20 MG 24 hr capsule Take 1 capsule (20 mg) by mouth daily. 30 capsule 0    nicotine polacrilex (NICORETTE) 4 MG gum Place 1 each (4 mg) inside cheek every hour as needed for smoking cessation 110 each 3    NONFORMULARY Take 1 tablet by mouth daily Protandim NRF-2      omega 3 1000 MG CAPS       omeprazole (PRILOSEC) 40 MG DR capsule TAKE ONE CAPSULE WITH BREAKFAST DAILY STRENGTH: 40 MG 90 capsule 3    ondansetron (ZOFRAN ODT) 4 MG ODT tab Take 4 mg by mouth      pantoprazole (PROTONIX) 40 MG EC tablet Take 40 mg by mouth      temazepam (RESTORIL) 7.5 MG capsule Take 3 - 4 capsules bedtime as needed for insomnia 120  capsule 3    vitamin B-Complex Take 1 tablet by mouth daily      Vitamin D, Cholecalciferol, 10 MCG (400 UNIT) TABS              Allergies   Allergen Reactions    Trazodone Swelling     Also very congested     Dust Mite Extract     Gabapentin Other (See Comments)     Feels disassociated.     Hydrocodone Other (See Comments) and Itching     Skin felt like it was burning.    Penicillins Rash         Recent Labs   Lab Test 10/19/24  1411 06/02/24  1332 09/14/23  1430 09/01/23  0444 08/31/23  1828 03/13/23  1205 09/07/21  1500 07/06/21  0406 06/01/21  1011   A1C  --   --   --   --   --   --   --   --  5.1   ALT 14 10  --  18   < > 11   < > 17 19   CR 0.77 0.72  --  0.79   < > 0.73   < > 0.66 0.80   GFRESTIMATED >90 >90  --  >90   < > >90   < > >90 >90   GFRESTBLACK  --   --   --   --   --   --   --  >90 >90   POTASSIUM 3.8 4.2  --  5.0   < > 3.2*   < > 3.0* 3.9   TSH  --   --  0.83  --   --  0.74  --   --   --     < > = values in this interval not displayed.            BP Readings from Last 3 Encounters:   01/21/25 130/62   10/19/24 125/90   06/02/24 132/86    Wt Readings from Last 3 Encounters:   01/21/25 62.6 kg (138 lb)   10/19/24 58.1 kg (128 lb 1.4 oz)   09/14/23 54.2 kg (119 lb 6.4 oz)                    Review of Systems  Constitutional, neuro, ENT, endocrine, pulmonary, cardiac, gastrointestinal, genitourinary, musculoskeletal, integument and psychiatric systems are negative, except as otherwise noted.          Objective    /62 (BP Location: Left arm, Patient Position: Sitting, Cuff Size: Adult Regular)   Pulse 103   Temp 97.7  F (36.5  C) (Tympanic)   Resp 18   Wt 62.6 kg (138 lb)   LMP 05/15/2021 (Approximate)   SpO2 98%   Breastfeeding No   BMI 23.69 kg/m    Body mass index is 23.69 kg/m .        Physical Exam   GENERAL: alert and no distress  RESP: lungs clear to auscultation - no rales, rhonchi or wheezes  CV: regular rate and rhythm, normal S1 S2, no S3 or S4, no murmur, click or rub, no  peripheral edema  MS: bilateral hand numbness, wrist and forearm pain        The longitudinal plan of care for the diagnosis(es)/condition(s) as documented were addressed during this visit. Due to the added complexity in care, I will continue to support Ayesha in the subsequent management and with ongoing continuity of care.           Signed Electronically by: Adrienne Dailey CNP

## 2025-01-21 ENCOUNTER — OFFICE VISIT (OUTPATIENT)
Dept: FAMILY MEDICINE | Facility: OTHER | Age: 45
End: 2025-01-21
Attending: NURSE PRACTITIONER
Payer: COMMERCIAL

## 2025-01-21 VITALS
SYSTOLIC BLOOD PRESSURE: 130 MMHG | HEART RATE: 103 BPM | BODY MASS INDEX: 23.69 KG/M2 | WEIGHT: 138 LBS | DIASTOLIC BLOOD PRESSURE: 62 MMHG | RESPIRATION RATE: 18 BRPM | TEMPERATURE: 97.7 F | OXYGEN SATURATION: 98 %

## 2025-01-21 DIAGNOSIS — M25.531 PAIN IN BOTH WRISTS: ICD-10-CM

## 2025-01-21 DIAGNOSIS — R20.0 BILATERAL HAND NUMBNESS: ICD-10-CM

## 2025-01-21 DIAGNOSIS — M79.601 PAIN IN BOTH UPPER EXTREMITIES: Primary | ICD-10-CM

## 2025-01-21 DIAGNOSIS — M25.532 PAIN IN BOTH WRISTS: ICD-10-CM

## 2025-01-21 DIAGNOSIS — M79.602 PAIN IN BOTH UPPER EXTREMITIES: Primary | ICD-10-CM

## 2025-01-21 DIAGNOSIS — Z12.31 ENCOUNTER FOR SCREENING MAMMOGRAM FOR MALIGNANT NEOPLASM OF BREAST: ICD-10-CM

## 2025-01-21 PROCEDURE — G0463 HOSPITAL OUTPT CLINIC VISIT: HCPCS

## 2025-01-21 ASSESSMENT — PATIENT HEALTH QUESTIONNAIRE - PHQ9
SUM OF ALL RESPONSES TO PHQ QUESTIONS 1-9: 4
10. IF YOU CHECKED OFF ANY PROBLEMS, HOW DIFFICULT HAVE THESE PROBLEMS MADE IT FOR YOU TO DO YOUR WORK, TAKE CARE OF THINGS AT HOME, OR GET ALONG WITH OTHER PEOPLE: SOMEWHAT DIFFICULT
SUM OF ALL RESPONSES TO PHQ QUESTIONS 1-9: 4

## 2025-01-21 ASSESSMENT — PAIN SCALES - GENERAL: PAINLEVEL_OUTOF10: MODERATE PAIN (4)

## 2025-01-21 NOTE — PATIENT INSTRUCTIONS
Assessment & Plan       Pain in both upper extremities  - EMG; Future  - Miscellaneous DME Supply Order (Use only if a more specific DME order does not already exist)    Pain in both wrists  - EMG; Future  - Miscellaneous DME Supply Order (Use only if a more specific DME order does not already exist)    Bilateral hand numbness  - EMG; Future  - Miscellaneous DME Supply Order (Use only if a more specific DME order does not already exist)    Encounter for screening mammogram for malignant neoplasm of breast  - MA Screen Bilateral w/Lyndon; Future        [facility]  [phone]

## 2025-02-05 DIAGNOSIS — F90.2 ADHD (ATTENTION DEFICIT HYPERACTIVITY DISORDER), COMBINED TYPE: ICD-10-CM

## 2025-02-05 NOTE — TELEPHONE ENCOUNTER
Methylphenidate (Ritalin LA) 20 mg 24 hr capsule  Take 1 capsule (20 mg) by mouth daily     Last Written Prescription Date:  1-8-25  Last Fill Quantity: 30 capsule,   # refills: 0  Last Office Visit: 1-8-25  Future Office visit:    Next 5 appointments (look out 90 days)      Mar 07, 2025 3:00 PM  (Arrive by 2:45 PM)  Adult Preventative Visit with Adrienne Dailey CNP  [facility] ([facility] ) [address]  [facility] [address]  [phone]

## 2025-02-06 RX ORDER — METHYLPHENIDATE HCL 20 MG
20 CAPSULE,EXTENDED RELEASE BIPHASIC 50-50 ORAL DAILY
Qty: 30 CAPSULE | Refills: 0 | Status: SHIPPED | OUTPATIENT
Start: 2025-02-06

## 2025-02-13 ENCOUNTER — TRANSFERRED RECORDS (OUTPATIENT)
Dept: HEALTH INFORMATION MANAGEMENT | Facility: CLINIC | Age: 45
End: 2025-02-13

## 2025-03-09 ENCOUNTER — HEALTH MAINTENANCE LETTER (OUTPATIENT)
Age: 45
End: 2025-03-09

## 2025-03-10 NOTE — TELEPHONE ENCOUNTER
Cymbalta  Last Written Prescription Date: 9/23/24  Last Fill Quantity: 30 # of Refills: 11  Last Office Visit: 7/23/24    Temazepam  Last Written Prescription Date: 9/23/24  Last Fill Quantity: 90 # of Refills: 3  Last Office Visit: 7/23/24  
n/a

## 2025-03-20 DIAGNOSIS — F90.2 ADHD (ATTENTION DEFICIT HYPERACTIVITY DISORDER), COMBINED TYPE: ICD-10-CM

## 2025-03-21 NOTE — TELEPHONE ENCOUNTER
Ritalin LA      Last Written Prescription Date:  2/6/25  Last Fill Quantity: 30,   # refills: 0  Last Office Visit: 1/21/25  Future Office visit:    Next 5 appointments (look out 90 days)      Mar 28, 2025 3:30 PM  (Arrive by 3:15 PM)  Adult Preventative Visit with Adrienne Dailey CNP  [facility] ([facility] ) [address]  [facility] [address]  [phone]             Routing refill request to provider for review/approval because:

## 2025-03-26 RX ORDER — METHYLPHENIDATE HCL 20 MG
20 CAPSULE,EXTENDED RELEASE BIPHASIC 50-50 ORAL DAILY
Qty: 30 CAPSULE | Refills: 0 | Status: SHIPPED | OUTPATIENT
Start: 2025-03-26

## 2025-03-29 DIAGNOSIS — F41.1 GAD (GENERALIZED ANXIETY DISORDER): ICD-10-CM

## 2025-03-31 NOTE — TELEPHONE ENCOUNTER
Temazepam      Last Written Prescription Date:  1/8/25  Last Fill Quantity: 120,   # refills: 3  Last Office Visit: 1/8/25  Future Office visit:       Routing refill request to provider for review/approval because:

## 2025-04-01 RX ORDER — TEMAZEPAM 7.5 MG/1
CAPSULE ORAL
Qty: 120 CAPSULE | Refills: 3 | Status: SHIPPED | OUTPATIENT
Start: 2025-04-28

## 2025-04-14 ENCOUNTER — VIRTUAL VISIT (OUTPATIENT)
Dept: PSYCHIATRY | Facility: OTHER | Age: 45
End: 2025-04-14
Attending: PSYCHIATRY & NEUROLOGY
Payer: COMMERCIAL

## 2025-04-14 DIAGNOSIS — F41.1 GAD (GENERALIZED ANXIETY DISORDER): Primary | ICD-10-CM

## 2025-04-14 DIAGNOSIS — F90.2 ADHD (ATTENTION DEFICIT HYPERACTIVITY DISORDER), COMBINED TYPE: ICD-10-CM

## 2025-04-14 RX ORDER — MIRTAZAPINE 7.5 MG/1
7.5 TABLET, FILM COATED ORAL AT BEDTIME
Qty: 30 TABLET | Refills: 5 | Status: SHIPPED | OUTPATIENT
Start: 2025-04-14

## 2025-04-14 RX ORDER — METHYLPHENIDATE HYDROCHLORIDE 20 MG/1
20 CAPSULE, EXTENDED RELEASE ORAL DAILY
Qty: 30 CAPSULE | Refills: 0 | Status: SHIPPED | OUTPATIENT
Start: 2025-06-18 | End: 2025-07-18

## 2025-04-14 RX ORDER — METHYLPHENIDATE HYDROCHLORIDE 20 MG/1
20 CAPSULE, EXTENDED RELEASE ORAL DAILY
Qty: 30 CAPSULE | Refills: 0 | Status: SHIPPED | OUTPATIENT
Start: 2025-04-23 | End: 2025-05-23

## 2025-04-14 RX ORDER — METHYLPHENIDATE HYDROCHLORIDE 20 MG/1
20 CAPSULE, EXTENDED RELEASE ORAL DAILY
Qty: 30 CAPSULE | Refills: 0 | Status: SHIPPED | OUTPATIENT
Start: 2025-05-21 | End: 2025-06-20
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
## 2025-08-05 ENCOUNTER — TELEPHONE (OUTPATIENT)
Dept: PSYCHIATRY | Facility: OTHER | Age: 45
End: 2025-08-05

## 2025-08-05 DIAGNOSIS — F41.1 GAD (GENERALIZED ANXIETY DISORDER): ICD-10-CM

## 2025-08-05 DIAGNOSIS — K21.9 GASTROESOPHAGEAL REFLUX DISEASE WITHOUT ESOPHAGITIS: ICD-10-CM

## 2025-08-06 RX ORDER — OMEPRAZOLE 40 MG/1
CAPSULE, DELAYED RELEASE ORAL
Qty: 90 CAPSULE | Refills: 3 | Status: SHIPPED | OUTPATIENT
Start: 2025-08-06

## 2025-08-06 RX ORDER — TEMAZEPAM 7.5 MG/1
CAPSULE ORAL
Qty: 120 CAPSULE | Refills: 0 | Status: SHIPPED | OUTPATIENT
Start: 2025-08-06

## 2025-08-11 ENCOUNTER — VIRTUAL VISIT (OUTPATIENT)
Dept: PSYCHIATRY | Facility: OTHER | Age: 45
End: 2025-08-11
Attending: PSYCHIATRY & NEUROLOGY
Payer: COMMERCIAL

## 2025-08-11 DIAGNOSIS — F33.0 MAJOR DEPRESSIVE DISORDER, RECURRENT EPISODE, MILD: Primary | ICD-10-CM

## 2025-08-11 DIAGNOSIS — F41.1 GAD (GENERALIZED ANXIETY DISORDER): ICD-10-CM

## 2025-08-11 RX ORDER — DULOXETIN HYDROCHLORIDE 60 MG/1
60 CAPSULE, DELAYED RELEASE ORAL DAILY
Qty: 30 CAPSULE | Refills: 11 | Status: SHIPPED | OUTPATIENT
Start: 2025-08-11

## 2025-08-11 RX ORDER — DULOXETIN HYDROCHLORIDE 30 MG/1
CAPSULE, DELAYED RELEASE ORAL
Qty: 30 CAPSULE | Refills: 11 | Status: SHIPPED | OUTPATIENT
Start: 2025-08-11

## 2025-08-11 RX ORDER — TEMAZEPAM 7.5 MG/1
CAPSULE ORAL
Qty: 120 CAPSULE | Refills: 4 | Status: SHIPPED | OUTPATIENT
Start: 2025-08-11

## 2025-08-13 ENCOUNTER — TELEPHONE (OUTPATIENT)
Dept: PSYCHIATRY | Facility: OTHER | Age: 45
End: 2025-08-13

## (undated) DEVICE — TROCAR SLEEVE-KII 5X100MM

## (undated) DEVICE — LINA LOOP 5MM MONO 160MM X 80MM

## (undated) DEVICE — LABEL-STERILE PREPRINTED FOR OR

## (undated) DEVICE — SUTURE-VICRYL 3-0 SH J416H

## (undated) DEVICE — LIGASURE-5MM BLUNT TIP LAPAROSCOPIC

## (undated) DEVICE — TUBING-SEECLEAR LAPAROSCOPIC SMOKE EVACUATION

## (undated) DEVICE — TRAY-SKIN PREP POVIDONE/IODINE

## (undated) DEVICE — DRAPE-STERI 45X60CM #1010

## (undated) DEVICE — SCD SLEEVE-KNEE REG.

## (undated) DEVICE — PACK-LAP LAVH-CUSTOM

## (undated) DEVICE — TUBING-SUCTION 20FT

## (undated) DEVICE — IRRIGATION-NACL 1000ML

## (undated) DEVICE — CAUTERY PAD-POLYHESIVE II ADULT

## (undated) DEVICE — SUTURE-PDS II 0 CT-1 Z346H

## (undated) DEVICE — GLV-7.0 PROTEXIS PI CLASSIC LF/PF

## (undated) DEVICE — NDL COUNTER-20-40 CT MAGNET/FOAM BLOCK

## (undated) DEVICE — FLUID TRAP FOR VIROSAFE FILTERS

## (undated) DEVICE — SUCTION-IRRIGATION STRYKEFLOW II (STRYKER)

## (undated) DEVICE — CATH TRAY-16FR METER W/STATLOCK LATEX]

## (undated) DEVICE — IRRIGATION-H2O 1000ML

## (undated) DEVICE — CLEARIFY VISUALIZATION SYSTEM (SCOPE WARMER)

## (undated) DEVICE — Device

## (undated) DEVICE — DRSG-SPONGE X-RAY 4 X 4

## (undated) DEVICE — NDL-INSUFFLATION 120MM

## (undated) DEVICE — CAUTERY TIP CLEANER

## (undated) DEVICE — BLADE-SCALPEL #10

## (undated) DEVICE — BLANKET-BAIR UPPER BODY

## (undated) DEVICE — SUTURE-MONOCRYL 3-0 PS-1 Y936H

## (undated) DEVICE — APPLICATOR-CHLORAPREP 26ML TINTED CHG 2%+ 70% IPA-SURGICAL

## (undated) DEVICE — TROCAR-5X100MM BLADED W/FIXATION

## (undated) DEVICE — TUBING-INSUFFLATION/LAPAROFLATOR W/FILTER

## (undated) DEVICE — SPATULA TIP FOR SUCTION IRRIGATION PROBE

## (undated) DEVICE — SCISSOR-ENDOPATH 5MM CURVED

## (undated) DEVICE — MARKER-SKIN REG

## (undated) DEVICE — LIGHT HANDLE COVER

## (undated) DEVICE — TOPICAL SKIN ADHESIVE EXOFIN

## (undated) DEVICE — SPONGE-LAPAROTOMY PADS 18 X 18

## (undated) DEVICE — PACK-BASIN SET-UP

## (undated) DEVICE — CORD-LAPAROSCOPIC MONOPOLAR-DISPOSABLE

## (undated) DEVICE — SUTURE-VICRYL 0 TIE J906G

## (undated) DEVICE — LUBRICANT JELLY 2OZ. TUBE

## (undated) DEVICE — GLV-7.5 BIOGEL LATEX

## (undated) DEVICE — INZII RETRIEVAL SYSTEM-10MM

## (undated) RX ORDER — KETAMINE HCL IN NACL, ISO-OSM 100MG/10ML
SYRINGE (ML) INJECTION
Status: DISPENSED
Start: 2021-06-11

## (undated) RX ORDER — FENTANYL CITRATE 50 UG/ML
INJECTION, SOLUTION INTRAMUSCULAR; INTRAVENOUS
Status: DISPENSED
Start: 2021-06-11

## (undated) RX ORDER — ONDANSETRON 2 MG/ML
INJECTION INTRAMUSCULAR; INTRAVENOUS
Status: DISPENSED
Start: 2021-06-11

## (undated) RX ORDER — LIDOCAINE HYDROCHLORIDE 20 MG/ML
INJECTION, SOLUTION EPIDURAL; INFILTRATION; INTRACAUDAL; PERINEURAL
Status: DISPENSED
Start: 2021-06-11

## (undated) RX ORDER — PROPOFOL 10 MG/ML
INJECTION, EMULSION INTRAVENOUS
Status: DISPENSED
Start: 2021-06-11

## (undated) RX ORDER — NEOSTIGMINE METHYLSULFATE 1 MG/ML
VIAL (ML) INJECTION
Status: DISPENSED
Start: 2021-06-11

## (undated) RX ORDER — DEXAMETHASONE SODIUM PHOSPHATE 10 MG/ML
INJECTION, SOLUTION INTRAMUSCULAR; INTRAVENOUS
Status: DISPENSED
Start: 2021-06-11

## (undated) RX ORDER — GLYCOPYRROLATE 0.2 MG/ML
INJECTION, SOLUTION INTRAMUSCULAR; INTRAVENOUS
Status: DISPENSED
Start: 2021-06-11